# Patient Record
Sex: MALE | Race: WHITE | NOT HISPANIC OR LATINO | Employment: FULL TIME | URBAN - METROPOLITAN AREA
[De-identification: names, ages, dates, MRNs, and addresses within clinical notes are randomized per-mention and may not be internally consistent; named-entity substitution may affect disease eponyms.]

---

## 2017-01-11 ENCOUNTER — CLINICAL SUPPORT (OUTPATIENT)
Dept: INTERNAL MEDICINE | Facility: CLINIC | Age: 52
End: 2017-01-11
Payer: COMMERCIAL

## 2017-01-11 DIAGNOSIS — E29.1 OTHER TESTICULAR HYPOFUNCTION: Primary | ICD-10-CM

## 2017-01-11 DIAGNOSIS — R79.89 LOW TESTOSTERONE: ICD-10-CM

## 2017-01-11 PROCEDURE — 99999 PR PBB SHADOW E&M-EST. PATIENT-LVL II: CPT | Mod: PBBFAC,,,

## 2017-01-11 PROCEDURE — 96372 THER/PROPH/DIAG INJ SC/IM: CPT | Mod: S$GLB,,, | Performed by: FAMILY MEDICINE

## 2017-01-11 RX ORDER — TESTOSTERONE CYPIONATE 200 MG/ML
200 INJECTION, SOLUTION INTRAMUSCULAR
Status: ACTIVE | OUTPATIENT
Start: 2017-01-12 | End: 2017-06-29

## 2017-01-11 RX ADMIN — TESTOSTERONE CYPIONATE 200 MG: 200 INJECTION, SOLUTION INTRAMUSCULAR at 04:01

## 2017-01-25 ENCOUNTER — CLINICAL SUPPORT (OUTPATIENT)
Dept: INTERNAL MEDICINE | Facility: CLINIC | Age: 52
End: 2017-01-25
Payer: COMMERCIAL

## 2017-01-25 DIAGNOSIS — R79.89 LOW TESTOSTERONE: Primary | ICD-10-CM

## 2017-01-25 PROCEDURE — 99999 PR PBB SHADOW E&M-EST. PATIENT-LVL II: CPT | Mod: PBBFAC,,,

## 2017-01-25 PROCEDURE — 96372 THER/PROPH/DIAG INJ SC/IM: CPT | Mod: S$GLB,,, | Performed by: FAMILY MEDICINE

## 2017-01-25 RX ADMIN — TESTOSTERONE CYPIONATE 200 MG: 200 INJECTION, SOLUTION INTRAMUSCULAR at 04:01

## 2017-01-25 NOTE — PROGRESS NOTES
Administered Depotestosterone 200mg/ml IM left Vastus Lateralius per Dr. Schwartz. Advised to remain in lobby 15 min to monitor for adverse reaction. Tolerated well/TGD

## 2017-02-08 ENCOUNTER — CLINICAL SUPPORT (OUTPATIENT)
Dept: INTERNAL MEDICINE | Facility: CLINIC | Age: 52
End: 2017-02-08
Payer: COMMERCIAL

## 2017-02-08 DIAGNOSIS — E34.9 TESTOSTERONE DEFICIENCY: Primary | ICD-10-CM

## 2017-02-08 PROCEDURE — 96372 THER/PROPH/DIAG INJ SC/IM: CPT | Mod: S$GLB,,, | Performed by: FAMILY MEDICINE

## 2017-02-08 PROCEDURE — 99999 PR PBB SHADOW E&M-EST. PATIENT-LVL II: CPT | Mod: PBBFAC,,,

## 2017-02-08 RX ADMIN — TESTOSTERONE CYPIONATE 200 MG: 200 INJECTION, SOLUTION INTRAMUSCULAR at 04:02

## 2017-02-08 NOTE — PROGRESS NOTES
Testosterone 200 mg given IM right vastus lateralis , pt tolerated well. Do to his schedule change he is requesting to do his own injections

## 2017-02-08 NOTE — TELEPHONE ENCOUNTER
Pt's schedule has changed and he is back to working at an hourly rate. He has to take off a couple of hours early and that is not good. He is requesting to give himself the injections . Okay to send to the pharmacy ?

## 2017-02-09 RX ORDER — TESTOSTERONE CYPIONATE 200 MG/ML
200 INJECTION, SOLUTION INTRAMUSCULAR
Qty: 10 ML | Refills: 0 | Status: SHIPPED | OUTPATIENT
Start: 2017-02-09 | End: 2017-09-06 | Stop reason: SDUPTHER

## 2017-04-07 RX ORDER — POTASSIUM CHLORIDE 750 MG/1
TABLET, EXTENDED RELEASE ORAL
Qty: 90 TABLET | Refills: 3 | Status: SHIPPED | OUTPATIENT
Start: 2017-04-07 | End: 2017-04-24 | Stop reason: SDUPTHER

## 2017-04-17 ENCOUNTER — LAB VISIT (OUTPATIENT)
Dept: LAB | Facility: HOSPITAL | Age: 52
End: 2017-04-17
Attending: FAMILY MEDICINE
Payer: COMMERCIAL

## 2017-04-17 DIAGNOSIS — M1A.0790 IDIOPATHIC CHRONIC GOUT OF FOOT WITHOUT TOPHUS, UNSPECIFIED LATERALITY: ICD-10-CM

## 2017-04-17 DIAGNOSIS — E78.2 MIXED HYPERLIPIDEMIA: ICD-10-CM

## 2017-04-17 DIAGNOSIS — R79.89 LOW TESTOSTERONE: ICD-10-CM

## 2017-04-17 PROCEDURE — 84550 ASSAY OF BLOOD/URIC ACID: CPT

## 2017-04-17 PROCEDURE — 80053 COMPREHEN METABOLIC PANEL: CPT

## 2017-04-17 PROCEDURE — 84403 ASSAY OF TOTAL TESTOSTERONE: CPT

## 2017-04-17 PROCEDURE — 36415 COLL VENOUS BLD VENIPUNCTURE: CPT | Mod: PO

## 2017-04-17 PROCEDURE — 80061 LIPID PANEL: CPT

## 2017-04-17 PROCEDURE — 84153 ASSAY OF PSA TOTAL: CPT

## 2017-04-18 LAB
ALBUMIN SERPL BCP-MCNC: 3.9 G/DL
ALP SERPL-CCNC: 63 U/L
ALT SERPL W/O P-5'-P-CCNC: 24 U/L
ANION GAP SERPL CALC-SCNC: 9 MMOL/L
AST SERPL-CCNC: 19 U/L
BILIRUB SERPL-MCNC: 0.4 MG/DL
BUN SERPL-MCNC: 17 MG/DL
CALCIUM SERPL-MCNC: 8.9 MG/DL
CHLORIDE SERPL-SCNC: 101 MMOL/L
CHOLEST/HDLC SERPL: 3.6 {RATIO}
CO2 SERPL-SCNC: 29 MMOL/L
COMPLEXED PSA SERPL-MCNC: 0.47 NG/ML
CREAT SERPL-MCNC: 1 MG/DL
EST. GFR  (AFRICAN AMERICAN): >60 ML/MIN/1.73 M^2
EST. GFR  (NON AFRICAN AMERICAN): >60 ML/MIN/1.73 M^2
GLUCOSE SERPL-MCNC: 104 MG/DL
HDL/CHOLESTEROL RATIO: 27.4 %
HDLC SERPL-MCNC: 164 MG/DL
HDLC SERPL-MCNC: 45 MG/DL
LDLC SERPL CALC-MCNC: 58.8 MG/DL
NONHDLC SERPL-MCNC: 119 MG/DL
POTASSIUM SERPL-SCNC: 4.1 MMOL/L
PROT SERPL-MCNC: 7.3 G/DL
SODIUM SERPL-SCNC: 139 MMOL/L
TESTOST SERPL-MCNC: 314 NG/DL
TRIGL SERPL-MCNC: 301 MG/DL
URATE SERPL-MCNC: 6.8 MG/DL

## 2017-04-24 ENCOUNTER — OFFICE VISIT (OUTPATIENT)
Dept: INTERNAL MEDICINE | Facility: CLINIC | Age: 52
End: 2017-04-24
Payer: COMMERCIAL

## 2017-04-24 VITALS
BODY MASS INDEX: 32 KG/M2 | OXYGEN SATURATION: 95 % | SYSTOLIC BLOOD PRESSURE: 124 MMHG | WEIGHT: 216.06 LBS | TEMPERATURE: 98 F | HEART RATE: 72 BPM | HEIGHT: 69 IN | DIASTOLIC BLOOD PRESSURE: 76 MMHG

## 2017-04-24 DIAGNOSIS — Z78.9: ICD-10-CM

## 2017-04-24 DIAGNOSIS — G57.11 MERALGIA PARAESTHETICA, RIGHT: ICD-10-CM

## 2017-04-24 DIAGNOSIS — R79.89 LOW TESTOSTERONE: Primary | ICD-10-CM

## 2017-04-24 DIAGNOSIS — E78.2 MIXED HYPERLIPIDEMIA: ICD-10-CM

## 2017-04-24 DIAGNOSIS — Z12.11 COLON CANCER SCREENING: ICD-10-CM

## 2017-04-24 DIAGNOSIS — M1A.0710 IDIOPATHIC CHRONIC GOUT OF RIGHT FOOT WITHOUT TOPHUS: ICD-10-CM

## 2017-04-24 PROCEDURE — 1160F RVW MEDS BY RX/DR IN RCRD: CPT | Mod: S$GLB,,, | Performed by: FAMILY MEDICINE

## 2017-04-24 PROCEDURE — 3078F DIAST BP <80 MM HG: CPT | Mod: S$GLB,,, | Performed by: FAMILY MEDICINE

## 2017-04-24 PROCEDURE — 99214 OFFICE O/P EST MOD 30 MIN: CPT | Mod: S$GLB,,, | Performed by: FAMILY MEDICINE

## 2017-04-24 PROCEDURE — 3074F SYST BP LT 130 MM HG: CPT | Mod: S$GLB,,, | Performed by: FAMILY MEDICINE

## 2017-04-24 PROCEDURE — 99999 PR PBB SHADOW E&M-EST. PATIENT-LVL III: CPT | Mod: PBBFAC,,, | Performed by: FAMILY MEDICINE

## 2017-04-24 RX ORDER — ALLOPURINOL 100 MG/1
100 TABLET ORAL DAILY
Qty: 90 TABLET | Refills: 3 | Status: SHIPPED | OUTPATIENT
Start: 2017-04-24 | End: 2017-10-31 | Stop reason: SDUPTHER

## 2017-04-24 RX ORDER — TADALAFIL 20 MG/1
20 TABLET ORAL
Qty: 15 TABLET | Refills: 3 | Status: SHIPPED | OUTPATIENT
Start: 2017-04-24 | End: 2019-12-02

## 2017-04-24 RX ORDER — POTASSIUM CHLORIDE 750 MG/1
TABLET, EXTENDED RELEASE ORAL
Qty: 90 TABLET | Refills: 3 | Status: SHIPPED | OUTPATIENT
Start: 2017-04-24 | End: 2017-10-31 | Stop reason: SDUPTHER

## 2017-04-24 RX ORDER — HYDRALAZINE HYDROCHLORIDE 50 MG/1
50 TABLET, FILM COATED ORAL 3 TIMES DAILY
Qty: 270 TABLET | Refills: 3 | Status: SHIPPED | OUTPATIENT
Start: 2017-04-24 | End: 2017-10-31 | Stop reason: SDUPTHER

## 2017-04-24 RX ORDER — AMLODIPINE AND BENAZEPRIL HYDROCHLORIDE 10; 40 MG/1; MG/1
1 CAPSULE ORAL DAILY
Qty: 90 CAPSULE | Refills: 3 | Status: SHIPPED | OUTPATIENT
Start: 2017-04-24 | End: 2017-10-31 | Stop reason: SDUPTHER

## 2017-04-24 RX ORDER — FLUTICASONE PROPIONATE 50 MCG
1 SPRAY, SUSPENSION (ML) NASAL DAILY
Qty: 3 BOTTLE | Refills: 3 | Status: SHIPPED | OUTPATIENT
Start: 2017-04-24 | End: 2017-10-31 | Stop reason: SDUPTHER

## 2017-04-24 RX ORDER — SERTRALINE HYDROCHLORIDE 50 MG/1
50 TABLET, FILM COATED ORAL DAILY
Qty: 90 TABLET | Refills: 3 | Status: SHIPPED | OUTPATIENT
Start: 2017-04-24 | End: 2017-10-31 | Stop reason: SDUPTHER

## 2017-04-24 RX ORDER — TRAZODONE HYDROCHLORIDE 50 MG/1
50 TABLET ORAL DAILY
Qty: 90 TABLET | Refills: 3 | Status: SHIPPED | OUTPATIENT
Start: 2017-04-24 | End: 2017-10-31 | Stop reason: SDUPTHER

## 2017-04-24 RX ORDER — HYDROCHLOROTHIAZIDE 25 MG/1
25 TABLET ORAL DAILY
Qty: 90 TABLET | Refills: 3 | Status: SHIPPED | OUTPATIENT
Start: 2017-04-24 | End: 2017-10-31 | Stop reason: SDUPTHER

## 2017-04-24 RX ORDER — PRAVASTATIN SODIUM 40 MG/1
40 TABLET ORAL DAILY
Qty: 90 TABLET | Refills: 3 | Status: SHIPPED | OUTPATIENT
Start: 2017-04-24 | End: 2017-10-31 | Stop reason: SDUPTHER

## 2017-04-24 NOTE — MR AVS SNAPSHOT
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       These Medications        Disp Refills Start End    trazodone (DESYREL) 50 MG tablet 90 tablet 3 2017     Take 1 tablet (50 mg total) by mouth once daily. - Oral    Pharmacy: [facility]/pharmacy #[address] [place], [place] [address] - [address] [facility] Ph #: [phone]       tadalafil (CIALIS) 20 MG Tab 15 tablet 3 2017    Take 1 tablet (20 mg total) by mouth every 36 (thirty-six) hours. - Oral    Pharmacy: [facility]/pharmacy #[facility] [place] - [address] [facility] Ph #: [phone]       sertraline (ZOLOFT) 50 MG tablet 90 tablet 3 2017     Take 1 tablet (50 mg total) by mouth once daily. - Oral    Pharmacy: [facility]/pharmacy #[facility] [place] - [address] [facility] AT [facility] Ph #: [phone]       pravastatin (PRAVACHOL) 40 MG tablet 90  tablet 3 4/24/2017     Take 1 tablet (40 mg total) by mouth once daily. - Oral    Pharmacy: [facility]/pharmacy #[facility] JONATHON Rivera [address] [place] [address] AT [facility] Ph #: [phone]       potassium chloride SA (K-DUR,KLOR-CON) 10 MEQ tablet 90 tablet 3 4/24/2017     TAKE 2 TABLETS (20 MEQ TOTAL) BY MOUTH ONCE DAILY.    Pharmacy: [facility]/pharmacy #[facility] JONATHON Rivera [address] [place] [address] AT [facility] Ph #: [phone]       hydrochlorothiazide (HYDRODIURIL) 25 MG tablet 90 tablet 3 4/24/2017     Take 1 tablet (25 mg total) by mouth once daily. - Oral    Pharmacy: [facility]/pharmacy #[facility] JONATHON Albert [address] [place] [address] AT [facility] Ph #: [phone]       hydrALAZINE (APRESOLINE) 50 MG tablet 270 tablet 3 4/24/2017 4/24/2018    Take 1 tablet (50 mg total) by mouth 3 (three) times daily. - Oral    Pharmacy: [facility]/pharmacy #[facility] JONATHON Rivera [address] [place] [address] AT [facility] Ph #: [phone]       fluticasone (FLONASE) 50 mcg/actuation nasal spray 3 Bottle 3 4/24/2017     1 spray by Each Nare route once daily. - Each Nare    Pharmacy: [facility]/pharmacy #[facility] JONATHON Rivera [address] [place] [address] AT [facility] Ph #: [phone]       amlodipine-benazepril (LOTREL) 10-40 mg per capsule 90 capsule 3 4/24/2017     Take 1 capsule by mouth once daily. - Oral    Pharmacy: [facility]/pharmacy #[place] JONATHON Albert [address] [place] [address] AT [facility] Ph #: [phone]       allopurinol (ZYLOPRIM) 100 MG tablet 90 tablet 3 4/24/2017     Take 1 tablet (100 mg total) by mouth once daily. - Oral    Pharmacy: [facility]/pharmacy #[facility] JONATHON Albert [address] [place] AT [facility] Ph #: [phone]         [facility]     [facility] Nurse Care Line - 24/7 Assistance  Unless otherwise directed by your provider, please contact [facility], our nurse care line  that is available for 24/7 assistance.     Registered nurses in the [facility] provide: appointment scheduling, clinical advisement, health education, and other advisory services.  Call: [phone] (toll free)               Medications           Message regarding Medications     Verify the changes and/or additions to your medication regime listed below are the same as discussed with your clinician today.  If any of these changes or additions are incorrect, please notify your healthcare provider.             Verify that the below list of medications is an accurate representation of the medications you are currently taking.  If none reported, the list may be blank. If incorrect, please contact your healthcare provider. Carry this list with you in case of emergency.           Current Medications     allopurinol (ZYLOPRIM) 100 MG tablet Take 1 tablet (100 mg total) by mouth once daily.    amlodipine-benazepril (LOTREL) 10-40 mg per capsule Take 1 capsule by mouth once daily.    fluticasone (FLONASE) 50 mcg/actuation nasal spray 1 spray by Each Nare route once daily.    hydrALAZINE (APRESOLINE) 50 MG tablet Take 1 tablet (50 mg total) by mouth 3 (three) times daily.    hydrochlorothiazide (HYDRODIURIL) 25 MG tablet Take 1 tablet (25 mg total) by mouth once daily.    loratadine (CLARITIN) 10 mg tablet Take 1 tablet (10 mg total) by mouth once daily.    potassium chloride SA (K-DUR,KLOR-CON) 10 MEQ tablet TAKE 2 TABLETS (20 MEQ TOTAL) BY MOUTH ONCE DAILY.    pravastatin (PRAVACHOL) 40 MG tablet Take 1 tablet (40 mg total) by mouth once daily.    sertraline (ZOLOFT) 50 MG tablet Take 1 tablet (50 mg total) by mouth once daily.    tadalafil (CIALIS) 20 MG Tab Take 1 tablet (20 mg total) by mouth every 36 (thirty-six) hours.    testosterone cypionate (DEPOTESTOTERONE CYPIONATE) 200 mg/mL injection Inject 1 mL (200 mg total) into the muscle every 14 (fourteen) days.    trazodone (DESYREL) 50 MG tablet Take 1  "tablet (50 mg total) by mouth once daily.           Clinical Reference Information           Your Vitals Were     BP Pulse Temp Height    124/76 (BP Location: Right arm, Patient Position: Sitting, BP Method: Manual) 72 98.1 °F (36.7 °C) (Tympanic) 5' 8.5" (1.74 m)    Weight SpO2 BMI    98 kg (216 lb 0.8 oz) 95% 32.37 kg/m2      Blood Pressure          Most Recent Value    BP  124/76      Allergies as of 4/24/2017     Iodine And Iodide Containing Products      Immunizations Administered on Date of Encounter - 4/24/2017     None      Orders Placed During Today's Visit      Normal Orders This Visit    Ambulatory Referral to Pain Clinic     Case request GI: COLONOSCOPY     Future Labs/Procedures Expected by Expires    Testosterone  4/24/2017 6/23/2018    CBC auto differential  10/22/2017 (Approximate) 7/23/2018    PSA, Screening  10/23/2017 (Approximate) 7/23/2018    Comprehensive metabolic panel  10/24/2017 (Approximate) 7/23/2018    Lipid panel  10/24/2017 (Approximate) 7/23/2018      [facility] Sign-Up     Activating your [facility] account is as easy as 1-2-3!     1) Visit [email or web address], select Sign Up Now, enter this activation code and your date of birth, then select Next.  [email or web address]  Expires: 6/8/2017  5:33 PM      2) Create a username and password to use when you visit [facility] in the future and select a security question in case you lose your password and select Next.    3) Enter your e-mail address and click Sign Up!    Additional Information  If you have questions, please e-mail [email or web address] or call [phone] to talk to our [facility] staff. Remember, [facility] is NOT to be used for urgent needs. For medical emergencies, dial 911.         Language Assistance Services     ATTENTION: Language assistance services are available, free of charge. Please call [phone].      ATENCIÓN: Si habla español, tiene a wylie disposición servicios gratuitos de asistencia lingüística. Llame al " [phone].     [facility] Ý: N?u b?n nói Ti?ng Vi?t, có các d?ch v? h? tr? ngôn ng? mi?n phí manoharh cho b?n. G?i s? [phone].         [facility] complies with applicable Federal civil rights laws and does not discriminate on the basis of race, color, national origin, age, disability, or sex.

## 2017-04-24 NOTE — PROGRESS NOTES
Chief Complaint:    Chief Complaint   Patient presents with    Follow-up       History of Present Illness:    Patient presents today for follow-up of chronic medical problems,  He is on testosterone placement therapy doing well no side effects.  His blood pressure is normal today, patient lipids chemistries are at goal.  Uric acid also has come down depression is stable.  He still drinks about 2-3 beers per day.  He does complain of some pain in the right anterior lateral thigh.    ROS:  Review of Systems   Constitutional: Negative for activity change, chills, fatigue, fever and unexpected weight change.   HENT: Negative for congestion, ear discharge, ear pain, hearing loss, postnasal drip and rhinorrhea.    Eyes: Negative for pain and visual disturbance.   Respiratory: Negative for cough, chest tightness and shortness of breath.    Cardiovascular: Negative for chest pain and palpitations.   Gastrointestinal: Negative for abdominal pain, diarrhea and vomiting.   Endocrine: Negative for heat intolerance.   Genitourinary: Negative for dysuria, flank pain, frequency and hematuria.   Musculoskeletal: Negative for back pain, gait problem and neck pain.   Skin: Negative for color change and rash.   Neurological: Negative for dizziness, tremors, seizures, numbness and headaches.   Psychiatric/Behavioral: Negative for agitation, hallucinations, self-injury, sleep disturbance and suicidal ideas. The patient is not nervous/anxious.        Past Medical History:   Diagnosis Date    Allergy     Anxiety     Depression     Hyperlipidemia     Hypertension        Social History:  Social History     Social History    Marital status:      Spouse name: N/A    Number of children: N/A    Years of education: N/A     Occupational History     [employer]     Social History Main Topics    Smoking status: Never Smoker    Smokeless tobacco: Current User      Comment: dips     Alcohol use Yes    Drug use: No     "Sexual activity: Yes     Partners: Female     Birth control/ protection: None     Other Topics Concern    None     Social History Narrative    None       Family History:   family history includes Hypertension in his mother.    Health Maintenance   Topic Date Due    TETANUS VACCINE  07/21/1984    Colonoscopy  07/21/2016    Influenza Vaccine  08/01/2016    Lipid Panel  04/17/2018       Physical Exam:    Vital Signs  Temp: 98.1 °F (36.7 °C)  Temp src: Tympanic  Pulse: 72  SpO2: 95 %  BP: 124/76  BP Location: Right arm  BP Method: Manual  Patient Position: Sitting  Pain Score:   2  Pain Loc: Leg  Height and Weight  Height: 5' 8.5" (174 cm)  Weight: 98 kg (216 lb 0.8 oz)  BSA (Calculated - sq m): 2.18 sq meters  BMI (Calculated): 32.4  Weight in (lb) to have BMI = 25: 166.5]    Body mass index is 32.37 kg/(m^2).    Physical Exam   Constitutional: He is oriented to person, place, and time. He appears well-developed.   HENT:   Mouth/Throat: Oropharynx is clear and moist.   Eyes: Conjunctivae are normal. Pupils are equal, round, and reactive to light.   Neck: Normal range of motion. Neck supple.   Cardiovascular: Normal rate, regular rhythm and normal heart sounds.    No murmur heard.  Pulmonary/Chest: Effort normal and breath sounds normal. No respiratory distress. He has no wheezes. He has no rales. He exhibits no tenderness.   Abdominal: Soft. He exhibits no distension and no mass. There is no tenderness. There is no guarding.   Genitourinary: Prostate normal.   Musculoskeletal: He exhibits no edema or tenderness.        Legs:  Lymphadenopathy:     He has no cervical adenopathy.   Neurological: He is alert and oriented to person, place, and time. He has normal reflexes.   Skin: Skin is warm and dry.   Psychiatric: He has a normal mood and affect. His behavior is normal. Judgment and thought content normal.       Lab Results   Component Value Date    CHOL 164 04/17/2017    CHOL 137 10/12/2016    CHOL 132 03/23/2016 "    TRIG 301 (H) 04/17/2017    TRIG 214 (H) 10/12/2016    TRIG 131 03/23/2016    HDL 45 04/17/2017    HDL 42 10/12/2016    HDL 51 03/23/2016    TOTALCHOLEST 3.6 04/17/2017    TOTALCHOLEST 3.3 10/12/2016    TOTALCHOLEST 2.6 03/23/2016    NONHDLCHOL 119 04/17/2017    NONHDLCHOL 95 10/12/2016    NONHDLCHOL 81 03/23/2016       Lab Results   Component Value Date    HGBA1C 5.3 09/02/2015       Assessment:      ICD-10-CM ICD-9-CM   1. Low testosterone E29.1 257.2   2. Mixed hyperlipidemia E78.2 272.2   3. Alcohol ingestion, more than 4 drinks/day Z78.9 V69.8   4. 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 Colon cancer screening Z12.11 V76.51   6. Meralgia paraesthetica, right G57.11 355.1         Plan:  Continue current treatment plan.  Please cut back on alcohol intake completely  Weight loss is recommended.  Continue monitor blood pressure  He will be referred to Dr. Schumacher for thigh pain.    Orders Placed This Encounter   Procedures    Lipid panel    Comprehensive metabolic panel    CBC auto differential    PSA, Screening    Testosterone    Ambulatory Referral to Pain Clinic       Current Outpatient Prescriptions   Medication Sig Dispense Refill    allopurinol (ZYLOPRIM) 100 MG tablet Take 1 tablet (100 mg total) by mouth once daily. 90 tablet 3    amlodipine-benazepril (LOTREL) 10-40 mg per capsule Take 1 capsule by mouth once daily. 90 capsule 3    fluticasone (FLONASE) 50 mcg/actuation nasal spray 1 spray by Each Nare route once daily. 3 Bottle 3    hydrALAZINE (APRESOLINE) 50 MG tablet Take 1 tablet (50 mg total) by mouth 3 (three) times daily. 270 tablet 3    hydrochlorothiazide (HYDRODIURIL) 25 MG tablet Take 1 tablet (25 mg total) by mouth once daily. 90 tablet 3    loratadine (CLARITIN) 10 mg tablet Take 1 tablet (10 mg total) by mouth once daily. 90 tablet 0    potassium chloride SA (K-DUR,KLOR-CON) 10 MEQ tablet TAKE 2 TABLETS (20 MEQ TOTAL) BY MOUTH ONCE DAILY. 90  tablet 3    pravastatin (PRAVACHOL) 40 MG tablet Take 1 tablet (40 mg total) by mouth once daily. 90 tablet 3    sertraline (ZOLOFT) 50 MG tablet Take 1 tablet (50 mg total) by mouth once daily. 90 tablet 3    tadalafil (CIALIS) 20 MG Tab Take 1 tablet (20 mg total) by mouth every 36 (thirty-six) hours. 15 tablet 3    testosterone cypionate (DEPOTESTOTERONE CYPIONATE) 200 mg/mL injection Inject 1 mL (200 mg total) into the muscle every 14 (fourteen) days. 10 mL 0    trazodone (DESYREL) 50 MG tablet Take 1 tablet (50 mg total) by mouth once daily. 90 tablet 3     Current Facility-Administered Medications   Medication Dose Route Frequency Provider Last Rate Last Dose    testosterone cypionate injection 200 mg  200 mg Intramuscular Q14 Days Manuel Villalobos, FNP   200 mg at 02/08/17 1611       Medications Discontinued During This Encounter   Medication Reason    trazodone (DESYREL) 50 MG tablet Reorder    tadalafil (CIALIS) 20 MG Tab Reorder    sertraline (ZOLOFT) 50 MG tablet Reorder    pravastatin (PRAVACHOL) 40 MG tablet Reorder    potassium chloride SA (K-DUR,KLOR-CON) 10 MEQ tablet Reorder    hydrochlorothiazide (HYDRODIURIL) 25 MG tablet Reorder    hydrALAZINE (APRESOLINE) 50 MG tablet Reorder    fluticasone (FLONASE) 50 mcg/actuation nasal spray Reorder    amlodipine-benazepril (LOTREL) 10-40 mg per capsule Reorder    allopurinol (ZYLOPRIM) 100 MG tablet Reorder       Return in about 6 months (around 10/24/2017).      Dr Mohan Schwartz MD    Disclaimer: This note is prepared using voice recognition system and as such is likely to have errors and is not proof read.

## 2017-05-09 ENCOUNTER — OFFICE VISIT (OUTPATIENT)
Dept: PAIN MEDICINE | Facility: CLINIC | Age: 52
End: 2017-05-09
Payer: COMMERCIAL

## 2017-05-09 VITALS
WEIGHT: 216 LBS | BODY MASS INDEX: 32.74 KG/M2 | HEART RATE: 75 BPM | RESPIRATION RATE: 18 BRPM | SYSTOLIC BLOOD PRESSURE: 118 MMHG | HEIGHT: 68 IN | DIASTOLIC BLOOD PRESSURE: 73 MMHG

## 2017-05-09 DIAGNOSIS — G57.11 MERALGIA PARAESTHETICA, RIGHT: Primary | ICD-10-CM

## 2017-05-09 PROCEDURE — 99999 PR PBB SHADOW E&M-EST. PATIENT-LVL III: CPT | Mod: PBBFAC,,, | Performed by: ANESTHESIOLOGY

## 2017-05-09 PROCEDURE — 3078F DIAST BP <80 MM HG: CPT | Mod: S$GLB,,, | Performed by: ANESTHESIOLOGY

## 2017-05-09 PROCEDURE — 3074F SYST BP LT 130 MM HG: CPT | Mod: S$GLB,,, | Performed by: ANESTHESIOLOGY

## 2017-05-09 PROCEDURE — 99203 OFFICE O/P NEW LOW 30 MIN: CPT | Mod: S$GLB,,, | Performed by: ANESTHESIOLOGY

## 2017-05-09 PROCEDURE — 1160F RVW MEDS BY RX/DR IN RCRD: CPT | Mod: S$GLB,,, | Performed by: ANESTHESIOLOGY

## 2017-05-09 RX ORDER — GABAPENTIN 300 MG/1
300 CAPSULE ORAL NIGHTLY
Qty: 30 CAPSULE | Refills: 0 | Status: SHIPPED | OUTPATIENT
Start: 2017-05-09 | End: 2017-10-31 | Stop reason: SDUPTHER

## 2017-05-09 NOTE — LETTER
May 9, 2017      Mohan Schwartz MD  [address] [address] [address]           [facility]  [address] [facility] [address]  Phone: [phone]  Fax: [phone]          Patient: Dre Curiel   MR Number: [id number]   YOB: 1965   Date of Visit: 5/9/2017       Dear Dr. Mohan Schwartz:    Thank you for referring Dre Curiel to me for evaluation. Attached you will find relevant portions of my assessment and plan of care.    If you have questions, please do not hesitate to call me. I look forward to following Dre Curiel along with you.    Sincerely,    Gokul Schumacher MD    Enclosure  CC:  No Recipients    If you would like to receive this communication electronically, please contact [email or web address] or [phone] to request more information on [employer] Link access.    For providers and/or their staff who would like to refer a patient to [facility], please contact us through our one-stop-shop provider referral line, [facility], at [phone].    If you feel you have received this communication in error or would no longer like to receive these types of communications, please e-mail [email or web address]

## 2017-05-09 NOTE — PROGRESS NOTES
"Chief Pain Complaint:  Right hip pain, right leg pain    History of Present Illness:   This patient is a 51 y.o. male who presents today complaining of the above noted pain/s. The patient describes the pain as follows.    - duration of pain: 1 year   - timing: intermittent   - character: aching, sharp  - radiating, dermatomal: extends into right leg / thigh  - antecedent trauma, prior spinal surgery: no prior trauma, no prior spinal surgery   - pertinent negatives: No fever, No chills, No weight loss, No bladder dysfunction, No bowel dysfunction, No extremity weakness, No saddle anesthesia  - pertinent positives: none    - medications, other therapies tried (physical therapy, injections):     >> Tylenol, compounded cream    >> Has NOT previously undergone Physical Therapy    >> Has NOT previously undergone spinal injection/s      Imaging / Labs / Studies (reviewed on 5/9/2017): none for review      Review of Systems:  CONSTITUTIONAL: patient denies any fever, chills, or weight loss  SKIN: patient denies any rash or itching  RESPIRATORY: patient denies having any shortness of breath  GASTROINTESTINAL: patient denies having any diarrhea, constipation, or bowel incontinence  GENITOURINARY: patient denies having any abnormal bladder function    MUSCULOSKELETAL:  - patient complains of the above noted pain/s (see chief pain complaint)    NEUROLOGICAL:   - pain as above  - strength in Lower extremities is intact, BILATERALLY  - sensation in Lower extremities is abnormal, on the RIGHT  - patient denies any loss of bowel or bladder control      PSYCHIATRIC: patient denies any change in mood    Other:  All other systems reviewed and are negative      Physical Exam:  /73 (BP Location: Right arm, Patient Position: Sitting, BP Method: Automatic)  Pulse 75  Resp 18  Ht 5' 8" (1.727 m)  Wt 98 kg (216 lb)  BMI 32.84 kg/m2 (reviewed on 5/9/2017)  General: alert and oriented, in no apparent distress  Gait: normal " gait  Skin: No rashes, No discoloration, No obvious lesions  HEENT: EOMI  Cardiovascular: no significant peripheral edema present  Respiratory: respirations nonlabored    Musculoskeletal:  - Any pain on flexion, extension, rotation:    >> pain on extension and rotation  - Straight Leg Raise:     >> LEFT :: negative    >> RIGHT :: negative    - Any tenderness to palpation across paraspinal muscles, joints, bursae:     >> none across lumbar paraspinals    Neuro:  - Extremity Strength:     >> LEFT :: 5/5    >> RIGHT :: 5/5     Psych:  Mood and affect is appropriate      Assessment:  Meralgia paresthetica, right    Plan:  Patient has right thigh pain, perhaps lateral femoral cutaneous compression.  I recommend weight loss, loosen the pants, trial gabapentin, apply lidocaine cream near ASIS (inferior and medial to).  RTC in 4 weeks, if no better then injection in clinic and check lumbar xr.  Imaging / studies reviewed, detailed above.  I discussed in detail the risks, benefits, and alternatives to any and all potential treatment options.  All questions and concerns were fully addressed today in clinic.      Disclaimer:  This note may have been prepared using voice recognition software, it may have not been extensively proofed, as such there could be errors within the text such as sound alike errors.

## 2017-09-06 DIAGNOSIS — E34.9 TESTOSTERONE DEFICIENCY: ICD-10-CM

## 2017-09-06 RX ORDER — TESTOSTERONE CYPIONATE 200 MG/ML
INJECTION, SOLUTION INTRAMUSCULAR
Qty: 10 ML | Refills: 0 | Status: SHIPPED | OUTPATIENT
Start: 2017-09-06 | End: 2019-04-16 | Stop reason: SDUPTHER

## 2017-10-24 ENCOUNTER — LAB VISIT (OUTPATIENT)
Dept: LAB | Facility: HOSPITAL | Age: 52
End: 2017-10-24
Attending: FAMILY MEDICINE
Payer: COMMERCIAL

## 2017-10-24 DIAGNOSIS — Z78.9: ICD-10-CM

## 2017-10-24 DIAGNOSIS — M1A.0710 IDIOPATHIC CHRONIC GOUT OF RIGHT FOOT WITHOUT TOPHUS: ICD-10-CM

## 2017-10-24 DIAGNOSIS — R79.89 LOW TESTOSTERONE: ICD-10-CM

## 2017-10-24 DIAGNOSIS — E78.2 MIXED HYPERLIPIDEMIA: ICD-10-CM

## 2017-10-24 DIAGNOSIS — Z12.11 COLON CANCER SCREENING: ICD-10-CM

## 2017-10-24 LAB
ALBUMIN SERPL BCP-MCNC: 3.7 G/DL
ALP SERPL-CCNC: 91 U/L
ALT SERPL W/O P-5'-P-CCNC: 22 U/L
ANION GAP SERPL CALC-SCNC: 8 MMOL/L
AST SERPL-CCNC: 20 U/L
BASOPHILS # BLD AUTO: 0.07 K/UL
BASOPHILS NFR BLD: 0.8 %
BILIRUB SERPL-MCNC: 0.3 MG/DL
BUN SERPL-MCNC: 21 MG/DL
CALCIUM SERPL-MCNC: 9.4 MG/DL
CHLORIDE SERPL-SCNC: 103 MMOL/L
CHOLEST SERPL-MCNC: 162 MG/DL
CHOLEST/HDLC SERPL: 3.9 {RATIO}
CO2 SERPL-SCNC: 29 MMOL/L
COMPLEXED PSA SERPL-MCNC: 0.29 NG/ML
CREAT SERPL-MCNC: 1.2 MG/DL
DIFFERENTIAL METHOD: ABNORMAL
EOSINOPHIL # BLD AUTO: 0.2 K/UL
EOSINOPHIL NFR BLD: 1.7 %
ERYTHROCYTE [DISTWIDTH] IN BLOOD BY AUTOMATED COUNT: 12.6 %
EST. GFR  (AFRICAN AMERICAN): >60 ML/MIN/1.73 M^2
EST. GFR  (NON AFRICAN AMERICAN): >60 ML/MIN/1.73 M^2
GLUCOSE SERPL-MCNC: 121 MG/DL
HCT VFR BLD AUTO: 44.6 %
HDLC SERPL-MCNC: 42 MG/DL
HDLC SERPL: 25.9 %
HGB BLD-MCNC: 14.6 G/DL
IMM GRANULOCYTES # BLD AUTO: 0.05 K/UL
IMM GRANULOCYTES NFR BLD AUTO: 0.6 %
LDLC SERPL CALC-MCNC: 73 MG/DL
LYMPHOCYTES # BLD AUTO: 1.9 K/UL
LYMPHOCYTES NFR BLD: 21.5 %
MCH RBC QN AUTO: 29.1 PG
MCHC RBC AUTO-ENTMCNC: 32.7 G/DL
MCV RBC AUTO: 89 FL
MONOCYTES # BLD AUTO: 0.6 K/UL
MONOCYTES NFR BLD: 6.9 %
NEUTROPHILS # BLD AUTO: 6.1 K/UL
NEUTROPHILS NFR BLD: 68.5 %
NONHDLC SERPL-MCNC: 120 MG/DL
NRBC BLD-RTO: 0 /100 WBC
PLATELET # BLD AUTO: 269 K/UL
PMV BLD AUTO: 11.5 FL
POTASSIUM SERPL-SCNC: 4 MMOL/L
PROT SERPL-MCNC: 7.7 G/DL
RBC # BLD AUTO: 5.02 M/UL
SODIUM SERPL-SCNC: 140 MMOL/L
TESTOST SERPL-MCNC: 167 NG/DL
TRIGL SERPL-MCNC: 235 MG/DL
WBC # BLD AUTO: 8.84 K/UL

## 2017-10-24 PROCEDURE — 80053 COMPREHEN METABOLIC PANEL: CPT

## 2017-10-24 PROCEDURE — 85025 COMPLETE CBC W/AUTO DIFF WBC: CPT

## 2017-10-24 PROCEDURE — 84403 ASSAY OF TOTAL TESTOSTERONE: CPT

## 2017-10-24 PROCEDURE — 80061 LIPID PANEL: CPT

## 2017-10-24 PROCEDURE — 36415 COLL VENOUS BLD VENIPUNCTURE: CPT | Mod: PO

## 2017-10-24 PROCEDURE — 84153 ASSAY OF PSA TOTAL: CPT

## 2017-10-25 DIAGNOSIS — D72.829 LEUKOCYTOSIS, UNSPECIFIED TYPE: Primary | ICD-10-CM

## 2017-10-30 ENCOUNTER — TELEPHONE (OUTPATIENT)
Dept: FAMILY MEDICINE | Facility: CLINIC | Age: 52
End: 2017-10-30

## 2017-10-30 NOTE — TELEPHONE ENCOUNTER
----- Message from Mohan Schwartz MD sent at 10/25/2017  8:49 AM CDT -----  CBC showing some immature granulocytes and it needs to be repeated in 1-2 weeks.    Orders are in the system

## 2017-10-30 NOTE — TELEPHONE ENCOUNTER
Spoke with patient. Gave results. Patient verbalized understanding. Appt for follow up labs and MD tomorrow.

## 2017-10-31 ENCOUNTER — OFFICE VISIT (OUTPATIENT)
Dept: FAMILY MEDICINE | Facility: CLINIC | Age: 52
End: 2017-10-31
Payer: COMMERCIAL

## 2017-10-31 ENCOUNTER — LAB VISIT (OUTPATIENT)
Dept: LAB | Facility: HOSPITAL | Age: 52
End: 2017-10-31
Attending: FAMILY MEDICINE
Payer: COMMERCIAL

## 2017-10-31 VITALS
DIASTOLIC BLOOD PRESSURE: 72 MMHG | TEMPERATURE: 98 F | WEIGHT: 218.38 LBS | SYSTOLIC BLOOD PRESSURE: 135 MMHG | HEIGHT: 68 IN | OXYGEN SATURATION: 96 % | BODY MASS INDEX: 33.1 KG/M2 | HEART RATE: 81 BPM

## 2017-10-31 DIAGNOSIS — E78.2 MIXED HYPERLIPIDEMIA: ICD-10-CM

## 2017-10-31 DIAGNOSIS — D72.829 LEUKOCYTOSIS, UNSPECIFIED TYPE: ICD-10-CM

## 2017-10-31 DIAGNOSIS — Z51.81 ENCOUNTER FOR MONITORING TESTOSTERONE REPLACEMENT THERAPY: ICD-10-CM

## 2017-10-31 DIAGNOSIS — Z12.11 COLON CANCER SCREENING: ICD-10-CM

## 2017-10-31 DIAGNOSIS — G47.09 OTHER INSOMNIA: ICD-10-CM

## 2017-10-31 DIAGNOSIS — I10 HYPERTENSION, UNSPECIFIED TYPE: ICD-10-CM

## 2017-10-31 DIAGNOSIS — R79.89 LOW TESTOSTERONE: Primary | ICD-10-CM

## 2017-10-31 DIAGNOSIS — Z79.890 ENCOUNTER FOR MONITORING TESTOSTERONE REPLACEMENT THERAPY: ICD-10-CM

## 2017-10-31 LAB
BASOPHILS # BLD AUTO: 0.09 K/UL
BASOPHILS NFR BLD: 0.7 %
DIFFERENTIAL METHOD: ABNORMAL
EOSINOPHIL # BLD AUTO: 0.1 K/UL
EOSINOPHIL NFR BLD: 1.1 %
ERYTHROCYTE [DISTWIDTH] IN BLOOD BY AUTOMATED COUNT: 12.6 %
HCT VFR BLD AUTO: 42.2 %
HGB BLD-MCNC: 14.2 G/DL
IMM GRANULOCYTES # BLD AUTO: 0.07 K/UL
IMM GRANULOCYTES NFR BLD AUTO: 0.5 %
LYMPHOCYTES # BLD AUTO: 2.4 K/UL
LYMPHOCYTES NFR BLD: 18 %
MCH RBC QN AUTO: 29.2 PG
MCHC RBC AUTO-ENTMCNC: 33.6 G/DL
MCV RBC AUTO: 87 FL
MONOCYTES # BLD AUTO: 1 K/UL
MONOCYTES NFR BLD: 7.3 %
NEUTROPHILS # BLD AUTO: 9.5 K/UL
NEUTROPHILS NFR BLD: 72.4 %
NRBC BLD-RTO: 0 /100 WBC
PLATELET # BLD AUTO: 272 K/UL
PMV BLD AUTO: 11.1 FL
RBC # BLD AUTO: 4.87 M/UL
WBC # BLD AUTO: 13.11 K/UL

## 2017-10-31 PROCEDURE — 90471 IMMUNIZATION ADMIN: CPT | Mod: S$GLB,,, | Performed by: FAMILY MEDICINE

## 2017-10-31 PROCEDURE — 90472 IMMUNIZATION ADMIN EACH ADD: CPT | Mod: S$GLB,,, | Performed by: FAMILY MEDICINE

## 2017-10-31 PROCEDURE — 90686 IIV4 VACC NO PRSV 0.5 ML IM: CPT | Mod: S$GLB,,, | Performed by: FAMILY MEDICINE

## 2017-10-31 PROCEDURE — 85025 COMPLETE CBC W/AUTO DIFF WBC: CPT

## 2017-10-31 PROCEDURE — 99214 OFFICE O/P EST MOD 30 MIN: CPT | Mod: 25,S$GLB,, | Performed by: FAMILY MEDICINE

## 2017-10-31 PROCEDURE — 99999 PR PBB SHADOW E&M-EST. PATIENT-LVL III: CPT | Mod: PBBFAC,,, | Performed by: FAMILY MEDICINE

## 2017-10-31 PROCEDURE — 90715 TDAP VACCINE 7 YRS/> IM: CPT | Mod: S$GLB,,, | Performed by: FAMILY MEDICINE

## 2017-10-31 PROCEDURE — 36415 COLL VENOUS BLD VENIPUNCTURE: CPT | Mod: PO

## 2017-10-31 RX ORDER — POTASSIUM CHLORIDE 750 MG/1
TABLET, EXTENDED RELEASE ORAL
Qty: 90 TABLET | Refills: 3 | Status: SHIPPED | OUTPATIENT
Start: 2017-10-31 | End: 2018-11-21 | Stop reason: SDUPTHER

## 2017-10-31 RX ORDER — HYDRALAZINE HYDROCHLORIDE 50 MG/1
50 TABLET, FILM COATED ORAL 3 TIMES DAILY
Qty: 270 TABLET | Refills: 3 | Status: SHIPPED | OUTPATIENT
Start: 2017-10-31 | End: 2018-11-21 | Stop reason: SDUPTHER

## 2017-10-31 RX ORDER — SERTRALINE HYDROCHLORIDE 50 MG/1
50 TABLET, FILM COATED ORAL DAILY
Qty: 90 TABLET | Refills: 3 | Status: SHIPPED | OUTPATIENT
Start: 2017-10-31 | End: 2018-11-21 | Stop reason: SDUPTHER

## 2017-10-31 RX ORDER — HYDROCHLOROTHIAZIDE 25 MG/1
25 TABLET ORAL DAILY
Qty: 90 TABLET | Refills: 3 | Status: SHIPPED | OUTPATIENT
Start: 2017-10-31 | End: 2018-11-21 | Stop reason: SDUPTHER

## 2017-10-31 RX ORDER — FLUTICASONE PROPIONATE 50 MCG
1 SPRAY, SUSPENSION (ML) NASAL DAILY
Qty: 3 BOTTLE | Refills: 3 | Status: SHIPPED | OUTPATIENT
Start: 2017-10-31 | End: 2020-06-09 | Stop reason: SDUPTHER

## 2017-10-31 RX ORDER — AMLODIPINE AND BENAZEPRIL HYDROCHLORIDE 10; 40 MG/1; MG/1
1 CAPSULE ORAL DAILY
Qty: 90 CAPSULE | Refills: 3 | Status: SHIPPED | OUTPATIENT
Start: 2017-10-31 | End: 2018-11-10 | Stop reason: SDUPTHER

## 2017-10-31 RX ORDER — PRAVASTATIN SODIUM 40 MG/1
40 TABLET ORAL DAILY
Qty: 90 TABLET | Refills: 3 | Status: SHIPPED | OUTPATIENT
Start: 2017-10-31 | End: 2018-11-21 | Stop reason: SDUPTHER

## 2017-10-31 RX ORDER — LORATADINE 10 MG/1
10 TABLET ORAL DAILY
Qty: 90 TABLET | Refills: 0 | Status: SHIPPED | OUTPATIENT
Start: 2017-10-31 | End: 2018-11-21 | Stop reason: SDUPTHER

## 2017-10-31 RX ORDER — ALLOPURINOL 100 MG/1
100 TABLET ORAL DAILY
Qty: 90 TABLET | Refills: 3 | Status: SHIPPED | OUTPATIENT
Start: 2017-10-31 | End: 2018-11-21 | Stop reason: SDUPTHER

## 2017-10-31 RX ORDER — TRAZODONE HYDROCHLORIDE 50 MG/1
50 TABLET ORAL DAILY
Qty: 90 TABLET | Refills: 3 | Status: SHIPPED | OUTPATIENT
Start: 2017-10-31 | End: 2018-11-21 | Stop reason: SDUPTHER

## 2017-10-31 RX ORDER — GABAPENTIN 300 MG/1
300 CAPSULE ORAL NIGHTLY
Qty: 30 CAPSULE | Refills: 0 | Status: SHIPPED | OUTPATIENT
Start: 2017-10-31 | End: 2017-12-05 | Stop reason: SDUPTHER

## 2017-10-31 NOTE — PROGRESS NOTES
Chief Complaint:    Chief Complaint   Patient presents with    Follow-up       History of Present Illness:    Is here for follow-up of chronic medical problems,  He has low testosterone is on replacement therapy doing well no side effects.  Also drinking a lot of alcohol which he says he's cut back.  His recent CBC revealed that he has some immature cells and repeat CBC is pending.  He does take trazodone to help with insomnia.    ROS:  Review of Systems   Constitutional: Negative for activity change, chills, fatigue, fever and unexpected weight change.   HENT: Negative for congestion, ear discharge, ear pain, hearing loss, postnasal drip and rhinorrhea.    Eyes: Negative for pain and visual disturbance.   Respiratory: Negative for cough, chest tightness and shortness of breath.    Cardiovascular: Negative for chest pain and palpitations.   Gastrointestinal: Negative for abdominal pain, diarrhea and vomiting.   Endocrine: Negative for heat intolerance.   Genitourinary: Negative for dysuria, flank pain, frequency and hematuria.   Musculoskeletal: Negative for back pain, gait problem and neck pain.   Skin: Negative for color change and rash.   Neurological: Negative for dizziness, tremors, seizures, numbness and headaches.   Psychiatric/Behavioral: Negative for agitation, hallucinations, self-injury, sleep disturbance and suicidal ideas. The patient is not nervous/anxious.        Past Medical History:   Diagnosis Date    Allergy     Anxiety     Depression     Hyperlipidemia     Hypertension        Social History:  Social History     Social History    Marital status:      Spouse name: N/A    Number of children: N/A    Years of education: N/A     Occupational History     [employer]     Social History Main Topics    Smoking status: Never Smoker    Smokeless tobacco: Current User      Comment: dips     Alcohol use Yes    Drug use: No    Sexual activity: Yes     Partners: Female     Birth  "control/ protection: None     Other Topics Concern    None     Social History Narrative    None       Family History:   family history includes Hypertension in his mother.    Health Maintenance   Topic Date Due    TETANUS VACCINE  07/21/1984    Colonoscopy  07/21/2016    Influenza Vaccine  08/01/2017    Lipid Panel  10/24/2018       Physical Exam:    Vital Signs  Temp: 97.8 °F (36.6 °C)  Temp src: Tympanic  Pulse: 81  SpO2: 96 %  BP: 135/72  BP Location: Left arm  Patient Position: Sitting  Pain Score: 0-No pain  Height and Weight  Height: 5' 8" (172.7 cm)  Weight: 99 kg (218 lb 5.9 oz)  BSA (Calculated - sq m): 2.18 sq meters  BMI (Calculated): 33.3  Weight in (lb) to have BMI = 25: 164.1]    Body mass index is 33.2 kg/m².    Physical Exam   Constitutional: He is oriented to person, place, and time. He appears well-developed.   HENT:   Mouth/Throat: Oropharynx is clear and moist.   Eyes: Conjunctivae are normal. Pupils are equal, round, and reactive to light.   Neck: Normal range of motion. Neck supple.   Cardiovascular: Normal rate, regular rhythm and normal heart sounds.    No murmur heard.  Pulmonary/Chest: Effort normal and breath sounds normal. No respiratory distress. He has no wheezes. He has no rales. He exhibits no tenderness.   Abdominal: Soft. He exhibits no distension and no mass. There is no tenderness. There is no guarding.   Genitourinary: Prostate normal.   Musculoskeletal: He exhibits no edema or tenderness.   Lymphadenopathy:     He has no cervical adenopathy.   Neurological: He is alert and oriented to person, place, and time. He has normal reflexes.   Skin: Skin is warm and dry.   Psychiatric: He has a normal mood and affect. His behavior is normal. Judgment and thought content normal.       Lab Results   Component Value Date    CHOL 162 10/24/2017    CHOL 164 04/17/2017    CHOL 137 10/12/2016    TRIG 235 (H) 10/24/2017    TRIG 301 (H) 04/17/2017    TRIG 214 (H) 10/12/2016    HDL 42 " 10/24/2017    HDL 45 04/17/2017    HDL 42 10/12/2016    TOTALCHOLEST 3.9 10/24/2017    TOTALCHOLEST 3.6 04/17/2017    TOTALCHOLEST 3.3 10/12/2016    NONHDLCHOL 120 10/24/2017    NONHDLCHOL 119 04/17/2017    NONHDLCHOL 95 10/12/2016       Lab Results   Component Value Date    HGBA1C 5.3 09/02/2015       Assessment:      ICD-10-CM ICD-9-CM   1. Low testosterone E34.9 259.9   2. Other insomnia G47.09 780.52   3. Encounter for monitoring testosterone replacement therapy Z51.81 V58.83    Z79.899 V58.69   4. Mixed hyperlipidemia E78.2 272.2   5. Hypertension, unspecified type I10 401.9   6. Colon cancer screening Z12.11 V76.51         Plan:  Continue current meds and plan.  Elevate CBC to see if he still has the same pattern of immature cells.  PSA will need hematology consultation.  Continue to minimize the use of alcohol.  Weight loss is recommended.  We have entered colonoscopy orders as requested since he could not keep up his last appointment.  Orders Placed This Encounter   Procedures    (In Office Administered) Tdap Vaccine    Influenza - Quadrivalent (3 years & older) (PF)    Comprehensive metabolic panel    Lipid panel    CBC auto differential    PSA, Screening    Testosterone       Current Outpatient Prescriptions   Medication Sig Dispense Refill    allopurinol (ZYLOPRIM) 100 MG tablet Take 1 tablet (100 mg total) by mouth once daily. 90 tablet 3    amlodipine-benazepril (LOTREL) 10-40 mg per capsule Take 1 capsule by mouth once daily. 90 capsule 3    fluticasone (FLONASE) 50 mcg/actuation nasal spray 1 spray by Each Nare route once daily. 3 Bottle 3    hydrALAZINE (APRESOLINE) 50 MG tablet Take 1 tablet (50 mg total) by mouth 3 (three) times daily. 270 tablet 3    hydroCHLOROthiazide (HYDRODIURIL) 25 MG tablet Take 1 tablet (25 mg total) by mouth once daily. 90 tablet 3    potassium chloride SA (K-DUR,KLOR-CON) 10 MEQ tablet TAKE 2 TABLETS (20 MEQ TOTAL) BY MOUTH ONCE DAILY. 90 tablet 3     pravastatin (PRAVACHOL) 40 MG tablet Take 1 tablet (40 mg total) by mouth once daily. 90 tablet 3    sertraline (ZOLOFT) 50 MG tablet Take 1 tablet (50 mg total) by mouth once daily. 90 tablet 3    tadalafil (CIALIS) 20 MG Tab Take 1 tablet (20 mg total) by mouth every 36 (thirty-six) hours. 15 tablet 3    testosterone cypionate (DEPOTESTOTERONE CYPIONATE) 200 mg/mL injection INJECT 1 ML INTO MUSCLE EVERY 14 DAYS AS DIRECTED 10 mL 0    traZODone (DESYREL) 50 MG tablet Take 1 tablet (50 mg total) by mouth once daily. 90 tablet 3    gabapentin (NEURONTIN) 300 MG capsule Take 1 capsule (300 mg total) by mouth every evening. Take 45 min to 1 hour before bed as may cause drowsiness 30 capsule 0    loratadine (CLARITIN) 10 mg tablet Take 1 tablet (10 mg total) by mouth once daily. 90 tablet 0     No current facility-administered medications for this visit.        Medications Discontinued During This Encounter   Medication Reason    allopurinol (ZYLOPRIM) 100 MG tablet Reorder    amlodipine-benazepril (LOTREL) 10-40 mg per capsule Reorder    fluticasone (FLONASE) 50 mcg/actuation nasal spray Reorder    gabapentin (NEURONTIN) 300 MG capsule Reorder    hydrALAZINE (APRESOLINE) 50 MG tablet Reorder    hydrochlorothiazide (HYDRODIURIL) 25 MG tablet Reorder    loratadine (CLARITIN) 10 mg tablet Reorder    potassium chloride SA (K-DUR,KLOR-CON) 10 MEQ tablet Reorder    pravastatin (PRAVACHOL) 40 MG tablet Reorder    sertraline (ZOLOFT) 50 MG tablet Reorder    trazodone (DESYREL) 50 MG tablet Reorder       Return in about 6 months (around 4/30/2018).      Mohan Schwartz MD          Disclaimer: This note is prepared using voice recognition system and as such is likely to have errors and is not proof read.

## 2017-11-01 DIAGNOSIS — D72.829 LEUKOCYTOSIS, UNSPECIFIED TYPE: Primary | ICD-10-CM

## 2017-11-01 NOTE — PROGRESS NOTES
Pt given influenza vaccine IM right deltoid. Pt also given Tdap vaccine left deltoid. Pt advised to wait 15 minutes to observe for adverse reaction. Pt tolerated well.

## 2017-11-03 ENCOUNTER — TELEPHONE (OUTPATIENT)
Dept: FAMILY MEDICINE | Facility: CLINIC | Age: 52
End: 2017-11-03

## 2017-11-03 DIAGNOSIS — D72.9 ABNORMAL WBC COUNT: Primary | ICD-10-CM

## 2017-11-24 ENCOUNTER — OFFICE VISIT (OUTPATIENT)
Dept: HEMATOLOGY/ONCOLOGY | Facility: CLINIC | Age: 52
End: 2017-11-24
Payer: COMMERCIAL

## 2017-11-24 ENCOUNTER — LAB VISIT (OUTPATIENT)
Dept: LAB | Facility: HOSPITAL | Age: 52
End: 2017-11-24
Attending: INTERNAL MEDICINE
Payer: COMMERCIAL

## 2017-11-24 VITALS
OXYGEN SATURATION: 99 % | HEART RATE: 64 BPM | BODY MASS INDEX: 33.08 KG/M2 | DIASTOLIC BLOOD PRESSURE: 97 MMHG | SYSTOLIC BLOOD PRESSURE: 163 MMHG | HEIGHT: 68 IN | WEIGHT: 218.25 LBS | TEMPERATURE: 98 F

## 2017-11-24 DIAGNOSIS — D72.825 BANDEMIA: ICD-10-CM

## 2017-11-24 DIAGNOSIS — D72.820 LYMPHOCYTOSIS: ICD-10-CM

## 2017-11-24 LAB
BASOPHILS # BLD AUTO: 0.03 K/UL
BASOPHILS NFR BLD: 0.3 %
DIFFERENTIAL METHOD: ABNORMAL
EOSINOPHIL # BLD AUTO: 0.1 K/UL
EOSINOPHIL NFR BLD: 1.1 %
ERYTHROCYTE [DISTWIDTH] IN BLOOD BY AUTOMATED COUNT: 13.3 %
HCT VFR BLD AUTO: 43.5 %
HGB BLD-MCNC: 14.7 G/DL
LYMPHOCYTES # BLD AUTO: 1.7 K/UL
LYMPHOCYTES NFR BLD: 18.4 %
MCH RBC QN AUTO: 30.2 PG
MCHC RBC AUTO-ENTMCNC: 33.8 G/DL
MCV RBC AUTO: 89 FL
MONOCYTES # BLD AUTO: 0.5 K/UL
MONOCYTES NFR BLD: 5.6 %
NEUTROPHILS # BLD AUTO: 6.9 K/UL
NEUTROPHILS NFR BLD: 74.6 %
PLATELET # BLD AUTO: 235 K/UL
PMV BLD AUTO: 9.9 FL
RBC # BLD AUTO: 4.87 M/UL
WBC # BLD AUTO: 9.27 K/UL

## 2017-11-24 PROCEDURE — 99244 OFF/OP CNSLTJ NEW/EST MOD 40: CPT | Mod: S$GLB,,, | Performed by: INTERNAL MEDICINE

## 2017-11-24 PROCEDURE — 36415 COLL VENOUS BLD VENIPUNCTURE: CPT

## 2017-11-24 PROCEDURE — 85025 COMPLETE CBC W/AUTO DIFF WBC: CPT

## 2017-11-24 PROCEDURE — 99999 PR PBB SHADOW E&M-EST. PATIENT-LVL III: CPT | Mod: PBBFAC,,, | Performed by: INTERNAL MEDICINE

## 2017-11-24 NOTE — PROGRESS NOTES
Subjective:       Patient ID: Dre Curiel is a 52 y.o. male.    Chief Complaint: Results (Leukocytosis)    HPI 52-year-old male referred by primary physician for evaluation of abnormal CBC with elevated white count and atypical granulocytes present    Past Medical History:   Diagnosis Date    Allergy     Anxiety     Depression     Hyperlipidemia     Hypertension      Family History   Problem Relation Age of Onset    Hypertension Mother     No Known Problems Sister     No Known Problems Brother     No Known Problems Maternal Aunt     Cancer Maternal Uncle     No Known Problems Paternal Aunt     No Known Problems Paternal Uncle     No Known Problems Maternal Grandmother     No Known Problems Maternal Grandfather     No Known Problems Paternal Grandmother     No Known Problems Paternal Grandfather      Social History     Social History    Marital status:      Spouse name: N/A    Number of children: N/A    Years of education: N/A     Occupational History     [employer]     Social History Main Topics    Smoking status: Never Smoker    Smokeless tobacco: Current User      Comment: dips     Alcohol use Yes    Drug use: No    Sexual activity: Yes     Partners: Female     Birth control/ protection: None     Other Topics Concern    Not on file     Social History Narrative    No narrative on file     Past Surgical History:   Procedure Laterality Date    COSMETIC SURGERY      kidney removal         Labs:  Lab Results   Component Value Date    WBC 9.27 11/24/2017    HGB 14.7 11/24/2017    HCT 43.5 11/24/2017    MCV 89 11/24/2017     11/24/2017     BMP  Lab Results   Component Value Date     10/24/2017    K 4.0 10/24/2017     10/24/2017    CO2 29 10/24/2017    BUN 21 (H) 10/24/2017    CREATININE 1.2 10/24/2017    CALCIUM 9.4 10/24/2017    ANIONGAP 8 10/24/2017    ESTGFRAFRICA >60.0 10/24/2017    EGFRNONAA >60.0 10/24/2017     Lab Results   Component Value Date    ALT  22 10/24/2017    AST 20 10/24/2017    ALKPHOS 91 10/24/2017    BILITOT 0.3 10/24/2017       No results found for: IRON, TIBC, FERRITIN, SATURATEDIRO  No results found for: [id number]  No results found for: FOLATE  Lab Results   Component Value Date    TSH 1.27 01/25/2011         Review of Systems   Constitutional: Negative for activity change, appetite change, chills, diaphoresis, fatigue, fever and unexpected weight change.   HENT: Negative for congestion, dental problem, drooling, ear discharge, ear pain, facial swelling, hearing loss, mouth sores, nosebleeds, postnasal drip, rhinorrhea, sinus pressure, sneezing, sore throat, tinnitus, trouble swallowing and voice change.    Eyes: Negative for photophobia, pain, discharge, redness, itching and visual disturbance.   Respiratory: Negative for apnea, cough, choking, chest tightness, shortness of breath, wheezing and stridor.    Cardiovascular: Negative for chest pain, palpitations and leg swelling.   Gastrointestinal: Negative for abdominal distention, abdominal pain, anal bleeding, blood in stool, constipation, diarrhea, nausea, rectal pain and vomiting.   Endocrine: Negative for cold intolerance, heat intolerance, polydipsia, polyphagia and polyuria.   Genitourinary: Negative for decreased urine volume, difficulty urinating, discharge, dysuria, enuresis, flank pain, frequency, genital sores, hematuria, penile pain, penile swelling, scrotal swelling, testicular pain and urgency.   Musculoskeletal: Negative for arthralgias, back pain, gait problem, joint swelling, myalgias, neck pain and neck stiffness.   Skin: Negative for color change, pallor, rash and wound.   Allergic/Immunologic: Negative for environmental allergies, food allergies and immunocompromised state.   Neurological: Negative for dizziness, tremors, seizures, syncope, facial asymmetry, speech difficulty, weakness, light-headedness, numbness and headaches.   Hematological: Negative for adenopathy. Does  not bruise/bleed easily.   Psychiatric/Behavioral: Negative for agitation, behavioral problems, confusion, decreased concentration, dysphoric mood, hallucinations, self-injury, sleep disturbance and suicidal ideas. The patient is not nervous/anxious and is not hyperactive.        Objective:      Physical Exam   Constitutional: He is oriented to person, place, and time. He appears well-developed and well-nourished. No distress.   HENT:   Head: Normocephalic.   Right Ear: Tympanic membrane and external ear normal.   Left Ear: Tympanic membrane and external ear normal.   Nose: Nose normal. Right sinus exhibits no maxillary sinus tenderness and no frontal sinus tenderness. Left sinus exhibits no maxillary sinus tenderness and no frontal sinus tenderness.   Mouth/Throat: Oropharynx is clear and moist. No oropharyngeal exudate.   Eyes: EOM and lids are normal. Pupils are equal, round, and reactive to light. Right eye exhibits no discharge. Left eye exhibits no discharge. Right conjunctiva is not injected. Right conjunctiva has no hemorrhage. Left conjunctiva is not injected. Left conjunctiva has no hemorrhage. No scleral icterus. Right eye exhibits normal extraocular motion. Left eye exhibits normal extraocular motion.   Neck: Normal range of motion. Neck supple. No JVD present. No tracheal deviation present. No thyromegaly present.   Cardiovascular: Normal rate, regular rhythm and normal heart sounds.    Pulmonary/Chest: Effort normal and breath sounds normal. No stridor. No respiratory distress.   Abdominal: Soft. Bowel sounds are normal. He exhibits no mass. There is no hepatosplenomegaly, splenomegaly or hepatomegaly. There is no tenderness.   Musculoskeletal: Normal range of motion. He exhibits no edema or tenderness.   Lymphadenopathy:        Head (right side): No posterior auricular and no occipital adenopathy present.        Head (left side): No posterior auricular and no occipital adenopathy present.     He has  no cervical adenopathy.        Right cervical: No superficial cervical, no deep cervical and no posterior cervical adenopathy present.       Left cervical: No superficial cervical, no deep cervical and no posterior cervical adenopathy present.     He has no axillary adenopathy.        Right: No supraclavicular adenopathy present.        Left: No supraclavicular adenopathy present.   Neurological: He is alert and oriented to person, place, and time. He has normal strength. No cranial nerve deficit. Coordination normal.   Skin: Skin is dry. No rash noted. He is not diaphoretic. No cyanosis or erythema. Nails show no clubbing.   Psychiatric: He has a normal mood and affect. His behavior is normal. Judgment and thought content normal. Cognition and memory are normal.   Vitals reviewed.          Assessment:      1. Bandemia           Plan:   Repeat CBC totally normal today at this point high likelihood normal variant patient does report a previous friend was called an infection when his CBC was done complete resolution at this point would make no further follow-up discussed implications.  This is a new type of a CBC that is marking the cells will check with pathology for review

## 2017-12-05 RX ORDER — GABAPENTIN 300 MG/1
CAPSULE ORAL
Qty: 30 CAPSULE | Refills: 0 | Status: SHIPPED | OUTPATIENT
Start: 2017-12-05 | End: 2018-01-03 | Stop reason: SDUPTHER

## 2018-01-03 RX ORDER — GABAPENTIN 300 MG/1
CAPSULE ORAL
Qty: 30 CAPSULE | Refills: 0 | Status: SHIPPED | OUTPATIENT
Start: 2018-01-03 | End: 2018-02-19 | Stop reason: SDUPTHER

## 2018-02-19 RX ORDER — GABAPENTIN 300 MG/1
CAPSULE ORAL
Qty: 30 CAPSULE | Refills: 0 | Status: SHIPPED | OUTPATIENT
Start: 2018-02-19 | End: 2018-03-19 | Stop reason: SDUPTHER

## 2018-03-19 RX ORDER — GABAPENTIN 300 MG/1
CAPSULE ORAL
Qty: 30 CAPSULE | Refills: 0 | Status: SHIPPED | OUTPATIENT
Start: 2018-03-19 | End: 2018-05-18

## 2018-04-30 ENCOUNTER — LAB VISIT (OUTPATIENT)
Dept: LAB | Facility: HOSPITAL | Age: 53
End: 2018-04-30
Attending: FAMILY MEDICINE
Payer: COMMERCIAL

## 2018-04-30 DIAGNOSIS — E78.2 MIXED HYPERLIPIDEMIA: ICD-10-CM

## 2018-04-30 DIAGNOSIS — I10 HYPERTENSION, UNSPECIFIED TYPE: ICD-10-CM

## 2018-04-30 DIAGNOSIS — R79.89 LOW TESTOSTERONE: ICD-10-CM

## 2018-04-30 LAB
ALBUMIN SERPL BCP-MCNC: 3.7 G/DL
ALP SERPL-CCNC: 80 U/L
ALT SERPL W/O P-5'-P-CCNC: 17 U/L
ANION GAP SERPL CALC-SCNC: 9 MMOL/L
AST SERPL-CCNC: 16 U/L
BASOPHILS # BLD AUTO: 0.08 K/UL
BASOPHILS NFR BLD: 0.8 %
BILIRUB SERPL-MCNC: 0.5 MG/DL
BUN SERPL-MCNC: 10 MG/DL
CALCIUM SERPL-MCNC: 9.1 MG/DL
CHLORIDE SERPL-SCNC: 102 MMOL/L
CHOLEST SERPL-MCNC: 139 MG/DL
CHOLEST/HDLC SERPL: 3.4 {RATIO}
CO2 SERPL-SCNC: 29 MMOL/L
COMPLEXED PSA SERPL-MCNC: 0.78 NG/ML
CREAT SERPL-MCNC: 1 MG/DL
DIFFERENTIAL METHOD: ABNORMAL
EOSINOPHIL # BLD AUTO: 0.1 K/UL
EOSINOPHIL NFR BLD: 1.3 %
ERYTHROCYTE [DISTWIDTH] IN BLOOD BY AUTOMATED COUNT: 13.3 %
EST. GFR  (AFRICAN AMERICAN): >60 ML/MIN/1.73 M^2
EST. GFR  (NON AFRICAN AMERICAN): >60 ML/MIN/1.73 M^2
GLUCOSE SERPL-MCNC: 139 MG/DL
HCT VFR BLD AUTO: 48.9 %
HDLC SERPL-MCNC: 41 MG/DL
HDLC SERPL: 29.5 %
HGB BLD-MCNC: 15.5 G/DL
IMM GRANULOCYTES # BLD AUTO: 0.03 K/UL
IMM GRANULOCYTES NFR BLD AUTO: 0.3 %
LDLC SERPL CALC-MCNC: 66.6 MG/DL
LYMPHOCYTES # BLD AUTO: 1.7 K/UL
LYMPHOCYTES NFR BLD: 17.6 %
MCH RBC QN AUTO: 29.5 PG
MCHC RBC AUTO-ENTMCNC: 31.7 G/DL
MCV RBC AUTO: 93 FL
MONOCYTES # BLD AUTO: 0.5 K/UL
MONOCYTES NFR BLD: 5.4 %
NEUTROPHILS # BLD AUTO: 7.1 K/UL
NEUTROPHILS NFR BLD: 74.6 %
NONHDLC SERPL-MCNC: 98 MG/DL
NRBC BLD-RTO: 0 /100 WBC
PLATELET # BLD AUTO: 288 K/UL
PMV BLD AUTO: 10.6 FL
POTASSIUM SERPL-SCNC: 3.6 MMOL/L
PROT SERPL-MCNC: 7.3 G/DL
RBC # BLD AUTO: 5.26 M/UL
SODIUM SERPL-SCNC: 140 MMOL/L
TESTOST SERPL-MCNC: 801 NG/DL
TRIGL SERPL-MCNC: 157 MG/DL
WBC # BLD AUTO: 9.48 K/UL

## 2018-04-30 PROCEDURE — 36415 COLL VENOUS BLD VENIPUNCTURE: CPT | Mod: PO

## 2018-04-30 PROCEDURE — 80061 LIPID PANEL: CPT

## 2018-04-30 PROCEDURE — 85025 COMPLETE CBC W/AUTO DIFF WBC: CPT

## 2018-04-30 PROCEDURE — 80053 COMPREHEN METABOLIC PANEL: CPT

## 2018-04-30 PROCEDURE — 84153 ASSAY OF PSA TOTAL: CPT

## 2018-04-30 PROCEDURE — 84403 ASSAY OF TOTAL TESTOSTERONE: CPT

## 2018-05-07 ENCOUNTER — OFFICE VISIT (OUTPATIENT)
Dept: FAMILY MEDICINE | Facility: CLINIC | Age: 53
End: 2018-05-07
Payer: COMMERCIAL

## 2018-05-07 VITALS
DIASTOLIC BLOOD PRESSURE: 76 MMHG | HEART RATE: 88 BPM | WEIGHT: 225 LBS | OXYGEN SATURATION: 98 % | TEMPERATURE: 96 F | SYSTOLIC BLOOD PRESSURE: 137 MMHG | HEIGHT: 68 IN | BODY MASS INDEX: 34.1 KG/M2

## 2018-05-07 DIAGNOSIS — M79.672 PAIN IN BOTH FEET: ICD-10-CM

## 2018-05-07 DIAGNOSIS — Z12.11 COLON CANCER SCREENING: ICD-10-CM

## 2018-05-07 DIAGNOSIS — G89.29 CHRONIC RADICULAR LOW BACK PAIN: ICD-10-CM

## 2018-05-07 DIAGNOSIS — R14.0 ABDOMINAL DISTENTION, NON-GASEOUS: ICD-10-CM

## 2018-05-07 DIAGNOSIS — E78.2 MIXED HYPERLIPIDEMIA: ICD-10-CM

## 2018-05-07 DIAGNOSIS — R73.9 HYPERGLYCEMIA: ICD-10-CM

## 2018-05-07 DIAGNOSIS — Z78.9: ICD-10-CM

## 2018-05-07 DIAGNOSIS — Z51.81 ENCOUNTER FOR MONITORING TESTOSTERONE REPLACEMENT THERAPY: Primary | ICD-10-CM

## 2018-05-07 DIAGNOSIS — Z79.890 ENCOUNTER FOR MONITORING TESTOSTERONE REPLACEMENT THERAPY: Primary | ICD-10-CM

## 2018-05-07 DIAGNOSIS — M54.16 CHRONIC RADICULAR LOW BACK PAIN: ICD-10-CM

## 2018-05-07 DIAGNOSIS — M79.671 PAIN IN BOTH FEET: ICD-10-CM

## 2018-05-07 PROCEDURE — 99999 PR PBB SHADOW E&M-EST. PATIENT-LVL IV: CPT | Mod: PBBFAC,,, | Performed by: FAMILY MEDICINE

## 2018-05-07 PROCEDURE — 99214 OFFICE O/P EST MOD 30 MIN: CPT | Mod: S$GLB,,, | Performed by: FAMILY MEDICINE

## 2018-05-07 PROCEDURE — 3075F SYST BP GE 130 - 139MM HG: CPT | Mod: CPTII,S$GLB,, | Performed by: FAMILY MEDICINE

## 2018-05-07 PROCEDURE — 3078F DIAST BP <80 MM HG: CPT | Mod: CPTII,S$GLB,, | Performed by: FAMILY MEDICINE

## 2018-05-07 PROCEDURE — 3008F BODY MASS INDEX DOCD: CPT | Mod: CPTII,S$GLB,, | Performed by: FAMILY MEDICINE

## 2018-05-07 RX ORDER — SILDENAFIL 100 MG/1
100 TABLET, FILM COATED ORAL DAILY PRN
Qty: 5 TABLET | Refills: 2 | Status: SHIPPED | OUTPATIENT
Start: 2018-05-07 | End: 2018-11-21 | Stop reason: SDUPTHER

## 2018-05-07 NOTE — PROGRESS NOTES
Chief Complaint:    Chief Complaint   Patient presents with    Annual Exam       History of Present Illness:    He presents today for six-month follow-up:  He is complaining of abdominal distention and does drink alcohol on a regular basis.  Also complains of pain in the low back sometimes feels tingling numbness sensation in the right lateral thigh no weakness.  Also has bilateral ft pain he stays up on his feet all day.  His blood pressure is normal  He is complaining of trouble with in getting erections Cialis does not work anymore.    ROS:  Review of Systems   Constitutional: Negative for activity change, chills, fatigue, fever and unexpected weight change.   HENT: Negative for congestion, ear discharge, ear pain, hearing loss, postnasal drip and rhinorrhea.    Eyes: Negative for pain and visual disturbance.   Respiratory: Negative for cough, chest tightness and shortness of breath.    Cardiovascular: Negative for chest pain and palpitations.   Gastrointestinal: Negative for abdominal pain, diarrhea and vomiting.   Endocrine: Negative for heat intolerance.   Genitourinary: Negative for dysuria, flank pain, frequency and hematuria.   Musculoskeletal: Positive for back pain. Negative for gait problem and neck pain.   Skin: Negative for color change and rash.   Neurological: Negative for dizziness, tremors, seizures, numbness and headaches.   Psychiatric/Behavioral: Negative for agitation, hallucinations, self-injury, sleep disturbance and suicidal ideas. The patient is not nervous/anxious.        Past Medical History:   Diagnosis Date    Allergy     Anxiety     Depression     Hyperlipidemia     Hypertension        Social History:  Social History     Social History    Marital status:      Spouse name: N/A    Number of children: N/A    Years of education: N/A     Occupational History     [employer]     Social History Main Topics    Smoking status: Never Smoker    Smokeless tobacco: Current  "User      Comment: dips     Alcohol use Yes    Drug use: No    Sexual activity: Yes     Partners: Female     Birth control/ protection: None     Other Topics Concern    None     Social History Narrative    None       Family History:   family history includes Cancer in his maternal uncle; Hypertension in his mother; No Known Problems in his brother, maternal aunt, maternal grandfather, maternal grandmother, paternal aunt, paternal grandfather, paternal grandmother, paternal uncle, and sister.    Health Maintenance   Topic Date Due    Colonoscopy  07/21/2016    Influenza Vaccine  08/01/2018    Lipid Panel  04/30/2019    TETANUS VACCINE  10/31/2027       Physical Exam:    Vital Signs  Temp: 96.1 °F (35.6 °C)  Temp src: Tympanic  Pulse: 88  SpO2: 98 %  BP: 137/76  BP Location: Left arm  Patient Position: Sitting  Height and Weight  Height: 5' 8" (172.7 cm)  Weight: 102 kg (224 lb 15.7 oz)  BSA (Calculated - sq m): 2.21 sq meters  BMI (Calculated): 34.3  Weight in (lb) to have BMI = 25: 164.1]    Body mass index is 34.21 kg/m².    Physical Exam   Constitutional: He is oriented to person, place, and time. He appears well-developed.   HENT:   Mouth/Throat: Oropharynx is clear and moist.   Eyes: Conjunctivae are normal. Pupils are equal, round, and reactive to light.   Neck: Normal range of motion. Neck supple.   Cardiovascular: Normal rate, regular rhythm and normal heart sounds.    No murmur heard.  Pulmonary/Chest: Effort normal and breath sounds normal. No respiratory distress. He has no wheezes. He has no rales. He exhibits no tenderness.   Abdominal: Soft. He exhibits no distension and no mass. There is no tenderness. There is no guarding.   Musculoskeletal: He exhibits no edema or tenderness.        Feet:    Straight leg raising test is positive bilaterally.  There is decrease in sensation to touch on the right lateral leg   Lymphadenopathy:     He has no cervical adenopathy.   Neurological: He is alert and " oriented to person, place, and time. He has normal reflexes.   Skin: Skin is warm and dry.   Psychiatric: He has a normal mood and affect. His behavior is normal. Judgment and thought content normal.       Results for orders placed or performed in visit on 04/30/18   Comprehensive metabolic panel   Result Value Ref Range    Sodium 140 136 - 145 mmol/L    Potassium 3.6 3.5 - 5.1 mmol/L    Chloride 102 95 - 110 mmol/L    CO2 29 23 - 29 mmol/L    Glucose 139 (H) 70 - 110 mg/dL    BUN, Bld 10 6 - 20 mg/dL    Creatinine 1.0 0.5 - 1.4 mg/dL    Calcium 9.1 8.7 - 10.5 mg/dL    Total Protein 7.3 6.0 - 8.4 g/dL    Albumin 3.7 3.5 - 5.2 g/dL    Total Bilirubin 0.5 0.1 - 1.0 mg/dL    Alkaline Phosphatase 80 55 - 135 U/L    AST 16 10 - 40 U/L    ALT 17 10 - 44 U/L    Anion Gap 9 8 - 16 mmol/L    eGFR if African American >60.0 >60 mL/min/1.73 m^2    eGFR if non African American >60.0 >60 mL/min/1.73 m^2   Lipid panel   Result Value Ref Range    Cholesterol 139 120 - 199 mg/dL    Triglycerides 157 (H) 30 - 150 mg/dL    HDL 41 40 - 75 mg/dL    LDL Cholesterol 66.6 63.0 - 159.0 mg/dL    HDL/Chol Ratio 29.5 20.0 - 50.0 %    Total Cholesterol/HDL Ratio 3.4 2.0 - 5.0    Non-HDL Cholesterol 98 mg/dL   CBC auto differential   Result Value Ref Range    WBC 9.48 3.90 - 12.70 K/uL    RBC 5.26 4.60 - 6.20 M/uL    Hemoglobin 15.5 14.0 - 18.0 g/dL    Hematocrit 48.9 40.0 - 54.0 %    MCV 93 82 - 98 fL    MCH 29.5 27.0 - 31.0 pg    MCHC 31.7 (L) 32.0 - 36.0 g/dL    RDW 13.3 11.5 - 14.5 %    Platelets 288 150 - 350 K/uL    MPV 10.6 9.2 - 12.9 fL    Immature Granulocytes 0.3 0.0 - 0.5 %    Gran # (ANC) 7.1 1.8 - 7.7 K/uL    Immature Grans (Abs) 0.03 0.00 - 0.04 K/uL    Lymph # 1.7 1.0 - 4.8 K/uL    Mono # 0.5 0.3 - 1.0 K/uL    Eos # 0.1 0.0 - 0.5 K/uL    Baso # 0.08 0.00 - 0.20 K/uL    nRBC 0 0 /100 WBC    Gran% 74.6 (H) 38.0 - 73.0 %    Lymph% 17.6 (L) 18.0 - 48.0 %    Mono% 5.4 4.0 - 15.0 %    Eosinophil% 1.3 0.0 - 8.0 %    Basophil% 0.8 0.0 -  1.9 %    Differential Method Automated    PSA, Screening   Result Value Ref Range    PSA, SCREEN 0.78 0.00 - 4.00 ng/mL   Testosterone   Result Value Ref Range    Testosterone, Total 801 195.0 - 1138.0 ng/dL         Lab Results   Component Value Date    HGBA1C 5.3 09/02/2015       Assessment:      ICD-10-CM ICD-9-CM   1. Encounter for monitoring testosterone replacement therapy Z51.81 V58.83    Z79.890 V58.69   2. Mixed hyperlipidemia E78.2 272.2   3. Colon cancer screening Z12.11 V76.51   4. Abdominal distention, non-gaseous R14.0 787.3   5. Alcohol ingestion, more than 4 drinks/day Z78.9 V69.8   6. Chronic radicular low back pain M54.16 724.4    G89.29 338.29   7. Pain in both feet M79.671 729.5    M79.672    8. Hyperglycemia R73.9 790.29         Plan:    It appears that he may have radicular pain, he does not want to try therapy recommend consultation with Pain Management for UVALDO  He will be referred to Podiatry for bilateral forefeet pain  Will get ultrasound of abdomen because of his abdominal distention and continued alcohol abuse.  Check A1c due to his hyperglycemia  He will continue testosterone replacement therapy  We will try Viagra for ED  Recommended screening colonoscopy      Orders Placed This Encounter   Procedures    US Abdomen Limited    Comprehensive metabolic panel    CBC auto differential    Lipid panel    PSA, Screening    Testosterone    Hemoglobin A1c    Ambulatory referral to Pain Clinic    Ambulatory referral to Podiatry       Current Outpatient Prescriptions   Medication Sig Dispense Refill    allopurinol (ZYLOPRIM) 100 MG tablet Take 1 tablet (100 mg total) by mouth once daily. 90 tablet 3    amlodipine-benazepril (LOTREL) 10-40 mg per capsule Take 1 capsule by mouth once daily. 90 capsule 3    fluticasone (FLONASE) 50 mcg/actuation nasal spray 1 spray by Each Nare route once daily. 3 Bottle 3    gabapentin (NEURONTIN) 300 MG capsule TAKE ONE CAPSULE BY MOUTH EVERY EVENING**  TAKE 45MIN TO 1HR BEFORE BED AS MAY CAUSE DROWSINESS 30 capsule 0    hydrALAZINE (APRESOLINE) 50 MG tablet Take 1 tablet (50 mg total) by mouth 3 (three) times daily. 270 tablet 3    hydroCHLOROthiazide (HYDRODIURIL) 25 MG tablet Take 1 tablet (25 mg total) by mouth once daily. 90 tablet 3    loratadine (CLARITIN) 10 mg tablet Take 1 tablet (10 mg total) by mouth once daily. 90 tablet 0    potassium chloride SA (K-DUR,KLOR-CON) 10 MEQ tablet TAKE 2 TABLETS (20 MEQ TOTAL) BY MOUTH ONCE DAILY. 90 tablet 3    pravastatin (PRAVACHOL) 40 MG tablet Take 1 tablet (40 mg total) by mouth once daily. 90 tablet 3    sertraline (ZOLOFT) 50 MG tablet Take 1 tablet (50 mg total) by mouth once daily. 90 tablet 3    testosterone cypionate (DEPOTESTOTERONE CYPIONATE) 200 mg/mL injection INJECT 1 ML INTO MUSCLE EVERY 14 DAYS AS DIRECTED 10 mL 0    traZODone (DESYREL) 50 MG tablet Take 1 tablet (50 mg total) by mouth once daily. 90 tablet 3    sildenafil (VIAGRA) 100 MG tablet Take 1 tablet (100 mg total) by mouth daily as needed for Erectile Dysfunction. 5 tablet 2    tadalafil (CIALIS) 20 MG Tab Take 1 tablet (20 mg total) by mouth every 36 (thirty-six) hours. 15 tablet 3     No current facility-administered medications for this visit.        There are no discontinued medications.    Follow-up in about 6 months (around 11/7/2018).      Mohan Schwartz MD

## 2018-05-08 ENCOUNTER — LAB VISIT (OUTPATIENT)
Dept: LAB | Facility: HOSPITAL | Age: 53
End: 2018-05-08
Attending: FAMILY MEDICINE
Payer: COMMERCIAL

## 2018-05-08 DIAGNOSIS — R73.9 HYPERGLYCEMIA: ICD-10-CM

## 2018-05-08 LAB
ESTIMATED AVG GLUCOSE: 97 MG/DL
HBA1C MFR BLD HPLC: 5 %

## 2018-05-08 PROCEDURE — 83036 HEMOGLOBIN GLYCOSYLATED A1C: CPT

## 2018-05-08 PROCEDURE — 36415 COLL VENOUS BLD VENIPUNCTURE: CPT | Mod: PO

## 2018-05-09 ENCOUNTER — DOCUMENTATION ONLY (OUTPATIENT)
Dept: ENDOSCOPY | Facility: HOSPITAL | Age: 53
End: 2018-05-09

## 2018-05-09 RX ORDER — SODIUM, POTASSIUM,MAG SULFATES 17.5-3.13G
SOLUTION, RECONSTITUTED, ORAL ORAL
Qty: 354 ML | Refills: 0 | Status: ON HOLD | OUTPATIENT
Start: 2018-05-09 | End: 2018-06-08 | Stop reason: HOSPADM

## 2018-05-09 NOTE — PROGRESS NOTES
05/09/18 Per [employer], Person pressed touch tone key to speak with an endoscopy .  Pt. has been scheduled for colonoscopy on 06/08/18. Oral instructions given and mailed. Suprep ordered.

## 2018-05-17 ENCOUNTER — TELEPHONE (OUTPATIENT)
Dept: RADIOLOGY | Facility: HOSPITAL | Age: 53
End: 2018-05-17

## 2018-05-18 ENCOUNTER — HOSPITAL ENCOUNTER (OUTPATIENT)
Dept: RADIOLOGY | Facility: HOSPITAL | Age: 53
Discharge: HOME | End: 2018-05-18
Attending: FAMILY MEDICINE
Payer: COMMERCIAL

## 2018-05-18 ENCOUNTER — OFFICE VISIT (OUTPATIENT)
Dept: PAIN MEDICINE | Facility: CLINIC | Age: 53
End: 2018-05-18
Payer: COMMERCIAL

## 2018-05-18 VITALS
RESPIRATION RATE: 18 BRPM | HEART RATE: 72 BPM | WEIGHT: 222 LBS | BODY MASS INDEX: 33.65 KG/M2 | DIASTOLIC BLOOD PRESSURE: 90 MMHG | SYSTOLIC BLOOD PRESSURE: 156 MMHG | HEIGHT: 68 IN

## 2018-05-18 DIAGNOSIS — M79.651 RIGHT THIGH PAIN: ICD-10-CM

## 2018-05-18 DIAGNOSIS — M79.18 MYOFASCIAL PAIN: ICD-10-CM

## 2018-05-18 DIAGNOSIS — M54.16 LUMBAR RADICULOPATHY: ICD-10-CM

## 2018-05-18 DIAGNOSIS — G57.11 MERALGIA PARAESTHETICA, RIGHT: Primary | ICD-10-CM

## 2018-05-18 DIAGNOSIS — R14.0 ABDOMINAL DISTENTION, NON-GASEOUS: ICD-10-CM

## 2018-05-18 PROCEDURE — 3080F DIAST BP >= 90 MM HG: CPT | Mod: CPTII,S$GLB,, | Performed by: PHYSICIAN ASSISTANT

## 2018-05-18 PROCEDURE — 3077F SYST BP >= 140 MM HG: CPT | Mod: CPTII,S$GLB,, | Performed by: PHYSICIAN ASSISTANT

## 2018-05-18 PROCEDURE — 3008F BODY MASS INDEX DOCD: CPT | Mod: CPTII,S$GLB,, | Performed by: PHYSICIAN ASSISTANT

## 2018-05-18 PROCEDURE — 76705 ECHO EXAM OF ABDOMEN: CPT | Mod: TC

## 2018-05-18 PROCEDURE — 76705 ECHO EXAM OF ABDOMEN: CPT | Mod: 26,,, | Performed by: RADIOLOGY

## 2018-05-18 PROCEDURE — 99214 OFFICE O/P EST MOD 30 MIN: CPT | Mod: S$GLB,,, | Performed by: PHYSICIAN ASSISTANT

## 2018-05-18 PROCEDURE — 99999 PR PBB SHADOW E&M-EST. PATIENT-LVL IV: CPT | Mod: PBBFAC,,, | Performed by: PHYSICIAN ASSISTANT

## 2018-05-18 RX ORDER — GABAPENTIN 300 MG/1
CAPSULE ORAL
Qty: 60 CAPSULE | Refills: 1 | Status: SHIPPED | OUTPATIENT
Start: 2018-05-18 | End: 2018-06-15 | Stop reason: SDUPTHER

## 2018-05-18 NOTE — LETTER
May 18, 2018      Mohan Schwartz MD  [address] [address] [address]           [facility]  [address] [facility] [address]  Phone: [phone]  Fax: [phone]          Patient: Dre Curiel   MR Number: [id number]   YOB: 1965   Date of Visit: 5/18/2018       Dear Dr. Mohan Schwartz:    Thank you for referring Dre Curiel to me for evaluation. Attached you will find relevant portions of my assessment and plan of care.    If you have questions, please do not hesitate to call me. I look forward to following Dre Curiel along with you.    Sincerely,    Jeanne Luna PA-C    Enclosure  CC:  No Recipients    If you would like to receive this communication electronically, please contact [email or web address] or [phone] to request more information on [employer] access.    For providers and/or their staff who would like to refer a patient to [facility], please contact us through our one-stop-shop provider referral line, [facility] , at [phone].    If you feel you have received this communication in error or would no longer like to receive these types of communications, please e-mail [email or web address]

## 2018-05-18 NOTE — PROGRESS NOTES
Chief Pain Complaint:  Right hip pain, right leg pain      Interval History:  Patient was last seen on 5/9/17 with Dr. Schumacher. He was prescribed pain cream then, which helped some. His pain comes and goes.       History of Present Illness:   This patient is a 52 y.o. male who presents today complaining of the above noted pain/s. The patient describes the pain as follows.    - duration of pain: 1 year   - timing: intermittent   - character: aching, sharp  - radiating, dermatomal: extends into right leg / thigh sometimes down entire leg  - antecedent trauma, prior spinal surgery: no prior trauma, no prior spinal surgery   - pertinent negatives: No fever, No chills, No weight loss, No bladder dysfunction, No bowel dysfunction, No extremity weakness, No saddle anesthesia  - pertinent positives: none    - medications, other therapies tried (physical therapy, injections):     >> Tylenol, compounded cream    >> Has NOT previously undergone Physical Therapy    >> Has NOT previously undergone spinal injection/s      Imaging / Labs / Studies (reviewed on 5/18/2018):     4/30/18 Comprehensive metabolic panel     Ref Range & Units 4/30/18 6mo ago 1yr ago    Sodium 136 - 145 mmol/L 140  140  139     Potassium 3.5 - 5.1 mmol/L 3.6  4.0  4.1     Chloride 95 - 110 mmol/L 102  103  101     CO2 23 - 29 mmol/L 29  29  29     Glucose 70 - 110 mg/dL 139   121   104     BUN, Bld 6 - 20 mg/dL 10  21   17     Creatinine 0.5 - 1.4 mg/dL 1.0  1.2  1.0     Calcium 8.7 - 10.5 mg/dL 9.1  9.4  8.9     Total Protein 6.0 - 8.4 g/dL 7.3  7.7  7.3     Albumin 3.5 - 5.2 g/dL 3.7  3.7  3.9     Total Bilirubin 0.1 - 1.0 mg/dL 0.5  0.3CM  0.4CM        Alkaline Phosphatase 55 - 135 U/L 80  91  63     AST 10 - 40 U/L 16  20  19     ALT 10 - 44 U/L 17  22  24     Anion Gap 8 - 16 mmol/L 9  8  9     eGFR if African American >60 mL/min/1.73 m^2 >60.0  >60.0  >60.0     eGFR if non African American >60 mL/min/1.73 m^2 >60.0  >60.0CM  >60.0CM         "          Review of Systems:  CONSTITUTIONAL: patient denies any fever, chills, or weight loss  SKIN: patient denies any rash or itching  RESPIRATORY: patient denies having any shortness of breath  GASTROINTESTINAL: patient denies having any diarrhea, constipation, or bowel incontinence  GENITOURINARY: patient denies having any abnormal bladder function    MUSCULOSKELETAL:  - patient complains of the above noted pain/s (see chief pain complaint)    NEUROLOGICAL:   - pain as above  - strength in Lower extremities is intact, BILATERALLY  - sensation in Lower extremities is abnormal, on the RIGHT  - patient denies any loss of bowel or bladder control      PSYCHIATRIC: patient denies any change in mood    Other:  All other systems reviewed and are negative        Physical Exam:  Vitals:  BP (!) 156/90 (BP Location: Right arm, Patient Position: Sitting, BP Method: Medium (Automatic))   Pulse 72   Resp 18   Ht 5' 8" (1.727 m)   Wt 100.7 kg (222 lb)   BMI 33.75 kg/m²   (reviewed on 5/18/2018)    General: alert and oriented, in no apparent distress.  Gait: normal gait.  Skin: no rashes, no discoloration, no obvious lesions  HEENT: normocephalic, atraumatic. Pupils equal and round.  Cardiovascular: no significant peripheral edema present.  Respiratory: without use of accessory muscles of respiration.    Musculoskeletal - Lumbar Spine:  - Pain on flexion of lumbar spine: Absent   - Pain on extension of lumbar spine: Present  - Lumbar facet loading: Absent   - TTP over the lumbar facet joints: Absent  - TTP over the SI joints:  Absent   - TTP over GT bursa: Absent  - Straight Leg Raise: Negative  - DO: Negative    Neuro - Lower Extremities:  - RLE Strength:     >> 5/5 strength in right ankle with plantar and dorsiflexion    >> 5/5 strength with right knee flexion extension  - LLE Strength:     >> 5/5 strength in left ankle with plantar and dorsiflexion    >> 5/5 strength with knee flexion extension on the left   - " Sensory: Sensation to light touch intact bilaterally      Psych:  Mood and affect is appropriate          Assessment:  Dre Curiel is a 52 y.o. male who presents with    ICD-10-CM ICD-9-CM   1. Meralgia paraesthetica, right G57.11 355.1   2. Lumbar radiculopathy M54.16 724.4   3. Myofascial pain M79.1 729.1   4. Right thigh pain M79.651 729.5     Patient has right thigh pain, perhaps lateral femoral cutaneous compression.  It may also be due to lumbar radiculopathy.       Plan:  1. Interventional: None for now.     2. Pharmacologic:   - Will start gabapentin 600mg QHS (with titration instructions, take 1 tablet QHS x 1 week, then increase to 2 capsules at night thereafter, increasing as tolerated).    3. Rehabilitative: Encouraged regular exercise.    4. Diagnostic: None for now. Consider ordering lumbar MRI.    5. Follow up: 4 weeks for f/u    - I discussed the risks, benefits, and alternatives to potential treatment options. All questions and concerns were fully addressed today in clinic. Dr. Manuel was consulted regarding the patient plan and agrees.

## 2018-05-24 ENCOUNTER — TELEPHONE (OUTPATIENT)
Dept: FAMILY MEDICINE | Facility: CLINIC | Age: 53
End: 2018-05-24

## 2018-05-24 NOTE — TELEPHONE ENCOUNTER
----- Message from Jing Manzo sent at 5/24/2018 10:39 AM CDT -----  Contact: pt  Calling about a missed call and please advise [phone] (mpcc)

## 2018-05-25 ENCOUNTER — HOSPITAL ENCOUNTER (OUTPATIENT)
Dept: RADIOLOGY | Facility: HOSPITAL | Age: 53
Discharge: HOME | End: 2018-05-25
Attending: PODIATRIST
Payer: COMMERCIAL

## 2018-05-25 ENCOUNTER — OFFICE VISIT (OUTPATIENT)
Dept: PODIATRY | Facility: CLINIC | Age: 53
End: 2018-05-25
Payer: COMMERCIAL

## 2018-05-25 VITALS
DIASTOLIC BLOOD PRESSURE: 95 MMHG | RESPIRATION RATE: 20 BRPM | SYSTOLIC BLOOD PRESSURE: 149 MMHG | HEART RATE: 66 BPM | BODY MASS INDEX: 33.45 KG/M2 | HEIGHT: 68 IN | WEIGHT: 220.69 LBS

## 2018-05-25 DIAGNOSIS — M79.672 PAIN IN BOTH FEET: ICD-10-CM

## 2018-05-25 DIAGNOSIS — M76.821 POSTERIOR TIBIAL TENDON DYSFUNCTION (PTTD) OF BOTH LOWER EXTREMITIES: Primary | ICD-10-CM

## 2018-05-25 DIAGNOSIS — M21.42 PES PLANUS OF BOTH FEET: ICD-10-CM

## 2018-05-25 DIAGNOSIS — M79.671 PAIN IN BOTH FEET: ICD-10-CM

## 2018-05-25 DIAGNOSIS — M76.822 POSTERIOR TIBIAL TENDON DYSFUNCTION (PTTD) OF BOTH LOWER EXTREMITIES: Primary | ICD-10-CM

## 2018-05-25 DIAGNOSIS — M21.41 PES PLANUS OF BOTH FEET: ICD-10-CM

## 2018-05-25 PROCEDURE — 3008F BODY MASS INDEX DOCD: CPT | Mod: CPTII,S$GLB,, | Performed by: PODIATRIST

## 2018-05-25 PROCEDURE — 99204 OFFICE O/P NEW MOD 45 MIN: CPT | Mod: S$GLB,,, | Performed by: PODIATRIST

## 2018-05-25 PROCEDURE — 73630 X-RAY EXAM OF FOOT: CPT | Mod: 26,50,, | Performed by: RADIOLOGY

## 2018-05-25 PROCEDURE — 73630 X-RAY EXAM OF FOOT: CPT | Mod: 50,TC,FY,PO

## 2018-05-25 PROCEDURE — 3080F DIAST BP >= 90 MM HG: CPT | Mod: CPTII,S$GLB,, | Performed by: PODIATRIST

## 2018-05-25 PROCEDURE — 3077F SYST BP >= 140 MM HG: CPT | Mod: CPTII,S$GLB,, | Performed by: PODIATRIST

## 2018-05-25 PROCEDURE — 99999 PR PBB SHADOW E&M-EST. PATIENT-LVL III: CPT | Mod: PBBFAC,,, | Performed by: PODIATRIST

## 2018-05-25 NOTE — LETTER
May 30, 2018      Mohan Schwartz MD  [address] [address] [address]           [facility]  [address] [facility]  [place] [address]  Phone: [phone]  Fax: [phone]          Patient: Dre Curiel   MR Number: [id number]   YOB: 1965   Date of Visit: 5/25/2018       Dear Dr. Mohan Schwartz:    Thank you for referring Dre Curiel to me for evaluation. Attached you will find relevant portions of my assessment and plan of care.    If you have questions, please do not hesitate to call me. I look forward to following Dre Curiel along with you.    Sincerely,    Ramin Cardona  CC:  No Recipients    If you would like to receive this communication electronically, please contact [email or web address] or [phone] to request more information on [employer] Link access.    For providers and/or their staff who would like to refer a patient to [facility], please contact us through our one-stop-shop provider referral line, [facility], at [phone].    If you feel you have received this communication in error or would no longer like to receive these types of communications, please e-mail [email or web address]

## 2018-06-01 ENCOUNTER — TELEPHONE (OUTPATIENT)
Dept: FAMILY MEDICINE | Facility: CLINIC | Age: 53
End: 2018-06-01

## 2018-06-01 DIAGNOSIS — K82.4 GALLBLADDER POLYP: Primary | ICD-10-CM

## 2018-06-04 NOTE — PROGRESS NOTES
Subjective:     Patient ID: Dre Curiel is a 52 y.o. male.    Chief Complaint: Foot Pain (bilateral pain on bottom of feet mainly right, pt has gout)    Dre is a 52 y.o. male who presents to the podiatry clinic  with complaint of  bilateral foot pain. Onset of the symptoms was several months ago. Precipitating event: none known. Current symptoms include: ability to bear weight, but with some pain and stiffness. Aggravating factors: any weight bearing. Symptoms have gradually worsened. Patient has had no prior foot problems. Evaluation to date: none. Treatment to date: none. Patients rates pain 5/10 on pain scale.            Patient Active Problem List   Diagnosis    Idiopathic chronic gout of right foot    Insomnia    Low testosterone    Impotence due to erectile dysfunction    Premature ejaculation    Hyperlipidemia    Alcohol ingestion, more than 4 drinks/day    Encounter for monitoring testosterone replacement therapy    Lymphocytosis       Medication List with Changes/Refills   Current Medications    ALLOPURINOL (ZYLOPRIM) 100 MG TABLET    Take 1 tablet (100 mg total) by mouth once daily.    AMLODIPINE-BENAZEPRIL (LOTREL) 10-40 MG PER CAPSULE    Take 1 capsule by mouth once daily.    FLUTICASONE (FLONASE) 50 MCG/ACTUATION NASAL SPRAY    1 spray by Each Nare route once daily.    GABAPENTIN (NEURONTIN) 300 MG CAPSULE    Take 1 capsule (300mg) by mouth every night X 1 week then increase to 2 capsules (600mg) QHS thereafter. Increase as tolerated.    HYDRALAZINE (APRESOLINE) 50 MG TABLET    Take 1 tablet (50 mg total) by mouth 3 (three) times daily.    HYDROCHLOROTHIAZIDE (HYDRODIURIL) 25 MG TABLET    Take 1 tablet (25 mg total) by mouth once daily.    LORATADINE (CLARITIN) 10 MG TABLET    Take 1 tablet (10 mg total) by mouth once daily.    POTASSIUM CHLORIDE SA (K-DUR,KLOR-CON) 10 MEQ TABLET    TAKE 2 TABLETS (20 MEQ TOTAL) BY MOUTH ONCE DAILY.    PRAVASTATIN (PRAVACHOL) 40 MG TABLET    Take 1 tablet  (40 mg total) by mouth once daily.    SERTRALINE (ZOLOFT) 50 MG TABLET    Take 1 tablet (50 mg total) by mouth once daily.    SILDENAFIL (VIAGRA) 100 MG TABLET    Take 1 tablet (100 mg total) by mouth daily as needed for Erectile Dysfunction.    SODIUM,POTASSIUM,MAG SULFATES (SUPREP BOWEL PREP KIT) 17.5-3.13-1.6 GRAM SOLR    As directed    TADALAFIL (CIALIS) 20 MG TAB    Take 1 tablet (20 mg total) by mouth every 36 (thirty-six) hours.    TESTOSTERONE CYPIONATE (DEPOTESTOTERONE CYPIONATE) 200 MG/ML INJECTION    INJECT 1 ML INTO MUSCLE EVERY 14 DAYS AS DIRECTED    TRAZODONE (DESYREL) 50 MG TABLET    Take 1 tablet (50 mg total) by mouth once daily.       Review of patient's allergies indicates:   Allergen Reactions    Iodine and iodide containing products      Other reaction(s): Swelling  Other reaction(s): Sneezing  Other reaction(s): Shortness of breath       Past Surgical History:   Procedure Laterality Date    COSMETIC SURGERY      kidney removal         Family History   Problem Relation Age of Onset    Hypertension Mother     No Known Problems Sister     No Known Problems Brother     No Known Problems Maternal Aunt     Cancer Maternal Uncle     No Known Problems Paternal Aunt     No Known Problems Paternal Uncle     No Known Problems Maternal Grandmother     No Known Problems Maternal Grandfather     No Known Problems Paternal Grandmother     No Known Problems Paternal Grandfather        Social History     Social History    Marital status:      Spouse name: N/A    Number of children: N/A    Years of education: N/A     Occupational History     [employer]     Social History Main Topics    Smoking status: Never Smoker    Smokeless tobacco: Current User      Comment: dips     Alcohol use Yes    Drug use: No    Sexual activity: Yes     Partners: Female     Birth control/ protection: None     Other Topics Concern    Not on file     Social History Narrative    No narrative on file  "      Vitals:    05/25/18 0821   BP: (!) 149/95   Pulse: 66   Resp: 20   Weight: 100.1 kg (220 lb 10.9 oz)   Height: 5' 8" (1.727 m)   PainSc: 0-No pain       Hemoglobin A1C   Date Value Ref Range Status   05/08/2018 5.0 4.0 - 5.6 % Final     Comment:     According to ADA guidelines, hemoglobin A1c <7.0% represents  optimal control in non-pregnant diabetic patients. Different  metrics may apply to specific patient populations.   Standards of Medical Care in Diabetes-2016.  For the purpose of screening for the presence of diabetes:  <5.7%     Consistent with the absence of diabetes  5.7-6.4%  Consistent with increasing risk for diabetes   (prediabetes)  >or=6.5%  Consistent with diabetes  Currently, no consensus exists for use of hemoglobin A1c  for diagnosis of diabetes for children.  This Hemoglobin A1c assay has significant interference with fetal   hemoglobin   (HbF). The results are invalid for patients with abnormal amounts of   HbF,   including those with known Hereditary Persistence   of Fetal Hemoglobin. Heterozygous hemoglobin variants (HbAS, HbAC,   HbAD, HbAE, HbA2) do not significantly interfere with this assay;   however, presence of multiple variants in a sample may impact the %   interference.     09/02/2015 5.3 4.5 - 6.2 % Final   03/13/2015 5.7 4.5 - 6.2 % Final       Review of Systems   Constitutional: Negative for chills and fever.   Respiratory: Negative for shortness of breath.    Cardiovascular: Negative for chest pain, palpitations, orthopnea, claudication and leg swelling.   Gastrointestinal: Negative for diarrhea, nausea and vomiting.   Musculoskeletal: Negative for joint pain.   Skin: Negative for rash.   Neurological: Negative for dizziness, tingling, sensory change, focal weakness and weakness.   Psychiatric/Behavioral: Negative.              Objective:      PHYSICAL EXAM: Apperance: Alert and orient in no distress,well developed, and with good attention to grooming and body " habits  Patient presents ambulating in tennis shoes.   Lower Extremity Physical Exam:  VASCULAR: Dorsalis pedis pulses 2/4 bilateral and Posterior Tibial pulses 2/4 bilateral. Capillary fill time <4 seconds bilateral. No edema observed bilateral . Varicosities absent bilateral. Skin temperature of the lower extremities is warm to warm, proximal to distal. Hair growth WNLbilateral.  DERMATOLOGICAL: No skin rashes, subcutaneous nodules, lesions, or ulcers observed bilateral.   NEUROLOGICAL: Light touch, sharp-dull, proprioception all present and equal bilaterally.   MUSCULOSKELETAL:Muscle strength is 5/5 for foot inverters, everters, plantarflexors, and dorsiflexors. Muscle tone is normal.  Ankle joint ROM diminished  with no pain or crepitus bilateral ankle joints. Ankle joints bilateral demonstrate no evidence of subluxation, dislocation or laxity.  STJ bilateral demonstrates WNL ROM with no pain or crepitus. STJ bilateral demonstrates no evidence of subluxation, dislocation or laxity.  MTJ ROM bilateral is WNL, pain free and without crepitus.  MTJ bilateral demonstrates no evidence of subluxation, dislocation or laxity. Pain to palpation bilateral medial foot at arch. Medial aspect of bilateral foot shows tenderness to palpation. No pain on along posterior tibial tendon. The general morphology of the foot is decreased arch height off weight bearing bilateral. + Hubscher maneuver bilateral.      TESTS RESULTS: Radiographs of bilateral feet reveals there is multi articular degenerative change bilaterally.  Dorsal and plantar calcaneal spurs on both sides.  No periarticular erosions.  No acute fracture or dislocation.  Subtle deformity and benign appearing intraosseous lucency posteriorly within the distal fibular shaft on the right.  Small 3 mm curvilinear metallic foreign object in the plantar soft tissues between the 3rd and 4th proximal phalanges of the left foot.          Assessment:       Encounter Diagnoses    Name Primary?    Posterior tibial tendon dysfunction (PTTD) of both lower extremities Yes    Pes planus of both feet     Pain in both feet          Plan:   Posterior tibial tendon dysfunction (PTTD) of both lower extremities    Pes planus of both feet    Pain in both feet  -     X-Ray Foot Complete Bilateral; Future; Expected date: 05/25/2018      I counseled the patient on his conditions, regarding findings of my examination, my impressions, and usual treatment plan.   Ordered and reviewed bilateral foot x-rays in exam room with patient.   The patient and I reviewed the types of shoes he should be wearing, my recommendation includes generally the best time of the day for a shoe fitting is the afternoon, shoes with a wide toe box, very good cushion, and tennis shoes with removable inner soles.The patient and I reviewed my recommendations for over-the-counter orthotic inserts.    Patient shown proper execution of stretching /strengthening exercise and instructed on correct number and amounts of time each stretch.    Patient to return in 2 month(s) or sooner if needed.                  Nusrat Rodrigues DPM  [facility]

## 2018-06-08 ENCOUNTER — ANESTHESIA EVENT (OUTPATIENT)
Dept: ENDOSCOPY | Facility: HOSPITAL | Age: 53
End: 2018-06-08
Payer: COMMERCIAL

## 2018-06-08 ENCOUNTER — ANESTHESIA (OUTPATIENT)
Dept: ENDOSCOPY | Facility: HOSPITAL | Age: 53
End: 2018-06-08
Payer: COMMERCIAL

## 2018-06-08 ENCOUNTER — SURGERY (OUTPATIENT)
Age: 53
End: 2018-06-08

## 2018-06-08 ENCOUNTER — HOSPITAL ENCOUNTER (OUTPATIENT)
Facility: HOSPITAL | Age: 53
Discharge: HOME | End: 2018-06-08
Attending: INTERNAL MEDICINE | Admitting: INTERNAL MEDICINE
Payer: COMMERCIAL

## 2018-06-08 VITALS
WEIGHT: 218 LBS | BODY MASS INDEX: 33.04 KG/M2 | SYSTOLIC BLOOD PRESSURE: 138 MMHG | HEIGHT: 68 IN | TEMPERATURE: 98 F | RESPIRATION RATE: 19 BRPM | DIASTOLIC BLOOD PRESSURE: 73 MMHG | HEART RATE: 70 BPM | OXYGEN SATURATION: 99 %

## 2018-06-08 DIAGNOSIS — K57.30 DIVERTICULOSIS OF LARGE INTESTINE WITHOUT HEMORRHAGE: ICD-10-CM

## 2018-06-08 DIAGNOSIS — D12.6 ADENOMATOUS POLYP OF COLON, UNSPECIFIED PART OF COLON: Primary | ICD-10-CM

## 2018-06-08 PROCEDURE — 88305 TISSUE EXAM BY PATHOLOGIST: CPT | Mod: 26,,, | Performed by: PATHOLOGY

## 2018-06-08 PROCEDURE — 45385 COLONOSCOPY W/LESION REMOVAL: CPT | Mod: 33,,, | Performed by: INTERNAL MEDICINE

## 2018-06-08 PROCEDURE — 27201089 HC SNARE, DISP (ANY): Performed by: INTERNAL MEDICINE

## 2018-06-08 PROCEDURE — 63600175 PHARM REV CODE 636 W HCPCS: Performed by: NURSE ANESTHETIST, CERTIFIED REGISTERED

## 2018-06-08 PROCEDURE — 88305 TISSUE EXAM BY PATHOLOGIST: CPT | Performed by: PATHOLOGY

## 2018-06-08 PROCEDURE — 25000003 PHARM REV CODE 250: Performed by: INTERNAL MEDICINE

## 2018-06-08 PROCEDURE — 45385 COLONOSCOPY W/LESION REMOVAL: CPT | Performed by: INTERNAL MEDICINE

## 2018-06-08 PROCEDURE — 37000008 HC ANESTHESIA 1ST 15 MINUTES: Performed by: INTERNAL MEDICINE

## 2018-06-08 PROCEDURE — 37000009 HC ANESTHESIA EA ADD 15 MINS: Performed by: INTERNAL MEDICINE

## 2018-06-08 PROCEDURE — 25000003 PHARM REV CODE 250: Performed by: NURSE ANESTHETIST, CERTIFIED REGISTERED

## 2018-06-08 RX ORDER — PROPOFOL 10 MG/ML
VIAL (ML) INTRAVENOUS
Status: DISCONTINUED | OUTPATIENT
Start: 2018-06-08 | End: 2018-06-08

## 2018-06-08 RX ORDER — LIDOCAINE HYDROCHLORIDE 10 MG/ML
INJECTION INFILTRATION; PERINEURAL
Status: DISCONTINUED | OUTPATIENT
Start: 2018-06-08 | End: 2018-06-08

## 2018-06-08 RX ORDER — SODIUM CHLORIDE, SODIUM LACTATE, POTASSIUM CHLORIDE, CALCIUM CHLORIDE 600; 310; 30; 20 MG/100ML; MG/100ML; MG/100ML; MG/100ML
INJECTION, SOLUTION INTRAVENOUS CONTINUOUS
Status: DISCONTINUED | OUTPATIENT
Start: 2018-06-08 | End: 2018-06-08 | Stop reason: HOSPADM

## 2018-06-08 RX ORDER — LIDOCAINE HYDROCHLORIDE 10 MG/ML
1 INJECTION, SOLUTION EPIDURAL; INFILTRATION; INTRACAUDAL; PERINEURAL ONCE
Status: DISCONTINUED | OUTPATIENT
Start: 2018-06-08 | End: 2018-06-08 | Stop reason: HOSPADM

## 2018-06-08 RX ADMIN — PROPOFOL 30 MG: 10 INJECTION, EMULSION INTRAVENOUS at 02:06

## 2018-06-08 RX ADMIN — PROPOFOL 20 MG: 10 INJECTION, EMULSION INTRAVENOUS at 01:06

## 2018-06-08 RX ADMIN — LIDOCAINE HYDROCHLORIDE 50 MG: 10 INJECTION, SOLUTION INFILTRATION; PERINEURAL at 01:06

## 2018-06-08 RX ADMIN — SODIUM CHLORIDE, SODIUM LACTATE, POTASSIUM CHLORIDE, AND CALCIUM CHLORIDE: 600; 310; 30; 20 INJECTION, SOLUTION INTRAVENOUS at 02:06

## 2018-06-08 RX ADMIN — PROPOFOL 90 MG: 10 INJECTION, EMULSION INTRAVENOUS at 01:06

## 2018-06-08 RX ADMIN — PROPOFOL 20 MG: 10 INJECTION, EMULSION INTRAVENOUS at 02:06

## 2018-06-08 RX ADMIN — SODIUM CHLORIDE, SODIUM LACTATE, POTASSIUM CHLORIDE, AND CALCIUM CHLORIDE: 600; 310; 30; 20 INJECTION, SOLUTION INTRAVENOUS at 01:06

## 2018-06-08 NOTE — [employer]OVATION PATIENT INSTRUCTIONS
Discharge Summary/Instructions after an Endoscopic Procedure  Patient Name: Dre Curiel  Patient MRN: [id number]  Patient YOB: 1965 Friday, June 08, 2018 Simeon Acharya III, MD  RESTRICTIONS:  During your procedure today, you received medications for sedation.  These   medications may affect your judgment, balance and coordination.  Therefore,   for 24 hours, you have the following restrictions:   - DO NOT drive a car, operate machinery, make legal/financial decisions,   sign important papers or drink alcohol.    ACTIVITY:  Today: no heavy lifting, straining or running due to procedural   sedation/anesthesia.  The following day: return to full activity including work.  DIET:  Eat and drink normally unless instructed otherwise.     TREATMENT FOR COMMON SIDE EFFECTS:  - Mild abdominal pain, nausea, belching, bloating or excessive gas:  rest,   eat lightly and use a heating pad.  - Sore Throat: treat with throat lozenges and/or gargle with warm salt   water.  - Because air was used during the procedure, expelling large amounts of air   from your rectum or belching is normal.  - If a bowel prep was taken, you may not have a bowel movement for 1-3 days.    This is normal.  SYMPTOMS TO WATCH FOR AND REPORT TO YOUR PHYSICIAN:  1. Abdominal pain or bloating, other than gas cramps.  2. Chest pain.  3. Back pain.  4. Signs of infection such as: chills or fever occurring within 24 hours   after the procedure.  5. Rectal bleeding, which would show as bright red, maroon, or black stools.   (A tablespoon of blood from the rectum is not serious, especially if   hemorrhoids are present.)  6. Vomiting.  7. Weakness or dizziness.  GO DIRECTLY TO THE NEAREST EMERGENCY ROOM IF YOU HAVE ANY OF THE FOLLOWING:      Difficulty breathing              Chills and/or fever over 101 F   Persistent vomiting and/or vomiting blood   Severe abdominal pain   Severe chest pain   Black, tarry stools   Bleeding- more than one  tablespoon   Any other symptom or condition that you feel may need urgent attention  Your doctor recommends these additional instructions:  If any biopsies were taken, your doctors clinic will contact you in 1 to 2   weeks with any results.  - Discharge patient to home (via wheelchair).   - High fiber diet.   - Continue present medications.   - Await pathology results.   - Repeat colonoscopy in 3 years for surveillance based on pathology results.     - Return to primary care physician as previously scheduled.   - Discharge patient to home (via wheelchair).   - High fiber diet.   - Continue present medications.   - Await pathology results.   - Repeat colonoscopy in 3 years for surveillance based on pathology results.     - Return to primary care physician as previously scheduled.  For questions, problems or results please call your physician Simeon Acharya III, MD at Work:  [phone]  If you have any questions about the above instructions, call the GI   department at [phone] or call the endoscopy unit at [phone]   from 7am until 3 pm.  [facility] [place], EMERGENCY ROOM PHONE NUMBER:   [phone]  IF A COMPLICATION OR EMERGENCY SITUATION ARISES AND YOU ARE UNABLE TO REACH   YOUR PHYSICIAN - GO DIRECTLY TO THE EMERGENCY ROOM.  I have read or have had read to me these discharge instructions for my   procedure and have received a written copy.  I understand these   instructions and will follow-up with my physician if I have any questions.     __________________________________       _____________________________________  Nurse Signature                                          Patient/Designated   Responsible Party Signature  Simeon Acharya III, MD  6/8/2018 2:49:41 PM  This report has been verified and signed electronically.  [employer]

## 2018-06-08 NOTE — DISCHARGE INSTRUCTIONS
Understanding Colon and Rectal Polyps    The colon (also called the large intestine) is a muscular tube that forms the last part of the digestive tract. It absorbs water and stores food waste. The colon is about 4 to 6 feet long. The rectum is the last 6 inches of the colon. The colon and rectum have a smooth lining composed of millions of cells. Changes in these cells can lead to growths in the colon that can become cancerous and should be removed. Multiple tests are available to screen for colon cancer, but the colonoscopy is the most recommended test. During colonoscopy, these polyps can be removed. How often you need this test depends on many things including your condition, your family history, symptoms, and what the findings were at the previous colonoscopy.   When the colon lining changes  Changes that happen in the cells that line the colon or rectum can lead to growths called polyps. Over a period of years, polyps can turn cancerous. Removing polyps early may prevent cancer from ever forming.  Polyps  Polyps are fleshy clumps of tissue that form on the lining of the colon or rectum. Small polyps are usually benign (not cancerous). However, over time, cells in a polyp can change and become cancerous. Certain types of polyps known as adenomatous polyps are premalignant. The risk for invasive cancer increases with the size of the polyp and certain cell and gene features. This means that they can become cancerous if they're not removed. Hyperplastic polyps are benign. They can grow quite large and not turn cancerous.   Cancer  Almost all colorectal cancers start when polyp cells begin growing abnormally. As a cancerous tumor grows, it may involve more and more of the colon or rectum. In time, cancer can also grow beyond the colon or rectum and spread to nearby organs or to glands called lymph nodes. The cells can also travel to other parts of the body. This is known as metastasis. The earlier a cancerous  tumor is removed, the better the chance of preventing its spread.    Date Last Reviewed: 8/1/2016  © 5178-4331 [employer]. [address] [place] [address], [place] [address]. All rights reserved. This information is not intended as a substitute for professional medical care. Always follow your healthcare professional's instructions.        Hemorrhoids    Hemorrhoids are swollen and inflamed veins inside the rectum and near the anus. The rectum is the last several inches of the colon. The anus is the passage between the rectum and the outside of the body.  Causes  The veins can become swollen due to increased pressure in them. This is most often caused by:  · Chronic constipation or diarrhea  · Straining when having a bowel movement  · Sitting too long on the toilet  · A low-fiber diet  · Pregnancy  Symptoms  · Bleeding from the rectum (this may be noticeable after bowel movements)  · Lump near the anus  · Itching around the anus  · Pain around the anus  There are different types of hemorrhoids. Depending on the type you have and the severity, you may be able to treat yourself at home. In some cases, a procedure may be the best treatment option. Your healthcare provider can tell you more about this, if needed.  Home care  General care  · To get relief from pain or itching, try:  ¨ Topical products. Your healthcare provider may prescribe or recommend creams, ointments, or pads that can be applied to the hemorrhoid. Use these exactly as directed.  ¨ Medicines. Your healthcare provider may recommend stool softeners, suppositories, or laxatives to help manage constipation. Use these exactly as directed.  ¨ Sitz baths. A sitz bath involves sitting in a few inches of warm bath water. Be careful not to make the water so hot that you burn yourself--test it before sitting in it. Soak for about 10 to 15 minutes a few times a day. This may help relieve pain.  Tips to help prevent hemorrhoids  · Eat more fiber. Fiber adds  bulk to stool and absorbs water as it moves through your colon. This makes stool softer and easier to pass.  ¨ Increase the fiber in your diet with more fiber-rich foods. These include fresh fruit, vegetables, and whole grains.  ¨ Take a fiber supplement or bulking agent, if advised to by your provider. These include products such as psyllium or methylcellulose.  · Drink plenty of water, if directed to by your provider. This can help keep stool soft.  · Be more active. Frequent exercise aids digestion and helps prevent constipation. It may also help make bowel movements more regular.  · Dont strain during bowel movements. This can make hemorrhoids more likely. Also, dont sit on the toilet for long periods of time.  Follow-up care  Follow up with your healthcare provider, or as advised. If a culture or imaging tests were done, you will be notified of the results when they are ready. This may take a few days or longer.  When to seek medical advice  Call your healthcare provider right away if any of these occur:  · Increased bleeding from the rectum  · Increased pain around the rectum or anus  · Weakness or dizziness  Call 911  Call 911 or return to the emergency department right away if any of these occur:  · Trouble breathing or swallowing  · Fainting or loss of consciousness  · Unusually fast heart rate  · Vomiting blood  · Large amounts of blood in stool  Date Last Reviewed: 6/22/2015  © 7833-9947 [employer]. [address] [place] [address]. All rights reserved. This information is not intended as a substitute for professional medical care. Always follow your healthcare professional's instructions.        Diverticulosis    Diverticulosis means that small pouches have formed in the wall of your large intestine (colon). Most often, this problem causes no symptoms and is common as people age. But the pouches in the colon are at risk of becoming infected. When this happens, the condition is  called diverticulitis. Although most people with diverticulosis never develop diverticulitis, it is still not uncommon. Rectal bleeding can also occur and in less common situations, a type of colon inflammation called colitis.  While most people do not have symptoms, some people with diverticulosis may have:  · Abdominal cramps and pain  · Bloating  · Constipation  · Change in bowel habits  Causes  The exact cause of diverticulosis (and diverticulitis) has not been proved, but a few things are associated with the condition:  · Low-fiber diet  · Constipation  · Lack of exercise  Your healthcare provider will talk with you about how to manage your condition. Diet changes may be all that are needed to help control diverticulosis and prevent progression to diverticulitis. If you develop diverticulitis, you will likely need other treatments.  Home care  You may be told to take fiber supplements daily. Fiber adds bulk to the stool so that it passes through the colon more easily. Stool softeners may be recommended. You may also be given medications for pain relief. Be sure to take all medications as directed.  In the past, people were told to avoid corn, nuts, and seeds. This is no longer necessary.  Follow these guidelines when caring for yourself at home:  · Eat unprocessed foods that are high in fiber. Whole grains, fruits, and vegetables are good choices.  · Drink 6 to 8 glasses of water every day unless your healthcare provider has you limit how much fluid you should have.  · Watch for changes in your bowel movements. Tell your provider if you notice any changes.  · Begin an exercise program. Ask your provider how to get started. Generally, walking is the best.  · Get plenty of rest and sleep.  Follow-up care  Follow up with your healthcare provider, or as advised. Regular visits may be needed to check on your health. Sometimes special procedures such as colonoscopy, are needed after an episode of diverticulitis or  blooding. Be sure to keep all your appointments.  If a stool sample was taken, or cultures were done, you should be told if they are positive, or if your treatment needs to be changed. You can call as directed for the results.  If X-rays were done, a radiologist will look at them. You will be told if there is a change in your treatment.  If antibiotics were prescribed, be sure to finish them all.  When to seek medical advice  Call your healthcare provider right away if any of these occur:  · Fever of 100.4°F (38°C) or higher, or as directed by your healthcare provider  · Severe cramps in the lower left side of the abdomen or pain that is getting worse  · Tenderness in the lower left side of the abdomen or worsening pain throughout the abdomen  · Diarrhea or constipation that doesn't get better within 24 hours  · Nausea and vomiting  · Bleeding from the rectum  Call 911  Call emergency services if any of the following occur:  · Trouble breathing  · Confusion  · Very drowsy or trouble awakening  · Fainting or loss of consciousness  · Rapid heart rate  · Chest pain  Date Last Reviewed: 12/30/2015 © 2000-2017 [employer]. [address] [place] [address]. All rights reserved. This information is not intended as a substitute for professional medical care. Always follow your healthcare professional's instructions.

## 2018-06-08 NOTE — DISCHARGE SUMMARY
[facility]  Brief Operative Note     SUMMARY     Surgery Date: 6/8/2018     Surgeon(s) and Role:     * Simeon Acharya III, MD - Primary    Assisting Surgeon: None    Pre-op Diagnosis:  Colon cancer screening [Z12.11]    Post-op Diagnosis:  Post-Op Diagnosis Codes:     * Colon cancer screening [Z12.11]     - Colon Polyps     - Diverticulosis  Procedure(s) (LRB):  COLONOSCOPY (N/A)    Anesthesia: Choice    Description of the findings of the procedure: Procedures completed. See Procedure note for full details.    Findings/Key Components: Procedures completed. See Procedure note for full details.    Prosthetics/Devices: None    Estimated Blood Loss: * No values recorded between 6/8/2018 12:00 AM and 6/8/2018  2:54 PM *         Specimens:   Specimen (12h ago through future)    Start     Ordered    06/08/18 1410  Specimen to Pathology - Surgery  Once     Comments:  1. Cecum polyp2. Ascending colon polyps      06/08/18 1430          Discharge Note    SUMMARY     Admit Date: 6/8/2018    Discharge Date and Time: 6/8/2018    Hospital Course (synopsis of major diagnoses, care, treatment, and services provided during the course of the hospital stay):  Procedures completed. See Procedure note for full details. Discharge patient when discharge criteria met.    Final Diagnosis: Post-Op Diagnosis Codes:     * Colon cancer screening [Z12.11]     - Colon Polyps     - Diverticulosis  Disposition: Discharge patient when discharge criteria met.    Follow Up/Patient Instructions:       Medications:  Reconciled Home Medications: Current Discharge Medication List      CONTINUE these medications which have NOT CHANGED    Details   allopurinol (ZYLOPRIM) 100 MG tablet Take 1 tablet (100 mg total) by mouth once daily.  Qty: 90 tablet, Refills: 3      amlodipine-benazepril (LOTREL) 10-40 mg per capsule Take 1 capsule by mouth once daily.  Qty: 90 capsule, Refills: 3      fluticasone (FLONASE) 50 mcg/actuation nasal spray 1  spray by Each Nare route once daily.  Qty: 3 Bottle, Refills: 3      gabapentin (NEURONTIN) 300 MG capsule Take 1 capsule (300mg) by mouth every night X 1 week then increase to 2 capsules (600mg) QHS thereafter. Increase as tolerated.  Qty: 60 capsule, Refills: 1      hydrALAZINE (APRESOLINE) 50 MG tablet Take 1 tablet (50 mg total) by mouth 3 (three) times daily.  Qty: 270 tablet, Refills: 3      hydroCHLOROthiazide (HYDRODIURIL) 25 MG tablet Take 1 tablet (25 mg total) by mouth once daily.  Qty: 90 tablet, Refills: 3      loratadine (CLARITIN) 10 mg tablet Take 1 tablet (10 mg total) by mouth once daily.  Qty: 90 tablet, Refills: 0      potassium chloride SA (K-DUR,KLOR-CON) 10 MEQ tablet TAKE 2 TABLETS (20 MEQ TOTAL) BY MOUTH ONCE DAILY.  Qty: 90 tablet, Refills: 3      pravastatin (PRAVACHOL) 40 MG tablet Take 1 tablet (40 mg total) by mouth once daily.  Qty: 90 tablet, Refills: 3      sertraline (ZOLOFT) 50 MG tablet Take 1 tablet (50 mg total) by mouth once daily.  Qty: 90 tablet, Refills: 3      sildenafil (VIAGRA) 100 MG tablet Take 1 tablet (100 mg total) by mouth daily as needed for Erectile Dysfunction.  Qty: 5 tablet, Refills: 2      testosterone cypionate (DEPOTESTOTERONE CYPIONATE) 200 mg/mL injection INJECT 1 ML INTO MUSCLE EVERY 14 DAYS AS DIRECTED  Qty: 10 mL, Refills: 0    Comments: This request is for a new prescription for a controlled substance as required by Federal/State law.  Associated Diagnoses: Testosterone deficiency      traZODone (DESYREL) 50 MG tablet Take 1 tablet (50 mg total) by mouth once daily.  Qty: 90 tablet, Refills: 3      tadalafil (CIALIS) 20 MG Tab Take 1 tablet (20 mg total) by mouth every 36 (thirty-six) hours.  Qty: 15 tablet, Refills: 3         STOP taking these medications       sodium,potassium,mag sulfates (SUPREP BOWEL PREP KIT) 17.5-3.13-1.6 gram SolR Comments:   Reason for Stopping:                Discharge Procedure Orders  Diet general     Activity as tolerated

## 2018-06-08 NOTE — H&P
Short Stay Endoscopy History and Physical    PCP - Mohan Schwartz MD    Procedure - Colon Cancer Screen  ASA - 2  Mallampati - per anesthesia  History of Anesthesia problems - no  Family history Anesthesia problems -  no     HPI:  This is a 52 y.o.male here for evaluation of :Colon Cancer Screen     Reflux - no  Dysphagia - no  Abdominal pain - no  Diarrhea - no  Anemia - no  GI bleeding - no  Nausea and vomiting-no  Early satiety-no  aversion to sight or smell of food-no    ROS:  Constitutional: No fevers, chills, No weight loss  ENT: No allergies  CV: No chest pain  Pulm: No cough, No shortness of breath  Ophtho: No vision changes  GI: see HPI  Derm: No rash  Heme: No lymphadenopathy, No bruising  MSK: No arthritis  : No dysuria, No hematuria  Endo: No hot or cold intolerance  Neuro: No syncope, No seizure  Psych: No anxiety, No depression    Medical History:  Past Medical History:   Diagnosis Date    Allergy     Anxiety     Depression     Hyperlipidemia     Hypertension        Surgical History:  Past Surgical History:   Procedure Laterality Date    COSMETIC SURGERY      kidney removal         Family History:  Family History   Problem Relation Age of Onset    Hypertension Mother     No Known Problems Sister     No Known Problems Brother     No Known Problems Maternal Aunt     Cancer Maternal Uncle     No Known Problems Paternal Aunt     No Known Problems Paternal Uncle     No Known Problems Maternal Grandmother     No Known Problems Maternal Grandfather     No Known Problems Paternal Grandmother     No Known Problems Paternal Grandfather        Social History:  Social History     Social History    Marital status:      Spouse name: N/A    Number of children: N/A    Years of education: N/A     Occupational History     [employer]     Social History Main Topics    Smoking status: Never Smoker    Smokeless tobacco: Current User      Comment: dips     Alcohol use Yes    Drug  use: No    Sexual activity: Yes     Partners: Female     Birth control/ protection: None     Other Topics Concern    Not on file     Social History Narrative    No narrative on file       Allergies:   Review of patient's allergies indicates:   Allergen Reactions    Iodine and iodide containing products      Other reaction(s): Swelling  Other reaction(s): Sneezing  Other reaction(s): Shortness of breath       Medications:   No current facility-administered medications on file prior to encounter.      Current Outpatient Prescriptions on File Prior to Encounter   Medication Sig Dispense Refill    allopurinol (ZYLOPRIM) 100 MG tablet Take 1 tablet (100 mg total) by mouth once daily. 90 tablet 3    amlodipine-benazepril (LOTREL) 10-40 mg per capsule Take 1 capsule by mouth once daily. 90 capsule 3    fluticasone (FLONASE) 50 mcg/actuation nasal spray 1 spray by Each Nare route once daily. 3 Bottle 3    hydrALAZINE (APRESOLINE) 50 MG tablet Take 1 tablet (50 mg total) by mouth 3 (three) times daily. 270 tablet 3    hydroCHLOROthiazide (HYDRODIURIL) 25 MG tablet Take 1 tablet (25 mg total) by mouth once daily. 90 tablet 3    loratadine (CLARITIN) 10 mg tablet Take 1 tablet (10 mg total) by mouth once daily. 90 tablet 0    potassium chloride SA (K-DUR,KLOR-CON) 10 MEQ tablet TAKE 2 TABLETS (20 MEQ TOTAL) BY MOUTH ONCE DAILY. 90 tablet 3    pravastatin (PRAVACHOL) 40 MG tablet Take 1 tablet (40 mg total) by mouth once daily. 90 tablet 3    sertraline (ZOLOFT) 50 MG tablet Take 1 tablet (50 mg total) by mouth once daily. 90 tablet 3    sildenafil (VIAGRA) 100 MG tablet Take 1 tablet (100 mg total) by mouth daily as needed for Erectile Dysfunction. 5 tablet 2    testosterone cypionate (DEPOTESTOTERONE CYPIONATE) 200 mg/mL injection INJECT 1 ML INTO MUSCLE EVERY 14 DAYS AS DIRECTED 10 mL 0    traZODone (DESYREL) 50 MG tablet Take 1 tablet (50 mg total) by mouth once daily. 90 tablet 3    tadalafil (CIALIS) 20  MG Tab Take 1 tablet (20 mg total) by mouth every 36 (thirty-six) hours. 15 tablet 3       Objective Findings:    Vital Signs:  Vitals:    06/08/18 1253   BP: (!) 151/85   Pulse: 66   Resp: 20   Temp: 98.2 °F (36.8 °C)           Physical Exam:  General Appearance: Well appearing in no acute distress  Eyes:    No scleral icterus  ENT: Neck supple, Lips, mucosa, and tongue normal; teeth and gums normal  Lungs: CTA bilaterally in anterior and posterior fields, no wheezes, no crackles.  Heart:  Regular rate, S1, S2 normal, no murmurs heard.  Abdomen: Soft, non tender, non distended with normal bowel sounds. No hepatosplenomegaly, ascites, or mass.  Extremities: No clubbing, cyanosis or edema  Skin: No rash    Labs:  Reviewed    Plan:Colonoscopy  I have explained the risks and benefits of endoscopy procedures to the patient including but not limited to bleeding, perforation, infection, and death. The patient wishes to proceed.

## 2018-06-08 NOTE — ANESTHESIA RELEASE NOTE
"Anesthesia Release from PACU Note    Patient: Dre Curiel    Procedure(s) Performed: Procedure(s) (LRB):  COLONOSCOPY (N/A)    Anesthesia type: MAC    Post pain: Adequate analgesia    Post assessment: no apparent anesthetic complications, tolerated procedure well and no evidence of recall    Last Vitals:   Visit Vitals  BP (!) 151/85   Pulse 66   Temp 36.8 °C (98.2 °F) (Oral)   Resp 20   Ht 5' 8" (1.727 m)   Wt 98.9 kg (218 lb)   SpO2 97%   BMI 33.15 kg/m²       Post vital signs: stable    Level of consciousness: awake    Nausea/Vomiting: no nausea/no vomiting    Complications: none    Airway Patency: patent    Respiratory: unassisted, spontaneous ventilation, room air    Cardiovascular: stable and blood pressure at baseline    Hydration: euvolemic  "

## 2018-06-08 NOTE — ANESTHESIA PREPROCEDURE EVALUATION
06/08/2018  Dre Curiel is a 52 y.o., male.    Anesthesia Evaluation    I have reviewed the Patient Summary Reports.    I have reviewed the Nursing Notes.   I have reviewed the Medications.     Review of Systems  Anesthesia Hx:  No problems with previous Anesthesia  Denies Family Hx of Anesthesia complications.   Denies Personal Hx of Anesthesia complications.   Social:  Alcohol Use, Non-Smoker 6-7 beers per day   Hematology/Oncology:     Oncology Normal   Hematology Comments: Lymphocytosis   Cardiovascular:   Hypertension Denies MI.   Denies CABG/stent.      hyperlipidemia    Pulmonary:   Denies COPD.  Denies Asthma.  Denies Sleep Apnea.    Renal/:  Renal/ Normal     Hepatic/GI:   Bowel Prep. Denies GERD. Denies Liver Disease.  Denies Hepatitis.    Musculoskeletal:   gout   Neurological:   Denies CVA. Denies Seizures.    Endocrine:  Endocrine Normal    Psych:   anxiety depression Insomnia         Physical Exam  General:  Obesity    Airway/Jaw/Neck:  Airway Findings: Mouth Opening: Normal Tongue: Normal  General Airway Assessment: Adult  Mallampati: II      Dental:  Dental Findings: In tact, upper partial dentures   Chest/Lungs:  Chest/Lungs Findings: Clear to auscultation, Normal Respiratory Rate     Heart/Vascular:  Heart Findings: Rate: Normal  Rhythm: Regular Rhythm  Sounds: Normal             Anesthesia Plan  Type of Anesthesia, risks & benefits discussed:  Anesthesia Type:  MAC  Patient's Preference:   Intra-op Monitoring Plan: standard ASA monitors  Intra-op Monitoring Plan Comments:   Post Op Pain Control Plan:   Post Op Pain Control Plan Comments:   Induction:   IV  Beta Blocker:  Patient is not currently on a Beta-Blocker (No further documentation required).       Informed Consent: Patient understands risks and agrees with Anesthesia plan.  Questions answered. Anesthesia consent signed with  patient.  ASA Score: 2     Day of Surgery Review of History & Physical: I have interviewed and examined the patient. I have reviewed the patient's H&P dated:  There are no significant changes.  H&P update referred to the surgeon.         Ready For Surgery From Anesthesia Perspective.

## 2018-06-08 NOTE — PLAN OF CARE
MD at , speaking with pt and fmly, all questions answered, pt denies c/o pain, VSS, dc instructions reviewed, verbalized understanding, pt ok to dc to home

## 2018-06-11 ENCOUNTER — ANESTHESIA (OUTPATIENT)
Dept: ENDOSCOPY | Facility: HOSPITAL | Age: 53
End: 2018-06-11
Payer: COMMERCIAL

## 2018-06-11 ENCOUNTER — HOSPITAL ENCOUNTER (OUTPATIENT)
Facility: HOSPITAL | Age: 53
Discharge: HOME | End: 2018-06-12
Attending: EMERGENCY MEDICINE | Admitting: HOSPITALIST
Payer: COMMERCIAL

## 2018-06-11 ENCOUNTER — ANESTHESIA EVENT (OUTPATIENT)
Dept: ENDOSCOPY | Facility: HOSPITAL | Age: 53
End: 2018-06-11
Payer: COMMERCIAL

## 2018-06-11 DIAGNOSIS — K92.2 ACUTE LOWER GI BLEEDING: Primary | ICD-10-CM

## 2018-06-11 PROBLEM — I10 ESSENTIAL HYPERTENSION: Status: ACTIVE | Noted: 2018-06-11

## 2018-06-11 PROBLEM — D72.829 LEUKOCYTOSIS: Status: ACTIVE | Noted: 2017-11-24

## 2018-06-11 LAB
ABO + RH BLD: NORMAL
ALBUMIN SERPL BCP-MCNC: 3.6 G/DL
ALP SERPL-CCNC: 69 U/L
ALT SERPL W/O P-5'-P-CCNC: 20 U/L
ANION GAP SERPL CALC-SCNC: 12 MMOL/L
APTT BLDCRRT: 26.8 SEC
AST SERPL-CCNC: 14 U/L
BASOPHILS # BLD AUTO: 0.03 K/UL
BASOPHILS NFR BLD: 0.2 %
BILIRUB SERPL-MCNC: 0.6 MG/DL
BILIRUB UR QL STRIP: NEGATIVE
BLD GP AB SCN CELLS X3 SERPL QL: NORMAL
BUN SERPL-MCNC: 16 MG/DL
CALCIUM SERPL-MCNC: 9 MG/DL
CHLORIDE SERPL-SCNC: 101 MMOL/L
CLARITY UR: CLEAR
CO2 SERPL-SCNC: 24 MMOL/L
COLOR UR: YELLOW
CREAT SERPL-MCNC: 1.1 MG/DL
DIFFERENTIAL METHOD: ABNORMAL
EOSINOPHIL # BLD AUTO: 0.2 K/UL
EOSINOPHIL NFR BLD: 1.2 %
ERYTHROCYTE [DISTWIDTH] IN BLOOD BY AUTOMATED COUNT: 13.6 %
EST. GFR  (AFRICAN AMERICAN): >60 ML/MIN/1.73 M^2
EST. GFR  (NON AFRICAN AMERICAN): >60 ML/MIN/1.73 M^2
GLUCOSE SERPL-MCNC: 192 MG/DL
GLUCOSE UR QL STRIP: NEGATIVE
HCT VFR BLD AUTO: 40.2 %
HCT VFR BLD AUTO: 41.6 %
HCT VFR BLD AUTO: 45 %
HGB BLD-MCNC: 13.3 G/DL
HGB BLD-MCNC: 13.6 G/DL
HGB BLD-MCNC: 14.8 G/DL
HGB UR QL STRIP: NEGATIVE
INR PPP: 1
KETONES UR QL STRIP: NEGATIVE
LEUKOCYTE ESTERASE UR QL STRIP: NEGATIVE
LYMPHOCYTES # BLD AUTO: 2.7 K/UL
LYMPHOCYTES NFR BLD: 19.8 %
MCH RBC QN AUTO: 30.1 PG
MCHC RBC AUTO-ENTMCNC: 32.9 G/DL
MCV RBC AUTO: 92 FL
MONOCYTES # BLD AUTO: 0.5 K/UL
MONOCYTES NFR BLD: 3.8 %
NEUTROPHILS # BLD AUTO: 10.4 K/UL
NEUTROPHILS NFR BLD: 75 %
NITRITE UR QL STRIP: NEGATIVE
PH UR STRIP: 6 [PH] (ref 5–8)
PLATELET # BLD AUTO: 228 K/UL
PMV BLD AUTO: 10 FL
POCT GLUCOSE: 85 MG/DL (ref 70–110)
POTASSIUM SERPL-SCNC: 3.6 MMOL/L
PROT SERPL-MCNC: 7 G/DL
PROT UR QL STRIP: NEGATIVE
PROTHROMBIN TIME: 10 SEC
RBC # BLD AUTO: 4.91 M/UL
SODIUM SERPL-SCNC: 137 MMOL/L
SP GR UR STRIP: 1.02 (ref 1–1.03)
URN SPEC COLLECT METH UR: NORMAL
UROBILINOGEN UR STRIP-ACNC: NEGATIVE EU/DL
WBC # BLD AUTO: 13.85 K/UL

## 2018-06-11 PROCEDURE — 85018 HEMOGLOBIN: CPT | Mod: 91

## 2018-06-11 PROCEDURE — 94761 N-INVAS EAR/PLS OXIMETRY MLT: CPT

## 2018-06-11 PROCEDURE — 25000003 PHARM REV CODE 250: Performed by: PHYSICIAN ASSISTANT

## 2018-06-11 PROCEDURE — C9113 INJ PANTOPRAZOLE SODIUM, VIA: HCPCS | Performed by: NURSE PRACTITIONER

## 2018-06-11 PROCEDURE — 37000008 HC ANESTHESIA 1ST 15 MINUTES: Performed by: INTERNAL MEDICINE

## 2018-06-11 PROCEDURE — 96374 THER/PROPH/DIAG INJ IV PUSH: CPT

## 2018-06-11 PROCEDURE — 63600175 PHARM REV CODE 636 W HCPCS: Performed by: NURSE PRACTITIONER

## 2018-06-11 PROCEDURE — 80053 COMPREHEN METABOLIC PANEL: CPT

## 2018-06-11 PROCEDURE — 45381 COLONOSCOPY SUBMUCOUS NJX: CPT | Performed by: INTERNAL MEDICINE

## 2018-06-11 PROCEDURE — 85014 HEMATOCRIT: CPT | Mod: 91

## 2018-06-11 PROCEDURE — G0378 HOSPITAL OBSERVATION PER HR: HCPCS

## 2018-06-11 PROCEDURE — 85730 THROMBOPLASTIN TIME PARTIAL: CPT

## 2018-06-11 PROCEDURE — 96361 HYDRATE IV INFUSION ADD-ON: CPT

## 2018-06-11 PROCEDURE — 37000009 HC ANESTHESIA EA ADD 15 MINS: Performed by: INTERNAL MEDICINE

## 2018-06-11 PROCEDURE — 85610 PROTHROMBIN TIME: CPT

## 2018-06-11 PROCEDURE — 25000003 PHARM REV CODE 250: Performed by: NURSE PRACTITIONER

## 2018-06-11 PROCEDURE — 86901 BLOOD TYPING SEROLOGIC RH(D): CPT

## 2018-06-11 PROCEDURE — 81003 URINALYSIS AUTO W/O SCOPE: CPT

## 2018-06-11 PROCEDURE — 25000003 PHARM REV CODE 250: Performed by: INTERNAL MEDICINE

## 2018-06-11 PROCEDURE — 25000003 PHARM REV CODE 250: Performed by: NURSE ANESTHETIST, CERTIFIED REGISTERED

## 2018-06-11 PROCEDURE — 27200997: Performed by: INTERNAL MEDICINE

## 2018-06-11 PROCEDURE — 63600175 PHARM REV CODE 636 W HCPCS: Performed by: NURSE ANESTHETIST, CERTIFIED REGISTERED

## 2018-06-11 PROCEDURE — 45381 COLONOSCOPY SUBMUCOUS NJX: CPT | Mod: ,,, | Performed by: INTERNAL MEDICINE

## 2018-06-11 PROCEDURE — 45382 COLONOSCOPY W/CONTROL BLEED: CPT | Performed by: INTERNAL MEDICINE

## 2018-06-11 PROCEDURE — 85025 COMPLETE CBC W/AUTO DIFF WBC: CPT

## 2018-06-11 PROCEDURE — 99284 EMERGENCY DEPT VISIT MOD MDM: CPT

## 2018-06-11 PROCEDURE — 36415 COLL VENOUS BLD VENIPUNCTURE: CPT

## 2018-06-11 RX ORDER — TRAZODONE HYDROCHLORIDE 50 MG/1
50 TABLET ORAL DAILY
Status: DISCONTINUED | OUTPATIENT
Start: 2018-06-11 | End: 2018-06-11

## 2018-06-11 RX ORDER — PROPOFOL 10 MG/ML
VIAL (ML) INTRAVENOUS
Status: DISCONTINUED | OUTPATIENT
Start: 2018-06-11 | End: 2018-06-11

## 2018-06-11 RX ORDER — SODIUM CHLORIDE 9 MG/ML
INJECTION, SOLUTION INTRAVENOUS CONTINUOUS
Status: DISCONTINUED | OUTPATIENT
Start: 2018-06-11 | End: 2018-06-12 | Stop reason: HOSPADM

## 2018-06-11 RX ORDER — SODIUM CHLORIDE, SODIUM LACTATE, POTASSIUM CHLORIDE, CALCIUM CHLORIDE 600; 310; 30; 20 MG/100ML; MG/100ML; MG/100ML; MG/100ML
INJECTION, SOLUTION INTRAVENOUS CONTINUOUS
Status: DISCONTINUED | OUTPATIENT
Start: 2018-06-11 | End: 2018-06-11

## 2018-06-11 RX ORDER — PRAVASTATIN SODIUM 20 MG/1
40 TABLET ORAL DAILY
Status: DISCONTINUED | OUTPATIENT
Start: 2018-06-11 | End: 2018-06-12 | Stop reason: HOSPADM

## 2018-06-11 RX ORDER — AMLODIPINE BESYLATE 10 MG/1
10 TABLET ORAL DAILY
Status: DISCONTINUED | OUTPATIENT
Start: 2018-06-11 | End: 2018-06-12 | Stop reason: HOSPADM

## 2018-06-11 RX ORDER — ALLOPURINOL 100 MG/1
100 TABLET ORAL DAILY
Status: DISCONTINUED | OUTPATIENT
Start: 2018-06-11 | End: 2018-06-12 | Stop reason: HOSPADM

## 2018-06-11 RX ORDER — DEXTROSE MONOHYDRATE AND SODIUM CHLORIDE 5; .45 G/100ML; G/100ML
1000 INJECTION, SOLUTION INTRAVENOUS CONTINUOUS
Status: DISCONTINUED | OUTPATIENT
Start: 2018-06-11 | End: 2018-06-11

## 2018-06-11 RX ORDER — GABAPENTIN 300 MG/1
600 CAPSULE ORAL NIGHTLY
Status: DISCONTINUED | OUTPATIENT
Start: 2018-06-11 | End: 2018-06-12 | Stop reason: HOSPADM

## 2018-06-11 RX ORDER — LIDOCAINE HYDROCHLORIDE 10 MG/ML
INJECTION INFILTRATION; PERINEURAL
Status: DISCONTINUED | OUTPATIENT
Start: 2018-06-11 | End: 2018-06-11

## 2018-06-11 RX ORDER — POLYETHYLENE GLYCOL 3350, SODIUM SULFATE ANHYDROUS, SODIUM BICARBONATE, SODIUM CHLORIDE, POTASSIUM CHLORIDE 236; 22.74; 6.74; 5.86; 2.97 G/4L; G/4L; G/4L; G/4L; G/4L
4000 POWDER, FOR SOLUTION ORAL ONCE
Status: COMPLETED | OUTPATIENT
Start: 2018-06-11 | End: 2018-06-11

## 2018-06-11 RX ORDER — TRAZODONE HYDROCHLORIDE 50 MG/1
50 TABLET ORAL NIGHTLY
Status: DISCONTINUED | OUTPATIENT
Start: 2018-06-11 | End: 2018-06-12 | Stop reason: HOSPADM

## 2018-06-11 RX ORDER — HYDRALAZINE HYDROCHLORIDE 50 MG/1
50 TABLET, FILM COATED ORAL 3 TIMES DAILY
Status: DISCONTINUED | OUTPATIENT
Start: 2018-06-11 | End: 2018-06-12 | Stop reason: HOSPADM

## 2018-06-11 RX ORDER — BENAZEPRIL HYDROCHLORIDE 20 MG/1
40 TABLET ORAL DAILY
Status: DISCONTINUED | OUTPATIENT
Start: 2018-06-11 | End: 2018-06-12 | Stop reason: HOSPADM

## 2018-06-11 RX ORDER — SODIUM CHLORIDE, SODIUM LACTATE, POTASSIUM CHLORIDE, CALCIUM CHLORIDE 600; 310; 30; 20 MG/100ML; MG/100ML; MG/100ML; MG/100ML
INJECTION, SOLUTION INTRAVENOUS CONTINUOUS PRN
Status: DISCONTINUED | OUTPATIENT
Start: 2018-06-11 | End: 2018-06-11

## 2018-06-11 RX ORDER — CETIRIZINE HYDROCHLORIDE 10 MG/1
10 TABLET ORAL DAILY
Status: DISCONTINUED | OUTPATIENT
Start: 2018-06-11 | End: 2018-06-12 | Stop reason: HOSPADM

## 2018-06-11 RX ADMIN — PROPOFOL 50 MG: 10 INJECTION, EMULSION INTRAVENOUS at 05:06

## 2018-06-11 RX ADMIN — PROPOFOL 100 MG: 10 INJECTION, EMULSION INTRAVENOUS at 05:06

## 2018-06-11 RX ADMIN — SODIUM CHLORIDE, SODIUM LACTATE, POTASSIUM CHLORIDE, AND CALCIUM CHLORIDE: .6; .31; .03; .02 INJECTION, SOLUTION INTRAVENOUS at 05:06

## 2018-06-11 RX ADMIN — SODIUM CHLORIDE: 0.9 INJECTION, SOLUTION INTRAVENOUS at 06:06

## 2018-06-11 RX ADMIN — SODIUM CHLORIDE, SODIUM LACTATE, POTASSIUM CHLORIDE, AND CALCIUM CHLORIDE: 600; 310; 30; 20 INJECTION, SOLUTION INTRAVENOUS at 05:06

## 2018-06-11 RX ADMIN — SODIUM CHLORIDE: 0.9 INJECTION, SOLUTION INTRAVENOUS at 05:06

## 2018-06-11 RX ADMIN — DEXTROSE 40 MG: 50 INJECTION, SOLUTION INTRAVENOUS at 08:06

## 2018-06-11 RX ADMIN — TRAZODONE HYDROCHLORIDE 50 MG: 50 TABLET ORAL at 08:06

## 2018-06-11 RX ADMIN — HYDRALAZINE HYDROCHLORIDE 50 MG: 50 TABLET, FILM COATED ORAL at 03:06

## 2018-06-11 RX ADMIN — POLYETHYLENE GLYCOL 3350, SODIUM SULFATE ANHYDROUS, SODIUM BICARBONATE, SODIUM CHLORIDE, POTASSIUM CHLORIDE 4000 ML: 236; 22.74; 6.74; 5.86; 2.97 POWDER, FOR SOLUTION ORAL at 10:06

## 2018-06-11 RX ADMIN — LIDOCAINE HYDROCHLORIDE 50 MG: 10 INJECTION, SOLUTION INFILTRATION; PERINEURAL at 05:06

## 2018-06-11 RX ADMIN — GABAPENTIN 600 MG: 300 CAPSULE ORAL at 08:06

## 2018-06-11 RX ADMIN — HYDRALAZINE HYDROCHLORIDE 50 MG: 50 TABLET, FILM COATED ORAL at 08:06

## 2018-06-11 RX ADMIN — SODIUM CHLORIDE: 0.9 INJECTION, SOLUTION INTRAVENOUS at 02:06

## 2018-06-11 NOTE — SUBJECTIVE & OBJECTIVE
Past Medical History:   Diagnosis Date    Allergy     Anxiety     Depression     Hyperlipidemia     Hypertension        Past Surgical History:   Procedure Laterality Date    COSMETIC SURGERY      kidney removal         Review of patient's allergies indicates:   Allergen Reactions    Iodine and iodide containing products      Other reaction(s): Swelling  Other reaction(s): Sneezing  Other reaction(s): Shortness of breath       No current facility-administered medications on file prior to encounter.      Current Outpatient Prescriptions on File Prior to Encounter   Medication Sig    allopurinol (ZYLOPRIM) 100 MG tablet Take 1 tablet (100 mg total) by mouth once daily.    amlodipine-benazepril (LOTREL) 10-40 mg per capsule Take 1 capsule by mouth once daily.    fluticasone (FLONASE) 50 mcg/actuation nasal spray 1 spray by Each Nare route once daily.    gabapentin (NEURONTIN) 300 MG capsule Take 1 capsule (300mg) by mouth every night X 1 week then increase to 2 capsules (600mg) QHS thereafter. Increase as tolerated.    hydrALAZINE (APRESOLINE) 50 MG tablet Take 1 tablet (50 mg total) by mouth 3 (three) times daily.    hydroCHLOROthiazide (HYDRODIURIL) 25 MG tablet Take 1 tablet (25 mg total) by mouth once daily.    loratadine (CLARITIN) 10 mg tablet Take 1 tablet (10 mg total) by mouth once daily.    potassium chloride SA (K-DUR,KLOR-CON) 10 MEQ tablet TAKE 2 TABLETS (20 MEQ TOTAL) BY MOUTH ONCE DAILY.    pravastatin (PRAVACHOL) 40 MG tablet Take 1 tablet (40 mg total) by mouth once daily.    sertraline (ZOLOFT) 50 MG tablet Take 1 tablet (50 mg total) by mouth once daily.    sildenafil (VIAGRA) 100 MG tablet Take 1 tablet (100 mg total) by mouth daily as needed for Erectile Dysfunction.    tadalafil (CIALIS) 20 MG Tab Take 1 tablet (20 mg total) by mouth every 36 (thirty-six) hours.    testosterone cypionate (DEPOTESTOTERONE CYPIONATE) 200 mg/mL injection INJECT 1 ML INTO MUSCLE EVERY 14 DAYS AS  DIRECTED    traZODone (DESYREL) 50 MG tablet Take 1 tablet (50 mg total) by mouth once daily.     Family History     Problem Relation (Age of Onset)    Cancer Maternal Uncle    Hypertension Mother    No Known Problems Sister, Brother, Maternal Aunt, Paternal Aunt, Paternal Uncle, Maternal Grandmother, Maternal Grandfather, Paternal Grandmother, Paternal Grandfather        Social History Main Topics    Smoking status: Never Smoker    Smokeless tobacco: Current User      Comment: dips     Alcohol use Yes    Drug use: No    Sexual activity: Yes     Partners: Female     Birth control/ protection: None     Review of Systems   Constitutional: Positive for activity change.   HENT: Negative.    Eyes: Negative.    Respiratory: Negative for apnea, cough, choking, chest tightness, shortness of breath, wheezing and stridor.    Cardiovascular: Negative for chest pain, palpitations and leg swelling.   Gastrointestinal: Positive for anal bleeding. Negative for abdominal pain, blood in stool, constipation, diarrhea, nausea and vomiting.   Endocrine: Negative.    Genitourinary: Negative.    Musculoskeletal: Negative.    Skin: Negative.    Allergic/Immunologic: Negative.    Neurological: Negative for dizziness, tremors, seizures, syncope, facial asymmetry, speech difficulty, weakness, light-headedness, numbness and headaches.     Objective:     Vital Signs (Most Recent):  Temp: 98 °F (36.7 °C) (06/11/18 0308)  Pulse: 86 (06/11/18 0330)  Resp: 16 (06/11/18 0308)  BP: (!) 189/110 (06/11/18 0308) Vital Signs (24h Range):  Temp:  [98 °F (36.7 °C)] 98 °F (36.7 °C)  Pulse:  [86-87] 86  Resp:  [16] 16  BP: (189)/(110) 189/110     Weight: 99.8 kg (220 lb 0.3 oz)  Body mass index is 33.45 kg/m².    Physical Exam   Constitutional: He is oriented to person, place, and time. He appears well-developed. No distress.   HENT:   Head: Normocephalic and atraumatic.   Eyes: EOM are normal.   Neck: Normal range of motion. Neck supple.    Cardiovascular: Normal rate, regular rhythm, normal heart sounds and intact distal pulses.    No murmur heard.  Pulmonary/Chest: Effort normal and breath sounds normal. No respiratory distress. He has no wheezes. He has no rales. He exhibits no tenderness.   Abdominal: Soft. Bowel sounds are normal. He exhibits no distension. There is no tenderness. There is no rebound and no guarding.   Musculoskeletal: Normal range of motion.   Neurological: He is alert and oriented to person, place, and time. No sensory deficit.   Skin: Skin is warm and dry. Capillary refill takes less than 2 seconds.   Psychiatric: He has a normal mood and affect. His behavior is normal. Judgment and thought content normal.   Nursing note and vitals reviewed.        CRANIAL NERVES     CN III, IV, VI   Extraocular motions are normal.        Significant Labs:   CBC:   Recent Labs  Lab 06/11/18  0330   WBC 13.85*   HGB 14.8   HCT 45.0        CMP:   Recent Labs  Lab 06/11/18  0330      K 3.6      CO2 24   *   BUN 16   CREATININE 1.1   CALCIUM 9.0   PROT 7.0   ALBUMIN 3.6   BILITOT 0.6   ALKPHOS 69   AST 14   ALT 20   ANIONGAP 12   EGFRNONAA >60     Coagulation:   Recent Labs  Lab 06/11/18  0330   INR 1.0   APTT 26.8       Significant Imaging:   Imaging Results    None

## 2018-06-11 NOTE — PROGRESS NOTES
Medication administration attempted this AM however pt states he took all of his own daily medications this AM. Expressed to pt for the duration of his stay that the nurses will be administering his medications. Pt verbalized understanding

## 2018-06-11 NOTE — ANESTHESIA RELEASE NOTE
"Anesthesia Release from PACU Note    Patient: Dre Curiel    Procedure(s) Performed: Procedure(s) (LRB):  COLONOSCOPY (N/A)    Anesthesia type: MAC    Post pain: Adequate analgesia    Post assessment: no apparent anesthetic complications    Last Vitals:   Visit Vitals  /65   Pulse 82   Temp 36.8 °C (98.3 °F)   Resp 18   Ht 5' 8" (1.727 m)   Wt 98 kg (216 lb)   SpO2 96%   BMI 32.84 kg/m²       Post vital signs: stable    Level of consciousness: awake    Nausea/Vomiting: no nausea/no vomiting    Complications: none    Airway Patency: patent    Respiratory: unassisted    Cardiovascular: stable and blood pressure at baseline    Hydration: euvolemic  "

## 2018-06-11 NOTE — ANESTHESIA PREPROCEDURE EVALUATION
06/11/2018  Dre Curiel is a 52 y.o., male.    Anesthesia Evaluation    I have reviewed the Patient Summary Reports.    I have reviewed the Nursing Notes.   I have reviewed the Medications.     Review of Systems  Anesthesia Hx:  No problems with previous Anesthesia  History of prior surgery of interest to airway management or planning: Denies Family Hx of Anesthesia complications.   Denies Personal Hx of Anesthesia complications.   Social:  Non-Smoker, Alcohol Use    Hematology/Oncology:         -- Anemia:   Cardiovascular:   Hypertension hyperlipidemia    Pulmonary:  Pulmonary Normal    Renal/:  Renal/ Normal     Musculoskeletal:   gout   Psych:   Psychiatric History depression          Physical Exam  General:  Obesity    Airway/Jaw/Neck:  Airway Findings: Mouth Opening: Normal Tongue: Normal  General Airway Assessment: Adult  Mallampati: II  TM Distance: Normal, at least 6 cm      Dental:  Poor dentition   Chest/Lungs:  Chest/Lungs Findings: Normal Respiratory Rate     Heart/Vascular:  Heart Findings: Rate: Normal             Anesthesia Plan  Type of Anesthesia, risks & benefits discussed:  Anesthesia Type:  MAC  Patient's Preference:   Intra-op Monitoring Plan: standard ASA monitors  Intra-op Monitoring Plan Comments:   Post Op Pain Control Plan:   Post Op Pain Control Plan Comments:   Induction:   IV  Beta Blocker:  Patient is not currently on a Beta-Blocker (No further documentation required).       Informed Consent: Patient understands risks and agrees with Anesthesia plan.  Questions answered. Anesthesia consent signed with patient.  ASA Score: 3     Day of Surgery Review of History & Physical:    H&P update referred to the surgeon.         Ready For Surgery From Anesthesia Perspective.

## 2018-06-11 NOTE — H&P (VIEW-ONLY)
[facility] -   Gastroenterology  Consult Note    Patient Name: Dre Curiel  MRN: [id number]  Admission Date: 6/11/2018  Hospital Length of Stay: 0 days  Code Status: No Order   Attending Provider: Js Hernandez MD   Consulting Provider: Klaus Gentile PA-C  Primary Care Physician: Mohan Schwartz MD  Principal Problem:GI bleed    Inpatient consult to Gastroenterology  Consult performed by: KLAUS GENTILE  Consult ordered by: DEVONTE GIL  Reason for consult: GI bleed        Subjective:     HPI:  The patient presented to the ER for hematochezia. He had a colonoscopy on the 8th. Multiple polyps were removed specifically from the ascending colon. The bleeding started yesterday and progressively got worse and more bright red. His last BM was 5 minutes ago, very dark and very small. He reports some lower abdominal cramping. He denies nausea or vomiting. His Hgb on admit was 14.8. The last one was 13.3. Vitals stable. BUN and creatinine normal. He takes Aspirin 81 mg daily which he resumed. He denies taking other blood thinners.     Past Medical History:   Diagnosis Date    Allergy     Anxiety     Depression     Hyperlipidemia     Hypertension        Past Surgical History:   Procedure Laterality Date    COSMETIC SURGERY      kidney removal         Review of patient's allergies indicates:   Allergen Reactions    Iodine and iodide containing products      Other reaction(s): Swelling  Other reaction(s): Sneezing  Other reaction(s): Shortness of breath     Family History     Problem Relation (Age of Onset)    Cancer Maternal Uncle    Hypertension Mother    No Known Problems Sister, Brother, Maternal Aunt, Paternal Aunt, Paternal Uncle, Maternal Grandmother, Maternal Grandfather, Paternal Grandmother, Paternal Grandfather        Social History Main Topics    Smoking status: Never Smoker    Smokeless tobacco: Current User      Comment: dips     Alcohol use Yes    Drug use: No    Sexual activity:  Yes     Partners: Female     Birth control/ protection: None     Review of Systems   Constitutional: Negative for fatigue and fever.   HENT: Negative for hearing loss.    Eyes: Negative for visual disturbance.   Respiratory: Negative for cough and shortness of breath.    Cardiovascular: Negative for chest pain and palpitations.   Gastrointestinal:        As per HPI.   Genitourinary: Negative for difficulty urinating, dysuria, frequency and hematuria.   Musculoskeletal: Negative for arthralgias and back pain.   Skin: Negative for color change and rash.   Neurological: Negative for dizziness, seizures, syncope, weakness, numbness and headaches.   Hematological: Does not bruise/bleed easily.   Psychiatric/Behavioral: The patient is not nervous/anxious.      Objective:     Vital Signs (Most Recent):  Temp: 98.1 °F (36.7 °C) (06/11/18 0754)  Pulse: 72 (06/11/18 0754)  Resp: 18 (06/11/18 0754)  BP: 132/71 (06/11/18 0754)  SpO2: 96 % (06/11/18 0754) Vital Signs (24h Range):  Temp:  [98 °F (36.7 °C)-98.4 °F (36.9 °C)] 98.1 °F (36.7 °C)  Pulse:  [72-89] 72  Resp:  [16-18] 18  SpO2:  [96 %-97 %] 96 %  BP: (131-189)/() 132/71     Weight: 98 kg (216 lb) (06/11/18 0529)  Body mass index is 32.84 kg/m².      Intake/Output Summary (Last 24 hours) at 06/11/18 0758  Last data filed at 06/11/18 0700   Gross per 24 hour   Intake              200 ml   Output                0 ml   Net              200 ml       Lines/Drains/Airways     Peripheral Intravenous Line                 Peripheral IV - Single Lumen 06/11/18 0327 Right Antecubital less than 1 day                Physical Exam   Constitutional: He is oriented to person, place, and time. He appears well-developed and well-nourished.   HENT:   Head: Normocephalic and atraumatic.   Eyes: EOM are normal.   Neck: Normal range of motion. Neck supple.   Cardiovascular: Normal rate, regular rhythm and normal heart sounds.    No murmur heard.  Pulmonary/Chest: Effort normal and breath  sounds normal. No respiratory distress. He has no wheezes.   Abdominal: Soft. Bowel sounds are normal. He exhibits no distension and no mass. There is no hepatomegaly. There is tenderness in the right upper quadrant.   Musculoskeletal: He exhibits no edema.   Neurological: He is alert and oriented to person, place, and time. No cranial nerve deficit. Gait normal.   Skin: Skin is warm and dry. No rash noted.   Psychiatric: He has a normal mood and affect.       Significant Labs:  CBC:   Recent Labs  Lab 06/11/18 0330 06/11/18 0651   WBC 13.85*  --    HGB 14.8 13.3*   HCT 45.0 40.2     --      CMP:   Recent Labs  Lab 06/11/18 0330   *   CALCIUM 9.0   ALBUMIN 3.6   PROT 7.0      K 3.6   CO2 24      BUN 16   CREATININE 1.1   ALKPHOS 69   ALT 20   AST 14   BILITOT 0.6     Coagulation:   Recent Labs  Lab 06/11/18 0330   INR 1.0   APTT 26.8       Significant Imaging:  Imaging results within the past 24 hours have been reviewed.    Assessment/Plan:     * GI bleed    53 yo male with hematochezia, suspect post-polypectomy bleed.   These are usually self-limiting.   Currently, stool getting darker and smaller. Hgb and vitals are ok.   Will likely monitor for now.   Agree with regular monitoring of H/H.             Thank you for your consult. I will follow-up with patient. Please contact us if you have any additional questions.    Jesse Gentile PA-C  Gastroenterology  [facility]

## 2018-06-11 NOTE — [employer] PATIENT INSTRUCTIONS
Discharge Summary/Instructions after an Endoscopic Procedure  Patient Name: Dre Curiel  Patient MRN: [id number]  Patient YOB: 1965 Monday, June 11, 2018 Simeon Acharya III, MD  RESTRICTIONS:  During your procedure today, you received medications for sedation.  These   medications may affect your judgment, balance and coordination.  Therefore,   for 24 hours, you have the following restrictions:   - DO NOT drive a car, operate machinery, make legal/financial decisions,   sign important papers or drink alcohol.    ACTIVITY:  Today: no heavy lifting, straining or running due to procedural   sedation/anesthesia.  The following day: return to full activity including work.  DIET:  Eat and drink normally unless instructed otherwise.     TREATMENT FOR COMMON SIDE EFFECTS:  - Mild abdominal pain, nausea, belching, bloating or excessive gas:  rest,   eat lightly and use a heating pad.  - Sore Throat: treat with throat lozenges and/or gargle with warm salt   water.  - Because air was used during the procedure, expelling large amounts of air   from your rectum or belching is normal.  - If a bowel prep was taken, you may not have a bowel movement for 1-3 days.    This is normal.  SYMPTOMS TO WATCH FOR AND REPORT TO YOUR PHYSICIAN:  1. Abdominal pain or bloating, other than gas cramps.  2. Chest pain.  3. Back pain.  4. Signs of infection such as: chills or fever occurring within 24 hours   after the procedure.  5. Rectal bleeding, which would show as bright red, maroon, or black stools.   (A tablespoon of blood from the rectum is not serious, especially if   hemorrhoids are present.)  6. Vomiting.  7. Weakness or dizziness.  GO DIRECTLY TO THE NEAREST EMERGENCY ROOM IF YOU HAVE ANY OF THE FOLLOWING:      Difficulty breathing              Chills and/or fever over 101 F   Persistent vomiting and/or vomiting blood   Severe abdominal pain   Severe chest pain   Black, tarry stools   Bleeding- more than one  tablespoon   Any other symptom or condition that you feel may need urgent attention  Your doctor recommends these additional instructions:  If any biopsies were taken, your doctors clinic will contact you in 1 to 2   weeks with any results.  - Return patient to [facility] for [facility] care.   - Advance diet as tolerated.   - Continue present medications.   - Repeat colonoscopy in 3 years for surveillance.  For questions, problems or results please call your physician Simeon Acharya III, MD at Work:  [phone]  If you have any questions about the above instructions, call the GI   department at [phone] or call the endoscopy unit at [phone]   from 7am until 3 pm.  [facility] [place], EMERGENCY ROOM PHONE NUMBER:   [phone]  IF A COMPLICATION OR EMERGENCY SITUATION ARISES AND YOU ARE UNABLE TO REACH   YOUR PHYSICIAN - GO DIRECTLY TO THE EMERGENCY ROOM.  I have read or have had read to me these discharge instructions for my   procedure and have received a written copy.  I understand these   instructions and will follow-up with my physician if I have any questions.     __________________________________       _____________________________________  Nurse Signature                                          Patient/Designated   Responsible Party Signature  Simeon Acharya III, MD  6/11/2018 5:58:25 PM  This report has been verified and signed electronically.  [employer]

## 2018-06-11 NOTE — ASSESSMENT & PLAN NOTE
- Initially hypertensive upon arrival to ED, BP now improved. Pt reports taking all BP medications today prior to arrival  - Resume home medications  - Monitor BP, risk for hypotension with rectal bleeding

## 2018-06-11 NOTE — PROGRESS NOTES
Pt back to floor from ENDO procedure. Pt tolerated CLD and advanced to regular at this time. SR on tele monitor. Fall precautions in place

## 2018-06-11 NOTE — SUBJECTIVE & OBJECTIVE
Past Medical History:   Diagnosis Date    Allergy     Anxiety     Depression     Hyperlipidemia     Hypertension        Past Surgical History:   Procedure Laterality Date    COSMETIC SURGERY      kidney removal         Review of patient's allergies indicates:   Allergen Reactions    Iodine and iodide containing products      Other reaction(s): Swelling  Other reaction(s): Sneezing  Other reaction(s): Shortness of breath     Family History     Problem Relation (Age of Onset)    Cancer Maternal Uncle    Hypertension Mother    No Known Problems Sister, Brother, Maternal Aunt, Paternal Aunt, Paternal Uncle, Maternal Grandmother, Maternal Grandfather, Paternal Grandmother, Paternal Grandfather        Social History Main Topics    Smoking status: Never Smoker    Smokeless tobacco: Current User      Comment: dips     Alcohol use Yes    Drug use: No    Sexual activity: Yes     Partners: Female     Birth control/ protection: None     Review of Systems   Constitutional: Negative for fatigue and fever.   HENT: Negative for hearing loss.    Eyes: Negative for visual disturbance.   Respiratory: Negative for cough and shortness of breath.    Cardiovascular: Negative for chest pain and palpitations.   Gastrointestinal:        As per HPI.   Genitourinary: Negative for difficulty urinating, dysuria, frequency and hematuria.   Musculoskeletal: Negative for arthralgias and back pain.   Skin: Negative for color change and rash.   Neurological: Negative for dizziness, seizures, syncope, weakness, numbness and headaches.   Hematological: Does not bruise/bleed easily.   Psychiatric/Behavioral: The patient is not nervous/anxious.      Objective:     Vital Signs (Most Recent):  Temp: 98.1 °F (36.7 °C) (06/11/18 0754)  Pulse: 72 (06/11/18 0754)  Resp: 18 (06/11/18 0754)  BP: 132/71 (06/11/18 0754)  SpO2: 96 % (06/11/18 0754) Vital Signs (24h Range):  Temp:  [98 °F (36.7 °C)-98.4 °F (36.9 °C)] 98.1 °F (36.7 °C)  Pulse:  [72-89]  72  Resp:  [16-18] 18  SpO2:  [96 %-97 %] 96 %  BP: (131-189)/() 132/71     Weight: 98 kg (216 lb) (06/11/18 0529)  Body mass index is 32.84 kg/m².      Intake/Output Summary (Last 24 hours) at 06/11/18 0758  Last data filed at 06/11/18 0700   Gross per 24 hour   Intake              200 ml   Output                0 ml   Net              200 ml       Lines/Drains/Airways     Peripheral Intravenous Line                 Peripheral IV - Single Lumen 06/11/18 0327 Right Antecubital less than 1 day                Physical Exam   Constitutional: He is oriented to person, place, and time. He appears well-developed and well-nourished.   HENT:   Head: Normocephalic and atraumatic.   Eyes: EOM are normal.   Neck: Normal range of motion. Neck supple.   Cardiovascular: Normal rate, regular rhythm and normal heart sounds.    No murmur heard.  Pulmonary/Chest: Effort normal and breath sounds normal. No respiratory distress. He has no wheezes.   Abdominal: Soft. Bowel sounds are normal. He exhibits no distension and no mass. There is no hepatomegaly. There is tenderness in the right upper quadrant.   Musculoskeletal: He exhibits no edema.   Neurological: He is alert and oriented to person, place, and time. No cranial nerve deficit. Gait normal.   Skin: Skin is warm and dry. No rash noted.   Psychiatric: He has a normal mood and affect.       Significant Labs:  CBC:   Recent Labs  Lab 06/11/18  0330 06/11/18  0651   WBC 13.85*  --    HGB 14.8 13.3*   HCT 45.0 40.2     --      CMP:   Recent Labs  Lab 06/11/18  0330   *   CALCIUM 9.0   ALBUMIN 3.6   PROT 7.0      K 3.6   CO2 24      BUN 16   CREATININE 1.1   ALKPHOS 69   ALT 20   AST 14   BILITOT 0.6     Coagulation:   Recent Labs  Lab 06/11/18  0330   INR 1.0   APTT 26.8       Significant Imaging:  Imaging results within the past 24 hours have been reviewed.

## 2018-06-11 NOTE — PLAN OF CARE
Pt transferred back to room via stretcher. VSS. NADN. Glasses on pt. Tele monitor in place. Procedure handout given to pt. Bedside report given to JAXON Cristobal.

## 2018-06-11 NOTE — PLAN OF CARE
Problem: Patient Care Overview  Goal: Plan of Care Review  Outcome: Ongoing (interventions implemented as appropriate)  Plan of care discussed with patient AAOX4 Vital signs stable afebrile on RA on tele SR 1 bm some blood darker not as much as in ER urine collect cxr pending fluids at 100 patient tolerating well NPO until GI see patient will cont to monitor.

## 2018-06-11 NOTE — TRANSFER OF CARE
"Anesthesia Transfer of Care Note    Patient: Dre Curiel    Procedure(s) Performed: Procedure(s) (LRB):  COLONOSCOPY (N/A)    Patient location: PACU    Anesthesia Type: MAC    Transport from OR: Transported from OR on room air with adequate spontaneous ventilation    Post pain: adequate analgesia    Post assessment: no apparent anesthetic complications    Post vital signs: stable    Level of consciousness: awake    Nausea/Vomiting: no nausea/vomiting    Complications: none    Transfer of care protocol was followed      Last vitals:   Visit Vitals  /65   Pulse 82   Temp 36.8 °C (98.3 °F)   Resp 18   Ht 5' 8" (1.727 m)   Wt 98 kg (216 lb)   SpO2 96%   BMI 32.84 kg/m²     "

## 2018-06-11 NOTE — ASSESSMENT & PLAN NOTE
53 yo male with hematochezia, suspect post-polypectomy bleed.   These are usually self-limiting.   Currently, stool getting darker and smaller. Hgb and vitals are ok.   Will likely monitor for now.   Agree with regular monitoring of H/H.

## 2018-06-11 NOTE — HPI
Dre Curiel is a 52 year old male with a PMHx of HTN, HLD, Depression, and Anxiety who presented to the Emergency Department with c/o Rectal bleeding which onset gradually at 10 PM yesterday. Symptoms are episodic and moderate in severity. Pt states he has had 5 to 6 episodes PTA. Pt reports his blood started as dark and now is bright red. No mitigating or exacerbating factors reported. Associated sxs include mild abdominal pain. Pt has had 2 episodes of bright red rectal bleeding in the ED and 1 episode on observation unit. Patient denies any fever, chills, chest pain, SOB, n/v/d, hematuria, dysuria, back pain, flank pain, dizziness. Pt had a routine colonoscopy on 06/08/18 showed non bleeding internal hemorrhoids, diverticulosis, and polyps -- performed by Dr. Acharya. Pt denies NSAID abuse. Pt reports he drinks about 6 beers/day. ED workup revealed:  WBC 13.85, Hgb/Hct 14.8/45.0, glucose 192. Pt placed on Observation for GI Bleed.

## 2018-06-11 NOTE — CONSULTS
[facility] -   Gastroenterology  Consult Note    Patient Name: Dre Curiel  MRN: [id number]  Admission Date: 6/11/2018  Hospital Length of Stay: 0 days  Code Status: No Order   Attending Provider: Js Hernandez MD   Consulting Provider: Klaus Gentile PA-C  Primary Care Physician: Mohan Schwartz MD  Principal Problem:GI bleed    Inpatient consult to Gastroenterology  Consult performed by: KLAUS GENTILE  Consult ordered by: DEVONTE GIL  Reason for consult: GI bleed        Subjective:     HPI:  The patient presented to the ER for hematochezia. He had a colonoscopy on the 8th. Multiple polyps were removed specifically from the ascending colon. The bleeding started yesterday and progressively got worse and more bright red. His last BM was 5 minutes ago, very dark and very small. He reports some lower abdominal cramping. He denies nausea or vomiting. His Hgb on admit was 14.8. The last one was 13.3. Vitals stable. BUN and creatinine normal. He takes Aspirin 81 mg daily which he resumed. He denies taking other blood thinners.     Past Medical History:   Diagnosis Date    Allergy     Anxiety     Depression     Hyperlipidemia     Hypertension        Past Surgical History:   Procedure Laterality Date    COSMETIC SURGERY      kidney removal         Review of patient's allergies indicates:   Allergen Reactions    Iodine and iodide containing products      Other reaction(s): Swelling  Other reaction(s): Sneezing  Other reaction(s): Shortness of breath     Family History     Problem Relation (Age of Onset)    Cancer Maternal Uncle    Hypertension Mother    No Known Problems Sister, Brother, Maternal Aunt, Paternal Aunt, Paternal Uncle, Maternal Grandmother, Maternal Grandfather, Paternal Grandmother, Paternal Grandfather        Social History Main Topics    Smoking status: Never Smoker    Smokeless tobacco: Current User      Comment: dips     Alcohol use Yes    Drug use: No    Sexual activity:  Yes     Partners: Female     Birth control/ protection: None     Review of Systems   Constitutional: Negative for fatigue and fever.   HENT: Negative for hearing loss.    Eyes: Negative for visual disturbance.   Respiratory: Negative for cough and shortness of breath.    Cardiovascular: Negative for chest pain and palpitations.   Gastrointestinal:        As per HPI.   Genitourinary: Negative for difficulty urinating, dysuria, frequency and hematuria.   Musculoskeletal: Negative for arthralgias and back pain.   Skin: Negative for color change and rash.   Neurological: Negative for dizziness, seizures, syncope, weakness, numbness and headaches.   Hematological: Does not bruise/bleed easily.   Psychiatric/Behavioral: The patient is not nervous/anxious.      Objective:     Vital Signs (Most Recent):  Temp: 98.1 °F (36.7 °C) (06/11/18 0754)  Pulse: 72 (06/11/18 0754)  Resp: 18 (06/11/18 0754)  BP: 132/71 (06/11/18 0754)  SpO2: 96 % (06/11/18 0754) Vital Signs (24h Range):  Temp:  [98 °F (36.7 °C)-98.4 °F (36.9 °C)] 98.1 °F (36.7 °C)  Pulse:  [72-89] 72  Resp:  [16-18] 18  SpO2:  [96 %-97 %] 96 %  BP: (131-189)/() 132/71     Weight: 98 kg (216 lb) (06/11/18 0529)  Body mass index is 32.84 kg/m².      Intake/Output Summary (Last 24 hours) at 06/11/18 0758  Last data filed at 06/11/18 0700   Gross per 24 hour   Intake              200 ml   Output                0 ml   Net              200 ml       Lines/Drains/Airways     Peripheral Intravenous Line                 Peripheral IV - Single Lumen 06/11/18 0327 Right Antecubital less than 1 day                Physical Exam   Constitutional: He is oriented to person, place, and time. He appears well-developed and well-nourished.   HENT:   Head: Normocephalic and atraumatic.   Eyes: EOM are normal.   Neck: Normal range of motion. Neck supple.   Cardiovascular: Normal rate, regular rhythm and normal heart sounds.    No murmur heard.  Pulmonary/Chest: Effort normal and breath  sounds normal. No respiratory distress. He has no wheezes.   Abdominal: Soft. Bowel sounds are normal. He exhibits no distension and no mass. There is no hepatomegaly. There is tenderness in the right upper quadrant.   Musculoskeletal: He exhibits no edema.   Neurological: He is alert and oriented to person, place, and time. No cranial nerve deficit. Gait normal.   Skin: Skin is warm and dry. No rash noted.   Psychiatric: He has a normal mood and affect.       Significant Labs:  CBC:   Recent Labs  Lab 06/11/18 0330 06/11/18 0651   WBC 13.85*  --    HGB 14.8 13.3*   HCT 45.0 40.2     --      CMP:   Recent Labs  Lab 06/11/18 0330   *   CALCIUM 9.0   ALBUMIN 3.6   PROT 7.0      K 3.6   CO2 24      BUN 16   CREATININE 1.1   ALKPHOS 69   ALT 20   AST 14   BILITOT 0.6     Coagulation:   Recent Labs  Lab 06/11/18 0330   INR 1.0   APTT 26.8       Significant Imaging:  Imaging results within the past 24 hours have been reviewed.    Assessment/Plan:     * GI bleed    53 yo male with hematochezia, suspect post-polypectomy bleed.   These are usually self-limiting.   Currently, stool getting darker and smaller. Hgb and vitals are ok.   Will likely monitor for now.   Agree with regular monitoring of H/H.             Thank you for your consult. I will follow-up with patient. Please contact us if you have any additional questions.    Jesse Gentile PA-C  Gastroenterology  [facility]

## 2018-06-11 NOTE — ASSESSMENT & PLAN NOTE
- WBC count 13.85K  - May be stress response  - Currently afebrile. No increase in HR or RR. Low threshold to start antibiotics at this time  - Obtain CXR and UA to r/o infectious process

## 2018-06-11 NOTE — ED PROVIDER NOTES
SCRIBE #1 NOTE: I, Chante Crawford, am scribing for, and in the presence of, Robert Crawford MD. I have scribed the entire note.      History      Chief Complaint   Patient presents with    Rectal Bleeding     Pt reports colonscopy 6/8/18, and noticing bright red stools tonight       Review of patient's allergies indicates:   Allergen Reactions    Iodine and iodide containing products      Other reaction(s): Swelling  Other reaction(s): Sneezing  Other reaction(s): Shortness of breath        HPI   HPI    6/11/2018, 3:17 AM   History obtained from the patient      History of Present Illness: Dre Curiel is a 52 y.o. male patient who presents to the Emergency Department for rectal bleeding which onset gradually at 10 PM yesterday. Symptoms are episodic and moderate in severity. Pt states he has had 5 to 6 episodes. Pt reports his blood started as dark and now is bright red. No mitigating or exacerbating factors reported. Associated sxs include abd pain and hematochezia. Patient denies any fever chills, n/v/d, hematuria, dysuria, back pain, flank pain, dizziness, and all other sxs at this time. No prior Tx. Pt had polyps removed during a colonoscopy by Dr. Acharya on 6/8/18. No further complaints or concerns at this time.         Arrival mode: Personal vehicle      PCP: Mohan Schwartz MD       Past Medical History:  Past Medical History:   Diagnosis Date    Allergy     Anxiety     Depression     Hyperlipidemia     Hypertension        Past Surgical History:  Past Surgical History:   Procedure Laterality Date    COSMETIC SURGERY      kidney removal           Family History:  Family History   Problem Relation Age of Onset    Hypertension Mother     No Known Problems Sister     No Known Problems Brother     No Known Problems Maternal Aunt     Cancer Maternal Uncle     No Known Problems Paternal Aunt     No Known Problems Paternal Uncle     No Known Problems Maternal Grandmother     No Known Problems Maternal  Grandfather     No Known Problems Paternal Grandmother     No Known Problems Paternal Grandfather        Social History:  Social History     Social History Main Topics    Smoking status: Never Smoker    Smokeless tobacco: Current User      Comment: dips     Alcohol use Yes    Drug use: No    Sexual activity: Yes     Partners: Female     Birth control/ protection: None       ROS   Review of Systems   Constitutional: Negative for fever.   HENT: Negative for sore throat.    Respiratory: Negative for shortness of breath.    Cardiovascular: Negative for chest pain.   Gastrointestinal: Positive for abdominal pain, anal bleeding and blood in stool. Negative for diarrhea, nausea, rectal pain and vomiting.   Genitourinary: Negative for dysuria.   Musculoskeletal: Negative for back pain.   Skin: Negative for rash.   Neurological: Negative for dizziness and weakness.   Hematological: Does not bruise/bleed easily.   All other systems reviewed and are negative.      Physical Exam      Initial Vitals   BP Pulse Resp Temp SpO2   06/11/18 0308 06/11/18 0308 06/11/18 0308 06/11/18 0308 06/11/18 0502   (!) 189/110 87 16 98 °F (36.7 °C) 96 %      MAP       06/11/18 0308       136.33          Physical Exam  Nursing Notes and Vital Signs Reviewed.  Constitutional: Patient is in no apparent distress. Well-developed and well-nourished.  Head: Atraumatic. Normocephalic.  Eyes: PERRL. EOM intact. Conjunctivae are not pale. No scleral icterus.  ENT: Mucous membranes are moist. Oropharynx is clear and symmetric.    Neck: Supple. Full ROM. No lymphadenopathy.  Cardiovascular: Regular rate. Regular rhythm. No murmurs, rubs, or gallops. Distal pulses are 2+ and symmetric.  Pulmonary/Chest: No respiratory distress. Clear to auscultation bilaterally. No wheezing or rales.  Abdominal: Soft and non-distended.  There is no tenderness.  No rebound, guarding, or rigidity. Good bowel sounds.  Genitourinary: No CVA tenderness.  Musculoskeletal:  "Moves all extremities. No obvious deformities. No edema. No calf tenderness.  Skin: Warm and dry.  Neurological:  Alert, awake, and appropriate.  Normal speech.  No acute focal neurological deficits are appreciated.  Psychiatric: Normal affect. Good eye contact. Appropriate in content.    ED Course    Procedures  ED Vital Signs:  Vitals:    06/11/18 0308 06/11/18 0330 06/11/18 0502   BP: (!) 189/110  131/66   Pulse: 87 86 89   Resp: 16     Temp: 98 °F (36.7 °C)     TempSrc: Oral     SpO2:   96%   Weight: 99.8 kg (220 lb 0.3 oz)     Height: 5' 8" (1.727 m)         Abnormal Lab Results:  Labs Reviewed   CBC W/ AUTO DIFFERENTIAL - Abnormal; Notable for the following:        Result Value    WBC 13.85 (*)     Gran # (ANC) 10.4 (*)     Gran% 75.0 (*)     Mono% 3.8 (*)     All other components within normal limits   COMPREHENSIVE METABOLIC PANEL - Abnormal; Notable for the following:     Glucose 192 (*)     All other components within normal limits   PROTIME-INR   APTT   HEMOGLOBIN   HEMATOCRIT   TYPE & SCREEN        All Lab Results:  Results for orders placed or performed during the hospital encounter of 06/11/18   CBC auto differential   Result Value Ref Range    WBC 13.85 (H) 3.90 - 12.70 K/uL    RBC 4.91 4.60 - 6.20 M/uL    Hemoglobin 14.8 14.0 - 18.0 g/dL    Hematocrit 45.0 40.0 - 54.0 %    MCV 92 82 - 98 fL    MCH 30.1 27.0 - 31.0 pg    MCHC 32.9 32.0 - 36.0 g/dL    RDW 13.6 11.5 - 14.5 %    Platelets 228 150 - 350 K/uL    MPV 10.0 9.2 - 12.9 fL    Gran # (ANC) 10.4 (H) 1.8 - 7.7 K/uL    Lymph # 2.7 1.0 - 4.8 K/uL    Mono # 0.5 0.3 - 1.0 K/uL    Eos # 0.2 0.0 - 0.5 K/uL    Baso # 0.03 0.00 - 0.20 K/uL    Gran% 75.0 (H) 38.0 - 73.0 %    Lymph% 19.8 18.0 - 48.0 %    Mono% 3.8 (L) 4.0 - 15.0 %    Eosinophil% 1.2 0.0 - 8.0 %    Basophil% 0.2 0.0 - 1.9 %    Differential Method Automated    Comprehensive metabolic panel   Result Value Ref Range    Sodium 137 136 - 145 mmol/L    Potassium 3.6 3.5 - 5.1 mmol/L    Chloride " 101 95 - 110 mmol/L    CO2 24 23 - 29 mmol/L    Glucose 192 (H) 70 - 110 mg/dL    BUN, Bld 16 6 - 20 mg/dL    Creatinine 1.1 0.5 - 1.4 mg/dL    Calcium 9.0 8.7 - 10.5 mg/dL    Total Protein 7.0 6.0 - 8.4 g/dL    Albumin 3.6 3.5 - 5.2 g/dL    Total Bilirubin 0.6 0.1 - 1.0 mg/dL    Alkaline Phosphatase 69 55 - 135 U/L    AST 14 10 - 40 U/L    ALT 20 10 - 44 U/L    Anion Gap 12 8 - 16 mmol/L    eGFR if African American >60 >60 mL/min/1.73 m^2    eGFR if non African American >60 >60 mL/min/1.73 m^2   Protime-INR   Result Value Ref Range    Prothrombin Time 10.0 9.0 - 12.5 sec    INR 1.0 0.8 - 1.2   APTT   Result Value Ref Range    aPTT 26.8 21.0 - 32.0 sec   Type & Screen   Result Value Ref Range    Group & Rh O POS     Indirect Emily NEG          Imaging Results:  Imaging Results    None                 The Emergency Provider reviewed the vital signs and test results, which are outlined above.    ED Discussion     4:29 AM: Re-evaluated pt. Pt is resting comfortably and is in no acute distress.  Pt states his last meal was around 3 or 4 PM yesterday.  D/w pt all pertinent results. D/w pt any concerns expressed at this time. Answered all questions. Pt expresses understanding at this time.    4:47 AM: Pt had 2 episodes of bright red blood.    4:52 AM: Discussed case with Nickie Rodrigues NP ([facility]). Dr. Brown agrees with current care and management of pt and accepts admission.   Admitting Service: Hospital medicine   Admitting Physician: Kevin  Admit to: tele obs    5:00 AM: Re-evaluated pt. I have discussed test results, shared treatment plan, and the need for admission with patient at bedside. Pt express understanding at this time and agree with all information. All questions answered. Pt have no further questions or concerns at this time. Pt is ready for admit.        ED Medication(s):  Medications   pantoprazole 40 mg in dextrose 5 % 100 mL IVPB (over 15 minutes) (ready to mix system) (not administered)   0.9%   NaCl infusion (not administered)       New Prescriptions    No medications on file             Medical Decision Making    Medical Decision Making:   Clinical Tests:   Lab Tests: Reviewed and Ordered           Scribe Attestation:   Scribe #1: I performed the above scribed service and the documentation accurately describes the services I performed. I attest to the accuracy of the note.    Attending:   Physician Attestation Statement for Scribe #1: I, Robert Crawfodr MD, personally performed the services described in this documentation, as scribed by Chante Crawford, in my presence, and it is both accurate and complete.          Clinical Impression       ICD-10-CM ICD-9-CM   1. Acute lower GI bleeding K92.2 578.9       Disposition:   Disposition: Placed in Observation  Condition: Fair         Robert Crawford MD  06/11/18 0515

## 2018-06-11 NOTE — H&P
[facility] Hospital Medicine  History & Physical    Patient Name: Dre Curiel  MRN: [id number]  Admission Date: 6/11/2018  Attending Physician: Edison Brown MD   Primary Care Provider: Mohan Schwartz MD         Patient information was obtained from patient and ER records.     Subjective:     Principal Problem:GI bleed    Chief Complaint:   Chief Complaint   Patient presents with    Rectal Bleeding     Pt reports colonscopy 6/8/18, and noticing bright red stools tonight        HPI: Dre Curiel is a 52 year old male with a PMHx of HTN, HLD, Depression, and Anxiety who presented to the Emergency Department with c/o Rectal bleeding which onset gradually at 10 PM yesterday. Symptoms are episodic and moderate in severity. Pt states he has had 5 to 6 episodes PTA. Pt reports his blood started as dark and now is bright red. No mitigating or exacerbating factors reported. Associated sxs include mild abdominal pain. Pt has had 2 episodes of bright red rectal bleeding in the ED and 1 episode on observation unit. Patient denies any fever, chills, chest pain, SOB, n/v/d, hematuria, dysuria, back pain, flank pain, dizziness. Pt had a routine colonoscopy on 06/08/18 showed non bleeding internal hemorrhoids, diverticulosis, and polyps -- performed by Dr. Acharya. Pt denies NSAID abuse. Pt reports he drinks about 6 beers/day. ED workup revealed:  WBC 13.85, Hgb/Hct 14.8/45.0, glucose 192. Pt placed on Observation for GI Bleed.     Past Medical History:   Diagnosis Date    Allergy     Anxiety     Depression     Hyperlipidemia     Hypertension        Past Surgical History:   Procedure Laterality Date    COSMETIC SURGERY      kidney removal         Review of patient's allergies indicates:   Allergen Reactions    Iodine and iodide containing products      Other reaction(s): Swelling  Other reaction(s): Sneezing  Other reaction(s): Shortness of breath       No current facility-administered medications on  file prior to encounter.      Current Outpatient Prescriptions on File Prior to Encounter   Medication Sig    allopurinol (ZYLOPRIM) 100 MG tablet Take 1 tablet (100 mg total) by mouth once daily.    amlodipine-benazepril (LOTREL) 10-40 mg per capsule Take 1 capsule by mouth once daily.    fluticasone (FLONASE) 50 mcg/actuation nasal spray 1 spray by Each Nare route once daily.    gabapentin (NEURONTIN) 300 MG capsule Take 1 capsule (300mg) by mouth every night X 1 week then increase to 2 capsules (600mg) QHS thereafter. Increase as tolerated.    hydrALAZINE (APRESOLINE) 50 MG tablet Take 1 tablet (50 mg total) by mouth 3 (three) times daily.    hydroCHLOROthiazide (HYDRODIURIL) 25 MG tablet Take 1 tablet (25 mg total) by mouth once daily.    loratadine (CLARITIN) 10 mg tablet Take 1 tablet (10 mg total) by mouth once daily.    potassium chloride SA (K-DUR,KLOR-CON) 10 MEQ tablet TAKE 2 TABLETS (20 MEQ TOTAL) BY MOUTH ONCE DAILY.    pravastatin (PRAVACHOL) 40 MG tablet Take 1 tablet (40 mg total) by mouth once daily.    sertraline (ZOLOFT) 50 MG tablet Take 1 tablet (50 mg total) by mouth once daily.    sildenafil (VIAGRA) 100 MG tablet Take 1 tablet (100 mg total) by mouth daily as needed for Erectile Dysfunction.    tadalafil (CIALIS) 20 MG Tab Take 1 tablet (20 mg total) by mouth every 36 (thirty-six) hours.    testosterone cypionate (DEPOTESTOTERONE CYPIONATE) 200 mg/mL injection INJECT 1 ML INTO MUSCLE EVERY 14 DAYS AS DIRECTED    traZODone (DESYREL) 50 MG tablet Take 1 tablet (50 mg total) by mouth once daily.     Family History     Problem Relation (Age of Onset)    Cancer Maternal Uncle    Hypertension Mother    No Known Problems Sister, Brother, Maternal Aunt, Paternal Aunt, Paternal Uncle, Maternal Grandmother, Maternal Grandfather, Paternal Grandmother, Paternal Grandfather        Social History Main Topics    Smoking status: Never Smoker    Smokeless tobacco: Current User      Comment:  dips     Alcohol use Yes    Drug use: No    Sexual activity: Yes     Partners: Female     Birth control/ protection: None     Review of Systems   Constitutional: Positive for activity change.   HENT: Negative.    Eyes: Negative.    Respiratory: Negative for apnea, cough, choking, chest tightness, shortness of breath, wheezing and stridor.    Cardiovascular: Negative for chest pain, palpitations and leg swelling.   Gastrointestinal: Positive for anal bleeding. Negative for abdominal pain, blood in stool, constipation, diarrhea, nausea and vomiting.   Endocrine: Negative.    Genitourinary: Negative.    Musculoskeletal: Negative.    Skin: Negative.    Allergic/Immunologic: Negative.    Neurological: Negative for dizziness, tremors, seizures, syncope, facial asymmetry, speech difficulty, weakness, light-headedness, numbness and headaches.     Objective:     Vital Signs (Most Recent):  Temp: 98 °F (36.7 °C) (06/11/18 0308)  Pulse: 86 (06/11/18 0330)  Resp: 16 (06/11/18 0308)  BP: (!) 189/110 (06/11/18 0308) Vital Signs (24h Range):  Temp:  [98 °F (36.7 °C)] 98 °F (36.7 °C)  Pulse:  [86-87] 86  Resp:  [16] 16  BP: (189)/(110) 189/110     Weight: 99.8 kg (220 lb 0.3 oz)  Body mass index is 33.45 kg/m².    Physical Exam   Constitutional: He is oriented to person, place, and time. He appears well-developed. No distress.   HENT:   Head: Normocephalic and atraumatic.   Eyes: EOM are normal.   Neck: Normal range of motion. Neck supple.   Cardiovascular: Normal rate, regular rhythm, normal heart sounds and intact distal pulses.    No murmur heard.  Pulmonary/Chest: Effort normal and breath sounds normal. No respiratory distress. He has no wheezes. He has no rales. He exhibits no tenderness.   Abdominal: Soft. Bowel sounds are normal. He exhibits no distension. There is no tenderness. There is no rebound and no guarding.   Musculoskeletal: Normal range of motion.   Neurological: He is alert and oriented to person, place, and  time. No sensory deficit.   Skin: Skin is warm and dry. Capillary refill takes less than 2 seconds.   Psychiatric: He has a normal mood and affect. His behavior is normal. Judgment and thought content normal.   Nursing note and vitals reviewed.        CRANIAL NERVES     CN III, IV, VI   Extraocular motions are normal.        Significant Labs:   CBC:   Recent Labs  Lab 06/11/18  0330   WBC 13.85*   HGB 14.8   HCT 45.0        CMP:   Recent Labs  Lab 06/11/18  0330      K 3.6      CO2 24   *   BUN 16   CREATININE 1.1   CALCIUM 9.0   PROT 7.0   ALBUMIN 3.6   BILITOT 0.6   ALKPHOS 69   AST 14   ALT 20   ANIONGAP 12   EGFRNONAA >60     Coagulation:   Recent Labs  Lab 06/11/18  0330   INR 1.0   APTT 26.8       Significant Imaging:   Imaging Results    None       Assessment/Plan:     * GI bleed    - Observation  - Colonoscopy on 06/08/18 -- non bleeding internal hemorrhoids, diverticulosis, and polyps (performed by Dr. Acharya)  - Inpatient consult to Gastroenterology in AM  - Hgb/Hct upon admit stable at 14.8/45.0  - Has had 2 episodes of bright red rectal bleeding in the ED then one episode on observation unit  - Serial Hgb/Hct q6hrs x 3  - PPI  - NPO  - IVF  - Supplemental oxygen prn, keep O2 sats > 92%          Leukocytosis    - WBC count 13.85K  - May be stress response  - Currently afebrile. No increase in HR or RR. Low threshold to start antibiotics at this time  - Obtain CXR and UA to r/o infectious process          Essential hypertension    - Initially hypertensive upon arrival to ED, BP now improved. Pt reports taking all BP medications today prior to arrival  - Resume home medications  - Monitor BP, risk for hypotension with rectal bleeding            VTE Risk Mitigation         Ordered     Place sequential compression device  Until discontinued      06/11/18 0573             LOKESH Ann  Department of Hospital Medicine   [facility]

## 2018-06-11 NOTE — INTERVAL H&P NOTE
The patient has been examined and the H&P has been reviewed:I have reviewed this note and I agree with this assessment. The patient remains stable for endoscopy at the time of this present evaluation.         Anesthesia/Surgery risks, benefits and alternative options discussed and understood by patient/family.          Active Hospital Problems    Diagnosis  POA    *GI bleed [K92.2]  Yes    Essential hypertension [I10]  Yes    Leukocytosis [D72.829]  Yes      Resolved Hospital Problems    Diagnosis Date Resolved POA   No resolved problems to display.

## 2018-06-11 NOTE — HPI
The patient presented to the ER for hematochezia. He had a colonoscopy on the 8th. Multiple polyps were removed specifically from the ascending colon. The bleeding started yesterday and progressively got worse and more bright red. His last BM was 5 minutes ago, very dark and very small. He reports some lower abdominal cramping. He denies nausea or vomiting. His Hgb on admit was 14.8. The last one was 13.3. Vitals stable. BUN and creatinine normal. He takes Aspirin 81 mg daily which he resumed. He denies taking other blood thinners.

## 2018-06-11 NOTE — ASSESSMENT & PLAN NOTE
- Observation  - Colonoscopy on 06/08/18 -- non bleeding internal hemorrhoids, diverticulosis, and polyps (performed by Dr. Acharya)  - Inpatient consult to Gastroenterology in AM  - Hgb/Hct upon admit stable at 14.8/45.0  - Has had 2 episodes of bright red rectal bleeding in the ED then one episode on observation unit  - Serial Hgb/Hct q6hrs x 3  - PPI  - NPO  - IVF  - Supplemental oxygen prn, keep O2 sats > 92%

## 2018-06-11 NOTE — PLAN OF CARE
Problem: Patient Care Overview  Goal: Individualization & Mutuality  Outcome: Ongoing (interventions implemented as appropriate)  Pt AAO  SR on tele monitor  Colonoscopy today  Advance diet as tolerated   IVF at 100 ml/hr   If no events over night with bleeding and pt tolerates diet DC tomorrow per report from ENDO RN   Fall precautions in place

## 2018-06-11 NOTE — ANESTHESIA POSTPROCEDURE EVALUATION
"Anesthesia Post Evaluation    Patient: Dre Curiel    Procedure(s) Performed: Procedure(s) (LRB):  COLONOSCOPY (N/A)    Final Anesthesia Type: MAC  Patient location during evaluation: PACU  Patient participation: Yes- Able to Participate  Level of consciousness: awake and alert  Post-procedure vital signs: reviewed and stable  Pain management: adequate  Airway patency: patent  PONV status at discharge: No PONV  Anesthetic complications: no      Cardiovascular status: blood pressure returned to baseline  Respiratory status: unassisted  Hydration status: euvolemic  Follow-up not needed.        Visit Vitals  /65   Pulse 82   Temp 36.8 °C (98.3 °F)   Resp 18   Ht 5' 8" (1.727 m)   Wt 98 kg (216 lb)   SpO2 96%   BMI 32.84 kg/m²       Pain/Marisa Score: Pain Assessment Performed: Yes (6/11/2018  4:43 PM)  Presence of Pain: denies (6/11/2018  5:40 PM)  Marisa Score: 9 (6/11/2018  5:40 PM)      "

## 2018-06-11 NOTE — PROGRESS NOTES
Patient seen and examined. He is still having dark, tarry stool. He is prepping for colonscopy - drinking Golythly. H/H down slightly - checking every 6 hours, will transfuse if indicated. All was discussed with patient, who verbalized understanding.

## 2018-06-12 VITALS
HEART RATE: 83 BPM | BODY MASS INDEX: 32.74 KG/M2 | SYSTOLIC BLOOD PRESSURE: 125 MMHG | RESPIRATION RATE: 16 BRPM | TEMPERATURE: 98 F | HEIGHT: 68 IN | WEIGHT: 216 LBS | DIASTOLIC BLOOD PRESSURE: 68 MMHG | OXYGEN SATURATION: 98 %

## 2018-06-12 LAB
HCT VFR BLD AUTO: 38.2 %
HGB BLD-MCNC: 12.3 G/DL

## 2018-06-12 PROCEDURE — 85014 HEMATOCRIT: CPT

## 2018-06-12 PROCEDURE — 85018 HEMOGLOBIN: CPT

## 2018-06-12 PROCEDURE — G0378 HOSPITAL OBSERVATION PER HR: HCPCS

## 2018-06-12 PROCEDURE — 94761 N-INVAS EAR/PLS OXIMETRY MLT: CPT

## 2018-06-12 PROCEDURE — 99213 OFFICE O/P EST LOW 20 MIN: CPT | Mod: ,,, | Performed by: PHYSICIAN ASSISTANT

## 2018-06-12 PROCEDURE — 25000003 PHARM REV CODE 250: Performed by: NURSE PRACTITIONER

## 2018-06-12 PROCEDURE — 36415 COLL VENOUS BLD VENIPUNCTURE: CPT

## 2018-06-12 RX ADMIN — PRAVASTATIN SODIUM 40 MG: 20 TABLET ORAL at 10:06

## 2018-06-12 RX ADMIN — HYDRALAZINE HYDROCHLORIDE 50 MG: 50 TABLET, FILM COATED ORAL at 10:06

## 2018-06-12 RX ADMIN — ALLOPURINOL 100 MG: 100 TABLET ORAL at 10:06

## 2018-06-12 RX ADMIN — BENAZEPRIL HYDROCHLORIDE 40 MG: 20 TABLET, FILM COATED ORAL at 10:06

## 2018-06-12 RX ADMIN — AMLODIPINE BESYLATE 10 MG: 10 TABLET ORAL at 10:06

## 2018-06-12 RX ADMIN — CETIRIZINE HYDROCHLORIDE 10 MG: 10 TABLET, FILM COATED ORAL at 10:06

## 2018-06-12 NOTE — PROGRESS NOTES
[facility]  Gastroenterology  Progress Note    Patient Name: Dre Curiel  MRN: [id number]  Admission Date: 6/11/2018  Hospital Length of Stay: 0 days  Code Status: No Order   Attending Provider: Js Hernandez MD  Consulting Provider: Jesse Gentile PA-C  Primary Care Physician: Mohan Schwartz MD  Principal Problem: GI bleed      Subjective:     Interval History:   The patient is feeling well and tolerating his diet. No complaints or bleeding today. Hgb relatively stable. S/p colonoscopy yesterday which showed bleeding at the appendiceal orifice at polypectomy site. It was injected and clipped. There were also a few ulcers in the ascending colon.     Review of Systems   Constitutional:        See Interval History for daily ROS.      Objective:     Vital Signs (Most Recent):  Temp: 97.8 °F (36.6 °C) (06/12/18 0827)  Pulse: 79 (06/12/18 0827)  Resp: 18 (06/12/18 0827)  BP: 133/70 (06/12/18 0827)  SpO2: 95 % (06/12/18 0827) Vital Signs (24h Range):  Temp:  [97.8 °F (36.6 °C)-98.8 °F (37.1 °C)] 97.8 °F (36.6 °C)  Pulse:  [58-89] 79  Resp:  [16-18] 18  SpO2:  [93 %-98 %] 95 %  BP: (105-133)/(50-83) 133/70     Weight: 98 kg (216 lb) (06/11/18 0529)  Body mass index is 32.84 kg/m².      Intake/Output Summary (Last 24 hours) at 06/12/18 1055  Last data filed at 06/12/18 0600   Gross per 24 hour   Intake             4974 ml   Output               16 ml   Net             4958 ml       Lines/Drains/Airways     Peripheral Intravenous Line                 Peripheral IV - Single Lumen 06/11/18 0327 Right Antecubital 1 day                Physical Exam   Constitutional: He is oriented to person, place, and time. He appears well-developed and well-nourished. No distress.   HENT:   Head: Normocephalic and atraumatic.   Eyes: EOM are normal.   Cardiovascular: Normal rate and regular rhythm.    Pulmonary/Chest: Effort normal and breath sounds normal. No respiratory distress. He has no wheezes.   Abdominal: Soft. Bowel  sounds are normal. He exhibits no distension. There is no tenderness.   Neurological: He is alert and oriented to person, place, and time. No cranial nerve deficit.   Skin: He is not diaphoretic.   Psychiatric: His behavior is normal.       Significant Labs:  CBC:   Recent Labs  Lab 06/11/18  0330 06/11/18  0651 06/11/18  1208 06/12/18  0914   WBC 13.85*  --   --   --    HGB 14.8 13.3* 13.6* 12.3*   HCT 45.0 40.2 41.6 38.2*     --   --   --          Significant Imaging:  Imaging results within the past 24 hours have been reviewed.    Assessment/Plan:     * GI bleed    51 yo male with post-polypectomy bleed.   Hgb and vitals are relatively stable.   Plan for discharge today.    Recommend holding Aspirin for 5 days.   Call clinic in five days for pathology results.             Thank you for your consult. I will sign off. Please contact us if you have any additional questions.    Jesse Gentile PA-C  Gastroenterology  [facility]

## 2018-06-12 NOTE — PROGRESS NOTES
Discharge instructions given to pt. Verbalized understanding. Iv removed. Cath tip intact. Tele removed. Pt can not drive for 24hr per sedation yest evening. Pt called for ride. Awaiting arrival. No distress noted.

## 2018-06-12 NOTE — ASSESSMENT & PLAN NOTE
51 yo male with post-polypectomy bleed.   Hgb and vitals are relatively stable.   Plan for discharge today.    Recommend holding Aspirin for 5 days.   Call clinic in five days for pathology results.

## 2018-06-12 NOTE — PROGRESS NOTES
Pt walked down stairs to private vehicle. Home with friend. No distress noted. Denies any other needs.

## 2018-06-12 NOTE — PLAN OF CARE
Problem: Patient Care Overview  Goal: Plan of Care Review  Outcome: Ongoing (interventions implemented as appropriate)  Outcome: Ongoing (interventions implemented as appropriate)  Plan of care discussed with patient AAOX4 Vital signs stable afebrile on RA on tele SR  GI seen patient colonoscopy done old bleeder fix 1 bm clear per patient assessment  fluids at 100 patient tolerating if no blood in stool and h/h stable will d/c in the am see patient will cont to monitor.

## 2018-06-12 NOTE — SUBJECTIVE & OBJECTIVE
Subjective:     Interval History:   The patient is feeling well and tolerating his diet. No complaints or bleeding today. Hgb relatively stable. S/p colonoscopy yesterday which showed bleeding at the appendiceal orifice at polypectomy site. It was injected and clipped. There were also a few ulcers in the ascending colon.     Review of Systems   Constitutional:        See Interval History for daily ROS.      Objective:     Vital Signs (Most Recent):  Temp: 97.8 °F (36.6 °C) (06/12/18 0827)  Pulse: 79 (06/12/18 0827)  Resp: 18 (06/12/18 0827)  BP: 133/70 (06/12/18 0827)  SpO2: 95 % (06/12/18 0827) Vital Signs (24h Range):  Temp:  [97.8 °F (36.6 °C)-98.8 °F (37.1 °C)] 97.8 °F (36.6 °C)  Pulse:  [58-89] 79  Resp:  [16-18] 18  SpO2:  [93 %-98 %] 95 %  BP: (105-133)/(50-83) 133/70     Weight: 98 kg (216 lb) (06/11/18 0529)  Body mass index is 32.84 kg/m².      Intake/Output Summary (Last 24 hours) at 06/12/18 1055  Last data filed at 06/12/18 0600   Gross per 24 hour   Intake             4974 ml   Output               16 ml   Net             4958 ml       Lines/Drains/Airways     Peripheral Intravenous Line                 Peripheral IV - Single Lumen 06/11/18 0327 Right Antecubital 1 day                Physical Exam   Constitutional: He is oriented to person, place, and time. He appears well-developed and well-nourished. No distress.   HENT:   Head: Normocephalic and atraumatic.   Eyes: EOM are normal.   Cardiovascular: Normal rate and regular rhythm.    Pulmonary/Chest: Effort normal and breath sounds normal. No respiratory distress. He has no wheezes.   Abdominal: Soft. Bowel sounds are normal. He exhibits no distension. There is no tenderness.   Neurological: He is alert and oriented to person, place, and time. No cranial nerve deficit.   Skin: He is not diaphoretic.   Psychiatric: His behavior is normal.       Significant Labs:  CBC:   Recent Labs  Lab 06/11/18  0330 06/11/18  0651 06/11/18  1208 06/12/18  0914    WBC 13.85*  --   --   --    HGB 14.8 13.3* 13.6* 12.3*   HCT 45.0 40.2 41.6 38.2*     --   --   --          Significant Imaging:  Imaging results within the past 24 hours have been reviewed.

## 2018-06-13 NOTE — DISCHARGE SUMMARY
[facility] Hospital Medicine  Discharge Summary      Patient Name: Dre Curiel  MRN: [id number]  Admission Date: 6/11/2018  Hospital Length of Stay: 0 days  Discharge Date 6/12/18  Attending Physician:Dr. Vick   Discharging Provider: Naomi Bazan NP  Primary Care Provider: Mohan Schwartz MD      HPI:   Dre Curiel is a 52 year old male with a PMHx of HTN, HLD, Depression, and Anxiety who presented to the Emergency Department with c/o Rectal bleeding which onset gradually at 10 PM yesterday. Symptoms are episodic and moderate in severity. Pt states he has had 5 to 6 episodes PTA. Pt reports his blood started as dark and now is bright red. No mitigating or exacerbating factors reported. Associated sxs include mild abdominal pain. Pt has had 2 episodes of bright red rectal bleeding in the ED and 1 episode on observation unit. Patient denies any fever, chills, chest pain, SOB, n/v/d, hematuria, dysuria, back pain, flank pain, dizziness. Pt had a routine colonoscopy on 06/08/18 showed non bleeding internal hemorrhoids, diverticulosis, and polyps -- performed by Dr. Acharya. Pt denies NSAID abuse. Pt reports he drinks about 6 beers/day. ED workup revealed:  WBC 13.85, Hgb/Hct 14.8/45.0, glucose 192. Pt placed on Observation for GI Bleed.     Procedure(s) (LRB):  COLONOSCOPY (N/A)      Hospital Course:   The pt 51 yo male placed in observation for post-polypectomy bleed. 14.8>13 and continued to have bleeding.CAre discussed with GI who recommended colonoscopy which showed Diverticulosis, Bleeding at the appendiceal orifice secondary to previous polypectomy. Injected. Clip was placed. A few ulcers in the ascending colon.   Hgb and vitals remained stable. No further rectal bleeding. Pt instructed to hold ASA x 5 days- f/u with GI.      Consults:   Consults         Status Ordering Provider     Inpatient consult to Gastroenterology  Once     Provider:  (Not yet assigned)    Completed DEVONTE GIL             Final Active Diagnoses:    Diagnosis Date Noted POA    Essential hypertension [I10] 06/11/2018 Yes    Acute lower GI bleeding [K92.2] 06/11/2018 Yes    Leukocytosis [D72.829] 11/24/2017 Yes      Problems Resolved During this Admission:    Diagnosis Date Noted Date Resolved POA       Discharged Condition: stable    Disposition: Home or Self Care    Follow Up:  Follow-up Information     Mohan Schwartz MD In 3 days.    Specialty:  Family Medicine  Contact information:  [address] [address] [address] [phone]                 Patient Instructions:     Diet Adult Regular     Activity as tolerated         Significant Diagnostic Studies:  Imaging Results          X-Ray Chest PA And Lateral (Final result)  Result time 06/11/18 08:06:53    Final result by Pedro Wiseman MD (06/11/18 08:06:53)                 Impression:      No acute findings.      Electronically signed by: Pedro Wiseman MD  Date:    06/11/2018  Time:    08:06             Narrative:    EXAMINATION:  XR CHEST PA AND LATERAL    CLINICAL HISTORY  Fever.  Elevated white blood cell count., Leukocytosis;    COMPARISON:  03/30/2016.    FINDINGS:  The heart size is normal.  The lung fields are clear.  No acute cardiopulmonary infiltrative.                                Pending Diagnostic Studies:     None         Medications:  Reconciled Home Medications:      Medication List      CONTINUE taking these medications    allopurinol 100 MG tablet  Commonly known as:  ZYLOPRIM  Take 1 tablet (100 mg total) by mouth once daily.     amlodipine-benazepril 10-40 mg per capsule  Commonly known as:  LOTREL  Take 1 capsule by mouth once daily.     fluticasone 50 mcg/actuation nasal spray  Commonly known as:  FLONASE  1 spray by Each Nare route once daily.     gabapentin 300 MG capsule  Commonly known as:  NEURONTIN  Take 1 capsule (300mg) by mouth every night X 1 week then increase to 2 capsules (600mg) QHS thereafter. Increase as  tolerated.     hydrALAZINE 50 MG tablet  Commonly known as:  APRESOLINE  Take 1 tablet (50 mg total) by mouth 3 (three) times daily.     hydroCHLOROthiazide 25 MG tablet  Commonly known as:  HYDRODIURIL  Take 1 tablet (25 mg total) by mouth once daily.     loratadine 10 mg tablet  Commonly known as:  CLARITIN  Take 1 tablet (10 mg total) by mouth once daily.     potassium chloride SA 10 MEQ tablet  Commonly known as:  K-DUR,KLOR-CON  TAKE 2 TABLETS (20 MEQ TOTAL) BY MOUTH ONCE DAILY.     pravastatin 40 MG tablet  Commonly known as:  PRAVACHOL  Take 1 tablet (40 mg total) by mouth once daily.     sertraline 50 MG tablet  Commonly known as:  ZOLOFT  Take 1 tablet (50 mg total) by mouth once daily.     sildenafil 100 MG tablet  Commonly known as:  VIAGRA  Take 1 tablet (100 mg total) by mouth daily as needed for Erectile Dysfunction.     tadalafil 20 MG Tab  Commonly known as:  CIALIS  Take 1 tablet (20 mg total) by mouth every 36 (thirty-six) hours.     testosterone cypionate 200 mg/mL injection  Commonly known as:  DEPOTESTOTERONE CYPIONATE  INJECT 1 ML INTO MUSCLE EVERY 14 DAYS AS DIRECTED     traZODone 50 MG tablet  Commonly known as:  DESYREL  Take 1 tablet (50 mg total) by mouth once daily.            Indwelling Lines/Drains at time of discharge:   Lines/Drains/Airways          No matching active lines, drains, or airways          Time spent on the discharge of patient: 42 minutes  Patient was seen and examined on the date of discharge and determined to be suitable for discharge.         Naomi Bazan NP  Department of Hospital Medicine  [facility]

## 2018-06-13 NOTE — HOSPITAL COURSE
The pt 51 yo male placed in observation for post-polypectomy bleed. 14.8>13 and continued to have bleeding.CAre discussed with GI who recommended colonoscopy which showed Diverticulosis, Bleeding at the appendiceal orifice secondary to previous polypectomy. Injected. Clip was placed. A few ulcers in the ascending colon.   Hgb and vitals remained stable. No further rectal bleeding. Pt instructed to hold ASA x 5 days- f/u with GI.

## 2018-06-15 ENCOUNTER — OFFICE VISIT (OUTPATIENT)
Dept: PAIN MEDICINE | Facility: CLINIC | Age: 53
End: 2018-06-15
Payer: COMMERCIAL

## 2018-06-15 VITALS
RESPIRATION RATE: 18 BRPM | BODY MASS INDEX: 32.74 KG/M2 | HEART RATE: 74 BPM | WEIGHT: 216 LBS | DIASTOLIC BLOOD PRESSURE: 82 MMHG | HEIGHT: 68 IN | SYSTOLIC BLOOD PRESSURE: 123 MMHG

## 2018-06-15 DIAGNOSIS — M79.651 RIGHT THIGH PAIN: ICD-10-CM

## 2018-06-15 DIAGNOSIS — M79.18 MYOFASCIAL PAIN: ICD-10-CM

## 2018-06-15 DIAGNOSIS — M54.16 LUMBAR RADICULOPATHY: ICD-10-CM

## 2018-06-15 DIAGNOSIS — G57.11 MERALGIA PARAESTHETICA, RIGHT: Primary | ICD-10-CM

## 2018-06-15 PROCEDURE — 99214 OFFICE O/P EST MOD 30 MIN: CPT | Mod: S$GLB,,, | Performed by: PHYSICIAN ASSISTANT

## 2018-06-15 PROCEDURE — 99999 PR PBB SHADOW E&M-EST. PATIENT-LVL III: CPT | Mod: PBBFAC,,, | Performed by: PHYSICIAN ASSISTANT

## 2018-06-15 PROCEDURE — 3079F DIAST BP 80-89 MM HG: CPT | Mod: CPTII,S$GLB,, | Performed by: PHYSICIAN ASSISTANT

## 2018-06-15 PROCEDURE — 3074F SYST BP LT 130 MM HG: CPT | Mod: CPTII,S$GLB,, | Performed by: PHYSICIAN ASSISTANT

## 2018-06-15 PROCEDURE — 3008F BODY MASS INDEX DOCD: CPT | Mod: CPTII,S$GLB,, | Performed by: PHYSICIAN ASSISTANT

## 2018-06-15 RX ORDER — GABAPENTIN 300 MG/1
CAPSULE ORAL
Qty: 270 CAPSULE | Refills: 1 | Status: SHIPPED | OUTPATIENT
Start: 2018-06-15 | End: 2018-06-15 | Stop reason: SDUPTHER

## 2018-06-15 RX ORDER — GABAPENTIN 300 MG/1
CAPSULE ORAL
Qty: 270 CAPSULE | Refills: 1 | Status: SHIPPED | OUTPATIENT
Start: 2018-06-15 | End: 2018-11-21 | Stop reason: SDUPTHER

## 2018-06-15 NOTE — PROGRESS NOTES
Chief Pain Complaint:  Right hip pain, right leg pain      Interval History:   Since his last visit on 5-18-18, he reports he had a colonoscopy and had a complication with increased bleeding after.  He had to be admitted, but he is doing better now. He had another colonoscopy after that to check, and he has been cleared.      History of Present Illness:   This patient is a 52 y.o. male who presents today complaining of the above noted pain/s. The patient describes the pain as follows.    - duration of pain: ~ 1 year   - timing: intermittent   - character: aching, sharp  - radiating, dermatomal: extends into right leg / thigh sometimes down entire leg  - antecedent trauma, prior spinal surgery: no prior trauma, no prior spinal surgery   - pertinent negatives: No fever, No chills, No weight loss, No bladder dysfunction, No bowel dysfunction, No extremity weakness, No saddle anesthesia  - pertinent positives: none    - medications, other therapies tried (physical therapy, injections):     >> Tylenol, compounded cream    >> Has NOT previously undergone Physical Therapy    >> Has NOT previously undergone spinal injection/s        Imaging / Labs / Studies (reviewed on 6/15/2018):     4/30/18 Comprehensive metabolic panel     Ref Range & Units 4/30/18 6mo ago 1yr ago    Sodium 136 - 145 mmol/L 140  140  139     Potassium 3.5 - 5.1 mmol/L 3.6  4.0  4.1     Chloride 95 - 110 mmol/L 102  103  101     CO2 23 - 29 mmol/L 29  29  29     Glucose 70 - 110 mg/dL 139   121   104     BUN, Bld 6 - 20 mg/dL 10  21   17     Creatinine 0.5 - 1.4 mg/dL 1.0  1.2  1.0     Calcium 8.7 - 10.5 mg/dL 9.1  9.4  8.9     Total Protein 6.0 - 8.4 g/dL 7.3  7.7  7.3     Albumin 3.5 - 5.2 g/dL 3.7  3.7  3.9     Total Bilirubin 0.1 - 1.0 mg/dL 0.5  0.3CM  0.4CM        Alkaline Phosphatase 55 - 135 U/L 80  91  63     AST 10 - 40 U/L 16  20  19     ALT 10 - 44 U/L 17  22  24     Anion Gap 8 - 16 mmol/L 9  8  9     eGFR if African American >60 mL/min/1.73  "m^2 >60.0  >60.0  >60.0     eGFR if non African American >60 mL/min/1.73 m^2 >60.0  >60.0CM  >60.0CM                  Review of Systems:  CONSTITUTIONAL: patient denies any fever, chills, or weight loss  SKIN: patient denies any rash or itching  RESPIRATORY: patient denies having any shortness of breath  GASTROINTESTINAL: patient denies having any diarrhea, constipation, or bowel incontinence  GENITOURINARY: patient denies having any abnormal bladder function    MUSCULOSKELETAL:  - patient complains of the above noted pain/s (see chief pain complaint)    NEUROLOGICAL:   - pain as above  - strength in Lower extremities is intact, BILATERALLY  - sensation in Lower extremities is abnormal, on the RIGHT  - patient denies any loss of bowel or bladder control      PSYCHIATRIC: patient denies any change in mood    Other:  All other systems reviewed and are negative        Physical Exam:  Vitals:  /82 (BP Location: Right arm, Patient Position: Sitting, BP Method: Medium (Automatic))   Pulse 74   Resp 18   Ht 5' 8" (1.727 m)   Wt 98 kg (216 lb)   BMI 32.84 kg/m²   (reviewed on 6/15/2018)    General: alert and oriented, in no apparent distress.  Gait: normal gait.  Skin: no rashes, no discoloration, no obvious lesions  HEENT: normocephalic, atraumatic. Pupils equal and round.  Cardiovascular: no significant peripheral edema present.  Respiratory: without use of accessory muscles of respiration.    Musculoskeletal - Lumbar Spine:  - Pain on flexion of lumbar spine: Absent   - Pain on extension of lumbar spine: Present  - Lumbar facet loading: Absent   - TTP over the lumbar facet joints: Absent  - TTP over the SI joints:  Absent   - TTP over GT bursa: Absent  - Straight Leg Raise: Negative  - DO: Negative    Neuro - Lower Extremities:  - RLE Strength:     >> 5/5 strength in right ankle with plantar and dorsiflexion    >> 5/5 strength with right knee flexion extension  - LLE Strength:     >> 5/5 strength in left " ankle with plantar and dorsiflexion    >> 5/5 strength with knee flexion extension on the left   - Sensory: Sensation to light touch intact bilaterally      Psych:  Mood and affect is appropriate          Assessment:  Dre Curiel is a 52 y.o. male who presents with    ICD-10-CM ICD-9-CM   1. Meralgia paraesthetica, right G57.11 355.1   2. Lumbar radiculopathy M54.16 724.4   3. Myofascial pain M79.1 729.1   4. Right thigh pain M79.651 729.5     Patient has right thigh pain, perhaps lateral femoral cutaneous compression.  It may also be due to lumbar radiculopathy.       Plan:  1. Interventional: None for now.     2. Pharmacologic: Increase gabapentin 600mg QHS to 900mg per day (300mg QD, 600mg QHS). 90 day supply with refill sent to pharmacy.  He feels this has been helping some.    3. Rehabilitative: Encouraged regular exercise.    4. Diagnostic: None for now. Consider ordering lumbar MRI.    5. Follow up: PRN    - I discussed the risks, benefits, and alternatives to potential treatment options. All questions and concerns were fully addressed today in clinic. Dr. Manuel was consulted regarding the patient plan and agrees.

## 2018-06-18 ENCOUNTER — LAB VISIT (OUTPATIENT)
Dept: LAB | Facility: HOSPITAL | Age: 53
End: 2018-06-18
Attending: FAMILY MEDICINE
Payer: COMMERCIAL

## 2018-06-18 ENCOUNTER — OFFICE VISIT (OUTPATIENT)
Dept: FAMILY MEDICINE | Facility: CLINIC | Age: 53
End: 2018-06-18
Payer: COMMERCIAL

## 2018-06-18 VITALS
TEMPERATURE: 97 F | BODY MASS INDEX: 34.55 KG/M2 | HEART RATE: 86 BPM | SYSTOLIC BLOOD PRESSURE: 110 MMHG | WEIGHT: 220.13 LBS | OXYGEN SATURATION: 96 % | DIASTOLIC BLOOD PRESSURE: 56 MMHG | HEIGHT: 67 IN

## 2018-06-18 DIAGNOSIS — K62.5 RECTAL BLEED: Primary | ICD-10-CM

## 2018-06-18 DIAGNOSIS — K62.5 RECTAL BLEED: ICD-10-CM

## 2018-06-18 LAB
BASOPHILS # BLD AUTO: 0.1 K/UL
BASOPHILS NFR BLD: 0.7 %
DIFFERENTIAL METHOD: ABNORMAL
EOSINOPHIL # BLD AUTO: 0.1 K/UL
EOSINOPHIL NFR BLD: 0.6 %
ERYTHROCYTE [DISTWIDTH] IN BLOOD BY AUTOMATED COUNT: 13.2 %
HCT VFR BLD AUTO: 38.9 %
HGB BLD-MCNC: 12.9 G/DL
IMM GRANULOCYTES # BLD AUTO: 0.13 K/UL
IMM GRANULOCYTES NFR BLD AUTO: 0.9 %
LYMPHOCYTES # BLD AUTO: 2.3 K/UL
LYMPHOCYTES NFR BLD: 15.1 %
MCH RBC QN AUTO: 30.1 PG
MCHC RBC AUTO-ENTMCNC: 33.2 G/DL
MCV RBC AUTO: 91 FL
MONOCYTES # BLD AUTO: 1.2 K/UL
MONOCYTES NFR BLD: 8.1 %
NEUTROPHILS # BLD AUTO: 11.3 K/UL
NEUTROPHILS NFR BLD: 74.6 %
NRBC BLD-RTO: 0 /100 WBC
PLATELET # BLD AUTO: 359 K/UL
PMV BLD AUTO: 11 FL
RBC # BLD AUTO: 4.29 M/UL
WBC # BLD AUTO: 15.09 K/UL

## 2018-06-18 PROCEDURE — 3008F BODY MASS INDEX DOCD: CPT | Mod: CPTII,S$GLB,, | Performed by: FAMILY MEDICINE

## 2018-06-18 PROCEDURE — 85025 COMPLETE CBC W/AUTO DIFF WBC: CPT

## 2018-06-18 PROCEDURE — 99213 OFFICE O/P EST LOW 20 MIN: CPT | Mod: S$GLB,,, | Performed by: FAMILY MEDICINE

## 2018-06-18 PROCEDURE — 36415 COLL VENOUS BLD VENIPUNCTURE: CPT | Mod: PO

## 2018-06-18 PROCEDURE — 99999 PR PBB SHADOW E&M-EST. PATIENT-LVL III: CPT | Mod: PBBFAC,,, | Performed by: FAMILY MEDICINE

## 2018-06-18 PROCEDURE — 3074F SYST BP LT 130 MM HG: CPT | Mod: CPTII,S$GLB,, | Performed by: FAMILY MEDICINE

## 2018-06-18 PROCEDURE — 3078F DIAST BP <80 MM HG: CPT | Mod: CPTII,S$GLB,, | Performed by: FAMILY MEDICINE

## 2018-06-18 NOTE — PROGRESS NOTES
Chief Complaint:    Chief Complaint   Patient presents with    Follow-up     hospital       History of Present Illness:    Patient is here for follow-up he was hospitalized after GI bleed following colonoscopy he bled from the polypectomy site.  His bleeding was spot stopped spontaneously.  He has not had any more bleeding since then is symptoms have been stable.      ROS:  Review of Systems   Constitutional: Negative for activity change, chills, fatigue, fever and unexpected weight change.   HENT: Negative for congestion, ear discharge, ear pain, hearing loss, postnasal drip and rhinorrhea.    Eyes: Negative for pain and visual disturbance.   Respiratory: Negative for cough, chest tightness and shortness of breath.    Cardiovascular: Negative for chest pain and palpitations.   Gastrointestinal: Negative for abdominal pain, diarrhea and vomiting.   Endocrine: Negative for heat intolerance.   Genitourinary: Negative for dysuria, flank pain, frequency and hematuria.   Musculoskeletal: Negative for back pain, gait problem and neck pain.   Skin: Negative for color change and rash.   Neurological: Negative for dizziness, tremors, seizures, numbness and headaches.   Psychiatric/Behavioral: Negative for agitation, hallucinations, self-injury, sleep disturbance and suicidal ideas. The patient is not nervous/anxious.        Past Medical History:   Diagnosis Date    Allergy     Anxiety     Depression     Hyperlipidemia     Hypertension        Social History:  Social History     Social History    Marital status:      Spouse name: N/A    Number of children: N/A    Years of education: N/A     Occupational History     [employer]     Social History Main Topics    Smoking status: Never Smoker    Smokeless tobacco: Current User      Comment: dips     Alcohol use Yes    Drug use: No    Sexual activity: Yes     Partners: Female     Birth control/ protection: None     Other Topics Concern    None  "    Social History Narrative    None       Family History:   family history includes Cancer in his maternal uncle; Hypertension in his mother; No Known Problems in his brother, maternal aunt, maternal grandfather, maternal grandmother, paternal aunt, paternal grandfather, paternal grandmother, paternal uncle, and sister.    Health Maintenance   Topic Date Due    Influenza Vaccine  08/01/2018    Lipid Panel  04/30/2019    TETANUS VACCINE  10/31/2027    Colonoscopy  06/11/2028       Physical Exam:    Vital Signs  Temp: 96.7 °F (35.9 °C)  Temp src: Tympanic  Pulse: 86  SpO2: 96 %  BP: (!) 110/56  BP Location: Left arm  Patient Position: Sitting  Pain Score: 0-No pain  Height and Weight  Height: 5' 7" (170.2 cm)  Weight: 99.9 kg (220 lb 2.1 oz)  BSA (Calculated - sq m): 2.17 sq meters  BMI (Calculated): 34.5  Weight in (lb) to have BMI = 25: 159.3]    Body mass index is 34.48 kg/m².    Physical Exam   Constitutional: He is oriented to person, place, and time. He appears well-developed.   HENT:   Mouth/Throat: Oropharynx is clear and moist.   Eyes: Conjunctivae are normal. Pupils are equal, round, and reactive to light.   Neck: Normal range of motion. Neck supple.   Cardiovascular: Normal rate, regular rhythm and normal heart sounds.    No murmur heard.  Pulmonary/Chest: Effort normal and breath sounds normal. No respiratory distress. He has no wheezes. He has no rales. He exhibits no tenderness.   Abdominal: Soft. He exhibits no distension and no mass. There is no tenderness. There is no guarding.   Musculoskeletal: He exhibits no edema or tenderness.   Lymphadenopathy:     He has no cervical adenopathy.   Neurological: He is alert and oriented to person, place, and time. He has normal reflexes.   Skin: Skin is warm and dry.   Psychiatric: He has a normal mood and affect. His behavior is normal. Judgment and thought content normal.       Results for orders placed or performed during the hospital encounter of " 06/11/18   CBC auto differential   Result Value Ref Range    WBC 13.85 (H) 3.90 - 12.70 K/uL    RBC 4.91 4.60 - 6.20 M/uL    Hemoglobin 14.8 14.0 - 18.0 g/dL    Hematocrit 45.0 40.0 - 54.0 %    MCV 92 82 - 98 fL    MCH 30.1 27.0 - 31.0 pg    MCHC 32.9 32.0 - 36.0 g/dL    RDW 13.6 11.5 - 14.5 %    Platelets 228 150 - 350 K/uL    MPV 10.0 9.2 - 12.9 fL    Gran # (ANC) 10.4 (H) 1.8 - 7.7 K/uL    Lymph # 2.7 1.0 - 4.8 K/uL    Mono # 0.5 0.3 - 1.0 K/uL    Eos # 0.2 0.0 - 0.5 K/uL    Baso # 0.03 0.00 - 0.20 K/uL    Gran% 75.0 (H) 38.0 - 73.0 %    Lymph% 19.8 18.0 - 48.0 %    Mono% 3.8 (L) 4.0 - 15.0 %    Eosinophil% 1.2 0.0 - 8.0 %    Basophil% 0.2 0.0 - 1.9 %    Differential Method Automated    Comprehensive metabolic panel   Result Value Ref Range    Sodium 137 136 - 145 mmol/L    Potassium 3.6 3.5 - 5.1 mmol/L    Chloride 101 95 - 110 mmol/L    CO2 24 23 - 29 mmol/L    Glucose 192 (H) 70 - 110 mg/dL    BUN, Bld 16 6 - 20 mg/dL    Creatinine 1.1 0.5 - 1.4 mg/dL    Calcium 9.0 8.7 - 10.5 mg/dL    Total Protein 7.0 6.0 - 8.4 g/dL    Albumin 3.6 3.5 - 5.2 g/dL    Total Bilirubin 0.6 0.1 - 1.0 mg/dL    Alkaline Phosphatase 69 55 - 135 U/L    AST 14 10 - 40 U/L    ALT 20 10 - 44 U/L    Anion Gap 12 8 - 16 mmol/L    eGFR if African American >60 >60 mL/min/1.73 m^2    eGFR if non African American >60 >60 mL/min/1.73 m^2   Protime-INR   Result Value Ref Range    Prothrombin Time 10.0 9.0 - 12.5 sec    INR 1.0 0.8 - 1.2   APTT   Result Value Ref Range    aPTT 26.8 21.0 - 32.0 sec   Urinalysis   Result Value Ref Range    Specimen UA Urine, Clean Catch     Color, UA Yellow Yellow, Straw, Yoko    Appearance, UA Clear Clear    pH, UA 6.0 5.0 - 8.0    Specific Gravity, UA 1.020 1.005 - 1.030    Protein, UA Negative Negative    Glucose, UA Negative Negative    Ketones, UA Negative Negative    Bilirubin (UA) Negative Negative    Occult Blood UA Negative Negative    Nitrite, UA Negative Negative    Urobilinogen, UA Negative <2.0 EU/dL     Leukocytes, UA Negative Negative   Hematocrit   Result Value Ref Range    Hematocrit 40.2 40.0 - 54.0 %   Hemoglobin   Result Value Ref Range    Hemoglobin 13.3 (L) 14.0 - 18.0 g/dL   Hematocrit   Result Value Ref Range    Hematocrit 41.6 40.0 - 54.0 %   Hemoglobin   Result Value Ref Range    Hemoglobin 13.6 (L) 14.0 - 18.0 g/dL   Hemoglobin   Result Value Ref Range    Hemoglobin 12.3 (L) 14.0 - 18.0 g/dL   Hematocrit   Result Value Ref Range    Hematocrit 38.2 (L) 40.0 - 54.0 %   Type & Screen   Result Value Ref Range    Group & Rh O POS     Indirect Emily NEG    POCT glucose   Result Value Ref Range    POCT Glucose 85 70 - 110 mg/dL         Lab Results   Component Value Date    HGBA1C 5.0 05/08/2018       Assessment:      ICD-10-CM ICD-9-CM   1. Rectal bleed K62.5 569.3         Plan:      Check a CBC today  GI bleed is resolved         Orders Placed This Encounter   Procedures    CBC auto differential       Current Outpatient Prescriptions   Medication Sig Dispense Refill    allopurinol (ZYLOPRIM) 100 MG tablet Take 1 tablet (100 mg total) by mouth once daily. 90 tablet 3    amlodipine-benazepril (LOTREL) 10-40 mg per capsule Take 1 capsule by mouth once daily. 90 capsule 3    fluticasone (FLONASE) 50 mcg/actuation nasal spray 1 spray by Each Nare route once daily. 3 Bottle 3    gabapentin (NEURONTIN) 300 MG capsule Take 1 capsule (300mg) during the day and 2 capsules (600mg) QHS. Total 900mg per day. 90 day supply. 270 capsule 1    hydrALAZINE (APRESOLINE) 50 MG tablet Take 1 tablet (50 mg total) by mouth 3 (three) times daily. 270 tablet 3    hydroCHLOROthiazide (HYDRODIURIL) 25 MG tablet Take 1 tablet (25 mg total) by mouth once daily. 90 tablet 3    loratadine (CLARITIN) 10 mg tablet Take 1 tablet (10 mg total) by mouth once daily. 90 tablet 0    potassium chloride SA (K-DUR,KLOR-CON) 10 MEQ tablet TAKE 2 TABLETS (20 MEQ TOTAL) BY MOUTH ONCE DAILY. 90 tablet 3    pravastatin (PRAVACHOL) 40 MG  tablet Take 1 tablet (40 mg total) by mouth once daily. 90 tablet 3    sertraline (ZOLOFT) 50 MG tablet Take 1 tablet (50 mg total) by mouth once daily. 90 tablet 3    sildenafil (VIAGRA) 100 MG tablet Take 1 tablet (100 mg total) by mouth daily as needed for Erectile Dysfunction. 5 tablet 2    testosterone cypionate (DEPOTESTOTERONE CYPIONATE) 200 mg/mL injection INJECT 1 ML INTO MUSCLE EVERY 14 DAYS AS DIRECTED 10 mL 0    traZODone (DESYREL) 50 MG tablet Take 1 tablet (50 mg total) by mouth once daily. 90 tablet 3    tadalafil (CIALIS) 20 MG Tab Take 1 tablet (20 mg total) by mouth every 36 (thirty-six) hours. 15 tablet 3     No current facility-administered medications for this visit.        There are no discontinued medications.    No Follow-up on file.      Mohan Schwartz MD

## 2018-06-20 ENCOUNTER — TELEPHONE (OUTPATIENT)
Dept: FAMILY MEDICINE | Facility: CLINIC | Age: 53
End: 2018-06-20

## 2018-06-20 DIAGNOSIS — R42 DIZZINESS: ICD-10-CM

## 2018-06-20 DIAGNOSIS — D72.829 LEUKOCYTOSIS, UNSPECIFIED TYPE: Primary | ICD-10-CM

## 2018-06-22 ENCOUNTER — LAB VISIT (OUTPATIENT)
Dept: LAB | Facility: HOSPITAL | Age: 53
End: 2018-06-22
Attending: FAMILY MEDICINE
Payer: COMMERCIAL

## 2018-06-22 DIAGNOSIS — D72.829 LEUKOCYTOSIS, UNSPECIFIED TYPE: ICD-10-CM

## 2018-06-22 LAB
BASOPHILS # BLD AUTO: 0.08 K/UL
BASOPHILS NFR BLD: 0.9 %
DIFFERENTIAL METHOD: ABNORMAL
EOSINOPHIL # BLD AUTO: 0.2 K/UL
EOSINOPHIL NFR BLD: 1.9 %
ERYTHROCYTE [DISTWIDTH] IN BLOOD BY AUTOMATED COUNT: 13.2 %
HCT VFR BLD AUTO: 38.8 %
HGB BLD-MCNC: 12.8 G/DL
IMM GRANULOCYTES # BLD AUTO: 0.07 K/UL
IMM GRANULOCYTES NFR BLD AUTO: 0.8 %
LYMPHOCYTES # BLD AUTO: 2 K/UL
LYMPHOCYTES NFR BLD: 21.5 %
MCH RBC QN AUTO: 30 PG
MCHC RBC AUTO-ENTMCNC: 33 G/DL
MCV RBC AUTO: 91 FL
MONOCYTES # BLD AUTO: 0.6 K/UL
MONOCYTES NFR BLD: 6.9 %
NEUTROPHILS # BLD AUTO: 6.3 K/UL
NEUTROPHILS NFR BLD: 68 %
NRBC BLD-RTO: 0 /100 WBC
PLATELET # BLD AUTO: 265 K/UL
PMV BLD AUTO: 11.6 FL
RBC # BLD AUTO: 4.26 M/UL
WBC # BLD AUTO: 9.31 K/UL

## 2018-06-22 PROCEDURE — 85025 COMPLETE CBC W/AUTO DIFF WBC: CPT

## 2018-06-22 PROCEDURE — 36415 COLL VENOUS BLD VENIPUNCTURE: CPT | Mod: PO

## 2018-07-26 ENCOUNTER — TELEPHONE (OUTPATIENT)
Dept: GASTROENTEROLOGY | Facility: CLINIC | Age: 53
End: 2018-07-26

## 2018-07-26 NOTE — TELEPHONE ENCOUNTER
----- Message from Kendra West sent at 7/26/2018  4:31 PM CDT -----  Contact: Pt.   Pt is calling regarding question about letter that he received in mail regarding labs. Pt stated that he is uncertain has to what it may be pertaining too. ..[phone]

## 2018-11-07 ENCOUNTER — LAB VISIT (OUTPATIENT)
Dept: LAB | Facility: HOSPITAL | Age: 53
End: 2018-11-07
Attending: FAMILY MEDICINE
Payer: COMMERCIAL

## 2018-11-07 DIAGNOSIS — Z79.890 ENCOUNTER FOR MONITORING TESTOSTERONE REPLACEMENT THERAPY: ICD-10-CM

## 2018-11-07 DIAGNOSIS — Z51.81 ENCOUNTER FOR MONITORING TESTOSTERONE REPLACEMENT THERAPY: ICD-10-CM

## 2018-11-07 LAB
ALBUMIN SERPL BCP-MCNC: 3.8 G/DL
ALP SERPL-CCNC: 83 U/L
ALT SERPL W/O P-5'-P-CCNC: 19 U/L
ANION GAP SERPL CALC-SCNC: 10 MMOL/L
AST SERPL-CCNC: 24 U/L
BASOPHILS # BLD AUTO: 0.06 K/UL
BASOPHILS NFR BLD: 0.7 %
BILIRUB SERPL-MCNC: 0.4 MG/DL
BUN SERPL-MCNC: 12 MG/DL
CALCIUM SERPL-MCNC: 9.3 MG/DL
CHLORIDE SERPL-SCNC: 99 MMOL/L
CHOLEST SERPL-MCNC: 142 MG/DL
CHOLEST/HDLC SERPL: 3.2 {RATIO}
CO2 SERPL-SCNC: 29 MMOL/L
COMPLEXED PSA SERPL-MCNC: 0.46 NG/ML
CREAT SERPL-MCNC: 1.2 MG/DL
DIFFERENTIAL METHOD: ABNORMAL
EOSINOPHIL # BLD AUTO: 0.1 K/UL
EOSINOPHIL NFR BLD: 1.1 %
ERYTHROCYTE [DISTWIDTH] IN BLOOD BY AUTOMATED COUNT: 13.6 %
EST. GFR  (AFRICAN AMERICAN): >60 ML/MIN/1.73 M^2
EST. GFR  (NON AFRICAN AMERICAN): >60 ML/MIN/1.73 M^2
GLUCOSE SERPL-MCNC: 120 MG/DL
HCT VFR BLD AUTO: 46.6 %
HDLC SERPL-MCNC: 45 MG/DL
HDLC SERPL: 31.7 %
HGB BLD-MCNC: 15.4 G/DL
IMM GRANULOCYTES # BLD AUTO: 0.08 K/UL
IMM GRANULOCYTES NFR BLD AUTO: 0.9 %
LDLC SERPL CALC-MCNC: 63.8 MG/DL
LYMPHOCYTES # BLD AUTO: 1.6 K/UL
LYMPHOCYTES NFR BLD: 17.5 %
MCH RBC QN AUTO: 28.7 PG
MCHC RBC AUTO-ENTMCNC: 33 G/DL
MCV RBC AUTO: 87 FL
MONOCYTES # BLD AUTO: 0.6 K/UL
MONOCYTES NFR BLD: 7 %
NEUTROPHILS # BLD AUTO: 6.4 K/UL
NEUTROPHILS NFR BLD: 72.8 %
NONHDLC SERPL-MCNC: 97 MG/DL
NRBC BLD-RTO: 0 /100 WBC
PLATELET # BLD AUTO: 261 K/UL
PMV BLD AUTO: 11.7 FL
POTASSIUM SERPL-SCNC: 3.5 MMOL/L
PROT SERPL-MCNC: 7.8 G/DL
RBC # BLD AUTO: 5.37 M/UL
SODIUM SERPL-SCNC: 138 MMOL/L
TESTOST SERPL-MCNC: 455 NG/DL
TRIGL SERPL-MCNC: 166 MG/DL
WBC # BLD AUTO: 8.85 K/UL

## 2018-11-07 PROCEDURE — 84403 ASSAY OF TOTAL TESTOSTERONE: CPT

## 2018-11-07 PROCEDURE — 80053 COMPREHEN METABOLIC PANEL: CPT

## 2018-11-07 PROCEDURE — 80061 LIPID PANEL: CPT

## 2018-11-07 PROCEDURE — 85025 COMPLETE CBC W/AUTO DIFF WBC: CPT

## 2018-11-07 PROCEDURE — 36415 COLL VENOUS BLD VENIPUNCTURE: CPT | Mod: PO

## 2018-11-07 PROCEDURE — 84153 ASSAY OF PSA TOTAL: CPT

## 2018-11-10 RX ORDER — AMLODIPINE AND BENAZEPRIL HYDROCHLORIDE 10; 40 MG/1; MG/1
1 CAPSULE ORAL DAILY
Qty: 90 CAPSULE | Refills: 3 | Status: SHIPPED | OUTPATIENT
Start: 2018-11-10 | End: 2018-11-21 | Stop reason: SDUPTHER

## 2018-11-21 ENCOUNTER — OFFICE VISIT (OUTPATIENT)
Dept: FAMILY MEDICINE | Facility: CLINIC | Age: 53
End: 2018-11-21
Payer: COMMERCIAL

## 2018-11-21 VITALS
BODY MASS INDEX: 34.73 KG/M2 | OXYGEN SATURATION: 98 % | DIASTOLIC BLOOD PRESSURE: 78 MMHG | WEIGHT: 221.25 LBS | TEMPERATURE: 97 F | HEART RATE: 82 BPM | HEIGHT: 67 IN | SYSTOLIC BLOOD PRESSURE: 120 MMHG

## 2018-11-21 DIAGNOSIS — G47.09 OTHER INSOMNIA: ICD-10-CM

## 2018-11-21 DIAGNOSIS — R79.89 LOW TESTOSTERONE: ICD-10-CM

## 2018-11-21 DIAGNOSIS — Z79.890 ENCOUNTER FOR MONITORING TESTOSTERONE REPLACEMENT THERAPY: Primary | ICD-10-CM

## 2018-11-21 DIAGNOSIS — Z51.81 ENCOUNTER FOR MONITORING TESTOSTERONE REPLACEMENT THERAPY: Primary | ICD-10-CM

## 2018-11-21 DIAGNOSIS — Z78.9: ICD-10-CM

## 2018-11-21 DIAGNOSIS — E78.2 MIXED HYPERLIPIDEMIA: ICD-10-CM

## 2018-11-21 DIAGNOSIS — I10 ESSENTIAL HYPERTENSION: ICD-10-CM

## 2018-11-21 PROBLEM — K92.2 ACUTE LOWER GI BLEEDING: Status: RESOLVED | Noted: 2018-06-11 | Resolved: 2018-11-21

## 2018-11-21 PROCEDURE — 3074F SYST BP LT 130 MM HG: CPT | Mod: CPTII,S$GLB,, | Performed by: FAMILY MEDICINE

## 2018-11-21 PROCEDURE — 99999 PR PBB SHADOW E&M-EST. PATIENT-LVL III: CPT | Mod: PBBFAC,,, | Performed by: FAMILY MEDICINE

## 2018-11-21 PROCEDURE — 99214 OFFICE O/P EST MOD 30 MIN: CPT | Mod: S$GLB,,, | Performed by: FAMILY MEDICINE

## 2018-11-21 PROCEDURE — 3008F BODY MASS INDEX DOCD: CPT | Mod: CPTII,S$GLB,, | Performed by: FAMILY MEDICINE

## 2018-11-21 PROCEDURE — 3078F DIAST BP <80 MM HG: CPT | Mod: CPTII,S$GLB,, | Performed by: FAMILY MEDICINE

## 2018-11-21 RX ORDER — POTASSIUM CHLORIDE 750 MG/1
TABLET, EXTENDED RELEASE ORAL
Qty: 90 TABLET | Refills: 3 | Status: SHIPPED | OUTPATIENT
Start: 2018-11-21 | End: 2019-06-26 | Stop reason: SDUPTHER

## 2018-11-21 RX ORDER — ALLOPURINOL 100 MG/1
100 TABLET ORAL DAILY
Qty: 90 TABLET | Refills: 3 | Status: SHIPPED | OUTPATIENT
Start: 2018-11-21 | End: 2019-12-02

## 2018-11-21 RX ORDER — SERTRALINE HYDROCHLORIDE 50 MG/1
50 TABLET, FILM COATED ORAL DAILY
Qty: 90 TABLET | Refills: 3 | Status: SHIPPED | OUTPATIENT
Start: 2018-11-21 | End: 2020-06-09 | Stop reason: SDUPTHER

## 2018-11-21 RX ORDER — TADALAFIL 20 MG/1
20 TABLET ORAL
Qty: 15 TABLET | Refills: 3 | Status: CANCELLED | OUTPATIENT
Start: 2018-11-21 | End: 2019-11-21

## 2018-11-21 RX ORDER — HYDROCHLOROTHIAZIDE 25 MG/1
25 TABLET ORAL DAILY
Qty: 90 TABLET | Refills: 3 | Status: SHIPPED | OUTPATIENT
Start: 2018-11-21 | End: 2019-11-12 | Stop reason: SDUPTHER

## 2018-11-21 RX ORDER — GABAPENTIN 300 MG/1
CAPSULE ORAL
Qty: 270 CAPSULE | Refills: 1 | Status: SHIPPED | OUTPATIENT
Start: 2018-11-21 | End: 2020-06-09 | Stop reason: SDUPTHER

## 2018-11-21 RX ORDER — HYDRALAZINE HYDROCHLORIDE 50 MG/1
50 TABLET, FILM COATED ORAL 3 TIMES DAILY
Qty: 270 TABLET | Refills: 3 | Status: SHIPPED | OUTPATIENT
Start: 2018-11-21 | End: 2020-06-09 | Stop reason: SDUPTHER

## 2018-11-21 RX ORDER — AMLODIPINE AND BENAZEPRIL HYDROCHLORIDE 10; 40 MG/1; MG/1
1 CAPSULE ORAL DAILY
Qty: 90 CAPSULE | Refills: 3 | Status: SHIPPED | OUTPATIENT
Start: 2018-11-21 | End: 2020-03-09 | Stop reason: SDUPTHER

## 2018-11-21 RX ORDER — PRAVASTATIN SODIUM 40 MG/1
40 TABLET ORAL DAILY
Qty: 90 TABLET | Refills: 3 | Status: SHIPPED | OUTPATIENT
Start: 2018-11-21 | End: 2020-06-09 | Stop reason: SDUPTHER

## 2018-11-21 RX ORDER — TRAZODONE HYDROCHLORIDE 50 MG/1
50 TABLET ORAL DAILY
Qty: 90 TABLET | Refills: 3 | Status: SHIPPED | OUTPATIENT
Start: 2018-11-21 | End: 2020-06-09 | Stop reason: SDUPTHER

## 2018-11-21 RX ORDER — SILDENAFIL 100 MG/1
100 TABLET, FILM COATED ORAL DAILY PRN
Qty: 5 TABLET | Refills: 2 | Status: SHIPPED | OUTPATIENT
Start: 2018-11-21 | End: 2019-12-02

## 2018-11-21 RX ORDER — LORATADINE 10 MG/1
10 TABLET ORAL DAILY
Qty: 90 TABLET | Refills: 0 | Status: SHIPPED | OUTPATIENT
Start: 2018-11-21 | End: 2020-06-09 | Stop reason: SDUPTHER

## 2018-11-21 NOTE — PROGRESS NOTES
Chief Complaint:    Chief Complaint   Patient presents with    Results       History of Present Illness:    Here for 6 month follow-up of chronic medical problems:  He is on testosterone replacement therapy doing okay no side effects  His labs are okay still continues to drink beer blood pressure is normal today  His chronic insomnia which is stable      ROS:  Review of Systems   Constitutional: Negative for activity change, chills, fatigue, fever and unexpected weight change.   HENT: Negative for congestion, ear discharge, ear pain, hearing loss, postnasal drip and rhinorrhea.    Eyes: Negative for pain and visual disturbance.   Respiratory: Negative for cough, chest tightness and shortness of breath.    Cardiovascular: Negative for chest pain and palpitations.   Gastrointestinal: Negative for abdominal pain, diarrhea and vomiting.   Endocrine: Negative for heat intolerance.   Genitourinary: Negative for dysuria, flank pain, frequency and hematuria.   Musculoskeletal: Negative for back pain, gait problem and neck pain.   Skin: Negative for color change and rash.   Neurological: Negative for dizziness, tremors, seizures, numbness and headaches.   Psychiatric/Behavioral: Negative for agitation, hallucinations, self-injury, sleep disturbance and suicidal ideas. The patient is not nervous/anxious.        Past Medical History:   Diagnosis Date    Allergy     Anxiety     Depression     Hyperlipidemia     Hypertension        Social History:  Social History     Socioeconomic History    Marital status:      Spouse name: None    Number of children: None    Years of education: None    Highest education level: None   Social Needs    Financial resource strain: None    Food insecurity - worry: None    Food insecurity - inability: None    Transportation needs - medical: None    Transportation needs - non-medical: None   Occupational History     Employer: [employer]   Tobacco Use    Smoking status:  "Never Smoker    Smokeless tobacco: Current User    Tobacco comment: dips    Substance and Sexual Activity    Alcohol use: Yes    Drug use: No    Sexual activity: Yes     Partners: Female     Birth control/protection: None   Other Topics Concern    None   Social History Narrative    None       Family History:   family history includes Cancer in his maternal uncle; Hypertension in his mother; No Known Problems in his brother, maternal aunt, maternal grandfather, maternal grandmother, paternal aunt, paternal grandfather, paternal grandmother, paternal uncle, and sister.    Health Maintenance   Topic Date Due    Influenza Vaccine  08/01/2018    Lipid Panel  11/07/2019    TETANUS VACCINE  10/31/2027    Colonoscopy  06/11/2028       Physical Exam:    Vital Signs  Temp: 97 °F (36.1 °C)  Temp src: Tympanic  Pulse: 82  SpO2: 98 %  BP: 120/78  BP Location: Left arm  Patient Position: Sitting  Pain Score: 0-No pain  Height and Weight  Height: 5' 7" (170.2 cm)  Weight: 100.4 kg (221 lb 3.7 oz)  BSA (Calculated - sq m): 2.18 sq meters  BMI (Calculated): 34.7  Weight in (lb) to have BMI = 25: 159.3]    Body mass index is 34.65 kg/m².    Physical Exam   Constitutional: He is oriented to person, place, and time. He appears well-developed.   HENT:   Mouth/Throat: Oropharynx is clear and moist.   Eyes: Conjunctivae are normal. Pupils are equal, round, and reactive to light.   Neck: Normal range of motion. Neck supple.   Cardiovascular: Normal rate, regular rhythm and normal heart sounds.   No murmur heard.  Pulmonary/Chest: Effort normal and breath sounds normal. No respiratory distress. He has no wheezes. He has no rales. He exhibits no tenderness.   Abdominal: Soft. He exhibits no distension and no mass. There is no tenderness. There is no guarding.   Genitourinary: Prostate normal.   Musculoskeletal: He exhibits no edema or tenderness.   Lymphadenopathy:     He has no cervical adenopathy.   Neurological: He is alert and " oriented to person, place, and time. He has normal reflexes.   Skin: Skin is warm and dry.   Psychiatric: He has a normal mood and affect. His behavior is normal. Judgment and thought content normal.         Assessment:      ICD-10-CM ICD-9-CM   1. Encounter for monitoring testosterone replacement therapy Z51.81 V58.83    Z79.890 V58.69   2. Alcohol ingestion, more than 4 drinks/day Z78.9 V69.8   3. Mixed hyperlipidemia E78.2 272.2   4. Other insomnia G47.09 780.52         Plan:    Discussed alcohol cessation.  Continue current plan  Please work on weight loss start an exercise program        Orders Placed This Encounter   Procedures    Lipid panel    Hemoglobin A1c    Comprehensive metabolic panel    CBC auto differential    PSA, Screening       Current Outpatient Medications   Medication Sig Dispense Refill    allopurinol (ZYLOPRIM) 100 MG tablet Take 1 tablet (100 mg total) by mouth once daily. 90 tablet 3    amlodipine-benazepril (LOTREL) 10-40 mg per capsule TAKE 1 CAPSULE BY MOUTH ONCE DAILY. 90 capsule 3    fluticasone (FLONASE) 50 mcg/actuation nasal spray 1 spray by Each Nare route once daily. 3 Bottle 3    hydroCHLOROthiazide (HYDRODIURIL) 25 MG tablet Take 1 tablet (25 mg total) by mouth once daily. 90 tablet 3    potassium chloride SA (K-DUR,KLOR-CON) 10 MEQ tablet TAKE 2 TABLETS (20 MEQ TOTAL) BY MOUTH ONCE DAILY. 90 tablet 3    pravastatin (PRAVACHOL) 40 MG tablet Take 1 tablet (40 mg total) by mouth once daily. 90 tablet 3    sertraline (ZOLOFT) 50 MG tablet Take 1 tablet (50 mg total) by mouth once daily. 90 tablet 3    sildenafil (VIAGRA) 100 MG tablet Take 1 tablet (100 mg total) by mouth daily as needed for Erectile Dysfunction. 5 tablet 2    testosterone cypionate (DEPOTESTOTERONE CYPIONATE) 200 mg/mL injection INJECT 1 ML INTO MUSCLE EVERY 14 DAYS AS DIRECTED 10 mL 0    traZODone (DESYREL) 50 MG tablet Take 1 tablet (50 mg total) by mouth once daily. 90 tablet 3    gabapentin  (NEURONTIN) 300 MG capsule Take 1 capsule (300mg) during the day and 2 capsules (600mg) QHS. Total 900mg per day. 90 day supply. 270 capsule 1    hydrALAZINE (APRESOLINE) 50 MG tablet Take 1 tablet (50 mg total) by mouth 3 (three) times daily. 270 tablet 3    loratadine (CLARITIN) 10 mg tablet Take 1 tablet (10 mg total) by mouth once daily. 90 tablet 0    tadalafil (CIALIS) 20 MG Tab Take 1 tablet (20 mg total) by mouth every 36 (thirty-six) hours. 15 tablet 3     No current facility-administered medications for this visit.        There are no discontinued medications.    Follow-up in about 6 months (around 5/21/2019).      Mohan Schwartz MD

## 2018-12-06 ENCOUNTER — APPOINTMENT (OUTPATIENT)
Dept: RADIOLOGY | Facility: HOSPITAL | Age: 53
End: 2018-12-06
Attending: FAMILY MEDICINE
Payer: COMMERCIAL

## 2018-12-06 DIAGNOSIS — K82.4 GALLBLADDER POLYP: ICD-10-CM

## 2018-12-06 PROCEDURE — 76705 ECHO EXAM OF ABDOMEN: CPT | Mod: 26,,, | Performed by: RADIOLOGY

## 2018-12-06 PROCEDURE — 76705 ECHO EXAM OF ABDOMEN: CPT | Mod: TC,PO

## 2018-12-11 ENCOUNTER — TELEPHONE (OUTPATIENT)
Dept: FAMILY MEDICINE | Facility: CLINIC | Age: 53
End: 2018-12-11

## 2018-12-11 DIAGNOSIS — K76.0 FATTY LIVER: ICD-10-CM

## 2018-12-11 DIAGNOSIS — K82.4 GALLBLADDER POLYP: Primary | ICD-10-CM

## 2018-12-14 NOTE — PROGRESS NOTES
Notified pt of abnormal results. Pt verbalized understanding and made an appointment with GI on 1/4/19 @ 715 am per patient's request.

## 2019-01-04 ENCOUNTER — OFFICE VISIT (OUTPATIENT)
Dept: GASTROENTEROLOGY | Facility: CLINIC | Age: 54
End: 2019-01-04
Payer: COMMERCIAL

## 2019-01-04 VITALS
BODY MASS INDEX: 32.68 KG/M2 | HEIGHT: 68 IN | WEIGHT: 215.63 LBS | SYSTOLIC BLOOD PRESSURE: 122 MMHG | DIASTOLIC BLOOD PRESSURE: 69 MMHG | HEART RATE: 79 BPM

## 2019-01-04 DIAGNOSIS — F10.10 ALCOHOL ABUSE, DAILY USE: ICD-10-CM

## 2019-01-04 DIAGNOSIS — K76.0 FATTY LIVER: Primary | ICD-10-CM

## 2019-01-04 DIAGNOSIS — K82.4 POLYP OF GALLBLADDER: ICD-10-CM

## 2019-01-04 PROCEDURE — 99214 OFFICE O/P EST MOD 30 MIN: CPT | Mod: S$GLB,,, | Performed by: PHYSICIAN ASSISTANT

## 2019-01-04 PROCEDURE — 99999 PR PBB SHADOW E&M-EST. PATIENT-LVL IV: CPT | Mod: PBBFAC,,, | Performed by: PHYSICIAN ASSISTANT

## 2019-01-04 PROCEDURE — 99999 PR PBB SHADOW E&M-EST. PATIENT-LVL IV: ICD-10-PCS | Mod: PBBFAC,,, | Performed by: PHYSICIAN ASSISTANT

## 2019-01-04 PROCEDURE — 99214 PR OFFICE/OUTPT VISIT, EST, LEVL IV, 30-39 MIN: ICD-10-PCS | Mod: S$GLB,,, | Performed by: PHYSICIAN ASSISTANT

## 2019-01-04 PROCEDURE — 3008F BODY MASS INDEX DOCD: CPT | Mod: CPTII,S$GLB,, | Performed by: PHYSICIAN ASSISTANT

## 2019-01-04 PROCEDURE — 3008F PR BODY MASS INDEX (BMI) DOCUMENTED: ICD-10-PCS | Mod: CPTII,S$GLB,, | Performed by: PHYSICIAN ASSISTANT

## 2019-01-04 PROCEDURE — 3074F PR MOST RECENT SYSTOLIC BLOOD PRESSURE < 130 MM HG: ICD-10-PCS | Mod: CPTII,S$GLB,, | Performed by: PHYSICIAN ASSISTANT

## 2019-01-04 PROCEDURE — 3078F PR MOST RECENT DIASTOLIC BLOOD PRESSURE < 80 MM HG: ICD-10-PCS | Mod: CPTII,S$GLB,, | Performed by: PHYSICIAN ASSISTANT

## 2019-01-04 PROCEDURE — 3078F DIAST BP <80 MM HG: CPT | Mod: CPTII,S$GLB,, | Performed by: PHYSICIAN ASSISTANT

## 2019-01-04 PROCEDURE — 3074F SYST BP LT 130 MM HG: CPT | Mod: CPTII,S$GLB,, | Performed by: PHYSICIAN ASSISTANT

## 2019-01-04 NOTE — LETTER
January 6, 2019      Mohan Schwartz MD  [address] [address] [address]           [facility]  [id number] [place] [id number]  Phone: [phone]  Fax: [phone]          Patient: Dre Curiel   MR Number: [id number]   YOB: 1965   Date of Visit: 1/4/2019       Dear Dr. Mohan Schwartz:    Thank you for referring Dre Curiel to me for evaluation. Attached you will find relevant portions of my assessment and plan of care.    If you have questions, please do not hesitate to call me. I look forward to following Dre Curiel along with you.    Sincerely,    Jesse Gentile PA-C    Enclosure  CC:  No Recipients    If you would like to receive this communication electronically, please contact [email or web address] or [phone] to request more information on [facility] access.    For providers and/or their staff who would like to refer a patient to [facility], please contact us through our one-stop-shop provider referral line, [facility] , at [phone].    If you feel you have received this communication in error or would no longer like to receive these types of communications, please e-mail [email or web address]

## 2019-01-04 NOTE — PROGRESS NOTES
Subjective:      Patient ID: Dre Curiel is a 53 y.o. male.    Chief Complaint: Other (gallbladder polyp)    HPI  The patient has a history of colon polyps. The patient has been referred for fatty liver and gallbladder polyp. This was noted on ultrasound in May and again in December. The polyp is 4 mm. He has mild hepatomegaly with probable diffuse fatty infiltration and subcentimeter cyst noted in the right lobe. The pateint drinks about four beers a day. The patient denies jaundice, ascites, hematochezia, melena, change in bowel habits, weight loss, change in appetite, abdominal pain, nausea, vomiting, heartburn or dysphagia.     Review of Systems  As per HPI.     Objective:     Physical Exam   Constitutional: He is oriented to person, place, and time. He appears well-developed and well-nourished. No distress.   HENT:   Head: Normocephalic and atraumatic.   Eyes: EOM are normal.   Cardiovascular: Normal rate and regular rhythm.   Pulmonary/Chest: Effort normal and breath sounds normal. No respiratory distress. He has no wheezes.   Abdominal: Soft. Bowel sounds are normal. He exhibits no distension. There is no tenderness.   Neurological: He is alert and oriented to person, place, and time. No cranial nerve deficit.   Skin: He is not diaphoretic.   Psychiatric: His behavior is normal.       Assessment:     1. Fatty liver    2. Alcohol abuse, daily use    3. Polyp of gallbladder        Plan:     We discussed the natural history of fatty liver and discussed the importance of alcohol cessation.   Gallbladder polyps <5 mm are usually benign and do not require surgical intervention. Recommendations include repeat ultrasound in 12 months for surveillance.  The patient is up to date on colon cancer screening.      Follow-up in about 1 year (around 1/4/2020).    Thank you for the opportunity to participate in the care of this patient.   Jesse Gentile PA-C.

## 2019-04-16 DIAGNOSIS — E34.9 TESTOSTERONE DEFICIENCY: ICD-10-CM

## 2019-04-16 RX ORDER — TESTOSTERONE CYPIONATE 200 MG/ML
INJECTION, SOLUTION INTRAMUSCULAR
Qty: 10 ML | Refills: 0 | Status: SHIPPED | OUTPATIENT
Start: 2019-04-16 | End: 2019-10-17 | Stop reason: SDUPTHER

## 2019-05-21 ENCOUNTER — LAB VISIT (OUTPATIENT)
Dept: LAB | Facility: HOSPITAL | Age: 54
End: 2019-05-21
Attending: FAMILY MEDICINE
Payer: COMMERCIAL

## 2019-05-21 DIAGNOSIS — Z51.81 ENCOUNTER FOR MONITORING TESTOSTERONE REPLACEMENT THERAPY: ICD-10-CM

## 2019-05-21 DIAGNOSIS — Z79.890 ENCOUNTER FOR MONITORING TESTOSTERONE REPLACEMENT THERAPY: ICD-10-CM

## 2019-05-21 LAB
ALBUMIN SERPL BCP-MCNC: 3.7 G/DL (ref 3.5–5.2)
ALP SERPL-CCNC: 76 U/L (ref 55–135)
ALT SERPL W/O P-5'-P-CCNC: 26 U/L (ref 10–44)
ANION GAP SERPL CALC-SCNC: 9 MMOL/L (ref 8–16)
AST SERPL-CCNC: 29 U/L (ref 10–40)
BASOPHILS # BLD AUTO: 0.07 K/UL (ref 0–0.2)
BASOPHILS NFR BLD: 0.9 % (ref 0–1.9)
BILIRUB SERPL-MCNC: 0.4 MG/DL (ref 0.1–1)
BUN SERPL-MCNC: 15 MG/DL (ref 6–20)
CALCIUM SERPL-MCNC: 9.5 MG/DL (ref 8.7–10.5)
CHLORIDE SERPL-SCNC: 105 MMOL/L (ref 95–110)
CHOLEST SERPL-MCNC: 129 MG/DL (ref 120–199)
CHOLEST/HDLC SERPL: 2.9 {RATIO} (ref 2–5)
CO2 SERPL-SCNC: 24 MMOL/L (ref 23–29)
COMPLEXED PSA SERPL-MCNC: 0.73 NG/ML (ref 0–4)
CREAT SERPL-MCNC: 1.1 MG/DL (ref 0.5–1.4)
DIFFERENTIAL METHOD: ABNORMAL
EOSINOPHIL # BLD AUTO: 0.1 K/UL (ref 0–0.5)
EOSINOPHIL NFR BLD: 1.1 % (ref 0–8)
ERYTHROCYTE [DISTWIDTH] IN BLOOD BY AUTOMATED COUNT: 12.7 % (ref 11.5–14.5)
EST. GFR  (AFRICAN AMERICAN): >60 ML/MIN/1.73 M^2
EST. GFR  (NON AFRICAN AMERICAN): >60 ML/MIN/1.73 M^2
ESTIMATED AVG GLUCOSE: 94 MG/DL (ref 68–131)
GLUCOSE SERPL-MCNC: 120 MG/DL (ref 70–110)
HBA1C MFR BLD HPLC: 4.9 % (ref 4–5.6)
HCT VFR BLD AUTO: 47 % (ref 40–54)
HDLC SERPL-MCNC: 44 MG/DL (ref 40–75)
HDLC SERPL: 34.1 % (ref 20–50)
HGB BLD-MCNC: 15.8 G/DL (ref 14–18)
IMM GRANULOCYTES # BLD AUTO: 0.06 K/UL (ref 0–0.04)
IMM GRANULOCYTES NFR BLD AUTO: 0.8 % (ref 0–0.5)
LDLC SERPL CALC-MCNC: 61 MG/DL (ref 63–159)
LYMPHOCYTES # BLD AUTO: 1.5 K/UL (ref 1–4.8)
LYMPHOCYTES NFR BLD: 20.5 % (ref 18–48)
MCH RBC QN AUTO: 30.4 PG (ref 27–31)
MCHC RBC AUTO-ENTMCNC: 33.6 G/DL (ref 32–36)
MCV RBC AUTO: 90 FL (ref 82–98)
MONOCYTES # BLD AUTO: 0.5 K/UL (ref 0.3–1)
MONOCYTES NFR BLD: 6.5 % (ref 4–15)
NEUTROPHILS # BLD AUTO: 5.3 K/UL (ref 1.8–7.7)
NEUTROPHILS NFR BLD: 70.2 % (ref 38–73)
NONHDLC SERPL-MCNC: 85 MG/DL
NRBC BLD-RTO: 0 /100 WBC
PLATELET # BLD AUTO: 244 K/UL (ref 150–350)
PMV BLD AUTO: 11.5 FL (ref 9.2–12.9)
POTASSIUM SERPL-SCNC: 3.9 MMOL/L (ref 3.5–5.1)
PROT SERPL-MCNC: 7.3 G/DL (ref 6–8.4)
RBC # BLD AUTO: 5.2 M/UL (ref 4.6–6.2)
SODIUM SERPL-SCNC: 138 MMOL/L (ref 136–145)
TRIGL SERPL-MCNC: 120 MG/DL (ref 30–150)
WBC # BLD AUTO: 7.53 K/UL (ref 3.9–12.7)

## 2019-05-21 PROCEDURE — 85025 COMPLETE CBC W/AUTO DIFF WBC: CPT

## 2019-05-21 PROCEDURE — 83036 HEMOGLOBIN GLYCOSYLATED A1C: CPT

## 2019-05-21 PROCEDURE — 84153 ASSAY OF PSA TOTAL: CPT

## 2019-05-21 PROCEDURE — 80053 COMPREHEN METABOLIC PANEL: CPT

## 2019-05-21 PROCEDURE — 36415 COLL VENOUS BLD VENIPUNCTURE: CPT | Mod: PO

## 2019-05-21 PROCEDURE — 80061 LIPID PANEL: CPT

## 2019-05-28 ENCOUNTER — OFFICE VISIT (OUTPATIENT)
Dept: FAMILY MEDICINE | Facility: CLINIC | Age: 54
End: 2019-05-28
Payer: COMMERCIAL

## 2019-05-28 VITALS
SYSTOLIC BLOOD PRESSURE: 128 MMHG | DIASTOLIC BLOOD PRESSURE: 70 MMHG | OXYGEN SATURATION: 98 % | TEMPERATURE: 98 F | BODY MASS INDEX: 33.59 KG/M2 | HEART RATE: 80 BPM | WEIGHT: 220.88 LBS

## 2019-05-28 DIAGNOSIS — Z51.81 ENCOUNTER FOR MONITORING TESTOSTERONE REPLACEMENT THERAPY: Primary | ICD-10-CM

## 2019-05-28 DIAGNOSIS — M1A.9XX0 CHRONIC GOUT WITHOUT TOPHUS, UNSPECIFIED CAUSE, UNSPECIFIED SITE: ICD-10-CM

## 2019-05-28 DIAGNOSIS — G47.09 OTHER INSOMNIA: ICD-10-CM

## 2019-05-28 DIAGNOSIS — I10 ESSENTIAL HYPERTENSION: ICD-10-CM

## 2019-05-28 DIAGNOSIS — Z79.890 ENCOUNTER FOR MONITORING TESTOSTERONE REPLACEMENT THERAPY: Primary | ICD-10-CM

## 2019-05-28 PROCEDURE — 99999 PR PBB SHADOW E&M-EST. PATIENT-LVL III: ICD-10-PCS | Mod: PBBFAC,,, | Performed by: FAMILY MEDICINE

## 2019-05-28 PROCEDURE — 3008F BODY MASS INDEX DOCD: CPT | Mod: CPTII,S$GLB,, | Performed by: FAMILY MEDICINE

## 2019-05-28 PROCEDURE — 3078F DIAST BP <80 MM HG: CPT | Mod: CPTII,S$GLB,, | Performed by: FAMILY MEDICINE

## 2019-05-28 PROCEDURE — 3074F PR MOST RECENT SYSTOLIC BLOOD PRESSURE < 130 MM HG: ICD-10-PCS | Mod: CPTII,S$GLB,, | Performed by: FAMILY MEDICINE

## 2019-05-28 PROCEDURE — 99214 OFFICE O/P EST MOD 30 MIN: CPT | Mod: S$GLB,,, | Performed by: FAMILY MEDICINE

## 2019-05-28 PROCEDURE — 99999 PR PBB SHADOW E&M-EST. PATIENT-LVL III: CPT | Mod: PBBFAC,,, | Performed by: FAMILY MEDICINE

## 2019-05-28 PROCEDURE — 99214 PR OFFICE/OUTPT VISIT, EST, LEVL IV, 30-39 MIN: ICD-10-PCS | Mod: S$GLB,,, | Performed by: FAMILY MEDICINE

## 2019-05-28 PROCEDURE — 3078F PR MOST RECENT DIASTOLIC BLOOD PRESSURE < 80 MM HG: ICD-10-PCS | Mod: CPTII,S$GLB,, | Performed by: FAMILY MEDICINE

## 2019-05-28 PROCEDURE — 3074F SYST BP LT 130 MM HG: CPT | Mod: CPTII,S$GLB,, | Performed by: FAMILY MEDICINE

## 2019-05-28 PROCEDURE — 3008F PR BODY MASS INDEX (BMI) DOCUMENTED: ICD-10-PCS | Mod: CPTII,S$GLB,, | Performed by: FAMILY MEDICINE

## 2019-05-28 NOTE — PROGRESS NOTES
Chief Complaint:    Chief Complaint   Patient presents with    Follow-up       History of Present Illness:    Here for 6 month follow-up of chronic medical problems:  He is on testosterone replacement therapy doing okay no side effects  His labs are okay still continues to drink beer blood pressure is normal today although he says he has cut back on his beer drinking is not drinking every day.  His chronic insomnia which is stable      ROS:  Review of Systems   Constitutional: Negative for activity change, chills, fatigue, fever and unexpected weight change.   HENT: Negative for congestion, ear discharge, ear pain, hearing loss, postnasal drip and rhinorrhea.    Eyes: Negative for pain and visual disturbance.   Respiratory: Negative for cough, chest tightness and shortness of breath.    Cardiovascular: Negative for chest pain and palpitations.   Gastrointestinal: Negative for abdominal pain, diarrhea and vomiting.   Endocrine: Negative for heat intolerance.   Genitourinary: Negative for dysuria, flank pain, frequency and hematuria.   Musculoskeletal: Negative for back pain, gait problem and neck pain.   Skin: Negative for color change and rash.   Neurological: Negative for dizziness, tremors, seizures, numbness and headaches.   Psychiatric/Behavioral: Negative for agitation, hallucinations, self-injury, sleep disturbance and suicidal ideas. The patient is not nervous/anxious.        Past Medical History:   Diagnosis Date    Allergy     Anxiety     Depression     Hyperlipidemia     Hypertension        Social History:  Social History     Socioeconomic History    Marital status:      Spouse name: Not on file    Number of children: Not on file    Years of education: Not on file    Highest education level: Not on file   Occupational History     Employer: [employer]   Social Needs    Financial resource strain: Not on file    Food insecurity:     Worry: Not on file     Inability: Not on file     Transportation needs:     Medical: Not on file     Non-medical: Not on file   Tobacco Use    Smoking status: Never Smoker    Smokeless tobacco: Current User    Tobacco comment: dips    Substance and Sexual Activity    Alcohol use: Yes    Drug use: No    Sexual activity: Yes     Partners: Female     Birth control/protection: None   Lifestyle    Physical activity:     Days per week: Not on file     Minutes per session: Not on file    Stress: Not on file   Relationships    Social connections:     Talks on phone: Not on file     Gets together: Not on file     Attends Alevism service: Not on file     Active member of club or organization: Not on file     Attends meetings of clubs or organizations: Not on file     Relationship status: Not on file   Other Topics Concern    Not on file   Social History Narrative    Not on file       Family History:   family history includes Cancer in his maternal uncle; Hypertension in his mother; No Known Problems in his brother, maternal aunt, maternal grandfather, maternal grandmother, paternal aunt, paternal grandfather, paternal grandmother, paternal uncle, and sister.    Health Maintenance   Topic Date Due    Pneumococcal Vaccine (Medium Risk) (1 of 1 - PPSV23) 07/21/1985    Influenza Vaccine  08/01/2019    Lipid Panel  05/21/2020    TETANUS VACCINE  10/31/2027    Colonoscopy  06/11/2028       Physical Exam:    Vital Signs  Temp: 98 °F (36.7 °C)  Temp src: Oral  Pulse: 80  SpO2: 98 %  BP: 128/70  BP Location: Left arm  Patient Position: Sitting  Height and Weight  Weight: 100.2 kg (220 lb 14.4 oz)]    Body mass index is 33.59 kg/m².    Physical Exam   Constitutional: He is oriented to person, place, and time. He appears well-developed.   HENT:   Mouth/Throat: Oropharynx is clear and moist.   Eyes: Pupils are equal, round, and reactive to light. Conjunctivae are normal.   Neck: Normal range of motion. Neck supple.   Cardiovascular: Normal rate, regular rhythm and  normal heart sounds.   No murmur heard.  Pulmonary/Chest: Effort normal and breath sounds normal. No respiratory distress. He has no wheezes. He has no rales. He exhibits no tenderness.   Abdominal: Soft. He exhibits no distension and no mass. There is no tenderness. There is no guarding.   Genitourinary: Prostate normal.   Musculoskeletal: He exhibits no edema or tenderness.   Lymphadenopathy:     He has no cervical adenopathy.   Neurological: He is alert and oriented to person, place, and time. He has normal reflexes.   Skin: Skin is warm and dry.   Psychiatric: He has a normal mood and affect. His behavior is normal. Judgment and thought content normal.         Assessment:      ICD-10-CM ICD-9-CM   1. Encounter for monitoring testosterone replacement therapy Z51.81 V58.83    Z79.890 V58.69   2. Essential hypertension I10 401.9   3. Other insomnia G47.09 780.52   4. Chronic gout without tophus, unspecified cause, unspecified site [id number] 274.02         Plan:    Continue to cut back on alcohol use  Continue current plan  Please work on weight loss start an exercise program        Orders Placed This Encounter   Procedures    Testosterone    PSA, Screening    Lipid panel    Comprehensive metabolic panel    CBC auto differential    Uric acid       Current Outpatient Medications   Medication Sig Dispense Refill    allopurinol (ZYLOPRIM) 100 MG tablet Take 1 tablet (100 mg total) by mouth once daily. 90 tablet 3    amlodipine-benazepril (LOTREL) 10-40 mg per capsule Take 1 capsule by mouth once daily. 90 capsule 3    fluticasone (FLONASE) 50 mcg/actuation nasal spray 1 spray by Each Nare route once daily. 3 Bottle 3    hydrALAZINE (APRESOLINE) 50 MG tablet Take 1 tablet (50 mg total) by mouth 3 (three) times daily. 270 tablet 3    hydroCHLOROthiazide (HYDRODIURIL) 25 MG tablet Take 1 tablet (25 mg total) by mouth once daily. 90 tablet 3    loratadine (CLARITIN) 10 mg tablet Take 1 tablet (10 mg total) by  mouth once daily. 90 tablet 0    potassium chloride SA (K-DUR,KLOR-CON) 10 MEQ tablet TAKE 2 TABLETS (20 MEQ TOTAL) BY MOUTH ONCE DAILY. 90 tablet 3    pravastatin (PRAVACHOL) 40 MG tablet Take 1 tablet (40 mg total) by mouth once daily. 90 tablet 3    sertraline (ZOLOFT) 50 MG tablet Take 1 tablet (50 mg total) by mouth once daily. 90 tablet 3    sildenafil (VIAGRA) 100 MG tablet Take 1 tablet (100 mg total) by mouth daily as needed for Erectile Dysfunction. 5 tablet 2    testosterone cypionate (DEPOTESTOTERONE CYPIONATE) 200 mg/mL injection INJECT 1 ML INTO MUSCLE EVERY 14 DAYS AS DIRECTED 10 mL 0    traZODone (DESYREL) 50 MG tablet Take 1 tablet (50 mg total) by mouth once daily. 90 tablet 3    gabapentin (NEURONTIN) 300 MG capsule Take 1 capsule (300mg) during the day and 2 capsules (600mg) QHS. Total 900mg per day. 90 day supply. 270 capsule 1    tadalafil (CIALIS) 20 MG Tab Take 1 tablet (20 mg total) by mouth every 36 (thirty-six) hours. 15 tablet 3     No current facility-administered medications for this visit.        There are no discontinued medications.    Follow up in about 6 months (around 11/28/2019).      Mohan Schwartz MD

## 2019-06-26 RX ORDER — POTASSIUM CHLORIDE 750 MG/1
TABLET, EXTENDED RELEASE ORAL
Qty: 90 TABLET | Refills: 3 | Status: SHIPPED | OUTPATIENT
Start: 2019-06-26 | End: 2020-06-09 | Stop reason: SDUPTHER

## 2019-10-17 DIAGNOSIS — E34.9 TESTOSTERONE DEFICIENCY: ICD-10-CM

## 2019-10-17 RX ORDER — TESTOSTERONE CYPIONATE 200 MG/ML
INJECTION, SOLUTION INTRAMUSCULAR
Qty: 2 ML | Refills: 5 | Status: SHIPPED | OUTPATIENT
Start: 2019-10-17 | End: 2020-06-09 | Stop reason: SDUPTHER

## 2019-11-12 ENCOUNTER — LAB VISIT (OUTPATIENT)
Dept: LAB | Facility: HOSPITAL | Age: 54
End: 2019-11-12
Attending: FAMILY MEDICINE
Payer: COMMERCIAL

## 2019-11-12 DIAGNOSIS — Z79.890 ENCOUNTER FOR MONITORING TESTOSTERONE REPLACEMENT THERAPY: ICD-10-CM

## 2019-11-12 DIAGNOSIS — Z51.81 ENCOUNTER FOR MONITORING TESTOSTERONE REPLACEMENT THERAPY: ICD-10-CM

## 2019-11-12 DIAGNOSIS — M1A.9XX0 CHRONIC GOUT WITHOUT TOPHUS, UNSPECIFIED CAUSE, UNSPECIFIED SITE: ICD-10-CM

## 2019-11-12 LAB
ALBUMIN SERPL BCP-MCNC: 3.7 G/DL (ref 3.5–5.2)
ALP SERPL-CCNC: 57 U/L (ref 55–135)
ALT SERPL W/O P-5'-P-CCNC: 31 U/L (ref 10–44)
ANION GAP SERPL CALC-SCNC: 6 MMOL/L (ref 8–16)
AST SERPL-CCNC: 25 U/L (ref 10–40)
BASOPHILS # BLD AUTO: 0.04 K/UL (ref 0–0.2)
BASOPHILS NFR BLD: 0.6 % (ref 0–1.9)
BILIRUB SERPL-MCNC: 0.9 MG/DL (ref 0.1–1)
BUN SERPL-MCNC: 15 MG/DL (ref 6–20)
CALCIUM SERPL-MCNC: 9 MG/DL (ref 8.7–10.5)
CHLORIDE SERPL-SCNC: 105 MMOL/L (ref 95–110)
CHOLEST SERPL-MCNC: 182 MG/DL (ref 120–199)
CHOLEST/HDLC SERPL: 3.7 {RATIO} (ref 2–5)
CO2 SERPL-SCNC: 28 MMOL/L (ref 23–29)
COMPLEXED PSA SERPL-MCNC: 0.44 NG/ML (ref 0–4)
CREAT SERPL-MCNC: 1.1 MG/DL (ref 0.5–1.4)
DIFFERENTIAL METHOD: ABNORMAL
EOSINOPHIL # BLD AUTO: 0.1 K/UL (ref 0–0.5)
EOSINOPHIL NFR BLD: 1.1 % (ref 0–8)
ERYTHROCYTE [DISTWIDTH] IN BLOOD BY AUTOMATED COUNT: 15.4 % (ref 11.5–14.5)
EST. GFR  (AFRICAN AMERICAN): >60 ML/MIN/1.73 M^2
EST. GFR  (NON AFRICAN AMERICAN): >60 ML/MIN/1.73 M^2
GLUCOSE SERPL-MCNC: 92 MG/DL (ref 70–110)
HCT VFR BLD AUTO: 48.8 % (ref 40–54)
HDLC SERPL-MCNC: 49 MG/DL (ref 40–75)
HDLC SERPL: 26.9 % (ref 20–50)
HGB BLD-MCNC: 15.4 G/DL (ref 14–18)
IMM GRANULOCYTES # BLD AUTO: 0.03 K/UL (ref 0–0.04)
IMM GRANULOCYTES NFR BLD AUTO: 0.5 % (ref 0–0.5)
LDLC SERPL CALC-MCNC: 95.6 MG/DL (ref 63–159)
LYMPHOCYTES # BLD AUTO: 1.2 K/UL (ref 1–4.8)
LYMPHOCYTES NFR BLD: 18.9 % (ref 18–48)
MCH RBC QN AUTO: 29.1 PG (ref 27–31)
MCHC RBC AUTO-ENTMCNC: 31.6 G/DL (ref 32–36)
MCV RBC AUTO: 92 FL (ref 82–98)
MONOCYTES # BLD AUTO: 0.5 K/UL (ref 0.3–1)
MONOCYTES NFR BLD: 7.3 % (ref 4–15)
NEUTROPHILS # BLD AUTO: 4.6 K/UL (ref 1.8–7.7)
NEUTROPHILS NFR BLD: 71.6 % (ref 38–73)
NONHDLC SERPL-MCNC: 133 MG/DL
NRBC BLD-RTO: 0 /100 WBC
PLATELET # BLD AUTO: 254 K/UL (ref 150–350)
PMV BLD AUTO: 10.5 FL (ref 9.2–12.9)
POTASSIUM SERPL-SCNC: 4.2 MMOL/L (ref 3.5–5.1)
PROT SERPL-MCNC: 7.2 G/DL (ref 6–8.4)
RBC # BLD AUTO: 5.3 M/UL (ref 4.6–6.2)
SODIUM SERPL-SCNC: 139 MMOL/L (ref 136–145)
TESTOST SERPL-MCNC: 169 NG/DL (ref 304–1227)
TRIGL SERPL-MCNC: 187 MG/DL (ref 30–150)
URATE SERPL-MCNC: 7.8 MG/DL (ref 3.4–7)
WBC # BLD AUTO: 6.44 K/UL (ref 3.9–12.7)

## 2019-11-12 PROCEDURE — 84403 ASSAY OF TOTAL TESTOSTERONE: CPT

## 2019-11-12 PROCEDURE — 85025 COMPLETE CBC W/AUTO DIFF WBC: CPT

## 2019-11-12 PROCEDURE — 84550 ASSAY OF BLOOD/URIC ACID: CPT

## 2019-11-12 PROCEDURE — 36415 COLL VENOUS BLD VENIPUNCTURE: CPT | Mod: PO

## 2019-11-12 PROCEDURE — 80053 COMPREHEN METABOLIC PANEL: CPT

## 2019-11-12 PROCEDURE — 80061 LIPID PANEL: CPT

## 2019-11-12 PROCEDURE — 84153 ASSAY OF PSA TOTAL: CPT

## 2019-11-12 RX ORDER — HYDROCHLOROTHIAZIDE 25 MG/1
TABLET ORAL
Qty: 90 TABLET | Refills: 3 | Status: SHIPPED | OUTPATIENT
Start: 2019-11-12 | End: 2020-06-09 | Stop reason: SDUPTHER

## 2019-12-02 ENCOUNTER — OFFICE VISIT (OUTPATIENT)
Dept: FAMILY MEDICINE | Facility: CLINIC | Age: 54
End: 2019-12-02
Payer: COMMERCIAL

## 2019-12-02 VITALS
TEMPERATURE: 98 F | OXYGEN SATURATION: 96 % | BODY MASS INDEX: 32.76 KG/M2 | HEIGHT: 68 IN | DIASTOLIC BLOOD PRESSURE: 74 MMHG | HEART RATE: 71 BPM | WEIGHT: 216.19 LBS | SYSTOLIC BLOOD PRESSURE: 132 MMHG

## 2019-12-02 DIAGNOSIS — Z79.890 ENCOUNTER FOR MONITORING TESTOSTERONE REPLACEMENT THERAPY: Primary | ICD-10-CM

## 2019-12-02 DIAGNOSIS — I10 ESSENTIAL HYPERTENSION: ICD-10-CM

## 2019-12-02 DIAGNOSIS — Z78.9: ICD-10-CM

## 2019-12-02 DIAGNOSIS — E79.0 ELEVATED URIC ACID IN BLOOD: ICD-10-CM

## 2019-12-02 DIAGNOSIS — Z51.81 ENCOUNTER FOR MONITORING TESTOSTERONE REPLACEMENT THERAPY: Primary | ICD-10-CM

## 2019-12-02 DIAGNOSIS — E78.2 MIXED HYPERLIPIDEMIA: ICD-10-CM

## 2019-12-02 PROCEDURE — 3078F PR MOST RECENT DIASTOLIC BLOOD PRESSURE < 80 MM HG: ICD-10-PCS | Mod: CPTII,S$GLB,, | Performed by: FAMILY MEDICINE

## 2019-12-02 PROCEDURE — 3008F PR BODY MASS INDEX (BMI) DOCUMENTED: ICD-10-PCS | Mod: CPTII,S$GLB,, | Performed by: FAMILY MEDICINE

## 2019-12-02 PROCEDURE — 99214 PR OFFICE/OUTPT VISIT, EST, LEVL IV, 30-39 MIN: ICD-10-PCS | Mod: S$GLB,,, | Performed by: FAMILY MEDICINE

## 2019-12-02 PROCEDURE — 3075F PR MOST RECENT SYSTOLIC BLOOD PRESS GE 130-139MM HG: ICD-10-PCS | Mod: CPTII,S$GLB,, | Performed by: FAMILY MEDICINE

## 2019-12-02 PROCEDURE — 3078F DIAST BP <80 MM HG: CPT | Mod: CPTII,S$GLB,, | Performed by: FAMILY MEDICINE

## 2019-12-02 PROCEDURE — 99999 PR PBB SHADOW E&M-EST. PATIENT-LVL III: CPT | Mod: PBBFAC,,, | Performed by: FAMILY MEDICINE

## 2019-12-02 PROCEDURE — 3075F SYST BP GE 130 - 139MM HG: CPT | Mod: CPTII,S$GLB,, | Performed by: FAMILY MEDICINE

## 2019-12-02 PROCEDURE — 3008F BODY MASS INDEX DOCD: CPT | Mod: CPTII,S$GLB,, | Performed by: FAMILY MEDICINE

## 2019-12-02 PROCEDURE — 99999 PR PBB SHADOW E&M-EST. PATIENT-LVL III: ICD-10-PCS | Mod: PBBFAC,,, | Performed by: FAMILY MEDICINE

## 2019-12-02 PROCEDURE — 99214 OFFICE O/P EST MOD 30 MIN: CPT | Mod: S$GLB,,, | Performed by: FAMILY MEDICINE

## 2019-12-02 RX ORDER — ALLOPURINOL 300 MG/1
300 TABLET ORAL DAILY
Qty: 30 TABLET | Refills: 3 | Status: SHIPPED | OUTPATIENT
Start: 2019-12-02 | End: 2020-03-04 | Stop reason: SDUPTHER

## 2019-12-02 RX ORDER — ISOSORBIDE MONONITRATE 30 MG/1
30 TABLET, EXTENDED RELEASE ORAL DAILY
COMMUNITY
End: 2020-03-09 | Stop reason: SDUPTHER

## 2019-12-02 NOTE — PROGRESS NOTES
Chief Complaint:    Chief Complaint   Patient presents with    Follow-up       History of Present Illness:    Here for 6 month follow-up of chronic medical problems:  He is on testosterone replacement therapy doing okay no side effects  His labs are okay still continues to drink beer blood pressure is normal today although he says he has cut back on his beer drinking is not drinking every day.  His chronic insomnia which is stable      ROS:  Review of Systems   Constitutional: Negative for activity change, chills, fatigue, fever and unexpected weight change.   HENT: Negative for congestion, ear discharge, ear pain, hearing loss, postnasal drip and rhinorrhea.    Eyes: Negative for pain and visual disturbance.   Respiratory: Negative for cough, chest tightness and shortness of breath.    Cardiovascular: Negative for chest pain and palpitations.   Gastrointestinal: Negative for abdominal pain, diarrhea and vomiting.   Endocrine: Negative for heat intolerance.   Genitourinary: Negative for dysuria, flank pain, frequency and hematuria.   Musculoskeletal: Negative for back pain, gait problem and neck pain.   Skin: Negative for color change and rash.   Neurological: Negative for dizziness, tremors, seizures, numbness and headaches.   Psychiatric/Behavioral: Negative for agitation, hallucinations, self-injury, sleep disturbance and suicidal ideas. The patient is not nervous/anxious.        Past Medical History:   Diagnosis Date    Allergy     Anxiety     Depression     Hyperlipidemia     Hypertension        Social History:  Social History     Socioeconomic History    Marital status:      Spouse name: Not on file    Number of children: Not on file    Years of education: Not on file    Highest education level: Not on file   Occupational History     Employer: [employer]   Social Needs    Financial resource strain: Not on file    Food insecurity:     Worry: Not on file     Inability: Not on file     "Transportation needs:     Medical: Not on file     Non-medical: Not on file   Tobacco Use    Smoking status: Never Smoker    Smokeless tobacco: Current User    Tobacco comment: dips    Substance and Sexual Activity    Alcohol use: Yes    Drug use: No    Sexual activity: Yes     Partners: Female     Birth control/protection: None   Lifestyle    Physical activity:     Days per week: Not on file     Minutes per session: Not on file    Stress: Not on file   Relationships    Social connections:     Talks on phone: Not on file     Gets together: Not on file     Attends Anabaptism service: Not on file     Active member of club or organization: Not on file     Attends meetings of clubs or organizations: Not on file     Relationship status: Not on file   Other Topics Concern    Not on file   Social History Narrative    Not on file       Family History:   family history includes Cancer in his maternal uncle; Hypertension in his mother; No Known Problems in his brother, maternal aunt, maternal grandfather, maternal grandmother, paternal aunt, paternal grandfather, paternal grandmother, paternal uncle, and sister.    Health Maintenance   Topic Date Due    Pneumococcal Vaccine (Medium Risk) (1 of 1 - PPSV23) 07/21/1985    Lipid Panel  11/12/2020    TETANUS VACCINE  10/31/2027    Colonoscopy  06/11/2028       Physical Exam:    Vital Signs  Temp: 97.9 °F (36.6 °C)  Temp src: Temporal  Pulse: 71  SpO2: 96 %  BP: 132/74  BP Location: Left arm  Patient Position: Sitting  Pain Score: 0-No pain  Height and Weight  Height: 5' 8" (172.7 cm)  Weight: 98.1 kg (216 lb 2.6 oz)  BSA (Calculated - sq m): 2.17 sq meters  BMI (Calculated): 32.9  Weight in (lb) to have BMI = 25: 164.1]    Body mass index is 32.87 kg/m².    Physical Exam   Constitutional: He is oriented to person, place, and time. He appears well-developed.   HENT:   Mouth/Throat: Oropharynx is clear and moist.   Eyes: Pupils are equal, round, and reactive to light. " Conjunctivae are normal.   Neck: Normal range of motion. Neck supple.   Cardiovascular: Normal rate, regular rhythm and normal heart sounds.   No murmur heard.  Pulmonary/Chest: Effort normal and breath sounds normal. No respiratory distress. He has no wheezes. He has no rales. He exhibits no tenderness.   Abdominal: Soft. He exhibits no distension and no mass. There is no tenderness. There is no guarding.   Genitourinary: Prostate normal.   Musculoskeletal: He exhibits no edema or tenderness.   Lymphadenopathy:     He has no cervical adenopathy.   Neurological: He is alert and oriented to person, place, and time. He has normal reflexes.   Skin: Skin is warm and dry.   Psychiatric: He has a normal mood and affect. His behavior is normal. Judgment and thought content normal.         Assessment:      ICD-10-CM ICD-9-CM   1. Encounter for monitoring testosterone replacement therapy Z51.81 V58.83    Z79.890 V58.69   2. Essential hypertension I10 401.9   3. Alcohol ingestion, more than 4 drinks/day Z78.9 V69.8   4. Mixed hyperlipidemia E78.2 272.2   5. Elevated uric acid in blood E79.0 790.6         Plan:    Continue to cut back on alcohol use  Continue current plan  Increased allopurinol to 300 mg per day  Please work on weight loss start an exercise program        Orders Placed This Encounter   Procedures    Testosterone    PSA, Screening    Lipid panel    Comprehensive metabolic panel    CBC auto differential    Uric acid       Current Outpatient Medications   Medication Sig Dispense Refill    allopurinol (ZYLOPRIM) 300 MG tablet Take 1 tablet (300 mg total) by mouth once daily. 30 tablet 3    amlodipine-benazepril (LOTREL) 10-40 mg per capsule Take 1 capsule by mouth once daily. 90 capsule 3    fluticasone (FLONASE) 50 mcg/actuation nasal spray 1 spray by Each Nare route once daily. 3 Bottle 3    hydroCHLOROthiazide (HYDRODIURIL) 25 MG tablet TAKE 1 TABLET BY MOUTH EVERY DAY 90 tablet 3    isosorbide  mononitrate (IMDUR) 30 MG 24 hr tablet Take 30 mg by mouth once daily.      potassium chloride SA (K-DUR,KLOR-CON) 10 MEQ tablet TAKE 2 TABLETS (20 MEQ TOTAL) BY MOUTH ONCE DAILY. 90 tablet 3    pravastatin (PRAVACHOL) 40 MG tablet Take 1 tablet (40 mg total) by mouth once daily. 90 tablet 3    sertraline (ZOLOFT) 50 MG tablet Take 1 tablet (50 mg total) by mouth once daily. 90 tablet 3    testosterone cypionate (DEPOTESTOTERONE CYPIONATE) 200 mg/mL injection INJECT 1 ML INTO MUSCLE EVERY 14 DAYS AS DIRECTED 2 mL 5    traZODone (DESYREL) 50 MG tablet Take 1 tablet (50 mg total) by mouth once daily. 90 tablet 3    gabapentin (NEURONTIN) 300 MG capsule Take 1 capsule (300mg) during the day and 2 capsules (600mg) QHS. Total 900mg per day. 90 day supply. 270 capsule 1    hydrALAZINE (APRESOLINE) 50 MG tablet Take 1 tablet (50 mg total) by mouth 3 (three) times daily. 270 tablet 3    loratadine (CLARITIN) 10 mg tablet Take 1 tablet (10 mg total) by mouth once daily. 90 tablet 0     No current facility-administered medications for this visit.        Medications Discontinued During This Encounter   Medication Reason    tadalafil (CIALIS) 20 MG Tab     sildenafil (VIAGRA) 100 MG tablet     allopurinol (ZYLOPRIM) 100 MG tablet        Follow up in about 6 months (around 6/2/2020).      Mohan Schwartz MD

## 2020-01-22 ENCOUNTER — TELEPHONE (OUTPATIENT)
Dept: FAMILY MEDICINE | Facility: CLINIC | Age: 55
End: 2020-01-22

## 2020-01-22 ENCOUNTER — HOSPITAL ENCOUNTER (OUTPATIENT)
Dept: RADIOLOGY | Facility: HOSPITAL | Age: 55
Discharge: HOME | End: 2020-01-22
Attending: FAMILY MEDICINE
Payer: COMMERCIAL

## 2020-01-22 ENCOUNTER — OFFICE VISIT (OUTPATIENT)
Dept: FAMILY MEDICINE | Facility: CLINIC | Age: 55
End: 2020-01-22
Payer: COMMERCIAL

## 2020-01-22 VITALS
DIASTOLIC BLOOD PRESSURE: 72 MMHG | BODY MASS INDEX: 32.44 KG/M2 | HEIGHT: 68 IN | OXYGEN SATURATION: 99 % | HEART RATE: 64 BPM | TEMPERATURE: 98 F | SYSTOLIC BLOOD PRESSURE: 156 MMHG | WEIGHT: 214.06 LBS | RESPIRATION RATE: 18 BRPM

## 2020-01-22 DIAGNOSIS — G89.29 CHRONIC HIP PAIN, RIGHT: ICD-10-CM

## 2020-01-22 DIAGNOSIS — M54.16 LUMBAR RADICULOPATHY: Primary | ICD-10-CM

## 2020-01-22 DIAGNOSIS — M25.551 CHRONIC HIP PAIN, RIGHT: ICD-10-CM

## 2020-01-22 DIAGNOSIS — I10 ESSENTIAL HYPERTENSION: ICD-10-CM

## 2020-01-22 DIAGNOSIS — M54.41 CHRONIC RIGHT-SIDED LOW BACK PAIN WITH RIGHT-SIDED SCIATICA: ICD-10-CM

## 2020-01-22 DIAGNOSIS — G89.29 CHRONIC RIGHT-SIDED LOW BACK PAIN WITH RIGHT-SIDED SCIATICA: ICD-10-CM

## 2020-01-22 PROCEDURE — 99999 PR PBB SHADOW E&M-EST. PATIENT-LVL IV: ICD-10-PCS | Mod: PBBFAC,,, | Performed by: FAMILY MEDICINE

## 2020-01-22 PROCEDURE — 99215 PR OFFICE/OUTPT VISIT, EST, LEVL V, 40-54 MIN: ICD-10-PCS | Mod: 25,S$GLB,, | Performed by: FAMILY MEDICINE

## 2020-01-22 PROCEDURE — 96372 THER/PROPH/DIAG INJ SC/IM: CPT | Mod: S$GLB,,, | Performed by: FAMILY MEDICINE

## 2020-01-22 PROCEDURE — 3077F SYST BP >= 140 MM HG: CPT | Mod: CPTII,S$GLB,, | Performed by: FAMILY MEDICINE

## 2020-01-22 PROCEDURE — 3077F PR MOST RECENT SYSTOLIC BLOOD PRESSURE >= 140 MM HG: ICD-10-PCS | Mod: CPTII,S$GLB,, | Performed by: FAMILY MEDICINE

## 2020-01-22 PROCEDURE — 3008F BODY MASS INDEX DOCD: CPT | Mod: CPTII,S$GLB,, | Performed by: FAMILY MEDICINE

## 2020-01-22 PROCEDURE — 72100 XR LUMBAR SPINE AP AND LATERAL: ICD-10-PCS | Mod: 26,,, | Performed by: RADIOLOGY

## 2020-01-22 PROCEDURE — 73502 X-RAY EXAM HIP UNI 2-3 VIEWS: CPT | Mod: 26,RT,, | Performed by: RADIOLOGY

## 2020-01-22 PROCEDURE — 99215 OFFICE O/P EST HI 40 MIN: CPT | Mod: 25,S$GLB,, | Performed by: FAMILY MEDICINE

## 2020-01-22 PROCEDURE — 96372 PR INJECTION,THERAP/PROPH/DIAG2ST, IM OR SUBCUT: ICD-10-PCS | Mod: S$GLB,,, | Performed by: FAMILY MEDICINE

## 2020-01-22 PROCEDURE — 73502 XR HIP 2 VIEW RIGHT: ICD-10-PCS | Mod: 26,RT,, | Performed by: RADIOLOGY

## 2020-01-22 PROCEDURE — 99999 PR PBB SHADOW E&M-EST. PATIENT-LVL IV: CPT | Mod: PBBFAC,,, | Performed by: FAMILY MEDICINE

## 2020-01-22 PROCEDURE — 73502 X-RAY EXAM HIP UNI 2-3 VIEWS: CPT | Mod: TC,FY,PO,RT

## 2020-01-22 PROCEDURE — 72100 X-RAY EXAM L-S SPINE 2/3 VWS: CPT | Mod: 26,,, | Performed by: RADIOLOGY

## 2020-01-22 PROCEDURE — 3078F DIAST BP <80 MM HG: CPT | Mod: CPTII,S$GLB,, | Performed by: FAMILY MEDICINE

## 2020-01-22 PROCEDURE — 3078F PR MOST RECENT DIASTOLIC BLOOD PRESSURE < 80 MM HG: ICD-10-PCS | Mod: CPTII,S$GLB,, | Performed by: FAMILY MEDICINE

## 2020-01-22 PROCEDURE — 72100 X-RAY EXAM L-S SPINE 2/3 VWS: CPT | Mod: TC,FY,PO

## 2020-01-22 PROCEDURE — 3008F PR BODY MASS INDEX (BMI) DOCUMENTED: ICD-10-PCS | Mod: CPTII,S$GLB,, | Performed by: FAMILY MEDICINE

## 2020-01-22 RX ORDER — KETOROLAC TROMETHAMINE 30 MG/ML
60 INJECTION, SOLUTION INTRAMUSCULAR; INTRAVENOUS
Status: COMPLETED | OUTPATIENT
Start: 2020-01-22 | End: 2020-01-22

## 2020-01-22 RX ORDER — MELOXICAM 15 MG/1
15 TABLET ORAL DAILY PRN
Qty: 30 TABLET | Refills: 0 | Status: SHIPPED | OUTPATIENT
Start: 2020-01-22 | End: 2020-02-28

## 2020-01-22 RX ORDER — HYDROCODONE BITARTRATE AND ACETAMINOPHEN 10; 325 MG/1; MG/1
1 TABLET ORAL EVERY 6 HOURS PRN
Qty: 12 TABLET | Refills: 0 | Status: SHIPPED | OUTPATIENT
Start: 2020-01-22 | End: 2020-01-27

## 2020-01-22 RX ORDER — METHOCARBAMOL 750 MG/1
750 TABLET, FILM COATED ORAL 3 TIMES DAILY PRN
Qty: 40 TABLET | Refills: 0 | Status: SHIPPED | OUTPATIENT
Start: 2020-01-22 | End: 2020-02-28

## 2020-01-22 RX ADMIN — KETOROLAC TROMETHAMINE 60 MG: 30 INJECTION, SOLUTION INTRAMUSCULAR; INTRAVENOUS at 03:01

## 2020-01-22 NOTE — PROGRESS NOTES
Subjective:       Patient ID: Dre Curiel is a 54 y.o. male.    Chief Complaint: Low back  pain      History of Present Illness:   Dre Curiel 54 y.o. male presents today with low back pain  Low back pain  Acute on chronic  Onset; 11/2    Quality: Sharp, waxing and waning and rate pain 7/10. asso with stiffness  Radiates down to the right anterior leg with tingling and right hip pain  Unilateral sciatica:y to the right   Saddle or perineal anaesthesia:no  Bowel or bladder incontinence:no  LE motor weakness : yes right leg  Reduced or absent lower extremity reflexes.  He has never had xray of the back or hip and no PT either.  He takes ibuprofen 800 mg and it is not helping    BP was found to be elevated and he reports that he did not take his lunchtime-Hydralazine.      Past Medical History:   Diagnosis Date    Allergy     Anxiety     Depression     Hyperlipidemia     Hypertension      Family History   Problem Relation Age of Onset    Hypertension Mother     No Known Problems Sister     No Known Problems Brother     No Known Problems Maternal Aunt     Cancer Maternal Uncle     No Known Problems Paternal Aunt     No Known Problems Paternal Uncle     No Known Problems Maternal Grandmother     No Known Problems Maternal Grandfather     No Known Problems Paternal Grandmother     No Known Problems Paternal Grandfather      Social History     Socioeconomic History    Marital status:      Spouse name: Not on file    Number of children: Not on file    Years of education: Not on file    Highest education level: Not on file   Occupational History     Employer: [employer]   Social Needs    Financial resource strain: Not on file    Food insecurity:     Worry: Not on file     Inability: Not on file    Transportation needs:     Medical: Not on file     Non-medical: Not on file   Tobacco Use    Smoking status: Never Smoker    Smokeless tobacco: Current User    Tobacco comment: [place]ton     Substance and Sexual Activity    Alcohol use: Yes    Drug use: No    Sexual activity: Yes     Partners: Female     Birth control/protection: None   Lifestyle    Physical activity:     Days per week: Not on file     Minutes per session: Not on file    Stress: Not on file   Relationships    Social connections:     Talks on phone: Not on file     Gets together: Not on file     Attends Buddhist service: Not on file     Active member of club or organization: Not on file     Attends meetings of clubs or organizations: Not on file     Relationship status: Not on file   Other Topics Concern    Not on file   Social History Narrative    Not on file     Outpatient Encounter Medications as of 1/22/2020   Medication Sig Dispense Refill    allopurinol (ZYLOPRIM) 300 MG tablet Take 1 tablet (300 mg total) by mouth once daily. 30 tablet 3    amlodipine-benazepril (LOTREL) 10-40 mg per capsule Take 1 capsule by mouth once daily. 90 capsule 3    fluticasone (FLONASE) 50 mcg/actuation nasal spray 1 spray by Each Nare route once daily. 3 Bottle 3    gabapentin (NEURONTIN) 300 MG capsule Take 1 capsule (300mg) during the day and 2 capsules (600mg) QHS. Total 900mg per day. 90 day supply. 270 capsule 1    hydrALAZINE (APRESOLINE) 50 MG tablet Take 1 tablet (50 mg total) by mouth 3 (three) times daily. 270 tablet 3    hydroCHLOROthiazide (HYDRODIURIL) 25 MG tablet TAKE 1 TABLET BY MOUTH EVERY DAY 90 tablet 3    isosorbide mononitrate (IMDUR) 30 MG 24 hr tablet Take 30 mg by mouth once daily.      potassium chloride SA (K-DUR,KLOR-CON) 10 MEQ tablet TAKE 2 TABLETS (20 MEQ TOTAL) BY MOUTH ONCE DAILY. 90 tablet 3    pravastatin (PRAVACHOL) 40 MG tablet Take 1 tablet (40 mg total) by mouth once daily. 90 tablet 3    sertraline (ZOLOFT) 50 MG tablet Take 1 tablet (50 mg total) by mouth once daily. 90 tablet 3    testosterone cypionate (DEPOTESTOTERONE CYPIONATE) 200 mg/mL injection INJECT 1 ML INTO MUSCLE EVERY 14 DAYS AS  "DIRECTED 2 mL 5    traZODone (DESYREL) 50 MG tablet Take 1 tablet (50 mg total) by mouth once daily. 90 tablet 3    HYDROcodone-acetaminophen (NORCO)  mg per tablet Take 1 tablet by mouth every 6 (six) hours as needed for Pain. 12 tablet 0    loratadine (CLARITIN) 10 mg tablet Take 1 tablet (10 mg total) by mouth once daily. 90 tablet 0    meloxicam (MOBIC) 15 MG tablet Take 1 tablet (15 mg total) by mouth daily as needed for Pain. 30 tablet 0    methocarbamol (ROBAXIN) 750 MG Tab Take 1 tablet (750 mg total) by mouth 3 (three) times daily as needed. 40 tablet 0     Facility-Administered Encounter Medications as of 1/22/2020   Medication Dose Route Frequency Provider Last Rate Last Dose    ketorolac injection 60 mg  60 mg Intramuscular 1 time in Clinic/HOD Tabby Enriquez MD           Review of Systems   Constitutional: Negative for appetite change and fever.   HENT: Negative for congestion, facial swelling and voice change.    Eyes: Negative for discharge and itching.   Respiratory: Negative for cough, chest tightness and wheezing.    Cardiovascular: Negative.  Negative for chest pain and leg swelling.   Gastrointestinal: Negative for abdominal pain, nausea and vomiting.   Endocrine: Negative for cold intolerance and heat intolerance.   Genitourinary: Negative for dysuria and flank pain.   Musculoskeletal: Negative for myalgias and neck stiffness.   Skin: Negative for pallor and rash.   Neurological: Negative for facial asymmetry and weakness.   Psychiatric/Behavioral: Negative for agitation and confusion.       Objective:      BP (!) 156/72 (BP Location: Right arm, Patient Position: Sitting, BP Method: X-Large (Manual))   Pulse 64   Temp 97.6 °F (36.4 °C) (Oral)   Resp 18   Ht 5' 8" (1.727 m)   Wt 97.1 kg (214 lb 1.1 oz)   SpO2 99%   BMI 32.55 kg/m²   Physical Exam   Constitutional: He is oriented to person, place, and time. He appears well-developed. No distress.   HENT:   Head: Normocephalic " and atraumatic.   Right Ear: External ear normal.   Left Ear: External ear normal.   Eyes: Conjunctivae and EOM are normal.   Neck: Neck supple. No thyromegaly present.   Cardiovascular: Normal rate and regular rhythm.   Pulmonary/Chest: Effort normal. No respiratory distress.   Abdominal: Soft. Normal appearance and bowel sounds are normal. He exhibits no distension. There is no hepatosplenomegaly. There is no tenderness.   Genitourinary:   Genitourinary Comments: deferred   Musculoskeletal: He exhibits no edema or deformity.        Right hip: He exhibits decreased range of motion and tenderness.        Lumbar back: He exhibits decreased range of motion and tenderness.        Back:    Straight leg raise is positive to the right and negative to the left.     Lymphadenopathy:        Head (right side): No submandibular adenopathy present.        Head (left side): No submandibular adenopathy present.     He has no cervical adenopathy.   Neurological: He is alert and oriented to person, place, and time.   Skin: Skin is warm and dry.   Psychiatric: He has a normal mood and affect. His behavior is normal.   Nursing note and vitals reviewed.      Results for orders placed or performed in visit on 11/12/19   Testosterone   Result Value Ref Range    Testosterone, Total 169 (L) 304 - 1227 ng/dL   PSA, Screening   Result Value Ref Range    PSA, SCREEN 0.44 0.00 - 4.00 ng/mL   Lipid panel   Result Value Ref Range    Cholesterol 182 120 - 199 mg/dL    Triglycerides 187 (H) 30 - 150 mg/dL    HDL 49 40 - 75 mg/dL    LDL Cholesterol 95.6 63.0 - 159.0 mg/dL    Hdl/Cholesterol Ratio 26.9 20.0 - 50.0 %    Total Cholesterol/HDL Ratio 3.7 2.0 - 5.0    Non-HDL Cholesterol 133 mg/dL   Comprehensive metabolic panel   Result Value Ref Range    Sodium 139 136 - 145 mmol/L    Potassium 4.2 3.5 - 5.1 mmol/L    Chloride 105 95 - 110 mmol/L    CO2 28 23 - 29 mmol/L    Glucose 92 70 - 110 mg/dL    BUN, Bld 15 6 - 20 mg/dL    Creatinine 1.1 0.5 -  1.4 mg/dL    Calcium 9.0 8.7 - 10.5 mg/dL    Total Protein 7.2 6.0 - 8.4 g/dL    Albumin 3.7 3.5 - 5.2 g/dL    Total Bilirubin 0.9 0.1 - 1.0 mg/dL    Alkaline Phosphatase 57 55 - 135 U/L    AST 25 10 - 40 U/L    ALT 31 10 - 44 U/L    Anion Gap 6 (L) 8 - 16 mmol/L    eGFR if African American >60.0 >60 mL/min/1.73 m^2    eGFR if non African American >60.0 >60 mL/min/1.73 m^2   CBC auto differential   Result Value Ref Range    WBC 6.44 3.90 - 12.70 K/uL    RBC 5.30 4.60 - 6.20 M/uL    Hemoglobin 15.4 14.0 - 18.0 g/dL    Hematocrit 48.8 40.0 - 54.0 %    Mean Corpuscular Volume 92 82 - 98 fL    Mean Corpuscular Hemoglobin 29.1 27.0 - 31.0 pg    Mean Corpuscular Hemoglobin Conc 31.6 (L) 32.0 - 36.0 g/dL    RDW 15.4 (H) 11.5 - 14.5 %    Platelets 254 150 - 350 K/uL    MPV 10.5 9.2 - 12.9 fL    Immature Granulocytes 0.5 0.0 - 0.5 %    Gran # (ANC) 4.6 1.8 - 7.7 K/uL    Immature Grans (Abs) 0.03 0.00 - 0.04 K/uL    Lymph # 1.2 1.0 - 4.8 K/uL    Mono # 0.5 0.3 - 1.0 K/uL    Eos # 0.1 0.0 - 0.5 K/uL    Baso # 0.04 0.00 - 0.20 K/uL    nRBC 0 0 /100 WBC    Gran% 71.6 38.0 - 73.0 %    Lymph% 18.9 18.0 - 48.0 %    Mono% 7.3 4.0 - 15.0 %    Eosinophil% 1.1 0.0 - 8.0 %    Basophil% 0.6 0.0 - 1.9 %    Differential Method Automated    Uric acid   Result Value Ref Range    Uric Acid 7.8 (H) 3.4 - 7.0 mg/dL     Assessment:       1. Lumbar radiculopathy    2. Chronic right-sided low back pain with right-sided sciatica    3. Chronic hip pain, right    4. Essential hypertension        Plan:   Lumbar radiculopathy  -     Ambulatory Referral to Physical/Occupational Therapy    Chronic right-sided low back pain with right-sided sciatica  -     ketorolac injection 60 mg  -     HYDROcodone-acetaminophen (NORCO)  mg per tablet; Take 1 tablet by mouth every 6 (six) hours as needed for Pain.  Dispense: 12 tablet; Refill: 0  -     methocarbamol (ROBAXIN) 750 MG Tab; Take 1 tablet (750 mg total) by mouth 3 (three) times daily as needed.   Dispense: 40 tablet; Refill: 0  -     meloxicam (MOBIC) 15 MG tablet; Take 1 tablet (15 mg total) by mouth daily as needed for Pain.  Dispense: 30 tablet; Refill: 0  -     X-Ray Lumbar Spine Ap And Lateral; Future; Expected date: 01/22/2020  -     Ambulatory Referral to Physical/Occupational Therapy    Chronic hip pain, right  -     ketorolac injection 60 mg  -     HYDROcodone-acetaminophen (NORCO)  mg per tablet; Take 1 tablet by mouth every 6 (six) hours as needed for Pain.  Dispense: 12 tablet; Refill: 0  -     methocarbamol (ROBAXIN) 750 MG Tab; Take 1 tablet (750 mg total) by mouth 3 (three) times daily as needed.  Dispense: 40 tablet; Refill: 0  -     meloxicam (MOBIC) 15 MG tablet; Take 1 tablet (15 mg total) by mouth daily as needed for Pain.  Dispense: 30 tablet; Refill: 0  -     X-Ray Hip 2 View Right; Future; Expected date: 01/22/2020  -     Ambulatory Referral to Physical/Occupational Therapy    Essential hypertension: allow to continue current med and follow up with PCP      [id number]  MEDICAL DECISION MAKING: Moderate to high complexity.  Overall, the multiple problems listed are of moderate to high severity that may impact quality of life and activities of daily living. Side effects of medications, treatment plan as well as options and alternatives reviewed and discussed with patient. There was counseling of patient concerning these issues.     Total time spent in face to face counseling and coordination of care - 40 minutes over 50% of time was used in discussion of prognosis, risks, benefits of treatment, instructions and compliance with regimen . Discussion with other physicians or health care providers.

## 2020-01-22 NOTE — TELEPHONE ENCOUNTER
----- Message from Tabby Enriquez MD sent at 1/22/2020  4:09 PM CST -----  Please call the patient regarding his abnormal result.  Xray of the low back shows degenerative joint duisease

## 2020-01-22 NOTE — PATIENT INSTRUCTIONS
Back Care Tips    Caring for your back  These are things you can do to prevent a recurrence of acute back pain and to reduce symptoms from chronic back pain:  · Maintain a healthy weight. If you are overweight, losing weight will help most types of back pain.  · Exercise is an important part of recovery from most types of back pain. The muscles behind and in front of the spine support the back. This means strengthening both the back muscles and the abdominal muscles will provide better support for your spine.   · Swimming and brisk walking are good overall exercises to improve your fitness level.  · Practice safe lifting methods (below).  · Practice good posture when sitting, standing and walking. Avoid prolonged sitting. This puts more stress on the lower back than standing or walking.  · Wear quality shoes with sufficient arch support. Foot and ankle alignment can affect back symptoms. Women should avoid wearing high heels.  · Therapeutic massage can help relax the back muscles without stretching them.  · During the first 24 to 72 hours after an acute injury or flare-up of chronic back pain, apply an ice pack to the painful area for 20 minutes and then remove it for 20 minutes, over a period of 60 to 90 minutes, or several times a day. As a safety precaution, do not use a heating pad at bedtime. Sleeping on a heating pad can lead to skin burns or tissue damage.  · You can alternate ice and heat therapies.  Medications  Talk to your healthcare provider before using medicines, especially if you have other medical problems or are taking other medicines.  · You may use acetaminophen or ibuprofen to control pain, unless your healthcare provider prescribed other pain medicine. If you have chronic conditions like diabetes, liver or kidney disease, stomach ulcers, or gastrointestinal bleeding, or are taking blood thinners, talk with your healthcare provider before taking any medicines.  · Be careful if you are given  prescription pain medicines, narcotics, or medicine for muscle spasm. They can cause drowsiness, affect your coordination, reflexes, and judgment. Do not drive or operate heavy machinery while taking these types of medicines. Take prescription pain medicine only as prescribed by your healthcare provider.  Lumbar stretch  Here is a simple stretching exercise that will help relax muscle spasm and keep your back more limber. If exercise makes your back pain worse, dont do it.  · Lie on your back with your knees bent and both feet on the ground.  · Slowly raise your left knee to your chest as you flatten your lower back against the floor. Hold for 5 seconds.  · Relax and repeat the exercise with your right knee.  · Do 10 of these exercises for each leg.  Safe lifting method  · Dont bend over at the waist to lift an object off the floor.  Instead, bend your knees and hips in a squat.   · Keep your back and head upright  · Hold the object close to your body, directly in front of you.  · Straighten your legs to lift the object.   · Lower the object to the floor in the reverse fashion.  · If you must slide something across the floor, push it.  Posture tips  Sitting  Sit in chairs with straight backs or low-back support. Keep your knees lower than your hips, with your feet flat on the floor.  When driving, sit up straight. Adjust the seat forward so you are not leaning toward the steering wheel.  A small pillow or rolled towel behind your lower back may help if you are driving long distances.   Standing  When standing for long periods, shift most of your weight to one leg at a time. Alternate legs every few minutes.   Sleeping  The best way to sleep is on your side with your knees bent. Put a low pillow under your head to support your neck in a neutral spine position. Avoid thick pillows that bend your neck to one side. Put a pillow between your legs to further relax your lower back. If you sleep on your back, put pillows  under your knees to support your legs in a slightly flexed position. Use a firm mattress. If your mattress sags, replace it, or use a 1/2-inch plywood board under the mattress to add support.  Follow-up care  Follow up with your healthcare provider, or as advised.  If X-rays, a CT scan or an MRI scan were taken, they will be reviewed by a radiologist. You will be notified of any new findings that may affect your care.  Call 911  Seek emergency medical care if any of the following occur:  · Trouble breathing  · Confusion  · Very drowsy  · Fainting or loss of consciousness  · Rapid or very slow heart rate  · Loss of  bowel or bladder control  When to seek medical care  Call your healthcare provider if any of the following occur:  · Pain becomes worse or spreads to your arms or legs  · Weakness or numbness in one or both arms or legs  · Numbness in the groin area  Date Last Reviewed: 6/1/2016  © 1870-5250 [employer], "[employer]. [address] [place] [address], [place] [address]. All rights reserved. This information is not intended as a substitute for professional medical care. Always follow your healthcare professional's instructions.

## 2020-01-22 NOTE — PROGRESS NOTES
Toradol 60 Mg given IM right  ventrogluteal , patient tolerated well, recommended 15 minute wait to watch for adverse reactions.

## 2020-01-24 ENCOUNTER — TELEPHONE (OUTPATIENT)
Dept: FAMILY MEDICINE | Facility: CLINIC | Age: 55
End: 2020-01-24

## 2020-01-24 NOTE — TELEPHONE ENCOUNTER
----- Message from Jennifer Mclean sent at 1/24/2020 11:22 AM CST -----  Contact: self  states that physical therapy referral should've been sent to [facility]...[phone] (ask for salud)

## 2020-02-28 DIAGNOSIS — M54.41 CHRONIC RIGHT-SIDED LOW BACK PAIN WITH RIGHT-SIDED SCIATICA: ICD-10-CM

## 2020-02-28 DIAGNOSIS — M25.551 CHRONIC HIP PAIN, RIGHT: ICD-10-CM

## 2020-02-28 DIAGNOSIS — G89.29 CHRONIC HIP PAIN, RIGHT: ICD-10-CM

## 2020-02-28 DIAGNOSIS — G89.29 CHRONIC RIGHT-SIDED LOW BACK PAIN WITH RIGHT-SIDED SCIATICA: ICD-10-CM

## 2020-02-28 RX ORDER — METHOCARBAMOL 750 MG/1
TABLET, FILM COATED ORAL
Qty: 40 TABLET | Refills: 0 | Status: SHIPPED | OUTPATIENT
Start: 2020-02-28 | End: 2020-06-09 | Stop reason: SDUPTHER

## 2020-02-28 RX ORDER — MELOXICAM 15 MG/1
TABLET ORAL
Qty: 30 TABLET | Refills: 0 | Status: SHIPPED | OUTPATIENT
Start: 2020-02-28 | End: 2020-03-31

## 2020-03-04 RX ORDER — ALLOPURINOL 300 MG/1
300 TABLET ORAL DAILY
Qty: 30 TABLET | Refills: 3 | Status: SHIPPED | OUTPATIENT
Start: 2020-03-04 | End: 2020-05-25 | Stop reason: SDUPTHER

## 2020-03-09 RX ORDER — AMLODIPINE AND BENAZEPRIL HYDROCHLORIDE 10; 40 MG/1; MG/1
CAPSULE ORAL
Qty: 90 CAPSULE | Refills: 3 | Status: SHIPPED | OUTPATIENT
Start: 2020-03-09 | End: 2020-12-16 | Stop reason: SDUPTHER

## 2020-03-09 RX ORDER — AMLODIPINE AND BENAZEPRIL HYDROCHLORIDE 10; 40 MG/1; MG/1
1 CAPSULE ORAL DAILY
Qty: 90 CAPSULE | Refills: 1 | Status: SHIPPED | OUTPATIENT
Start: 2020-03-09 | End: 2020-06-09 | Stop reason: SDUPTHER

## 2020-03-09 RX ORDER — ISOSORBIDE MONONITRATE 30 MG/1
30 TABLET, EXTENDED RELEASE ORAL DAILY
Qty: 90 TABLET | Refills: 1 | Status: SHIPPED | OUTPATIENT
Start: 2020-03-09 | End: 2020-06-09 | Stop reason: SDUPTHER

## 2020-03-09 NOTE — TELEPHONE ENCOUNTER
----- Message from Rachel Powers sent at 3/9/2020 10:51 AM CDT -----  Contact: pt  Pt is calling to get refills on the following medication amlodipine-benazepril (LOTREL) 10-40 mg per capsule and isosorbide mononitrate (IMDUR) 30 MG 24 hr tablet. Pt can be reached at [phone].      Thanks,\  Rachel Powers

## 2020-03-31 DIAGNOSIS — G89.29 CHRONIC HIP PAIN, RIGHT: ICD-10-CM

## 2020-03-31 DIAGNOSIS — M54.41 CHRONIC RIGHT-SIDED LOW BACK PAIN WITH RIGHT-SIDED SCIATICA: ICD-10-CM

## 2020-03-31 DIAGNOSIS — G89.29 CHRONIC RIGHT-SIDED LOW BACK PAIN WITH RIGHT-SIDED SCIATICA: ICD-10-CM

## 2020-03-31 DIAGNOSIS — M25.551 CHRONIC HIP PAIN, RIGHT: ICD-10-CM

## 2020-03-31 RX ORDER — MELOXICAM 15 MG/1
TABLET ORAL
Qty: 30 TABLET | Refills: 0 | Status: SHIPPED | OUTPATIENT
Start: 2020-03-31 | End: 2020-04-28

## 2020-04-28 DIAGNOSIS — M54.41 CHRONIC RIGHT-SIDED LOW BACK PAIN WITH RIGHT-SIDED SCIATICA: ICD-10-CM

## 2020-04-28 DIAGNOSIS — G89.29 CHRONIC RIGHT-SIDED LOW BACK PAIN WITH RIGHT-SIDED SCIATICA: ICD-10-CM

## 2020-04-28 DIAGNOSIS — M25.551 CHRONIC HIP PAIN, RIGHT: ICD-10-CM

## 2020-04-28 DIAGNOSIS — G89.29 CHRONIC HIP PAIN, RIGHT: ICD-10-CM

## 2020-04-28 RX ORDER — MELOXICAM 15 MG/1
TABLET ORAL
Qty: 30 TABLET | Refills: 0 | Status: SHIPPED | OUTPATIENT
Start: 2020-04-28 | End: 2020-06-07

## 2020-05-25 RX ORDER — ALLOPURINOL 300 MG/1
300 TABLET ORAL DAILY
Qty: 30 TABLET | Refills: 3 | Status: SHIPPED | OUTPATIENT
Start: 2020-05-25 | End: 2020-06-09 | Stop reason: SDUPTHER

## 2020-06-02 ENCOUNTER — LAB VISIT (OUTPATIENT)
Dept: LAB | Facility: HOSPITAL | Age: 55
End: 2020-06-02
Attending: FAMILY MEDICINE
Payer: COMMERCIAL

## 2020-06-02 DIAGNOSIS — Z51.81 ENCOUNTER FOR MONITORING TESTOSTERONE REPLACEMENT THERAPY: ICD-10-CM

## 2020-06-02 DIAGNOSIS — E79.0 ELEVATED URIC ACID IN BLOOD: ICD-10-CM

## 2020-06-02 DIAGNOSIS — Z79.890 ENCOUNTER FOR MONITORING TESTOSTERONE REPLACEMENT THERAPY: ICD-10-CM

## 2020-06-02 LAB
BASOPHILS # BLD AUTO: 0.05 K/UL (ref 0–0.2)
BASOPHILS NFR BLD: 0.7 % (ref 0–1.9)
DIFFERENTIAL METHOD: NORMAL
EOSINOPHIL # BLD AUTO: 0.1 K/UL (ref 0–0.5)
EOSINOPHIL NFR BLD: 1.2 % (ref 0–8)
ERYTHROCYTE [DISTWIDTH] IN BLOOD BY AUTOMATED COUNT: 12.9 % (ref 11.5–14.5)
HCT VFR BLD AUTO: 47.7 % (ref 40–54)
HGB BLD-MCNC: 15.4 G/DL (ref 14–18)
IMM GRANULOCYTES # BLD AUTO: 0.02 K/UL (ref 0–0.04)
IMM GRANULOCYTES NFR BLD AUTO: 0.3 % (ref 0–0.5)
LYMPHOCYTES # BLD AUTO: 1.6 K/UL (ref 1–4.8)
LYMPHOCYTES NFR BLD: 23.6 % (ref 18–48)
MCH RBC QN AUTO: 29.4 PG (ref 27–31)
MCHC RBC AUTO-ENTMCNC: 32.3 G/DL (ref 32–36)
MCV RBC AUTO: 91 FL (ref 82–98)
MONOCYTES # BLD AUTO: 0.6 K/UL (ref 0.3–1)
MONOCYTES NFR BLD: 9.1 % (ref 4–15)
NEUTROPHILS # BLD AUTO: 4.4 K/UL (ref 1.8–7.7)
NEUTROPHILS NFR BLD: 65.1 % (ref 38–73)
NRBC BLD-RTO: 0 /100 WBC
PLATELET # BLD AUTO: 224 K/UL (ref 150–350)
PMV BLD AUTO: 11.4 FL (ref 9.2–12.9)
RBC # BLD AUTO: 5.23 M/UL (ref 4.6–6.2)
WBC # BLD AUTO: 6.79 K/UL (ref 3.9–12.7)

## 2020-06-02 PROCEDURE — 36415 COLL VENOUS BLD VENIPUNCTURE: CPT | Mod: PO

## 2020-06-02 PROCEDURE — 85025 COMPLETE CBC W/AUTO DIFF WBC: CPT

## 2020-06-02 PROCEDURE — 80061 LIPID PANEL: CPT

## 2020-06-02 PROCEDURE — 84403 ASSAY OF TOTAL TESTOSTERONE: CPT

## 2020-06-02 PROCEDURE — 84550 ASSAY OF BLOOD/URIC ACID: CPT

## 2020-06-02 PROCEDURE — 84153 ASSAY OF PSA TOTAL: CPT

## 2020-06-02 PROCEDURE — 80053 COMPREHEN METABOLIC PANEL: CPT

## 2020-06-03 LAB
ALBUMIN SERPL BCP-MCNC: 4 G/DL (ref 3.5–5.2)
ALP SERPL-CCNC: 71 U/L (ref 55–135)
ALT SERPL W/O P-5'-P-CCNC: 28 U/L (ref 10–44)
ANION GAP SERPL CALC-SCNC: 9 MMOL/L (ref 8–16)
AST SERPL-CCNC: 25 U/L (ref 10–40)
BILIRUB SERPL-MCNC: 0.5 MG/DL (ref 0.1–1)
BUN SERPL-MCNC: 13 MG/DL (ref 6–20)
CALCIUM SERPL-MCNC: 9.2 MG/DL (ref 8.7–10.5)
CHLORIDE SERPL-SCNC: 104 MMOL/L (ref 95–110)
CHOLEST SERPL-MCNC: 161 MG/DL (ref 120–199)
CHOLEST/HDLC SERPL: 2.6 {RATIO} (ref 2–5)
CO2 SERPL-SCNC: 26 MMOL/L (ref 23–29)
COMPLEXED PSA SERPL-MCNC: 0.47 NG/ML (ref 0–4)
CREAT SERPL-MCNC: 1 MG/DL (ref 0.5–1.4)
EST. GFR  (AFRICAN AMERICAN): >60 ML/MIN/1.73 M^2
EST. GFR  (NON AFRICAN AMERICAN): >60 ML/MIN/1.73 M^2
GLUCOSE SERPL-MCNC: 89 MG/DL (ref 70–110)
HDLC SERPL-MCNC: 61 MG/DL (ref 40–75)
HDLC SERPL: 37.9 % (ref 20–50)
LDLC SERPL CALC-MCNC: 78.4 MG/DL (ref 63–159)
NONHDLC SERPL-MCNC: 100 MG/DL
POTASSIUM SERPL-SCNC: 4.3 MMOL/L (ref 3.5–5.1)
PROT SERPL-MCNC: 7.5 G/DL (ref 6–8.4)
SODIUM SERPL-SCNC: 139 MMOL/L (ref 136–145)
TESTOST SERPL-MCNC: 294 NG/DL (ref 304–1227)
TRIGL SERPL-MCNC: 108 MG/DL (ref 30–150)
URATE SERPL-MCNC: 5.7 MG/DL (ref 3.4–7)

## 2020-06-09 ENCOUNTER — OFFICE VISIT (OUTPATIENT)
Dept: FAMILY MEDICINE | Facility: CLINIC | Age: 55
End: 2020-06-09
Payer: COMMERCIAL

## 2020-06-09 VITALS
OXYGEN SATURATION: 96 % | HEART RATE: 85 BPM | RESPIRATION RATE: 18 BRPM | SYSTOLIC BLOOD PRESSURE: 130 MMHG | WEIGHT: 213.75 LBS | DIASTOLIC BLOOD PRESSURE: 60 MMHG | TEMPERATURE: 99 F | BODY MASS INDEX: 32.5 KG/M2

## 2020-06-09 DIAGNOSIS — I10 ESSENTIAL HYPERTENSION: ICD-10-CM

## 2020-06-09 DIAGNOSIS — M54.41 CHRONIC RIGHT-SIDED LOW BACK PAIN WITH RIGHT-SIDED SCIATICA: ICD-10-CM

## 2020-06-09 DIAGNOSIS — Z51.81 ENCOUNTER FOR MONITORING TESTOSTERONE REPLACEMENT THERAPY: Primary | ICD-10-CM

## 2020-06-09 DIAGNOSIS — G89.29 CHRONIC HIP PAIN, RIGHT: ICD-10-CM

## 2020-06-09 DIAGNOSIS — E78.2 MIXED HYPERLIPIDEMIA: ICD-10-CM

## 2020-06-09 DIAGNOSIS — Z79.890 ENCOUNTER FOR MONITORING TESTOSTERONE REPLACEMENT THERAPY: Primary | ICD-10-CM

## 2020-06-09 DIAGNOSIS — E34.9 TESTOSTERONE DEFICIENCY: ICD-10-CM

## 2020-06-09 DIAGNOSIS — M25.551 CHRONIC HIP PAIN, RIGHT: ICD-10-CM

## 2020-06-09 DIAGNOSIS — G89.29 CHRONIC RIGHT-SIDED LOW BACK PAIN WITH RIGHT-SIDED SCIATICA: ICD-10-CM

## 2020-06-09 PROCEDURE — 3075F SYST BP GE 130 - 139MM HG: CPT | Mod: CPTII,S$GLB,, | Performed by: FAMILY MEDICINE

## 2020-06-09 PROCEDURE — 99214 OFFICE O/P EST MOD 30 MIN: CPT | Mod: S$GLB,,, | Performed by: FAMILY MEDICINE

## 2020-06-09 PROCEDURE — 3078F PR MOST RECENT DIASTOLIC BLOOD PRESSURE < 80 MM HG: ICD-10-PCS | Mod: CPTII,S$GLB,, | Performed by: FAMILY MEDICINE

## 2020-06-09 PROCEDURE — 3075F PR MOST RECENT SYSTOLIC BLOOD PRESS GE 130-139MM HG: ICD-10-PCS | Mod: CPTII,S$GLB,, | Performed by: FAMILY MEDICINE

## 2020-06-09 PROCEDURE — 3078F DIAST BP <80 MM HG: CPT | Mod: CPTII,S$GLB,, | Performed by: FAMILY MEDICINE

## 2020-06-09 PROCEDURE — 3008F BODY MASS INDEX DOCD: CPT | Mod: CPTII,S$GLB,, | Performed by: FAMILY MEDICINE

## 2020-06-09 PROCEDURE — 99214 PR OFFICE/OUTPT VISIT, EST, LEVL IV, 30-39 MIN: ICD-10-PCS | Mod: S$GLB,,, | Performed by: FAMILY MEDICINE

## 2020-06-09 PROCEDURE — 3008F PR BODY MASS INDEX (BMI) DOCUMENTED: ICD-10-PCS | Mod: CPTII,S$GLB,, | Performed by: FAMILY MEDICINE

## 2020-06-09 PROCEDURE — 99999 PR PBB SHADOW E&M-EST. PATIENT-LVL III: ICD-10-PCS | Mod: PBBFAC,,, | Performed by: FAMILY MEDICINE

## 2020-06-09 PROCEDURE — 99999 PR PBB SHADOW E&M-EST. PATIENT-LVL III: CPT | Mod: PBBFAC,,, | Performed by: FAMILY MEDICINE

## 2020-06-09 RX ORDER — ISOSORBIDE MONONITRATE 30 MG/1
30 TABLET, EXTENDED RELEASE ORAL DAILY
Qty: 90 TABLET | Refills: 1 | Status: SHIPPED | OUTPATIENT
Start: 2020-06-09 | End: 2021-09-10 | Stop reason: ALTCHOICE

## 2020-06-09 RX ORDER — SERTRALINE HYDROCHLORIDE 50 MG/1
50 TABLET, FILM COATED ORAL DAILY
Qty: 90 TABLET | Refills: 1 | Status: SHIPPED | OUTPATIENT
Start: 2020-06-09 | End: 2022-01-03 | Stop reason: SDUPTHER

## 2020-06-09 RX ORDER — HYDRALAZINE HYDROCHLORIDE 50 MG/1
50 TABLET, FILM COATED ORAL 3 TIMES DAILY
Qty: 270 TABLET | Refills: 1 | Status: SHIPPED | OUTPATIENT
Start: 2020-06-09 | End: 2020-12-16

## 2020-06-09 RX ORDER — POTASSIUM CHLORIDE 750 MG/1
TABLET, EXTENDED RELEASE ORAL
Qty: 90 TABLET | Refills: 1 | Status: ON HOLD | OUTPATIENT
Start: 2020-06-09 | End: 2021-09-19 | Stop reason: HOSPADM

## 2020-06-09 RX ORDER — METHOCARBAMOL 750 MG/1
750 TABLET, FILM COATED ORAL 3 TIMES DAILY PRN
Qty: 90 TABLET | Refills: 1 | Status: SHIPPED | OUTPATIENT
Start: 2020-06-09 | End: 2021-10-13

## 2020-06-09 RX ORDER — TESTOSTERONE CYPIONATE 200 MG/ML
INJECTION, SOLUTION INTRAMUSCULAR
Qty: 2 ML | Refills: 5 | Status: SHIPPED | OUTPATIENT
Start: 2020-06-09 | End: 2021-11-29

## 2020-06-09 RX ORDER — TRAZODONE HYDROCHLORIDE 50 MG/1
50 TABLET ORAL DAILY
Qty: 90 TABLET | Refills: 1 | Status: SHIPPED | OUTPATIENT
Start: 2020-06-09 | End: 2021-09-14

## 2020-06-09 RX ORDER — MELOXICAM 15 MG/1
TABLET ORAL
Qty: 90 TABLET | Refills: 1 | Status: SHIPPED | OUTPATIENT
Start: 2020-06-09 | End: 2021-10-13

## 2020-06-09 RX ORDER — PRAVASTATIN SODIUM 40 MG/1
40 TABLET ORAL DAILY
Qty: 90 TABLET | Refills: 1 | Status: SHIPPED | OUTPATIENT
Start: 2020-06-09 | End: 2022-01-03 | Stop reason: SDUPTHER

## 2020-06-09 RX ORDER — AMLODIPINE AND BENAZEPRIL HYDROCHLORIDE 10; 40 MG/1; MG/1
1 CAPSULE ORAL DAILY
Qty: 90 CAPSULE | Refills: 1 | Status: SHIPPED | OUTPATIENT
Start: 2020-06-09 | End: 2021-01-26

## 2020-06-09 RX ORDER — ALLOPURINOL 300 MG/1
300 TABLET ORAL DAILY
Qty: 90 TABLET | Refills: 1 | Status: SHIPPED | OUTPATIENT
Start: 2020-06-09 | End: 2021-08-27

## 2020-06-09 RX ORDER — HYDROCHLOROTHIAZIDE 25 MG/1
25 TABLET ORAL DAILY
Qty: 90 TABLET | Refills: 1 | Status: SHIPPED | OUTPATIENT
Start: 2020-06-09 | End: 2022-01-03 | Stop reason: SDUPTHER

## 2020-06-09 RX ORDER — FLUTICASONE PROPIONATE 50 MCG
1 SPRAY, SUSPENSION (ML) NASAL DAILY
Qty: 3 G | Refills: 5 | Status: SHIPPED | OUTPATIENT
Start: 2020-06-09 | End: 2023-05-16

## 2020-06-09 RX ORDER — GABAPENTIN 300 MG/1
CAPSULE ORAL
Qty: 270 CAPSULE | Refills: 1 | Status: SHIPPED | OUTPATIENT
Start: 2020-06-09 | End: 2022-02-18 | Stop reason: SDUPTHER

## 2020-06-09 RX ORDER — LORATADINE 10 MG/1
10 TABLET ORAL DAILY
Qty: 90 TABLET | Refills: 1 | Status: ON HOLD | OUTPATIENT
Start: 2020-06-09 | End: 2021-09-16

## 2020-06-09 NOTE — PROGRESS NOTES
Chief Complaint:    Chief Complaint   Patient presents with    Follow-up     6 month        History of Present Illness:    Here for 6 month follow-up of chronic medical problems:  He is on testosterone replacement therapy doing okay no side effects  His labs are okay still continues to drink beer blood pressure is normal today although he says he has cut back on his beer drinking is not drinking every day.  His chronic insomnia which is stable      ROS:  Review of Systems   Constitutional: Negative for activity change, chills, fatigue, fever and unexpected weight change.   HENT: Negative for congestion, ear discharge, ear pain, hearing loss, postnasal drip and rhinorrhea.    Eyes: Negative for pain and visual disturbance.   Respiratory: Negative for cough, chest tightness and shortness of breath.    Cardiovascular: Negative for chest pain and palpitations.   Gastrointestinal: Negative for abdominal pain, diarrhea and vomiting.   Endocrine: Negative for heat intolerance.   Genitourinary: Negative for dysuria, flank pain, frequency and hematuria.   Musculoskeletal: Negative for back pain, gait problem and neck pain.   Skin: Negative for color change and rash.   Neurological: Negative for dizziness, tremors, seizures, numbness and headaches.   Psychiatric/Behavioral: Negative for agitation, hallucinations, self-injury, sleep disturbance and suicidal ideas. The patient is not nervous/anxious.        Past Medical History:   Diagnosis Date    Allergy     Anxiety     Depression     Hyperlipidemia     Hypertension        Social History:  Social History     Socioeconomic History    Marital status:      Spouse name: Not on file    Number of children: Not on file    Years of education: Not on file    Highest education level: Not on file   Occupational History     Employer: [employer]   Social Needs    Financial resource strain: Not on file    Food insecurity:     Worry: Not on file     Inability: Not  on file    Transportation needs:     Medical: Not on file     Non-medical: Not on file   Tobacco Use    Smoking status: Never Smoker    Smokeless tobacco: Current User    Tobacco comment: dips    Substance and Sexual Activity    Alcohol use: Yes    Drug use: No    Sexual activity: Yes     Partners: Female     Birth control/protection: None   Lifestyle    Physical activity:     Days per week: Not on file     Minutes per session: Not on file    Stress: Not on file   Relationships    Social connections:     Talks on phone: Not on file     Gets together: Not on file     Attends [employer] service: Not on file     Active member of club or organization: Not on file     Attends meetings of clubs or organizations: Not on file     Relationship status: Not on file   Other Topics Concern    Not on file   Social History Narrative    Not on file       Family History:   family history includes Cancer in his maternal uncle; Hypertension in his mother; No Known Problems in his brother, maternal aunt, maternal grandfather, maternal grandmother, paternal aunt, paternal grandfather, paternal grandmother, paternal uncle, and sister.    Health Maintenance   Topic Date Due    Pneumococcal Vaccine (Medium Risk) (1 of 1 - PPSV23) 07/21/1985    Lipid Panel  06/02/2021    Colonoscopy  06/11/2021    TETANUS VACCINE  10/31/2027       Physical Exam:    Vital Signs  Temp: 98.8 °F (37.1 °C)  Temp src: Oral  Pulse: 85  Resp: 18  SpO2: 96 %  BP: 130/60  BP Location: Left arm  Patient Position: Sitting  Pain Score: 0-No pain  Height and Weight  Weight: 97 kg (213 lb 11.8 oz)]    Body mass index is 32.5 kg/m².    Physical Exam   Constitutional: He is oriented to person, place, and time. He appears well-developed.   HENT:   Mouth/Throat: Oropharynx is clear and moist.   Eyes: Pupils are equal, round, and reactive to light. Conjunctivae are normal.   Neck: Normal range of motion. Neck supple.   Cardiovascular: Normal rate, regular rhythm  and normal heart sounds.   No murmur heard.  Pulmonary/Chest: Effort normal and breath sounds normal. No respiratory distress. He has no wheezes. He has no rales. He exhibits no tenderness.   Abdominal: Soft. He exhibits no distension and no mass. There is no tenderness. There is no guarding.   Musculoskeletal: He exhibits no edema or tenderness.   Lymphadenopathy:     He has no cervical adenopathy.   Neurological: He is alert and oriented to person, place, and time. He has normal reflexes.   Skin: Skin is warm and dry.   Psychiatric: He has a normal mood and affect. His behavior is normal. Judgment and thought content normal.         Assessment:      ICD-10-CM ICD-9-CM   1. Encounter for monitoring testosterone replacement therapy Z51.81 V58.83    Z79.890 V58.69   2. Chronic right-sided low back pain with right-sided sciatica M54.41 724.2    G89.29 724.3     338.29   3. Chronic hip pain, right M25.551 719.45    G89.29 338.29   4. Testosterone deficiency E34.9 259.9   5. Essential hypertension I10 401.9   6. Mixed hyperlipidemia E78.2 272.2         Plan:    Continue to cut back on alcohol use  Continue current plan  Please work on weight loss start an exercise program        Orders Placed This Encounter   Procedures    Comprehensive metabolic panel    CBC auto differential    Lipid Panel    PSA, Screening    Testosterone       Current Outpatient Medications   Medication Sig Dispense Refill    allopurinoL (ZYLOPRIM) 300 MG tablet Take 1 tablet (300 mg total) by mouth once daily. 90 tablet 1    amLODIPine-benazepriL (LOTREL) 10-40 mg per capsule TAKE 1 CAPSULE BY MOUTH EVERY DAY 90 capsule 3    amLODIPine-benazepriL (LOTREL) 10-40 mg per capsule Take 1 capsule by mouth once daily. 90 capsule 1    fluticasone propionate (FLONASE) 50 mcg/actuation nasal spray 1 spray (50 mcg total) by Each Nostril route once daily. 3 g 5    gabapentin (NEURONTIN) 300 MG capsule Take 1 capsule (300mg) during the day and 2  capsules (600mg) QHS. Total 900mg per day. 90 day supply. 270 capsule 1    hydrALAZINE (APRESOLINE) 50 MG tablet Take 1 tablet (50 mg total) by mouth 3 (three) times daily. 270 tablet 1    hydroCHLOROthiazide (HYDRODIURIL) 25 MG tablet Take 1 tablet (25 mg total) by mouth once daily. 90 tablet 1    isosorbide mononitrate (IMDUR) 30 MG 24 hr tablet Take 1 tablet (30 mg total) by mouth once daily. 90 tablet 1    loratadine (CLARITIN) 10 mg tablet Take 1 tablet (10 mg total) by mouth once daily. 90 tablet 1    meloxicam (MOBIC) 15 MG tablet TAKE 1 TABLET BY MOUTH DAILY AS NEEDED FOR PAIN 90 tablet 1    methocarbamoL (ROBAXIN) 750 MG Tab Take 1 tablet (750 mg total) by mouth 3 (three) times daily as needed. 90 tablet 1    potassium chloride SA (K-DUR,KLOR-CON) 10 MEQ tablet Take one tablet by mouth daily 90 tablet 1    pravastatin (PRAVACHOL) 40 MG tablet Take 1 tablet (40 mg total) by mouth once daily. 90 tablet 1    sertraline (ZOLOFT) 50 MG tablet Take 1 tablet (50 mg total) by mouth once daily. 90 tablet 1    testosterone cypionate (DEPOTESTOTERONE CYPIONATE) 200 mg/mL injection INJECT 1 ML INTO MUSCLE EVERY 14 DAYS AS DIRECTED 2 mL 5    traZODone (DESYREL) 50 MG tablet Take 1 tablet (50 mg total) by mouth once daily. 90 tablet 1     No current facility-administered medications for this visit.        Medications Discontinued During This Encounter   Medication Reason    allopurinoL (ZYLOPRIM) 300 MG tablet Reorder    amLODIPine-benazepriL (LOTREL) 10-40 mg per capsule Reorder    fluticasone (FLONASE) 50 mcg/actuation nasal spray Reorder    gabapentin (NEURONTIN) 300 MG capsule Reorder    hydrALAZINE (APRESOLINE) 50 MG tablet Reorder    hydroCHLOROthiazide (HYDRODIURIL) 25 MG tablet Reorder    isosorbide mononitrate (IMDUR) 30 MG 24 hr tablet Reorder    loratadine (CLARITIN) 10 mg tablet Reorder    meloxicam (MOBIC) 15 MG tablet Reorder    methocarbamol (ROBAXIN) 750 MG Tab Reorder    potassium  chloride SA (K-DUR,KLOR-CON) 10 MEQ tablet Reorder    pravastatin (PRAVACHOL) 40 MG tablet Reorder    sertraline (ZOLOFT) 50 MG tablet Reorder    testosterone cypionate (DEPOTESTOTERONE CYPIONATE) 200 mg/mL injection Reorder    traZODone (DESYREL) 50 MG tablet Reorder       Follow up in about 6 months (around 12/9/2020).      Mohan Schwartz MD

## 2020-12-09 ENCOUNTER — LAB VISIT (OUTPATIENT)
Dept: LAB | Facility: HOSPITAL | Age: 55
End: 2020-12-09
Attending: FAMILY MEDICINE
Payer: COMMERCIAL

## 2020-12-09 DIAGNOSIS — Z51.81 ENCOUNTER FOR MONITORING TESTOSTERONE REPLACEMENT THERAPY: ICD-10-CM

## 2020-12-09 DIAGNOSIS — Z79.890 ENCOUNTER FOR MONITORING TESTOSTERONE REPLACEMENT THERAPY: ICD-10-CM

## 2020-12-09 LAB
BASOPHILS # BLD AUTO: 0.07 K/UL (ref 0–0.2)
BASOPHILS NFR BLD: 1.3 % (ref 0–1.9)
DIFFERENTIAL METHOD: ABNORMAL
EOSINOPHIL # BLD AUTO: 0.1 K/UL (ref 0–0.5)
EOSINOPHIL NFR BLD: 1.7 % (ref 0–8)
ERYTHROCYTE [DISTWIDTH] IN BLOOD BY AUTOMATED COUNT: 13.6 % (ref 11.5–14.5)
HCT VFR BLD AUTO: 53.5 % (ref 40–54)
HGB BLD-MCNC: 16.7 G/DL (ref 14–18)
IMM GRANULOCYTES # BLD AUTO: 0.04 K/UL (ref 0–0.04)
IMM GRANULOCYTES NFR BLD AUTO: 0.7 % (ref 0–0.5)
LYMPHOCYTES # BLD AUTO: 1.4 K/UL (ref 1–4.8)
LYMPHOCYTES NFR BLD: 25.2 % (ref 18–48)
MCH RBC QN AUTO: 29.5 PG (ref 27–31)
MCHC RBC AUTO-ENTMCNC: 31.2 G/DL (ref 32–36)
MCV RBC AUTO: 94 FL (ref 82–98)
MONOCYTES # BLD AUTO: 0.5 K/UL (ref 0.3–1)
MONOCYTES NFR BLD: 8.9 % (ref 4–15)
NEUTROPHILS # BLD AUTO: 3.4 K/UL (ref 1.8–7.7)
NEUTROPHILS NFR BLD: 62.2 % (ref 38–73)
NRBC BLD-RTO: 0 /100 WBC
PLATELET # BLD AUTO: 225 K/UL (ref 150–350)
PMV BLD AUTO: 10.6 FL (ref 9.2–12.9)
RBC # BLD AUTO: 5.67 M/UL (ref 4.6–6.2)
WBC # BLD AUTO: 5.4 K/UL (ref 3.9–12.7)

## 2020-12-09 PROCEDURE — 84153 ASSAY OF PSA TOTAL: CPT

## 2020-12-09 PROCEDURE — 80053 COMPREHEN METABOLIC PANEL: CPT

## 2020-12-09 PROCEDURE — 85025 COMPLETE CBC W/AUTO DIFF WBC: CPT

## 2020-12-09 PROCEDURE — 84403 ASSAY OF TOTAL TESTOSTERONE: CPT

## 2020-12-09 PROCEDURE — 80061 LIPID PANEL: CPT

## 2020-12-09 PROCEDURE — 36415 COLL VENOUS BLD VENIPUNCTURE: CPT | Mod: PO

## 2020-12-10 LAB
ALBUMIN SERPL BCP-MCNC: 3.7 G/DL (ref 3.5–5.2)
ALP SERPL-CCNC: 74 U/L (ref 55–135)
ALT SERPL W/O P-5'-P-CCNC: 23 U/L (ref 10–44)
ANION GAP SERPL CALC-SCNC: 13 MMOL/L (ref 8–16)
AST SERPL-CCNC: 16 U/L (ref 10–40)
BILIRUB SERPL-MCNC: 0.4 MG/DL (ref 0.1–1)
BUN SERPL-MCNC: 8 MG/DL (ref 6–20)
CALCIUM SERPL-MCNC: 9.1 MG/DL (ref 8.7–10.5)
CHLORIDE SERPL-SCNC: 101 MMOL/L (ref 95–110)
CHOLEST SERPL-MCNC: 145 MG/DL (ref 120–199)
CHOLEST/HDLC SERPL: 3.1 {RATIO} (ref 2–5)
CO2 SERPL-SCNC: 26 MMOL/L (ref 23–29)
COMPLEXED PSA SERPL-MCNC: 0.51 NG/ML (ref 0–4)
CREAT SERPL-MCNC: 1 MG/DL (ref 0.5–1.4)
EST. GFR  (AFRICAN AMERICAN): >60 ML/MIN/1.73 M^2
EST. GFR  (NON AFRICAN AMERICAN): >60 ML/MIN/1.73 M^2
GLUCOSE SERPL-MCNC: 98 MG/DL (ref 70–110)
HDLC SERPL-MCNC: 47 MG/DL (ref 40–75)
HDLC SERPL: 32.4 % (ref 20–50)
LDLC SERPL CALC-MCNC: 76.8 MG/DL (ref 63–159)
NONHDLC SERPL-MCNC: 98 MG/DL
POTASSIUM SERPL-SCNC: 4 MMOL/L (ref 3.5–5.1)
PROT SERPL-MCNC: 7.4 G/DL (ref 6–8.4)
SODIUM SERPL-SCNC: 140 MMOL/L (ref 136–145)
TESTOST SERPL-MCNC: 1144 NG/DL (ref 304–1227)
TRIGL SERPL-MCNC: 106 MG/DL (ref 30–150)

## 2020-12-16 ENCOUNTER — OFFICE VISIT (OUTPATIENT)
Dept: FAMILY MEDICINE | Facility: CLINIC | Age: 55
End: 2020-12-16
Payer: COMMERCIAL

## 2020-12-16 VITALS
BODY MASS INDEX: 32.99 KG/M2 | WEIGHT: 217.69 LBS | HEART RATE: 90 BPM | DIASTOLIC BLOOD PRESSURE: 70 MMHG | OXYGEN SATURATION: 99 % | TEMPERATURE: 98 F | SYSTOLIC BLOOD PRESSURE: 132 MMHG | HEIGHT: 68 IN

## 2020-12-16 DIAGNOSIS — Z51.81 ENCOUNTER FOR MONITORING TESTOSTERONE REPLACEMENT THERAPY: Primary | ICD-10-CM

## 2020-12-16 DIAGNOSIS — E78.2 MIXED HYPERLIPIDEMIA: ICD-10-CM

## 2020-12-16 DIAGNOSIS — Z79.890 ENCOUNTER FOR MONITORING TESTOSTERONE REPLACEMENT THERAPY: Primary | ICD-10-CM

## 2020-12-16 DIAGNOSIS — Z78.9: ICD-10-CM

## 2020-12-16 DIAGNOSIS — I10 ESSENTIAL HYPERTENSION: ICD-10-CM

## 2020-12-16 PROCEDURE — 99214 OFFICE O/P EST MOD 30 MIN: CPT | Mod: 25,S$GLB,, | Performed by: FAMILY MEDICINE

## 2020-12-16 PROCEDURE — 90472 IMMUNIZATION ADMIN EACH ADD: CPT | Mod: S$GLB,,, | Performed by: FAMILY MEDICINE

## 2020-12-16 PROCEDURE — 3008F PR BODY MASS INDEX (BMI) DOCUMENTED: ICD-10-PCS | Mod: CPTII,S$GLB,, | Performed by: FAMILY MEDICINE

## 2020-12-16 PROCEDURE — 3078F DIAST BP <80 MM HG: CPT | Mod: CPTII,S$GLB,, | Performed by: FAMILY MEDICINE

## 2020-12-16 PROCEDURE — 90471 IMMUNIZATION ADMIN: CPT | Mod: S$GLB,,, | Performed by: FAMILY MEDICINE

## 2020-12-16 PROCEDURE — 3075F PR MOST RECENT SYSTOLIC BLOOD PRESS GE 130-139MM HG: ICD-10-PCS | Mod: CPTII,S$GLB,, | Performed by: FAMILY MEDICINE

## 2020-12-16 PROCEDURE — 99214 PR OFFICE/OUTPT VISIT, EST, LEVL IV, 30-39 MIN: ICD-10-PCS | Mod: 25,S$GLB,, | Performed by: FAMILY MEDICINE

## 2020-12-16 PROCEDURE — 1126F PR PAIN SEVERITY QUANTIFIED, NO PAIN PRESENT: ICD-10-PCS | Mod: S$GLB,,, | Performed by: FAMILY MEDICINE

## 2020-12-16 PROCEDURE — 90686 IIV4 VACC NO PRSV 0.5 ML IM: CPT | Mod: S$GLB,,, | Performed by: FAMILY MEDICINE

## 2020-12-16 PROCEDURE — 99999 PR PBB SHADOW E&M-EST. PATIENT-LVL IV: CPT | Mod: PBBFAC,,, | Performed by: FAMILY MEDICINE

## 2020-12-16 PROCEDURE — 90472 ZOSTER RECOMBINANT VACCINE: ICD-10-PCS | Mod: S$GLB,,, | Performed by: FAMILY MEDICINE

## 2020-12-16 PROCEDURE — 3075F SYST BP GE 130 - 139MM HG: CPT | Mod: CPTII,S$GLB,, | Performed by: FAMILY MEDICINE

## 2020-12-16 PROCEDURE — 90686 FLU VACCINE (QUAD) GREATER THAN OR EQUAL TO 3YO PRESERVATIVE FREE IM: ICD-10-PCS | Mod: S$GLB,,, | Performed by: FAMILY MEDICINE

## 2020-12-16 PROCEDURE — 99999 PR PBB SHADOW E&M-EST. PATIENT-LVL IV: ICD-10-PCS | Mod: PBBFAC,,, | Performed by: FAMILY MEDICINE

## 2020-12-16 PROCEDURE — 90750 HZV VACC RECOMBINANT IM: CPT | Mod: S$GLB,,, | Performed by: FAMILY MEDICINE

## 2020-12-16 PROCEDURE — 1126F AMNT PAIN NOTED NONE PRSNT: CPT | Mod: S$GLB,,, | Performed by: FAMILY MEDICINE

## 2020-12-16 PROCEDURE — 3008F BODY MASS INDEX DOCD: CPT | Mod: CPTII,S$GLB,, | Performed by: FAMILY MEDICINE

## 2020-12-16 PROCEDURE — 3078F PR MOST RECENT DIASTOLIC BLOOD PRESSURE < 80 MM HG: ICD-10-PCS | Mod: CPTII,S$GLB,, | Performed by: FAMILY MEDICINE

## 2020-12-16 PROCEDURE — 90750 ZOSTER RECOMBINANT VACCINE: ICD-10-PCS | Mod: S$GLB,,, | Performed by: FAMILY MEDICINE

## 2020-12-16 PROCEDURE — 90471 FLU VACCINE (QUAD) GREATER THAN OR EQUAL TO 3YO PRESERVATIVE FREE IM: ICD-10-PCS | Mod: S$GLB,,, | Performed by: FAMILY MEDICINE

## 2020-12-16 RX ORDER — HYDRALAZINE HYDROCHLORIDE 100 MG/1
100 TABLET, FILM COATED ORAL 3 TIMES DAILY
Qty: 270 TABLET | Refills: 1 | Status: SHIPPED | OUTPATIENT
Start: 2020-12-16 | End: 2021-11-29

## 2020-12-16 NOTE — PROGRESS NOTES
Chief Complaint:    Chief Complaint   Patient presents with    Follow-up       History of Present Illness:    Here for 6 month follow-up of chronic medical problems:  He is on testosterone replacement therapy doing okay no side effects  His labs are okay still continues to drink beer   Says blood pressure been running high is been checking his work.  It has gained a lot a weight.  Has complains of some back pain    ROS:  Review of Systems   Constitutional: Negative for activity change, chills, fatigue, fever and unexpected weight change.   HENT: Negative for congestion, ear discharge, ear pain, hearing loss, postnasal drip and rhinorrhea.    Eyes: Negative for pain and visual disturbance.   Respiratory: Negative for cough, chest tightness and shortness of breath.    Cardiovascular: Negative for chest pain and palpitations.   Gastrointestinal: Negative for abdominal pain, diarrhea and vomiting.   Endocrine: Negative for heat intolerance.   Genitourinary: Negative for dysuria, flank pain, frequency and hematuria.   Musculoskeletal: Negative for back pain, gait problem and neck pain.   Skin: Negative for color change and rash.   Neurological: Negative for dizziness, tremors, seizures, numbness and headaches.   Psychiatric/Behavioral: Negative for agitation, hallucinations, self-injury, sleep disturbance and suicidal ideas. The patient is not nervous/anxious.        Past Medical History:   Diagnosis Date    Allergy     Anxiety     Depression     Hyperlipidemia     Hypertension        Social History:  Social History     Socioeconomic History    Marital status:      Spouse name: Not on file    Number of children: Not on file    Years of education: Not on file    Highest education level: Not on file   Occupational History     Employer: [employer]   Social Needs    Financial resource strain: Not on file    Food insecurity     Worry: Not on file     Inability: Not on file    Transportation needs  "    Medical: Not on file     Non-medical: Not on file   Tobacco Use    Smoking status: Never Smoker    Smokeless tobacco: Current User    Tobacco comment: dips    Substance and Sexual Activity    Alcohol use: Yes    Drug use: No    Sexual activity: Yes     Partners: Female     Birth control/protection: None   Lifestyle    Physical activity     Days per week: Not on file     Minutes per session: Not on file    Stress: Not on file   Relationships    Social connections     Talks on phone: Not on file     Gets together: Not on file     Attends [employer] service: Not on file     Active member of club or organization: Not on file     Attends meetings of clubs or organizations: Not on file     Relationship status: Not on file   Other Topics Concern    Not on file   Social History Narrative    Not on file       Family History:   family history includes Cancer in his maternal uncle; Hypertension in his mother; No Known Problems in his brother, maternal aunt, maternal grandfather, maternal grandmother, paternal aunt, paternal grandfather, paternal grandmother, paternal uncle, and sister.    Health Maintenance   Topic Date Due    Hepatitis C Screening  1965    Lipid Panel  12/09/2021    TETANUS VACCINE  10/31/2027       Physical Exam:    Vital Signs  Temp: 98.2 °F (36.8 °C)  Temp src: Oral  Pulse: 90  SpO2: 99 %  BP: 132/70  BP Location: Left arm  Patient Position: Sitting  Pain Score: 0-No pain  Height and Weight  Height: 5' 8" (172.7 cm)  Weight: 98.8 kg (217 lb 11.3 oz)  BSA (Calculated - sq m): 2.18 sq meters  BMI (Calculated): 33.1  Weight in (lb) to have BMI = 25: 164.1]    Body mass index is 33.1 kg/m².    Physical Exam  Constitutional:       Appearance: He is well-developed.   Eyes:      Conjunctiva/sclera: Conjunctivae normal.      Pupils: Pupils are equal, round, and reactive to light.   Neck:      Musculoskeletal: Normal range of motion and neck supple.   Cardiovascular:      Rate and Rhythm: " Normal rate and regular rhythm.      Heart sounds: Normal heart sounds. No murmur.   Pulmonary:      Effort: Pulmonary effort is normal. No respiratory distress.      Breath sounds: Normal breath sounds. No wheezing or rales.   Chest:      Chest wall: No tenderness.   Abdominal:      General: There is no distension.      Palpations: Abdomen is soft. There is no mass.      Tenderness: There is no abdominal tenderness. There is no guarding.   Musculoskeletal:         General: No tenderness.        Back:    Lymphadenopathy:      Cervical: No cervical adenopathy.   Skin:     General: Skin is warm and dry.   Neurological:      Mental Status: He is alert and oriented to person, place, and time.      Deep Tendon Reflexes: Reflexes are normal and symmetric.   Psychiatric:         Behavior: Behavior normal.         Thought Content: Thought content normal.         Judgment: Judgment normal.           Assessment:      ICD-10-CM ICD-9-CM   1. Encounter for monitoring testosterone replacement therapy  Z51.81 V58.83    Z79.890 V58.69   2. Essential hypertension  I10 401.9   3. Mixed hyperlipidemia  E78.2 272.2   4. Alcohol ingestion, more than 4 drinks/day  Z78.9 V69.8         Plan:    Increase hydralazine to 100 mg 3 times a day monitor blood pressure carefully continue other meds  Cut back on alcohol intake  Handout on hip exercises given  Other medical problems stable  Please work on weight loss  Discussed stress reduction techniques  Follow-up 6 months      Orders Placed This Encounter   Procedures    (In Office Administered) Zoster Recombinant Vaccine    Comprehensive Metabolic Panel    CBC Auto Differential    Lipid Panel    PSA, Screening    Testosterone    HIV 1/2 Ag/Ab (4th Gen)    Hepatitis C Antibody       Current Outpatient Medications   Medication Sig Dispense Refill    allopurinoL (ZYLOPRIM) 300 MG tablet Take 1 tablet (300 mg total) by mouth once daily. 90 tablet 1    amLODIPine-benazepriL (LOTREL) 10-40  mg per capsule Take 1 capsule by mouth once daily. 90 capsule 1    fluticasone propionate (FLONASE) 50 mcg/actuation nasal spray 1 spray (50 mcg total) by Each Nostril route once daily. 3 g 5    gabapentin (NEURONTIN) 300 MG capsule Take 1 capsule (300mg) during the day and 2 capsules (600mg) QHS. Total 900mg per day. 90 day supply. 270 capsule 1    hydrALAZINE (APRESOLINE) 100 MG tablet Take 1 tablet (100 mg total) by mouth 3 (three) times daily. 270 tablet 1    hydroCHLOROthiazide (HYDRODIURIL) 25 MG tablet Take 1 tablet (25 mg total) by mouth once daily. 90 tablet 1    isosorbide mononitrate (IMDUR) 30 MG 24 hr tablet Take 1 tablet (30 mg total) by mouth once daily. 90 tablet 1    loratadine (CLARITIN) 10 mg tablet Take 1 tablet (10 mg total) by mouth once daily. 90 tablet 1    meloxicam (MOBIC) 15 MG tablet TAKE 1 TABLET BY MOUTH DAILY AS NEEDED FOR PAIN 90 tablet 1    methocarbamoL (ROBAXIN) 750 MG Tab Take 1 tablet (750 mg total) by mouth 3 (three) times daily as needed. 90 tablet 1    potassium chloride SA (K-DUR,KLOR-CON) 10 MEQ tablet TAKE 2 TABLETS (20 MEQ TOTAL) BY MOUTH ONCE DAILY. 90 tablet 3    potassium chloride SA (K-DUR,KLOR-CON) 10 MEQ tablet Take one tablet by mouth daily 90 tablet 1    pravastatin (PRAVACHOL) 40 MG tablet Take 1 tablet (40 mg total) by mouth once daily. 90 tablet 1    sertraline (ZOLOFT) 50 MG tablet Take 1 tablet (50 mg total) by mouth once daily. 90 tablet 1    testosterone cypionate (DEPOTESTOTERONE CYPIONATE) 200 mg/mL injection INJECT 1 ML INTO MUSCLE EVERY 14 DAYS AS DIRECTED 2 mL 5    traZODone (DESYREL) 50 MG tablet Take 1 tablet (50 mg total) by mouth once daily. 90 tablet 1     No current facility-administered medications for this visit.        Medications Discontinued During This Encounter   Medication Reason    amLODIPine-benazepriL (LOTREL) 10-40 mg per capsule Duplicate Order    hydrALAZINE (APRESOLINE) 50 MG tablet        No follow-ups on  file.      Mohan Schwartz MD

## 2021-06-16 ENCOUNTER — LAB VISIT (OUTPATIENT)
Dept: LAB | Facility: HOSPITAL | Age: 56
End: 2021-06-16
Attending: FAMILY MEDICINE
Payer: COMMERCIAL

## 2021-06-16 DIAGNOSIS — Z51.81 ENCOUNTER FOR MONITORING TESTOSTERONE REPLACEMENT THERAPY: ICD-10-CM

## 2021-06-16 DIAGNOSIS — I10 ESSENTIAL HYPERTENSION: ICD-10-CM

## 2021-06-16 DIAGNOSIS — Z79.890 ENCOUNTER FOR MONITORING TESTOSTERONE REPLACEMENT THERAPY: ICD-10-CM

## 2021-06-16 DIAGNOSIS — E78.2 MIXED HYPERLIPIDEMIA: ICD-10-CM

## 2021-06-16 LAB
BASOPHILS # BLD AUTO: 0.04 K/UL (ref 0–0.2)
BASOPHILS NFR BLD: 0.7 % (ref 0–1.9)
DIFFERENTIAL METHOD: ABNORMAL
EOSINOPHIL # BLD AUTO: 0.1 K/UL (ref 0–0.5)
EOSINOPHIL NFR BLD: 1.1 % (ref 0–8)
ERYTHROCYTE [DISTWIDTH] IN BLOOD BY AUTOMATED COUNT: 14.6 % (ref 11.5–14.5)
HCT VFR BLD AUTO: 48.9 % (ref 40–54)
HGB BLD-MCNC: 16 G/DL (ref 14–18)
IMM GRANULOCYTES # BLD AUTO: 0.06 K/UL (ref 0–0.04)
IMM GRANULOCYTES NFR BLD AUTO: 1 % (ref 0–0.5)
LYMPHOCYTES # BLD AUTO: 1.4 K/UL (ref 1–4.8)
LYMPHOCYTES NFR BLD: 22.7 % (ref 18–48)
MCH RBC QN AUTO: 30.5 PG (ref 27–31)
MCHC RBC AUTO-ENTMCNC: 32.7 G/DL (ref 32–36)
MCV RBC AUTO: 93 FL (ref 82–98)
MONOCYTES # BLD AUTO: 0.5 K/UL (ref 0.3–1)
MONOCYTES NFR BLD: 8.8 % (ref 4–15)
NEUTROPHILS # BLD AUTO: 4 K/UL (ref 1.8–7.7)
NEUTROPHILS NFR BLD: 65.7 % (ref 38–73)
NRBC BLD-RTO: 0 /100 WBC
PLATELET # BLD AUTO: 259 K/UL (ref 150–450)
PMV BLD AUTO: 10.6 FL (ref 9.2–12.9)
RBC # BLD AUTO: 5.25 M/UL (ref 4.6–6.2)
WBC # BLD AUTO: 6.13 K/UL (ref 3.9–12.7)

## 2021-06-16 PROCEDURE — 86703 HIV-1/HIV-2 1 RESULT ANTBDY: CPT | Performed by: FAMILY MEDICINE

## 2021-06-16 PROCEDURE — 84403 ASSAY OF TOTAL TESTOSTERONE: CPT | Performed by: FAMILY MEDICINE

## 2021-06-16 PROCEDURE — 36415 COLL VENOUS BLD VENIPUNCTURE: CPT | Mod: PO | Performed by: FAMILY MEDICINE

## 2021-06-16 PROCEDURE — 80061 LIPID PANEL: CPT | Performed by: FAMILY MEDICINE

## 2021-06-16 PROCEDURE — 80053 COMPREHEN METABOLIC PANEL: CPT | Performed by: FAMILY MEDICINE

## 2021-06-16 PROCEDURE — 84153 ASSAY OF PSA TOTAL: CPT | Performed by: FAMILY MEDICINE

## 2021-06-16 PROCEDURE — 86803 HEPATITIS C AB TEST: CPT | Performed by: FAMILY MEDICINE

## 2021-06-16 PROCEDURE — 85025 COMPLETE CBC W/AUTO DIFF WBC: CPT | Performed by: FAMILY MEDICINE

## 2021-06-17 LAB
ALBUMIN SERPL BCP-MCNC: 3.8 G/DL (ref 3.5–5.2)
ALP SERPL-CCNC: 65 U/L (ref 55–135)
ALT SERPL W/O P-5'-P-CCNC: 19 U/L (ref 10–44)
ANION GAP SERPL CALC-SCNC: 13 MMOL/L (ref 8–16)
AST SERPL-CCNC: 16 U/L (ref 10–40)
BILIRUB SERPL-MCNC: 0.4 MG/DL (ref 0.1–1)
BUN SERPL-MCNC: 9 MG/DL (ref 6–20)
CALCIUM SERPL-MCNC: 9.4 MG/DL (ref 8.7–10.5)
CHLORIDE SERPL-SCNC: 102 MMOL/L (ref 95–110)
CHOLEST SERPL-MCNC: 146 MG/DL (ref 120–199)
CHOLEST/HDLC SERPL: 3.7 {RATIO} (ref 2–5)
CO2 SERPL-SCNC: 24 MMOL/L (ref 23–29)
COMPLEXED PSA SERPL-MCNC: 0.55 NG/ML (ref 0–4)
CREAT SERPL-MCNC: 1 MG/DL (ref 0.5–1.4)
EST. GFR  (AFRICAN AMERICAN): >60 ML/MIN/1.73 M^2
EST. GFR  (NON AFRICAN AMERICAN): >60 ML/MIN/1.73 M^2
GLUCOSE SERPL-MCNC: 112 MG/DL (ref 70–110)
HDLC SERPL-MCNC: 40 MG/DL (ref 40–75)
HDLC SERPL: 27.4 % (ref 20–50)
LDLC SERPL CALC-MCNC: 79.4 MG/DL (ref 63–159)
NONHDLC SERPL-MCNC: 106 MG/DL
POTASSIUM SERPL-SCNC: 4 MMOL/L (ref 3.5–5.1)
PROT SERPL-MCNC: 7.1 G/DL (ref 6–8.4)
SODIUM SERPL-SCNC: 139 MMOL/L (ref 136–145)
TESTOST SERPL-MCNC: 631 NG/DL (ref 304–1227)
TRIGL SERPL-MCNC: 133 MG/DL (ref 30–150)

## 2021-06-18 LAB
HCV AB SERPL QL IA: NEGATIVE
HIV 1+2 AB+HIV1 P24 AG SERPL QL IA: NEGATIVE

## 2021-06-23 ENCOUNTER — OFFICE VISIT (OUTPATIENT)
Dept: FAMILY MEDICINE | Facility: CLINIC | Age: 56
End: 2021-06-23
Payer: COMMERCIAL

## 2021-06-23 VITALS
BODY MASS INDEX: 33.82 KG/M2 | OXYGEN SATURATION: 98 % | SYSTOLIC BLOOD PRESSURE: 132 MMHG | WEIGHT: 223.13 LBS | HEART RATE: 81 BPM | TEMPERATURE: 99 F | DIASTOLIC BLOOD PRESSURE: 84 MMHG | HEIGHT: 68 IN

## 2021-06-23 DIAGNOSIS — I10 ESSENTIAL HYPERTENSION: Primary | ICD-10-CM

## 2021-06-23 DIAGNOSIS — E78.2 MIXED HYPERLIPIDEMIA: ICD-10-CM

## 2021-06-23 DIAGNOSIS — K82.4 GALLBLADDER POLYP: ICD-10-CM

## 2021-06-23 DIAGNOSIS — Z12.11 COLON CANCER SCREENING: ICD-10-CM

## 2021-06-23 DIAGNOSIS — Z51.81 ENCOUNTER FOR MONITORING TESTOSTERONE REPLACEMENT THERAPY: ICD-10-CM

## 2021-06-23 DIAGNOSIS — Z79.890 ENCOUNTER FOR MONITORING TESTOSTERONE REPLACEMENT THERAPY: ICD-10-CM

## 2021-06-23 DIAGNOSIS — R06.83 SNORING: ICD-10-CM

## 2021-06-23 DIAGNOSIS — G47.10 EXCESSIVE SLEEPINESS: ICD-10-CM

## 2021-06-23 DIAGNOSIS — R79.89 LOW TESTOSTERONE: ICD-10-CM

## 2021-06-23 DIAGNOSIS — Z78.9: ICD-10-CM

## 2021-06-23 PROCEDURE — 99999 PR PBB SHADOW E&M-EST. PATIENT-LVL V: ICD-10-PCS | Mod: PBBFAC,,, | Performed by: FAMILY MEDICINE

## 2021-06-23 PROCEDURE — 99214 OFFICE O/P EST MOD 30 MIN: CPT | Mod: S$GLB,,, | Performed by: FAMILY MEDICINE

## 2021-06-23 PROCEDURE — 1126F AMNT PAIN NOTED NONE PRSNT: CPT | Mod: S$GLB,,, | Performed by: FAMILY MEDICINE

## 2021-06-23 PROCEDURE — 99999 PR PBB SHADOW E&M-EST. PATIENT-LVL V: CPT | Mod: PBBFAC,,, | Performed by: FAMILY MEDICINE

## 2021-06-23 PROCEDURE — 3075F PR MOST RECENT SYSTOLIC BLOOD PRESS GE 130-139MM HG: ICD-10-PCS | Mod: CPTII,S$GLB,, | Performed by: FAMILY MEDICINE

## 2021-06-23 PROCEDURE — 99214 PR OFFICE/OUTPT VISIT, EST, LEVL IV, 30-39 MIN: ICD-10-PCS | Mod: S$GLB,,, | Performed by: FAMILY MEDICINE

## 2021-06-23 PROCEDURE — 3008F BODY MASS INDEX DOCD: CPT | Mod: CPTII,S$GLB,, | Performed by: FAMILY MEDICINE

## 2021-06-23 PROCEDURE — 3075F SYST BP GE 130 - 139MM HG: CPT | Mod: CPTII,S$GLB,, | Performed by: FAMILY MEDICINE

## 2021-06-23 PROCEDURE — 3079F PR MOST RECENT DIASTOLIC BLOOD PRESSURE 80-89 MM HG: ICD-10-PCS | Mod: CPTII,S$GLB,, | Performed by: FAMILY MEDICINE

## 2021-06-23 PROCEDURE — 3079F DIAST BP 80-89 MM HG: CPT | Mod: CPTII,S$GLB,, | Performed by: FAMILY MEDICINE

## 2021-06-23 PROCEDURE — 3008F PR BODY MASS INDEX (BMI) DOCUMENTED: ICD-10-PCS | Mod: CPTII,S$GLB,, | Performed by: FAMILY MEDICINE

## 2021-06-23 PROCEDURE — 1126F PR PAIN SEVERITY QUANTIFIED, NO PAIN PRESENT: ICD-10-PCS | Mod: S$GLB,,, | Performed by: FAMILY MEDICINE

## 2021-06-24 ENCOUNTER — TELEPHONE (OUTPATIENT)
Dept: PULMONOLOGY | Facility: CLINIC | Age: 56
End: 2021-06-24

## 2021-06-25 RX ORDER — SODIUM, POTASSIUM,MAG SULFATES 17.5-3.13G
1 SOLUTION, RECONSTITUTED, ORAL ORAL DAILY
Qty: 1 KIT | Refills: 0 | Status: SHIPPED | OUTPATIENT
Start: 2021-06-25 | End: 2021-06-27

## 2021-06-28 ENCOUNTER — HOSPITAL ENCOUNTER (OUTPATIENT)
Dept: RADIOLOGY | Facility: HOSPITAL | Age: 56
Discharge: HOME | End: 2021-06-28
Attending: FAMILY MEDICINE
Payer: COMMERCIAL

## 2021-06-28 DIAGNOSIS — K82.4 GALLBLADDER POLYP: ICD-10-CM

## 2021-06-28 DIAGNOSIS — N28.89 LEFT RENAL MASS: Primary | ICD-10-CM

## 2021-06-28 PROCEDURE — 76705 ECHO EXAM OF ABDOMEN: CPT | Mod: TC

## 2021-06-29 ENCOUNTER — TELEPHONE (OUTPATIENT)
Dept: FAMILY MEDICINE | Facility: CLINIC | Age: 56
End: 2021-06-29

## 2021-06-30 ENCOUNTER — TELEPHONE (OUTPATIENT)
Dept: FAMILY MEDICINE | Facility: CLINIC | Age: 56
End: 2021-06-30

## 2021-06-30 DIAGNOSIS — N28.89 OTHER SPECIFIED DISORDERS OF KIDNEY AND URETER: ICD-10-CM

## 2021-06-30 DIAGNOSIS — N28.89 RENAL MASS: Primary | ICD-10-CM

## 2021-07-01 ENCOUNTER — TELEPHONE (OUTPATIENT)
Dept: FAMILY MEDICINE | Facility: CLINIC | Age: 56
End: 2021-07-01

## 2021-07-06 ENCOUNTER — TELEPHONE (OUTPATIENT)
Dept: FAMILY MEDICINE | Facility: CLINIC | Age: 56
End: 2021-07-06

## 2021-07-06 DIAGNOSIS — N28.89 OTHER SPECIFIED DISORDERS OF KIDNEY AND URETER: ICD-10-CM

## 2021-07-06 DIAGNOSIS — N28.89 RENAL MASS: Primary | ICD-10-CM

## 2021-07-07 ENCOUNTER — TELEPHONE (OUTPATIENT)
Dept: FAMILY MEDICINE | Facility: CLINIC | Age: 56
End: 2021-07-07

## 2021-07-07 DIAGNOSIS — N28.89 RENAL MASS: Primary | ICD-10-CM

## 2021-07-13 ENCOUNTER — OFFICE VISIT (OUTPATIENT)
Dept: UROLOGY | Facility: CLINIC | Age: 56
End: 2021-07-13
Payer: COMMERCIAL

## 2021-07-13 ENCOUNTER — TELEPHONE (OUTPATIENT)
Dept: FAMILY MEDICINE | Facility: CLINIC | Age: 56
End: 2021-07-13

## 2021-07-13 VITALS — WEIGHT: 223 LBS | BODY MASS INDEX: 33.91 KG/M2

## 2021-07-13 DIAGNOSIS — N28.89 LEFT RENAL MASS: Primary | ICD-10-CM

## 2021-07-13 PROCEDURE — 99999 PR PBB SHADOW E&M-EST. PATIENT-LVL II: CPT | Mod: PBBFAC,,, | Performed by: UROLOGY

## 2021-07-13 PROCEDURE — 3008F BODY MASS INDEX DOCD: CPT | Mod: CPTII,S$GLB,, | Performed by: UROLOGY

## 2021-07-13 PROCEDURE — 1126F PR PAIN SEVERITY QUANTIFIED, NO PAIN PRESENT: ICD-10-PCS | Mod: S$GLB,,, | Performed by: UROLOGY

## 2021-07-13 PROCEDURE — 99999 PR PBB SHADOW E&M-EST. PATIENT-LVL II: ICD-10-PCS | Mod: PBBFAC,,, | Performed by: UROLOGY

## 2021-07-13 PROCEDURE — 3008F PR BODY MASS INDEX (BMI) DOCUMENTED: ICD-10-PCS | Mod: CPTII,S$GLB,, | Performed by: UROLOGY

## 2021-07-13 PROCEDURE — 99203 PR OFFICE/OUTPT VISIT, NEW, LEVL III, 30-44 MIN: ICD-10-PCS | Mod: S$GLB,,, | Performed by: UROLOGY

## 2021-07-13 PROCEDURE — 1126F AMNT PAIN NOTED NONE PRSNT: CPT | Mod: S$GLB,,, | Performed by: UROLOGY

## 2021-07-13 PROCEDURE — 99203 OFFICE O/P NEW LOW 30 MIN: CPT | Mod: S$GLB,,, | Performed by: UROLOGY

## 2021-07-14 ENCOUNTER — TELEPHONE (OUTPATIENT)
Dept: UROLOGY | Facility: CLINIC | Age: 56
End: 2021-07-14

## 2021-07-14 DIAGNOSIS — N28.89 LEFT RENAL MASS: Primary | ICD-10-CM

## 2021-07-15 ENCOUNTER — PATIENT OUTREACH (OUTPATIENT)
Dept: ADMINISTRATIVE | Facility: OTHER | Age: 56
End: 2021-07-15

## 2021-07-16 ENCOUNTER — TELEPHONE (OUTPATIENT)
Dept: UROLOGY | Facility: CLINIC | Age: 56
End: 2021-07-16

## 2021-07-20 ENCOUNTER — OFFICE VISIT (OUTPATIENT)
Dept: UROLOGY | Facility: CLINIC | Age: 56
End: 2021-07-20
Payer: COMMERCIAL

## 2021-07-20 ENCOUNTER — TELEPHONE (OUTPATIENT)
Dept: UROLOGY | Facility: CLINIC | Age: 56
End: 2021-07-20

## 2021-07-20 VITALS
HEART RATE: 80 BPM | WEIGHT: 222.69 LBS | DIASTOLIC BLOOD PRESSURE: 72 MMHG | SYSTOLIC BLOOD PRESSURE: 123 MMHG | HEIGHT: 68 IN | BODY MASS INDEX: 33.75 KG/M2

## 2021-07-20 DIAGNOSIS — N28.89 RENAL MASS: Primary | ICD-10-CM

## 2021-07-20 DIAGNOSIS — N28.89 LEFT RENAL MASS: Primary | ICD-10-CM

## 2021-07-20 DIAGNOSIS — Z88.8 ALLERGY TO IODINE COMPOUND: ICD-10-CM

## 2021-07-20 PROBLEM — C61 PROSTATE CANCER: Status: ACTIVE | Noted: 2021-07-20

## 2021-07-20 PROCEDURE — 1126F AMNT PAIN NOTED NONE PRSNT: CPT | Mod: CPTII,S$GLB,, | Performed by: UROLOGY

## 2021-07-20 PROCEDURE — 99214 OFFICE O/P EST MOD 30 MIN: CPT | Mod: S$GLB,,, | Performed by: UROLOGY

## 2021-07-20 PROCEDURE — 99999 PR PBB SHADOW E&M-EST. PATIENT-LVL IV: ICD-10-PCS | Mod: PBBFAC,,, | Performed by: UROLOGY

## 2021-07-20 PROCEDURE — 3074F PR MOST RECENT SYSTOLIC BLOOD PRESSURE < 130 MM HG: ICD-10-PCS | Mod: CPTII,S$GLB,, | Performed by: UROLOGY

## 2021-07-20 PROCEDURE — 3008F PR BODY MASS INDEX (BMI) DOCUMENTED: ICD-10-PCS | Mod: CPTII,S$GLB,, | Performed by: UROLOGY

## 2021-07-20 PROCEDURE — 3008F BODY MASS INDEX DOCD: CPT | Mod: CPTII,S$GLB,, | Performed by: UROLOGY

## 2021-07-20 PROCEDURE — 99999 PR PBB SHADOW E&M-EST. PATIENT-LVL IV: CPT | Mod: PBBFAC,,, | Performed by: UROLOGY

## 2021-07-20 PROCEDURE — 3078F PR MOST RECENT DIASTOLIC BLOOD PRESSURE < 80 MM HG: ICD-10-PCS | Mod: CPTII,S$GLB,, | Performed by: UROLOGY

## 2021-07-20 PROCEDURE — 3074F SYST BP LT 130 MM HG: CPT | Mod: CPTII,S$GLB,, | Performed by: UROLOGY

## 2021-07-20 PROCEDURE — 1126F PR PAIN SEVERITY QUANTIFIED, NO PAIN PRESENT: ICD-10-PCS | Mod: CPTII,S$GLB,, | Performed by: UROLOGY

## 2021-07-20 PROCEDURE — 3078F DIAST BP <80 MM HG: CPT | Mod: CPTII,S$GLB,, | Performed by: UROLOGY

## 2021-07-20 PROCEDURE — 99214 PR OFFICE/OUTPT VISIT, EST, LEVL IV, 30-39 MIN: ICD-10-PCS | Mod: S$GLB,,, | Performed by: UROLOGY

## 2021-07-20 RX ORDER — PREDNISONE 50 MG/1
50 TABLET ORAL SEE ADMIN INSTRUCTIONS
Qty: 3 TABLET | Refills: 0 | Status: SHIPPED | OUTPATIENT
Start: 2021-07-29 | End: 2021-10-13

## 2021-07-20 RX ORDER — DIPHENHYDRAMINE HCL 50 MG
50 CAPSULE ORAL ONCE
Qty: 1 CAPSULE | Refills: 0 | Status: SHIPPED | OUTPATIENT
Start: 2021-07-20 | End: 2021-07-20

## 2021-07-22 PROBLEM — C64.2 RENAL CANCER, LEFT: Status: ACTIVE | Noted: 2021-07-13

## 2021-07-30 ENCOUNTER — HOSPITAL ENCOUNTER (OUTPATIENT)
Dept: RADIOLOGY | Facility: HOSPITAL | Age: 56
Discharge: HOME | End: 2021-07-30
Attending: UROLOGY
Payer: COMMERCIAL

## 2021-07-30 DIAGNOSIS — N28.89 LEFT RENAL MASS: ICD-10-CM

## 2021-07-30 PROCEDURE — 25500020 PHARM REV CODE 255: Performed by: UROLOGY

## 2021-07-30 PROCEDURE — A9562 TC99M MERTIATIDE: HCPCS

## 2021-07-30 PROCEDURE — 74175 CTA ABDOMEN W/CONTRAST: CPT | Mod: TC

## 2021-07-30 PROCEDURE — 63600175 PHARM REV CODE 636 W HCPCS: Performed by: RADIOLOGY

## 2021-07-30 RX ORDER — DIPHENHYDRAMINE HYDROCHLORIDE 50 MG/ML
50 INJECTION INTRAMUSCULAR; INTRAVENOUS ONCE
Status: COMPLETED | OUTPATIENT
Start: 2021-07-30 | End: 2021-07-30

## 2021-07-30 RX ORDER — FUROSEMIDE 10 MG/ML
40 INJECTION INTRAMUSCULAR; INTRAVENOUS ONCE
Status: COMPLETED | OUTPATIENT
Start: 2021-07-30 | End: 2021-07-30

## 2021-07-30 RX ADMIN — IOHEXOL 100 ML: 350 INJECTION, SOLUTION INTRAVENOUS at 09:07

## 2021-07-30 RX ADMIN — FUROSEMIDE 40 MG: 10 INJECTION, SOLUTION INTRAMUSCULAR; INTRAVENOUS at 09:07

## 2021-07-30 RX ADMIN — DIPHENHYDRAMINE HYDROCHLORIDE 50 MG: 50 INJECTION INTRAMUSCULAR; INTRAVENOUS at 09:07

## 2021-08-04 ENCOUNTER — TELEPHONE (OUTPATIENT)
Dept: UROLOGY | Facility: CLINIC | Age: 56
End: 2021-08-04

## 2021-08-06 ENCOUNTER — TELEPHONE (OUTPATIENT)
Dept: UROLOGY | Facility: CLINIC | Age: 56
End: 2021-08-06

## 2021-08-06 ENCOUNTER — OFFICE VISIT (OUTPATIENT)
Dept: UROLOGY | Facility: CLINIC | Age: 56
End: 2021-08-06
Payer: COMMERCIAL

## 2021-08-06 VITALS
BODY MASS INDEX: 33.42 KG/M2 | DIASTOLIC BLOOD PRESSURE: 75 MMHG | WEIGHT: 219.81 LBS | SYSTOLIC BLOOD PRESSURE: 140 MMHG

## 2021-08-06 DIAGNOSIS — N28.89 LEFT RENAL MASS: Primary | ICD-10-CM

## 2021-08-06 PROCEDURE — 1126F AMNT PAIN NOTED NONE PRSNT: CPT | Mod: CPTII,S$GLB,, | Performed by: UROLOGY

## 2021-08-06 PROCEDURE — 3008F BODY MASS INDEX DOCD: CPT | Mod: CPTII,S$GLB,, | Performed by: UROLOGY

## 2021-08-06 PROCEDURE — 99999 PR PBB SHADOW E&M-EST. PATIENT-LVL II: ICD-10-PCS | Mod: PBBFAC,,, | Performed by: UROLOGY

## 2021-08-06 PROCEDURE — 99213 OFFICE O/P EST LOW 20 MIN: CPT | Mod: S$GLB,,, | Performed by: UROLOGY

## 2021-08-06 PROCEDURE — 3077F SYST BP >= 140 MM HG: CPT | Mod: CPTII,S$GLB,, | Performed by: UROLOGY

## 2021-08-06 PROCEDURE — 3077F PR MOST RECENT SYSTOLIC BLOOD PRESSURE >= 140 MM HG: ICD-10-PCS | Mod: CPTII,S$GLB,, | Performed by: UROLOGY

## 2021-08-06 PROCEDURE — 99213 PR OFFICE/OUTPT VISIT, EST, LEVL III, 20-29 MIN: ICD-10-PCS | Mod: S$GLB,,, | Performed by: UROLOGY

## 2021-08-06 PROCEDURE — 99999 PR PBB SHADOW E&M-EST. PATIENT-LVL II: CPT | Mod: PBBFAC,,, | Performed by: UROLOGY

## 2021-08-06 PROCEDURE — 3078F PR MOST RECENT DIASTOLIC BLOOD PRESSURE < 80 MM HG: ICD-10-PCS | Mod: CPTII,S$GLB,, | Performed by: UROLOGY

## 2021-08-06 PROCEDURE — 3078F DIAST BP <80 MM HG: CPT | Mod: CPTII,S$GLB,, | Performed by: UROLOGY

## 2021-08-06 PROCEDURE — 1126F PR PAIN SEVERITY QUANTIFIED, NO PAIN PRESENT: ICD-10-PCS | Mod: CPTII,S$GLB,, | Performed by: UROLOGY

## 2021-08-06 PROCEDURE — 3008F PR BODY MASS INDEX (BMI) DOCUMENTED: ICD-10-PCS | Mod: CPTII,S$GLB,, | Performed by: UROLOGY

## 2021-08-17 ENCOUNTER — TELEPHONE (OUTPATIENT)
Dept: UROLOGY | Facility: CLINIC | Age: 56
End: 2021-08-17

## 2021-08-18 ENCOUNTER — PATIENT MESSAGE (OUTPATIENT)
Dept: UROLOGY | Facility: CLINIC | Age: 56
End: 2021-08-18

## 2021-08-26 ENCOUNTER — TELEPHONE (OUTPATIENT)
Dept: UROLOGY | Facility: CLINIC | Age: 56
End: 2021-08-26

## 2021-08-27 ENCOUNTER — OFFICE VISIT (OUTPATIENT)
Dept: NEPHROLOGY | Facility: CLINIC | Age: 56
End: 2021-08-27
Payer: COMMERCIAL

## 2021-08-27 VITALS
BODY MASS INDEX: 33.82 KG/M2 | RESPIRATION RATE: 22 BRPM | DIASTOLIC BLOOD PRESSURE: 80 MMHG | HEIGHT: 68 IN | SYSTOLIC BLOOD PRESSURE: 122 MMHG | WEIGHT: 223.13 LBS | HEART RATE: 71 BPM

## 2021-08-27 DIAGNOSIS — Z00.00 ROUTINE ADULT HEALTH MAINTENANCE: Primary | ICD-10-CM

## 2021-08-27 PROCEDURE — 3079F PR MOST RECENT DIASTOLIC BLOOD PRESSURE 80-89 MM HG: ICD-10-PCS | Mod: CPTII,S$GLB,, | Performed by: INTERNAL MEDICINE

## 2021-08-27 PROCEDURE — 3074F PR MOST RECENT SYSTOLIC BLOOD PRESSURE < 130 MM HG: ICD-10-PCS | Mod: CPTII,S$GLB,, | Performed by: INTERNAL MEDICINE

## 2021-08-27 PROCEDURE — 3079F DIAST BP 80-89 MM HG: CPT | Mod: CPTII,S$GLB,, | Performed by: INTERNAL MEDICINE

## 2021-08-27 PROCEDURE — 3074F SYST BP LT 130 MM HG: CPT | Mod: CPTII,S$GLB,, | Performed by: INTERNAL MEDICINE

## 2021-08-27 PROCEDURE — 1159F MED LIST DOCD IN RCRD: CPT | Mod: CPTII,S$GLB,, | Performed by: INTERNAL MEDICINE

## 2021-08-27 PROCEDURE — 1159F PR MEDICATION LIST DOCUMENTED IN MEDICAL RECORD: ICD-10-PCS | Mod: CPTII,S$GLB,, | Performed by: INTERNAL MEDICINE

## 2021-08-27 PROCEDURE — 99999 PR PBB SHADOW E&M-EST. PATIENT-LVL III: CPT | Mod: PBBFAC,,, | Performed by: INTERNAL MEDICINE

## 2021-08-27 PROCEDURE — 99205 PR OFFICE/OUTPT VISIT, NEW, LEVL V, 60-74 MIN: ICD-10-PCS | Mod: S$GLB,,, | Performed by: INTERNAL MEDICINE

## 2021-08-27 PROCEDURE — 3008F BODY MASS INDEX DOCD: CPT | Mod: CPTII,S$GLB,, | Performed by: INTERNAL MEDICINE

## 2021-08-27 PROCEDURE — 99205 OFFICE O/P NEW HI 60 MIN: CPT | Mod: S$GLB,,, | Performed by: INTERNAL MEDICINE

## 2021-08-27 PROCEDURE — 3008F PR BODY MASS INDEX (BMI) DOCUMENTED: ICD-10-PCS | Mod: CPTII,S$GLB,, | Performed by: INTERNAL MEDICINE

## 2021-08-27 PROCEDURE — 99999 PR PBB SHADOW E&M-EST. PATIENT-LVL III: ICD-10-PCS | Mod: PBBFAC,,, | Performed by: INTERNAL MEDICINE

## 2021-08-27 RX ORDER — ALLOPURINOL 100 MG/1
100 TABLET ORAL DAILY
Qty: 90 TABLET | Refills: 3 | Status: SHIPPED | OUTPATIENT
Start: 2021-08-27 | End: 2021-11-29

## 2021-08-31 ENCOUNTER — TELEPHONE (OUTPATIENT)
Dept: UROLOGY | Facility: CLINIC | Age: 56
End: 2021-08-31

## 2021-09-01 ENCOUNTER — OFFICE VISIT (OUTPATIENT)
Dept: UROLOGY | Facility: CLINIC | Age: 56
End: 2021-09-01
Payer: COMMERCIAL

## 2021-09-01 VITALS
WEIGHT: 223 LBS | DIASTOLIC BLOOD PRESSURE: 72 MMHG | BODY MASS INDEX: 33.91 KG/M2 | SYSTOLIC BLOOD PRESSURE: 124 MMHG | HEART RATE: 90 BPM

## 2021-09-01 DIAGNOSIS — N28.89 RENAL MASS, LEFT: ICD-10-CM

## 2021-09-01 DIAGNOSIS — Z01.818 PREOP TESTING: Primary | ICD-10-CM

## 2021-09-01 DIAGNOSIS — N28.89 LEFT RENAL MASS: ICD-10-CM

## 2021-09-01 PROCEDURE — 3074F SYST BP LT 130 MM HG: CPT | Mod: CPTII,S$GLB,, | Performed by: UROLOGY

## 2021-09-01 PROCEDURE — 99999 PR PBB SHADOW E&M-EST. PATIENT-LVL V: CPT | Mod: PBBFAC,,, | Performed by: UROLOGY

## 2021-09-01 PROCEDURE — 3078F DIAST BP <80 MM HG: CPT | Mod: CPTII,S$GLB,, | Performed by: UROLOGY

## 2021-09-01 PROCEDURE — 1159F MED LIST DOCD IN RCRD: CPT | Mod: CPTII,S$GLB,, | Performed by: UROLOGY

## 2021-09-01 PROCEDURE — 3078F PR MOST RECENT DIASTOLIC BLOOD PRESSURE < 80 MM HG: ICD-10-PCS | Mod: CPTII,S$GLB,, | Performed by: UROLOGY

## 2021-09-01 PROCEDURE — 3008F PR BODY MASS INDEX (BMI) DOCUMENTED: ICD-10-PCS | Mod: CPTII,S$GLB,, | Performed by: UROLOGY

## 2021-09-01 PROCEDURE — 99214 OFFICE O/P EST MOD 30 MIN: CPT | Mod: S$GLB,,, | Performed by: UROLOGY

## 2021-09-01 PROCEDURE — 99999 PR PBB SHADOW E&M-EST. PATIENT-LVL V: ICD-10-PCS | Mod: PBBFAC,,, | Performed by: UROLOGY

## 2021-09-01 PROCEDURE — 99214 PR OFFICE/OUTPT VISIT, EST, LEVL IV, 30-39 MIN: ICD-10-PCS | Mod: S$GLB,,, | Performed by: UROLOGY

## 2021-09-01 PROCEDURE — 3008F BODY MASS INDEX DOCD: CPT | Mod: CPTII,S$GLB,, | Performed by: UROLOGY

## 2021-09-01 PROCEDURE — 3074F PR MOST RECENT SYSTOLIC BLOOD PRESSURE < 130 MM HG: ICD-10-PCS | Mod: CPTII,S$GLB,, | Performed by: UROLOGY

## 2021-09-01 PROCEDURE — 1159F PR MEDICATION LIST DOCUMENTED IN MEDICAL RECORD: ICD-10-PCS | Mod: CPTII,S$GLB,, | Performed by: UROLOGY

## 2021-09-01 RX ORDER — ENOXAPARIN SODIUM 100 MG/ML
40 INJECTION SUBCUTANEOUS EVERY 24 HOURS
Status: CANCELLED | OUTPATIENT
Start: 2021-09-01

## 2021-09-07 ENCOUNTER — HOSPITAL ENCOUNTER (OUTPATIENT)
Dept: CARDIOLOGY | Facility: HOSPITAL | Age: 56
Discharge: HOME | End: 2021-09-07
Attending: UROLOGY
Payer: COMMERCIAL

## 2021-09-07 ENCOUNTER — HOSPITAL ENCOUNTER (OUTPATIENT)
Dept: RADIOLOGY | Facility: HOSPITAL | Age: 56
Discharge: HOME | End: 2021-09-07
Attending: UROLOGY
Payer: COMMERCIAL

## 2021-09-07 DIAGNOSIS — Z01.818 PREOP TESTING: ICD-10-CM

## 2021-09-07 PROCEDURE — 71046 XR CHEST PA AND LATERAL PRE-OP: ICD-10-PCS | Mod: 26,,, | Performed by: RADIOLOGY

## 2021-09-07 PROCEDURE — 93005 ELECTROCARDIOGRAM TRACING: CPT

## 2021-09-07 PROCEDURE — 93010 EKG 12-LEAD: ICD-10-PCS | Mod: ,,, | Performed by: INTERNAL MEDICINE

## 2021-09-07 PROCEDURE — 71046 X-RAY EXAM CHEST 2 VIEWS: CPT | Mod: 26,,, | Performed by: RADIOLOGY

## 2021-09-07 PROCEDURE — 93010 ELECTROCARDIOGRAM REPORT: CPT | Mod: ,,, | Performed by: INTERNAL MEDICINE

## 2021-09-07 PROCEDURE — 71046 X-RAY EXAM CHEST 2 VIEWS: CPT | Mod: TC

## 2021-09-08 ENCOUNTER — TELEPHONE (OUTPATIENT)
Dept: UROLOGY | Facility: CLINIC | Age: 56
End: 2021-09-08

## 2021-09-08 DIAGNOSIS — Z01.818 PREOP TESTING: Primary | ICD-10-CM

## 2021-09-10 ENCOUNTER — TELEPHONE (OUTPATIENT)
Dept: UROLOGY | Facility: CLINIC | Age: 56
End: 2021-09-10

## 2021-09-10 ENCOUNTER — PATIENT MESSAGE (OUTPATIENT)
Dept: SURGERY | Facility: HOSPITAL | Age: 56
End: 2021-09-10

## 2021-09-10 ENCOUNTER — OFFICE VISIT (OUTPATIENT)
Dept: CARDIOLOGY | Facility: CLINIC | Age: 56
End: 2021-09-10
Payer: COMMERCIAL

## 2021-09-10 VITALS
WEIGHT: 223.13 LBS | DIASTOLIC BLOOD PRESSURE: 64 MMHG | HEART RATE: 74 BPM | OXYGEN SATURATION: 97 % | HEIGHT: 68 IN | BODY MASS INDEX: 33.82 KG/M2 | SYSTOLIC BLOOD PRESSURE: 118 MMHG

## 2021-09-10 DIAGNOSIS — Z01.810 PREOP CARDIOVASCULAR EXAM: ICD-10-CM

## 2021-09-10 DIAGNOSIS — E78.2 MIXED HYPERLIPIDEMIA: ICD-10-CM

## 2021-09-10 DIAGNOSIS — I10 ESSENTIAL HYPERTENSION: ICD-10-CM

## 2021-09-10 DIAGNOSIS — Z78.9: Primary | ICD-10-CM

## 2021-09-10 PROCEDURE — 3074F SYST BP LT 130 MM HG: CPT | Mod: CPTII,S$GLB,, | Performed by: INTERNAL MEDICINE

## 2021-09-10 PROCEDURE — 3066F NEPHROPATHY DOC TX: CPT | Mod: CPTII,S$GLB,, | Performed by: INTERNAL MEDICINE

## 2021-09-10 PROCEDURE — 4010F ACE/ARB THERAPY RXD/TAKEN: CPT | Mod: CPTII,S$GLB,, | Performed by: INTERNAL MEDICINE

## 2021-09-10 PROCEDURE — 99999 PR PBB SHADOW E&M-EST. PATIENT-LVL IV: CPT | Mod: PBBFAC,,, | Performed by: INTERNAL MEDICINE

## 2021-09-10 PROCEDURE — 3078F PR MOST RECENT DIASTOLIC BLOOD PRESSURE < 80 MM HG: ICD-10-PCS | Mod: CPTII,S$GLB,, | Performed by: INTERNAL MEDICINE

## 2021-09-10 PROCEDURE — 1159F PR MEDICATION LIST DOCUMENTED IN MEDICAL RECORD: ICD-10-PCS | Mod: CPTII,S$GLB,, | Performed by: INTERNAL MEDICINE

## 2021-09-10 PROCEDURE — 3074F PR MOST RECENT SYSTOLIC BLOOD PRESSURE < 130 MM HG: ICD-10-PCS | Mod: CPTII,S$GLB,, | Performed by: INTERNAL MEDICINE

## 2021-09-10 PROCEDURE — 99999 PR PBB SHADOW E&M-EST. PATIENT-LVL IV: ICD-10-PCS | Mod: PBBFAC,,, | Performed by: INTERNAL MEDICINE

## 2021-09-10 PROCEDURE — 99204 OFFICE O/P NEW MOD 45 MIN: CPT | Mod: S$GLB,,, | Performed by: INTERNAL MEDICINE

## 2021-09-10 PROCEDURE — 3008F BODY MASS INDEX DOCD: CPT | Mod: CPTII,S$GLB,, | Performed by: INTERNAL MEDICINE

## 2021-09-10 PROCEDURE — 99204 PR OFFICE/OUTPT VISIT, NEW, LEVL IV, 45-59 MIN: ICD-10-PCS | Mod: S$GLB,,, | Performed by: INTERNAL MEDICINE

## 2021-09-10 PROCEDURE — 3008F PR BODY MASS INDEX (BMI) DOCUMENTED: ICD-10-PCS | Mod: CPTII,S$GLB,, | Performed by: INTERNAL MEDICINE

## 2021-09-10 PROCEDURE — 3078F DIAST BP <80 MM HG: CPT | Mod: CPTII,S$GLB,, | Performed by: INTERNAL MEDICINE

## 2021-09-10 PROCEDURE — 4010F PR ACE/ARB THEARPY RXD/TAKEN: ICD-10-PCS | Mod: CPTII,S$GLB,, | Performed by: INTERNAL MEDICINE

## 2021-09-10 PROCEDURE — 1159F MED LIST DOCD IN RCRD: CPT | Mod: CPTII,S$GLB,, | Performed by: INTERNAL MEDICINE

## 2021-09-10 PROCEDURE — 3066F PR DOCUMENTATION OF TREATMENT FOR NEPHROPATHY: ICD-10-PCS | Mod: CPTII,S$GLB,, | Performed by: INTERNAL MEDICINE

## 2021-09-10 RX ORDER — METOPROLOL SUCCINATE 25 MG/1
25 TABLET, EXTENDED RELEASE ORAL DAILY
Qty: 30 TABLET | Refills: 11 | Status: SHIPPED | OUTPATIENT
Start: 2021-09-10 | End: 2022-01-03 | Stop reason: SDUPTHER

## 2021-09-13 ENCOUNTER — LAB VISIT (OUTPATIENT)
Dept: URGENT CARE | Facility: CLINIC | Age: 56
End: 2021-09-13
Payer: COMMERCIAL

## 2021-09-13 ENCOUNTER — TELEPHONE (OUTPATIENT)
Dept: UROLOGY | Facility: CLINIC | Age: 56
End: 2021-09-13

## 2021-09-13 DIAGNOSIS — Z01.818 PREOP TESTING: ICD-10-CM

## 2021-09-13 LAB
SARS-COV-2 RNA RESP QL NAA+PROBE: NOT DETECTED
SARS-COV-2- CYCLE NUMBER: NORMAL

## 2021-09-13 PROCEDURE — U0003 INFECTIOUS AGENT DETECTION BY NUCLEIC ACID (DNA OR RNA); SEVERE ACUTE RESPIRATORY SYNDROME CORONAVIRUS 2 (SARS-COV-2) (CORONAVIRUS DISEASE [COVID-19]), AMPLIFIED PROBE TECHNIQUE, MAKING USE OF HIGH THROUGHPUT TECHNOLOGIES AS DESCRIBED BY CMS-2020-01-R: HCPCS | Performed by: UROLOGY

## 2021-09-13 PROCEDURE — U0005 INFEC AGEN DETEC AMPLI PROBE: HCPCS | Performed by: UROLOGY

## 2021-09-13 RX ORDER — ALPRAZOLAM 0.25 MG/1
0.5 TABLET ORAL 2 TIMES DAILY PRN
Qty: 15 TABLET | Refills: 0 | Status: SHIPPED | OUTPATIENT
Start: 2021-09-13 | End: 2021-11-05 | Stop reason: SDUPTHER

## 2021-09-15 ENCOUNTER — ANESTHESIA EVENT (OUTPATIENT)
Dept: SURGERY | Facility: HOSPITAL | Age: 56
DRG: 658 | End: 2021-09-15
Payer: COMMERCIAL

## 2021-09-16 ENCOUNTER — ANESTHESIA (OUTPATIENT)
Dept: SURGERY | Facility: HOSPITAL | Age: 56
DRG: 658 | End: 2021-09-16
Payer: COMMERCIAL

## 2021-09-16 ENCOUNTER — HOSPITAL ENCOUNTER (INPATIENT)
Facility: HOSPITAL | Age: 56
LOS: 2 days | Discharge: HOME | DRG: 658 | End: 2021-09-19
Attending: UROLOGY | Admitting: UROLOGY
Payer: COMMERCIAL

## 2021-09-16 DIAGNOSIS — N28.89 RENAL MASS, LEFT: Primary | ICD-10-CM

## 2021-09-16 DIAGNOSIS — N28.89 LEFT RENAL MASS: ICD-10-CM

## 2021-09-16 LAB
ABO + RH BLD: NORMAL
ANION GAP SERPL CALC-SCNC: 11 MMOL/L (ref 8–16)
BASOPHILS # BLD AUTO: 0.05 K/UL (ref 0–0.2)
BASOPHILS NFR BLD: 0.2 % (ref 0–1.9)
BLD GP AB SCN CELLS X3 SERPL QL: NORMAL
BUN SERPL-MCNC: 13 MG/DL (ref 6–20)
CALCIUM SERPL-MCNC: 9.2 MG/DL (ref 8.7–10.5)
CHLORIDE SERPL-SCNC: 105 MMOL/L (ref 95–110)
CO2 SERPL-SCNC: 25 MMOL/L (ref 23–29)
CREAT SERPL-MCNC: 1.3 MG/DL (ref 0.5–1.4)
DIFFERENTIAL METHOD: ABNORMAL
EOSINOPHIL # BLD AUTO: 0 K/UL (ref 0–0.5)
EOSINOPHIL NFR BLD: 0.1 % (ref 0–8)
ERYTHROCYTE [DISTWIDTH] IN BLOOD BY AUTOMATED COUNT: 13.6 % (ref 11.5–14.5)
EST. GFR  (AFRICAN AMERICAN): >60 ML/MIN/1.73 M^2
EST. GFR  (NON AFRICAN AMERICAN): >60 ML/MIN/1.73 M^2
GLUCOSE SERPL-MCNC: 120 MG/DL (ref 70–110)
HCT VFR BLD AUTO: 47.6 % (ref 40–54)
HGB BLD-MCNC: 15.3 G/DL (ref 14–18)
IMM GRANULOCYTES # BLD AUTO: 0.1 K/UL (ref 0–0.04)
IMM GRANULOCYTES NFR BLD AUTO: 0.5 % (ref 0–0.5)
LYMPHOCYTES # BLD AUTO: 1.1 K/UL (ref 1–4.8)
LYMPHOCYTES NFR BLD: 4.9 % (ref 18–48)
MCH RBC QN AUTO: 29.4 PG (ref 27–31)
MCHC RBC AUTO-ENTMCNC: 32.1 G/DL (ref 32–36)
MCV RBC AUTO: 91 FL (ref 82–98)
MONOCYTES # BLD AUTO: 1.2 K/UL (ref 0.3–1)
MONOCYTES NFR BLD: 5.5 % (ref 4–15)
NEUTROPHILS # BLD AUTO: 19.1 K/UL (ref 1.8–7.7)
NEUTROPHILS NFR BLD: 88.8 % (ref 38–73)
NRBC BLD-RTO: 0 /100 WBC
PLATELET # BLD AUTO: 234 K/UL (ref 150–450)
PMV BLD AUTO: 10.4 FL (ref 9.2–12.9)
POTASSIUM SERPL-SCNC: 3.6 MMOL/L (ref 3.5–5.1)
RBC # BLD AUTO: 5.21 M/UL (ref 4.6–6.2)
SODIUM SERPL-SCNC: 141 MMOL/L (ref 136–145)
WBC # BLD AUTO: 21.52 K/UL (ref 3.9–12.7)

## 2021-09-16 PROCEDURE — 25000003 PHARM REV CODE 250

## 2021-09-16 PROCEDURE — 63600175 PHARM REV CODE 636 W HCPCS: Performed by: UROLOGY

## 2021-09-16 PROCEDURE — 25000003 PHARM REV CODE 250: Performed by: UROLOGY

## 2021-09-16 PROCEDURE — 86900 BLOOD TYPING SEROLOGIC ABO: CPT | Performed by: UROLOGY

## 2021-09-16 PROCEDURE — 37000008 HC ANESTHESIA 1ST 15 MINUTES: Performed by: UROLOGY

## 2021-09-16 PROCEDURE — 63600175 PHARM REV CODE 636 W HCPCS: Performed by: ANESTHESIOLOGY

## 2021-09-16 PROCEDURE — 50545 LAPARO RADICAL NEPHRECTOMY: CPT | Mod: LT,,, | Performed by: UROLOGY

## 2021-09-16 PROCEDURE — 88307 TISSUE EXAM BY PATHOLOGIST: CPT | Mod: 26,,, | Performed by: PATHOLOGY

## 2021-09-16 PROCEDURE — 36000711: Performed by: UROLOGY

## 2021-09-16 PROCEDURE — 36000710: Performed by: UROLOGY

## 2021-09-16 PROCEDURE — 37000009 HC ANESTHESIA EA ADD 15 MINS: Performed by: UROLOGY

## 2021-09-16 PROCEDURE — 88307 PR  SURG PATH,LEVEL V: ICD-10-PCS | Mod: 26,,, | Performed by: PATHOLOGY

## 2021-09-16 PROCEDURE — 71000033 HC RECOVERY, INTIAL HOUR: Performed by: UROLOGY

## 2021-09-16 PROCEDURE — 71000039 HC RECOVERY, EACH ADD'L HOUR: Performed by: UROLOGY

## 2021-09-16 PROCEDURE — 88307 TISSUE EXAM BY PATHOLOGIST: CPT | Performed by: PATHOLOGY

## 2021-09-16 PROCEDURE — 99900035 HC TECH TIME PER 15 MIN (STAT)

## 2021-09-16 PROCEDURE — 50545 PR LAP, RADICAL NEPHRECTOMY: ICD-10-PCS | Mod: LT,,, | Performed by: UROLOGY

## 2021-09-16 PROCEDURE — 85025 COMPLETE CBC W/AUTO DIFF WBC: CPT | Performed by: UROLOGY

## 2021-09-16 PROCEDURE — 80048 BASIC METABOLIC PNL TOTAL CA: CPT | Performed by: UROLOGY

## 2021-09-16 PROCEDURE — 27201423 OPTIME MED/SURG SUP & DEVICES STERILE SUPPLY: Performed by: UROLOGY

## 2021-09-16 PROCEDURE — 63600175 PHARM REV CODE 636 W HCPCS

## 2021-09-16 RX ORDER — ENOXAPARIN SODIUM 100 MG/ML
40 INJECTION SUBCUTANEOUS EVERY 24 HOURS
Status: DISCONTINUED | OUTPATIENT
Start: 2021-09-16 | End: 2021-09-16

## 2021-09-16 RX ORDER — HYDROCHLOROTHIAZIDE 25 MG/1
25 TABLET ORAL DAILY
Status: DISCONTINUED | OUTPATIENT
Start: 2021-09-16 | End: 2021-09-19 | Stop reason: HOSPADM

## 2021-09-16 RX ORDER — DEXAMETHASONE SODIUM PHOSPHATE 4 MG/ML
INJECTION, SOLUTION INTRA-ARTICULAR; INTRALESIONAL; INTRAMUSCULAR; INTRAVENOUS; SOFT TISSUE
Status: DISCONTINUED | OUTPATIENT
Start: 2021-09-16 | End: 2021-09-16

## 2021-09-16 RX ORDER — TRAZODONE HYDROCHLORIDE 50 MG/1
50 TABLET ORAL NIGHTLY
Status: DISCONTINUED | OUTPATIENT
Start: 2021-09-16 | End: 2021-09-19 | Stop reason: HOSPADM

## 2021-09-16 RX ORDER — ENOXAPARIN SODIUM 100 MG/ML
40 INJECTION SUBCUTANEOUS EVERY 24 HOURS
Status: DISCONTINUED | OUTPATIENT
Start: 2021-09-16 | End: 2021-09-16 | Stop reason: ALTCHOICE

## 2021-09-16 RX ORDER — CEFAZOLIN SODIUM 2 G/50ML
2 SOLUTION INTRAVENOUS
Status: COMPLETED | OUTPATIENT
Start: 2021-09-16 | End: 2021-09-16

## 2021-09-16 RX ORDER — ACETAMINOPHEN 500 MG
1000 TABLET ORAL EVERY 8 HOURS
Status: DISPENSED | OUTPATIENT
Start: 2021-09-16 | End: 2021-09-17

## 2021-09-16 RX ORDER — SERTRALINE HYDROCHLORIDE 50 MG/1
50 TABLET, FILM COATED ORAL DAILY
Status: DISCONTINUED | OUTPATIENT
Start: 2021-09-16 | End: 2021-09-19 | Stop reason: HOSPADM

## 2021-09-16 RX ORDER — ALPRAZOLAM 0.5 MG/1
0.5 TABLET ORAL 2 TIMES DAILY PRN
Status: DISCONTINUED | OUTPATIENT
Start: 2021-09-16 | End: 2021-09-19 | Stop reason: HOSPADM

## 2021-09-16 RX ORDER — CEFAZOLIN SODIUM 2 G/50ML
2 SOLUTION INTRAVENOUS
Status: COMPLETED | OUTPATIENT
Start: 2021-09-16 | End: 2021-09-17

## 2021-09-16 RX ORDER — ACETAMINOPHEN 10 MG/ML
1000 INJECTION, SOLUTION INTRAVENOUS ONCE
Status: COMPLETED | OUTPATIENT
Start: 2021-09-16 | End: 2021-09-16

## 2021-09-16 RX ORDER — HYDRALAZINE HYDROCHLORIDE 50 MG/1
100 TABLET, FILM COATED ORAL 3 TIMES DAILY
Status: DISCONTINUED | OUTPATIENT
Start: 2021-09-16 | End: 2021-09-19 | Stop reason: HOSPADM

## 2021-09-16 RX ORDER — FENTANYL CITRATE 50 UG/ML
INJECTION, SOLUTION INTRAMUSCULAR; INTRAVENOUS
Status: DISCONTINUED | OUTPATIENT
Start: 2021-09-16 | End: 2021-09-16

## 2021-09-16 RX ORDER — ALLOPURINOL 100 MG/1
100 TABLET ORAL DAILY
Status: DISCONTINUED | OUTPATIENT
Start: 2021-09-16 | End: 2021-09-19 | Stop reason: HOSPADM

## 2021-09-16 RX ORDER — METHOCARBAMOL 750 MG/1
750 TABLET, FILM COATED ORAL 3 TIMES DAILY PRN
Status: DISCONTINUED | OUTPATIENT
Start: 2021-09-16 | End: 2021-09-19 | Stop reason: HOSPADM

## 2021-09-16 RX ORDER — HYDROMORPHONE HYDROCHLORIDE 2 MG/ML
1 INJECTION, SOLUTION INTRAMUSCULAR; INTRAVENOUS; SUBCUTANEOUS
Status: DISCONTINUED | OUTPATIENT
Start: 2021-09-16 | End: 2021-09-19 | Stop reason: HOSPADM

## 2021-09-16 RX ORDER — PHENYLEPHRINE HYDROCHLORIDE 10 MG/ML
INJECTION INTRAVENOUS
Status: DISCONTINUED | OUTPATIENT
Start: 2021-09-16 | End: 2021-09-16

## 2021-09-16 RX ORDER — MIDAZOLAM HYDROCHLORIDE 1 MG/ML
INJECTION, SOLUTION INTRAMUSCULAR; INTRAVENOUS
Status: DISCONTINUED | OUTPATIENT
Start: 2021-09-16 | End: 2021-09-16

## 2021-09-16 RX ORDER — BUPIVACAINE HYDROCHLORIDE 2.5 MG/ML
INJECTION, SOLUTION EPIDURAL; INFILTRATION; INTRACAUDAL
Status: DISCONTINUED | OUTPATIENT
Start: 2021-09-16 | End: 2021-09-16 | Stop reason: HOSPADM

## 2021-09-16 RX ORDER — LIDOCAINE HYDROCHLORIDE 10 MG/ML
INJECTION, SOLUTION EPIDURAL; INFILTRATION; INTRACAUDAL; PERINEURAL
Status: DISCONTINUED | OUTPATIENT
Start: 2021-09-16 | End: 2021-09-16

## 2021-09-16 RX ORDER — PREDNISONE 50 MG/1
50 TABLET ORAL SEE ADMIN INSTRUCTIONS
Status: DISCONTINUED | OUTPATIENT
Start: 2021-09-16 | End: 2021-09-16

## 2021-09-16 RX ORDER — TALC
6 POWDER (GRAM) TOPICAL NIGHTLY PRN
Status: DISCONTINUED | OUTPATIENT
Start: 2021-09-16 | End: 2021-09-19 | Stop reason: HOSPADM

## 2021-09-16 RX ORDER — PROPOFOL 10 MG/ML
VIAL (ML) INTRAVENOUS
Status: DISCONTINUED | OUTPATIENT
Start: 2021-09-16 | End: 2021-09-16

## 2021-09-16 RX ORDER — ONDANSETRON 2 MG/ML
4 INJECTION INTRAMUSCULAR; INTRAVENOUS EVERY 8 HOURS PRN
Status: DISCONTINUED | OUTPATIENT
Start: 2021-09-16 | End: 2021-09-19 | Stop reason: HOSPADM

## 2021-09-16 RX ORDER — SUCCINYLCHOLINE CHLORIDE 20 MG/ML
INJECTION INTRAMUSCULAR; INTRAVENOUS
Status: DISCONTINUED | OUTPATIENT
Start: 2021-09-16 | End: 2021-09-16

## 2021-09-16 RX ORDER — DOCUSATE SODIUM 100 MG/1
100 CAPSULE, LIQUID FILLED ORAL 2 TIMES DAILY
Status: DISCONTINUED | OUTPATIENT
Start: 2021-09-16 | End: 2021-09-19 | Stop reason: HOSPADM

## 2021-09-16 RX ORDER — METOPROLOL SUCCINATE 25 MG/1
25 TABLET, EXTENDED RELEASE ORAL DAILY
Status: DISCONTINUED | OUTPATIENT
Start: 2021-09-16 | End: 2021-09-19 | Stop reason: HOSPADM

## 2021-09-16 RX ORDER — ONDANSETRON 2 MG/ML
INJECTION INTRAMUSCULAR; INTRAVENOUS
Status: DISCONTINUED | OUTPATIENT
Start: 2021-09-16 | End: 2021-09-16

## 2021-09-16 RX ORDER — SODIUM CHLORIDE, SODIUM LACTATE, POTASSIUM CHLORIDE, CALCIUM CHLORIDE 600; 310; 30; 20 MG/100ML; MG/100ML; MG/100ML; MG/100ML
INJECTION, SOLUTION INTRAVENOUS CONTINUOUS
Status: DISCONTINUED | OUTPATIENT
Start: 2021-09-16 | End: 2021-09-19 | Stop reason: HOSPADM

## 2021-09-16 RX ORDER — HYDROMORPHONE HYDROCHLORIDE 2 MG/ML
0.2 INJECTION, SOLUTION INTRAMUSCULAR; INTRAVENOUS; SUBCUTANEOUS EVERY 5 MIN PRN
Status: DISCONTINUED | OUTPATIENT
Start: 2021-09-16 | End: 2021-09-16 | Stop reason: HOSPADM

## 2021-09-16 RX ORDER — HEPARIN SODIUM 5000 [USP'U]/ML
5000 INJECTION, SOLUTION INTRAVENOUS; SUBCUTANEOUS
Status: COMPLETED | OUTPATIENT
Start: 2021-09-16 | End: 2021-09-16

## 2021-09-16 RX ORDER — PRAVASTATIN SODIUM 20 MG/1
40 TABLET ORAL DAILY
Status: DISCONTINUED | OUTPATIENT
Start: 2021-09-16 | End: 2021-09-19 | Stop reason: HOSPADM

## 2021-09-16 RX ORDER — ONDANSETRON 2 MG/ML
4 INJECTION INTRAMUSCULAR; INTRAVENOUS DAILY PRN
Status: DISCONTINUED | OUTPATIENT
Start: 2021-09-16 | End: 2021-09-16 | Stop reason: HOSPADM

## 2021-09-16 RX ORDER — FLUTICASONE PROPIONATE 50 MCG
1 SPRAY, SUSPENSION (ML) NASAL DAILY
Status: DISCONTINUED | OUTPATIENT
Start: 2021-09-16 | End: 2021-09-19 | Stop reason: HOSPADM

## 2021-09-16 RX ORDER — OXYCODONE HYDROCHLORIDE 5 MG/1
5 TABLET ORAL EVERY 4 HOURS PRN
Status: DISCONTINUED | OUTPATIENT
Start: 2021-09-16 | End: 2021-09-19 | Stop reason: HOSPADM

## 2021-09-16 RX ORDER — ROCURONIUM BROMIDE 10 MG/ML
INJECTION, SOLUTION INTRAVENOUS
Status: DISCONTINUED | OUTPATIENT
Start: 2021-09-16 | End: 2021-09-16

## 2021-09-16 RX ADMIN — ACETAMINOPHEN 1000 MG: 10 INJECTION INTRAVENOUS at 10:09

## 2021-09-16 RX ADMIN — ROCURONIUM BROMIDE 10 MG: 10 INJECTION, SOLUTION INTRAVENOUS at 08:09

## 2021-09-16 RX ADMIN — ONDANSETRON 4 MG: 2 INJECTION, SOLUTION INTRAMUSCULAR; INTRAVENOUS at 09:09

## 2021-09-16 RX ADMIN — HYDROMORPHONE HYDROCHLORIDE 0.2 MG: 2 INJECTION INTRAMUSCULAR; INTRAVENOUS; SUBCUTANEOUS at 10:09

## 2021-09-16 RX ADMIN — OXYCODONE 5 MG: 5 TABLET ORAL at 09:09

## 2021-09-16 RX ADMIN — SODIUM CHLORIDE, SODIUM LACTATE, POTASSIUM CHLORIDE, AND CALCIUM CHLORIDE: .6; .31; .03; .02 INJECTION, SOLUTION INTRAVENOUS at 08:09

## 2021-09-16 RX ADMIN — HYDRALAZINE HYDROCHLORIDE 100 MG: 50 TABLET ORAL at 09:09

## 2021-09-16 RX ADMIN — SODIUM CHLORIDE, SODIUM LACTATE, POTASSIUM CHLORIDE, AND CALCIUM CHLORIDE: .6; .31; .03; .02 INJECTION, SOLUTION INTRAVENOUS at 07:09

## 2021-09-16 RX ADMIN — GLYCOPYRROLATE 0.2 MG: 0.2 INJECTION, SOLUTION INTRAMUSCULAR; INTRAVENOUS at 08:09

## 2021-09-16 RX ADMIN — CEFAZOLIN SODIUM 2 G: 2 SOLUTION INTRAVENOUS at 03:09

## 2021-09-16 RX ADMIN — HYDROMORPHONE HYDROCHLORIDE 0.2 MG: 2 INJECTION INTRAMUSCULAR; INTRAVENOUS; SUBCUTANEOUS at 11:09

## 2021-09-16 RX ADMIN — SODIUM CHLORIDE, SODIUM LACTATE, POTASSIUM CHLORIDE, AND CALCIUM CHLORIDE: .6; .31; .03; .02 INJECTION, SOLUTION INTRAVENOUS at 10:09

## 2021-09-16 RX ADMIN — LIDOCAINE HYDROCHLORIDE 50 MG: 10 INJECTION, SOLUTION EPIDURAL; INFILTRATION; INTRACAUDAL; PERINEURAL at 07:09

## 2021-09-16 RX ADMIN — PHENYLEPHRINE HYDROCHLORIDE 100 MCG: 10 INJECTION INTRAVENOUS at 08:09

## 2021-09-16 RX ADMIN — ROCURONIUM BROMIDE 20 MG: 10 INJECTION, SOLUTION INTRAVENOUS at 07:09

## 2021-09-16 RX ADMIN — PROPOFOL 25 MG: 10 INJECTION, EMULSION INTRAVENOUS at 09:09

## 2021-09-16 RX ADMIN — CEFAZOLIN SODIUM 2 G: 2 SOLUTION INTRAVENOUS at 07:09

## 2021-09-16 RX ADMIN — ROCURONIUM BROMIDE 25 MG: 10 INJECTION, SOLUTION INTRAVENOUS at 07:09

## 2021-09-16 RX ADMIN — HYDRALAZINE HYDROCHLORIDE 100 MG: 50 TABLET ORAL at 03:09

## 2021-09-16 RX ADMIN — FENTANYL CITRATE 50 MCG: 50 INJECTION, SOLUTION INTRAMUSCULAR; INTRAVENOUS at 07:09

## 2021-09-16 RX ADMIN — FENTANYL CITRATE 100 MCG: 50 INJECTION, SOLUTION INTRAMUSCULAR; INTRAVENOUS at 07:09

## 2021-09-16 RX ADMIN — SUCCINYLCHOLINE CHLORIDE 200 MG: 20 INJECTION, SOLUTION INTRAMUSCULAR; INTRAVENOUS at 07:09

## 2021-09-16 RX ADMIN — SERTRALINE HYDROCHLORIDE 50 MG: 50 TABLET ORAL at 12:09

## 2021-09-16 RX ADMIN — DOCUSATE SODIUM 100 MG: 100 CAPSULE, LIQUID FILLED ORAL at 12:09

## 2021-09-16 RX ADMIN — ROCURONIUM BROMIDE 10 MG: 10 INJECTION, SOLUTION INTRAVENOUS at 09:09

## 2021-09-16 RX ADMIN — ROCURONIUM BROMIDE 5 MG: 10 INJECTION, SOLUTION INTRAVENOUS at 07:09

## 2021-09-16 RX ADMIN — FENTANYL CITRATE 100 MCG: 50 INJECTION, SOLUTION INTRAMUSCULAR; INTRAVENOUS at 10:09

## 2021-09-16 RX ADMIN — DOCUSATE SODIUM 100 MG: 100 CAPSULE, LIQUID FILLED ORAL at 09:09

## 2021-09-16 RX ADMIN — HEPARIN SODIUM 5000 UNITS: 5000 INJECTION, SOLUTION INTRAVENOUS; SUBCUTANEOUS at 07:09

## 2021-09-16 RX ADMIN — SODIUM CHLORIDE, SODIUM LACTATE, POTASSIUM CHLORIDE, AND CALCIUM CHLORIDE: .6; .31; .03; .02 INJECTION, SOLUTION INTRAVENOUS at 12:09

## 2021-09-16 RX ADMIN — ACETAMINOPHEN 1000 MG: 500 TABLET ORAL at 10:09

## 2021-09-16 RX ADMIN — DEXAMETHASONE SODIUM PHOSPHATE 4 MG: 4 INJECTION, SOLUTION INTRAMUSCULAR; INTRAVENOUS at 09:09

## 2021-09-16 RX ADMIN — ALLOPURINOL 100 MG: 100 TABLET ORAL at 12:09

## 2021-09-16 RX ADMIN — PHENYLEPHRINE HYDROCHLORIDE 100 MCG: 10 INJECTION INTRAVENOUS at 09:09

## 2021-09-16 RX ADMIN — PROPOFOL 150 MG: 10 INJECTION, EMULSION INTRAVENOUS at 07:09

## 2021-09-16 RX ADMIN — OXYCODONE 5 MG: 5 TABLET ORAL at 12:09

## 2021-09-16 RX ADMIN — FENTANYL CITRATE 50 MCG: 50 INJECTION, SOLUTION INTRAMUSCULAR; INTRAVENOUS at 09:09

## 2021-09-16 RX ADMIN — OXYCODONE 5 MG: 5 TABLET ORAL at 04:09

## 2021-09-16 RX ADMIN — TRAZODONE HYDROCHLORIDE 50 MG: 50 TABLET ORAL at 09:09

## 2021-09-16 RX ADMIN — MIDAZOLAM HYDROCHLORIDE 2 MG: 1 INJECTION, SOLUTION INTRAMUSCULAR; INTRAVENOUS at 07:09

## 2021-09-17 LAB
ANION GAP SERPL CALC-SCNC: 7 MMOL/L (ref 8–16)
BASOPHILS # BLD AUTO: 0.03 K/UL (ref 0–0.2)
BASOPHILS NFR BLD: 0.2 % (ref 0–1.9)
BUN SERPL-MCNC: 17 MG/DL (ref 6–20)
CALCIUM SERPL-MCNC: 9.5 MG/DL (ref 8.7–10.5)
CHLORIDE SERPL-SCNC: 102 MMOL/L (ref 95–110)
CO2 SERPL-SCNC: 28 MMOL/L (ref 23–29)
CREAT SERPL-MCNC: 1.9 MG/DL (ref 0.5–1.4)
DIFFERENTIAL METHOD: ABNORMAL
EOSINOPHIL # BLD AUTO: 0 K/UL (ref 0–0.5)
EOSINOPHIL NFR BLD: 0 % (ref 0–8)
ERYTHROCYTE [DISTWIDTH] IN BLOOD BY AUTOMATED COUNT: 13.4 % (ref 11.5–14.5)
EST. GFR  (AFRICAN AMERICAN): 45 ML/MIN/1.73 M^2
EST. GFR  (NON AFRICAN AMERICAN): 39 ML/MIN/1.73 M^2
GLUCOSE SERPL-MCNC: 115 MG/DL (ref 70–110)
HCT VFR BLD AUTO: 46.7 % (ref 40–54)
HGB BLD-MCNC: 15 G/DL (ref 14–18)
IMM GRANULOCYTES # BLD AUTO: 0.07 K/UL (ref 0–0.04)
IMM GRANULOCYTES NFR BLD AUTO: 0.5 % (ref 0–0.5)
LYMPHOCYTES # BLD AUTO: 0.7 K/UL (ref 1–4.8)
LYMPHOCYTES NFR BLD: 4.3 % (ref 18–48)
MCH RBC QN AUTO: 29 PG (ref 27–31)
MCHC RBC AUTO-ENTMCNC: 32.1 G/DL (ref 32–36)
MCV RBC AUTO: 90 FL (ref 82–98)
MONOCYTES # BLD AUTO: 1 K/UL (ref 0.3–1)
MONOCYTES NFR BLD: 6.6 % (ref 4–15)
NEUTROPHILS # BLD AUTO: 13.6 K/UL (ref 1.8–7.7)
NEUTROPHILS NFR BLD: 88.4 % (ref 38–73)
NRBC BLD-RTO: 0 /100 WBC
PLATELET # BLD AUTO: 215 K/UL (ref 150–450)
PMV BLD AUTO: 10 FL (ref 9.2–12.9)
POTASSIUM SERPL-SCNC: 4.3 MMOL/L (ref 3.5–5.1)
RBC # BLD AUTO: 5.17 M/UL (ref 4.6–6.2)
SODIUM SERPL-SCNC: 137 MMOL/L (ref 136–145)
WBC # BLD AUTO: 15.38 K/UL (ref 3.9–12.7)

## 2021-09-17 PROCEDURE — 36415 COLL VENOUS BLD VENIPUNCTURE: CPT | Performed by: UROLOGY

## 2021-09-17 PROCEDURE — 63600175 PHARM REV CODE 636 W HCPCS: Performed by: UROLOGY

## 2021-09-17 PROCEDURE — 25000003 PHARM REV CODE 250: Performed by: UROLOGY

## 2021-09-17 PROCEDURE — 85025 COMPLETE CBC W/AUTO DIFF WBC: CPT | Performed by: UROLOGY

## 2021-09-17 PROCEDURE — 11000001 HC ACUTE MED/SURG PRIVATE ROOM

## 2021-09-17 PROCEDURE — 94799 UNLISTED PULMONARY SVC/PX: CPT

## 2021-09-17 PROCEDURE — 25000242 PHARM REV CODE 250 ALT 637 W/ HCPCS: Performed by: UROLOGY

## 2021-09-17 PROCEDURE — 80048 BASIC METABOLIC PNL TOTAL CA: CPT | Performed by: UROLOGY

## 2021-09-17 RX ADMIN — FLUTICASONE PROPIONATE 50 MCG: 50 SPRAY, METERED NASAL at 09:09

## 2021-09-17 RX ADMIN — DOCUSATE SODIUM 100 MG: 100 CAPSULE, LIQUID FILLED ORAL at 08:09

## 2021-09-17 RX ADMIN — TRAZODONE HYDROCHLORIDE 50 MG: 50 TABLET ORAL at 08:09

## 2021-09-17 RX ADMIN — ALLOPURINOL 100 MG: 100 TABLET ORAL at 09:09

## 2021-09-17 RX ADMIN — CEFAZOLIN SODIUM 2 G: 2 SOLUTION INTRAVENOUS at 07:09

## 2021-09-17 RX ADMIN — PRAVASTATIN SODIUM 40 MG: 20 TABLET ORAL at 09:09

## 2021-09-17 RX ADMIN — METOPROLOL SUCCINATE 25 MG: 25 TABLET, EXTENDED RELEASE ORAL at 09:09

## 2021-09-17 RX ADMIN — OXYCODONE 5 MG: 5 TABLET ORAL at 09:09

## 2021-09-17 RX ADMIN — OXYCODONE 5 MG: 5 TABLET ORAL at 07:09

## 2021-09-17 RX ADMIN — HYDRALAZINE HYDROCHLORIDE 100 MG: 50 TABLET ORAL at 02:09

## 2021-09-17 RX ADMIN — HYDRALAZINE HYDROCHLORIDE 100 MG: 50 TABLET ORAL at 09:09

## 2021-09-17 RX ADMIN — HYDRALAZINE HYDROCHLORIDE 100 MG: 50 TABLET ORAL at 08:09

## 2021-09-17 RX ADMIN — CEFAZOLIN SODIUM 2 G: 2 SOLUTION INTRAVENOUS at 12:09

## 2021-09-17 RX ADMIN — OXYCODONE 5 MG: 5 TABLET ORAL at 02:09

## 2021-09-17 RX ADMIN — SERTRALINE HYDROCHLORIDE 50 MG: 50 TABLET ORAL at 09:09

## 2021-09-17 RX ADMIN — HYDROCHLOROTHIAZIDE 25 MG: 25 TABLET ORAL at 09:09

## 2021-09-17 RX ADMIN — ACETAMINOPHEN 1000 MG: 500 TABLET ORAL at 05:09

## 2021-09-17 RX ADMIN — DOCUSATE SODIUM 100 MG: 100 CAPSULE, LIQUID FILLED ORAL at 09:09

## 2021-09-18 LAB
ANION GAP SERPL CALC-SCNC: 10 MMOL/L (ref 8–16)
BASOPHILS # BLD AUTO: 0.04 K/UL (ref 0–0.2)
BASOPHILS NFR BLD: 0.3 % (ref 0–1.9)
BUN SERPL-MCNC: 17 MG/DL (ref 6–20)
CALCIUM SERPL-MCNC: 10 MG/DL (ref 8.7–10.5)
CHLORIDE SERPL-SCNC: 98 MMOL/L (ref 95–110)
CO2 SERPL-SCNC: 29 MMOL/L (ref 23–29)
CREAT SERPL-MCNC: 2 MG/DL (ref 0.5–1.4)
DIFFERENTIAL METHOD: ABNORMAL
EOSINOPHIL # BLD AUTO: 0.1 K/UL (ref 0–0.5)
EOSINOPHIL NFR BLD: 0.4 % (ref 0–8)
ERYTHROCYTE [DISTWIDTH] IN BLOOD BY AUTOMATED COUNT: 13.6 % (ref 11.5–14.5)
EST. GFR  (AFRICAN AMERICAN): 42 ML/MIN/1.73 M^2
EST. GFR  (NON AFRICAN AMERICAN): 36 ML/MIN/1.73 M^2
GLUCOSE SERPL-MCNC: 110 MG/DL (ref 70–110)
HCT VFR BLD AUTO: 45.9 % (ref 40–54)
HGB BLD-MCNC: 14.6 G/DL (ref 14–18)
IMM GRANULOCYTES # BLD AUTO: 0.12 K/UL (ref 0–0.04)
IMM GRANULOCYTES NFR BLD AUTO: 0.9 % (ref 0–0.5)
LYMPHOCYTES # BLD AUTO: 0.8 K/UL (ref 1–4.8)
LYMPHOCYTES NFR BLD: 6.3 % (ref 18–48)
MCH RBC QN AUTO: 29.1 PG (ref 27–31)
MCHC RBC AUTO-ENTMCNC: 31.8 G/DL (ref 32–36)
MCV RBC AUTO: 92 FL (ref 82–98)
MONOCYTES # BLD AUTO: 1 K/UL (ref 0.3–1)
MONOCYTES NFR BLD: 7.3 % (ref 4–15)
NEUTROPHILS # BLD AUTO: 11.3 K/UL (ref 1.8–7.7)
NEUTROPHILS NFR BLD: 84.8 % (ref 38–73)
NRBC BLD-RTO: 0 /100 WBC
PLATELET # BLD AUTO: 214 K/UL (ref 150–450)
PMV BLD AUTO: 10.5 FL (ref 9.2–12.9)
POTASSIUM SERPL-SCNC: 4 MMOL/L (ref 3.5–5.1)
RBC # BLD AUTO: 5.01 M/UL (ref 4.6–6.2)
SODIUM SERPL-SCNC: 137 MMOL/L (ref 136–145)
WBC # BLD AUTO: 13.36 K/UL (ref 3.9–12.7)

## 2021-09-18 PROCEDURE — 99900035 HC TECH TIME PER 15 MIN (STAT)

## 2021-09-18 PROCEDURE — 11000001 HC ACUTE MED/SURG PRIVATE ROOM

## 2021-09-18 PROCEDURE — 94799 UNLISTED PULMONARY SVC/PX: CPT

## 2021-09-18 PROCEDURE — 25000003 PHARM REV CODE 250: Performed by: UROLOGY

## 2021-09-18 PROCEDURE — 63600175 PHARM REV CODE 636 W HCPCS: Performed by: UROLOGY

## 2021-09-18 PROCEDURE — 36415 COLL VENOUS BLD VENIPUNCTURE: CPT | Performed by: UROLOGY

## 2021-09-18 PROCEDURE — 85025 COMPLETE CBC W/AUTO DIFF WBC: CPT | Performed by: UROLOGY

## 2021-09-18 PROCEDURE — 80048 BASIC METABOLIC PNL TOTAL CA: CPT | Performed by: UROLOGY

## 2021-09-18 PROCEDURE — 27000221 HC OXYGEN, UP TO 24 HOURS

## 2021-09-18 PROCEDURE — 94761 N-INVAS EAR/PLS OXIMETRY MLT: CPT

## 2021-09-18 RX ADMIN — HYDRALAZINE HYDROCHLORIDE 100 MG: 50 TABLET ORAL at 09:09

## 2021-09-18 RX ADMIN — OXYCODONE 5 MG: 5 TABLET ORAL at 01:09

## 2021-09-18 RX ADMIN — PRAVASTATIN SODIUM 40 MG: 20 TABLET ORAL at 09:09

## 2021-09-18 RX ADMIN — OXYCODONE 5 MG: 5 TABLET ORAL at 11:09

## 2021-09-18 RX ADMIN — FLUTICASONE PROPIONATE 50 MCG: 50 SPRAY, METERED NASAL at 09:09

## 2021-09-18 RX ADMIN — SODIUM CHLORIDE, SODIUM LACTATE, POTASSIUM CHLORIDE, AND CALCIUM CHLORIDE: .6; .31; .03; .02 INJECTION, SOLUTION INTRAVENOUS at 08:09

## 2021-09-18 RX ADMIN — METOPROLOL SUCCINATE 25 MG: 25 TABLET, EXTENDED RELEASE ORAL at 09:09

## 2021-09-18 RX ADMIN — HYDRALAZINE HYDROCHLORIDE 100 MG: 50 TABLET ORAL at 03:09

## 2021-09-18 RX ADMIN — OXYCODONE 5 MG: 5 TABLET ORAL at 05:09

## 2021-09-18 RX ADMIN — ALLOPURINOL 100 MG: 100 TABLET ORAL at 09:09

## 2021-09-18 RX ADMIN — DOCUSATE SODIUM 100 MG: 100 CAPSULE, LIQUID FILLED ORAL at 08:09

## 2021-09-18 RX ADMIN — SODIUM CHLORIDE, SODIUM LACTATE, POTASSIUM CHLORIDE, AND CALCIUM CHLORIDE: .6; .31; .03; .02 INJECTION, SOLUTION INTRAVENOUS at 11:09

## 2021-09-18 RX ADMIN — HYDRALAZINE HYDROCHLORIDE 100 MG: 50 TABLET ORAL at 08:09

## 2021-09-18 RX ADMIN — TRAZODONE HYDROCHLORIDE 50 MG: 50 TABLET ORAL at 08:09

## 2021-09-18 RX ADMIN — DOCUSATE SODIUM 100 MG: 100 CAPSULE, LIQUID FILLED ORAL at 09:09

## 2021-09-18 RX ADMIN — HYDROCHLOROTHIAZIDE 25 MG: 25 TABLET ORAL at 09:09

## 2021-09-19 VITALS
WEIGHT: 217.63 LBS | BODY MASS INDEX: 32.98 KG/M2 | HEART RATE: 70 BPM | TEMPERATURE: 98 F | SYSTOLIC BLOOD PRESSURE: 137 MMHG | DIASTOLIC BLOOD PRESSURE: 71 MMHG | HEIGHT: 68 IN | OXYGEN SATURATION: 98 % | RESPIRATION RATE: 18 BRPM

## 2021-09-19 LAB
ANION GAP SERPL CALC-SCNC: 12 MMOL/L (ref 8–16)
BASOPHILS # BLD AUTO: 0.04 K/UL (ref 0–0.2)
BASOPHILS NFR BLD: 0.4 % (ref 0–1.9)
BUN SERPL-MCNC: 18 MG/DL (ref 6–20)
CALCIUM SERPL-MCNC: 9.7 MG/DL (ref 8.7–10.5)
CHLORIDE SERPL-SCNC: 100 MMOL/L (ref 95–110)
CO2 SERPL-SCNC: 24 MMOL/L (ref 23–29)
CREAT SERPL-MCNC: 1.8 MG/DL (ref 0.5–1.4)
DIFFERENTIAL METHOD: ABNORMAL
EOSINOPHIL # BLD AUTO: 0.3 K/UL (ref 0–0.5)
EOSINOPHIL NFR BLD: 2.4 % (ref 0–8)
ERYTHROCYTE [DISTWIDTH] IN BLOOD BY AUTOMATED COUNT: 13.4 % (ref 11.5–14.5)
EST. GFR  (AFRICAN AMERICAN): 48 ML/MIN/1.73 M^2
EST. GFR  (NON AFRICAN AMERICAN): 41 ML/MIN/1.73 M^2
GLUCOSE SERPL-MCNC: 84 MG/DL (ref 70–110)
HCT VFR BLD AUTO: 44 % (ref 40–54)
HGB BLD-MCNC: 14.2 G/DL (ref 14–18)
IMM GRANULOCYTES # BLD AUTO: 0.11 K/UL (ref 0–0.04)
IMM GRANULOCYTES NFR BLD AUTO: 1 % (ref 0–0.5)
LYMPHOCYTES # BLD AUTO: 1.1 K/UL (ref 1–4.8)
LYMPHOCYTES NFR BLD: 9.8 % (ref 18–48)
MCH RBC QN AUTO: 29.6 PG (ref 27–31)
MCHC RBC AUTO-ENTMCNC: 32.3 G/DL (ref 32–36)
MCV RBC AUTO: 92 FL (ref 82–98)
MONOCYTES # BLD AUTO: 0.8 K/UL (ref 0.3–1)
MONOCYTES NFR BLD: 7.2 % (ref 4–15)
NEUTROPHILS # BLD AUTO: 8.8 K/UL (ref 1.8–7.7)
NEUTROPHILS NFR BLD: 79.2 % (ref 38–73)
NRBC BLD-RTO: 0 /100 WBC
PLATELET # BLD AUTO: 204 K/UL (ref 150–450)
PMV BLD AUTO: 10.3 FL (ref 9.2–12.9)
POTASSIUM SERPL-SCNC: 4.2 MMOL/L (ref 3.5–5.1)
RBC # BLD AUTO: 4.79 M/UL (ref 4.6–6.2)
SODIUM SERPL-SCNC: 136 MMOL/L (ref 136–145)
WBC # BLD AUTO: 11.1 K/UL (ref 3.9–12.7)

## 2021-09-19 PROCEDURE — 94799 UNLISTED PULMONARY SVC/PX: CPT

## 2021-09-19 PROCEDURE — 25000003 PHARM REV CODE 250: Performed by: UROLOGY

## 2021-09-19 PROCEDURE — 27000221 HC OXYGEN, UP TO 24 HOURS

## 2021-09-19 PROCEDURE — 94761 N-INVAS EAR/PLS OXIMETRY MLT: CPT

## 2021-09-19 PROCEDURE — 99900035 HC TECH TIME PER 15 MIN (STAT)

## 2021-09-19 PROCEDURE — 80048 BASIC METABOLIC PNL TOTAL CA: CPT | Performed by: UROLOGY

## 2021-09-19 PROCEDURE — 85025 COMPLETE CBC W/AUTO DIFF WBC: CPT | Performed by: UROLOGY

## 2021-09-19 PROCEDURE — 36415 COLL VENOUS BLD VENIPUNCTURE: CPT | Performed by: UROLOGY

## 2021-09-19 RX ORDER — DOCUSATE SODIUM 100 MG/1
100 CAPSULE, LIQUID FILLED ORAL 2 TIMES DAILY
Qty: 60 CAPSULE | Refills: 0 | Status: SHIPPED | OUTPATIENT
Start: 2021-09-19 | End: 2023-05-16

## 2021-09-19 RX ORDER — OXYCODONE AND ACETAMINOPHEN 5; 325 MG/1; MG/1
1 TABLET ORAL EVERY 4 HOURS PRN
Qty: 35 TABLET | Refills: 0 | Status: SHIPPED | OUTPATIENT
Start: 2021-09-19 | End: 2023-05-16

## 2021-09-19 RX ADMIN — SERTRALINE HYDROCHLORIDE 50 MG: 50 TABLET ORAL at 09:09

## 2021-09-19 RX ADMIN — DOCUSATE SODIUM 100 MG: 100 CAPSULE, LIQUID FILLED ORAL at 09:09

## 2021-09-19 RX ADMIN — OXYCODONE 5 MG: 5 TABLET ORAL at 09:09

## 2021-09-19 RX ADMIN — HYDRALAZINE HYDROCHLORIDE 100 MG: 50 TABLET ORAL at 09:09

## 2021-09-19 RX ADMIN — METOPROLOL SUCCINATE 25 MG: 25 TABLET, EXTENDED RELEASE ORAL at 09:09

## 2021-09-19 RX ADMIN — ALLOPURINOL 100 MG: 100 TABLET ORAL at 09:09

## 2021-09-19 RX ADMIN — HYDROCHLOROTHIAZIDE 25 MG: 25 TABLET ORAL at 09:09

## 2021-09-19 RX ADMIN — PRAVASTATIN SODIUM 40 MG: 20 TABLET ORAL at 09:09

## 2021-09-19 RX ADMIN — FLUTICASONE PROPIONATE 50 MCG: 50 SPRAY, METERED NASAL at 09:09

## 2021-09-20 ENCOUNTER — PATIENT OUTREACH (OUTPATIENT)
Dept: ADMINISTRATIVE | Facility: CLINIC | Age: 56
End: 2021-09-20

## 2021-09-20 ENCOUNTER — PATIENT MESSAGE (OUTPATIENT)
Dept: ADMINISTRATIVE | Facility: CLINIC | Age: 56
End: 2021-09-20

## 2021-09-20 LAB
FINAL PATHOLOGIC DIAGNOSIS: NORMAL
GROSS: NORMAL
Lab: NORMAL

## 2021-10-06 ENCOUNTER — TELEPHONE (OUTPATIENT)
Dept: UROLOGY | Facility: CLINIC | Age: 56
End: 2021-10-06

## 2021-10-06 ENCOUNTER — TELEPHONE (OUTPATIENT)
Dept: ENDOSCOPY | Facility: HOSPITAL | Age: 56
End: 2021-10-06

## 2021-10-06 ENCOUNTER — TELEPHONE (OUTPATIENT)
Dept: FAMILY MEDICINE | Facility: CLINIC | Age: 56
End: 2021-10-06

## 2021-10-06 RX ORDER — SODIUM, POTASSIUM,MAG SULFATES 17.5-3.13G
1 SOLUTION, RECONSTITUTED, ORAL ORAL DAILY
Qty: 1 KIT | Refills: 0 | Status: CANCELLED | OUTPATIENT
Start: 2021-10-06 | End: 2021-10-08

## 2021-10-13 ENCOUNTER — OFFICE VISIT (OUTPATIENT)
Dept: FAMILY MEDICINE | Facility: CLINIC | Age: 56
End: 2021-10-13
Payer: COMMERCIAL

## 2021-10-13 ENCOUNTER — PATIENT MESSAGE (OUTPATIENT)
Dept: ENDOSCOPY | Facility: HOSPITAL | Age: 56
End: 2021-10-13
Payer: COMMERCIAL

## 2021-10-13 ENCOUNTER — LAB VISIT (OUTPATIENT)
Dept: LAB | Facility: HOSPITAL | Age: 56
End: 2021-10-13
Attending: FAMILY MEDICINE
Payer: COMMERCIAL

## 2021-10-13 VITALS
WEIGHT: 205.38 LBS | DIASTOLIC BLOOD PRESSURE: 60 MMHG | HEART RATE: 94 BPM | HEIGHT: 68 IN | TEMPERATURE: 99 F | SYSTOLIC BLOOD PRESSURE: 110 MMHG | BODY MASS INDEX: 31.13 KG/M2 | OXYGEN SATURATION: 98 %

## 2021-10-13 DIAGNOSIS — Z90.5 S/P NEPHRECTOMY: Primary | ICD-10-CM

## 2021-10-13 DIAGNOSIS — I10 ESSENTIAL HYPERTENSION: ICD-10-CM

## 2021-10-13 DIAGNOSIS — R79.89 LOW TESTOSTERONE: ICD-10-CM

## 2021-10-13 DIAGNOSIS — Z01.818 PREOP TESTING: Primary | ICD-10-CM

## 2021-10-13 DIAGNOSIS — Z90.5 S/P NEPHRECTOMY: ICD-10-CM

## 2021-10-13 DIAGNOSIS — F10.11 ALCOHOL ABUSE, IN REMISSION: ICD-10-CM

## 2021-10-13 LAB
ALBUMIN SERPL BCP-MCNC: 4.1 G/DL (ref 3.5–5.2)
ALP SERPL-CCNC: 71 U/L (ref 55–135)
ALT SERPL W/O P-5'-P-CCNC: 29 U/L (ref 10–44)
ANION GAP SERPL CALC-SCNC: 14 MMOL/L (ref 8–16)
AST SERPL-CCNC: 21 U/L (ref 10–40)
BILIRUB SERPL-MCNC: 0.6 MG/DL (ref 0.1–1)
BUN SERPL-MCNC: 15 MG/DL (ref 6–20)
CALCIUM SERPL-MCNC: 9.9 MG/DL (ref 8.7–10.5)
CHLORIDE SERPL-SCNC: 102 MMOL/L (ref 95–110)
CO2 SERPL-SCNC: 22 MMOL/L (ref 23–29)
CREAT SERPL-MCNC: 1.8 MG/DL (ref 0.5–1.4)
EST. GFR  (AFRICAN AMERICAN): 47.9 ML/MIN/1.73 M^2
EST. GFR  (NON AFRICAN AMERICAN): 41.4 ML/MIN/1.73 M^2
GLUCOSE SERPL-MCNC: 84 MG/DL (ref 70–110)
POTASSIUM SERPL-SCNC: 4.7 MMOL/L (ref 3.5–5.1)
PROT SERPL-MCNC: 7.7 G/DL (ref 6–8.4)
SODIUM SERPL-SCNC: 138 MMOL/L (ref 136–145)

## 2021-10-13 PROCEDURE — 4010F ACE/ARB THERAPY RXD/TAKEN: CPT | Mod: CPTII,S$GLB,, | Performed by: FAMILY MEDICINE

## 2021-10-13 PROCEDURE — 99999 PR PBB SHADOW E&M-EST. PATIENT-LVL IV: ICD-10-PCS | Mod: PBBFAC,,, | Performed by: FAMILY MEDICINE

## 2021-10-13 PROCEDURE — 1159F PR MEDICATION LIST DOCUMENTED IN MEDICAL RECORD: ICD-10-PCS | Mod: CPTII,S$GLB,, | Performed by: FAMILY MEDICINE

## 2021-10-13 PROCEDURE — 3066F NEPHROPATHY DOC TX: CPT | Mod: CPTII,S$GLB,, | Performed by: FAMILY MEDICINE

## 2021-10-13 PROCEDURE — 3078F DIAST BP <80 MM HG: CPT | Mod: CPTII,S$GLB,, | Performed by: FAMILY MEDICINE

## 2021-10-13 PROCEDURE — 99214 OFFICE O/P EST MOD 30 MIN: CPT | Mod: S$GLB,,, | Performed by: FAMILY MEDICINE

## 2021-10-13 PROCEDURE — 1111F DSCHRG MED/CURRENT MED MERGE: CPT | Mod: CPTII,S$GLB,, | Performed by: FAMILY MEDICINE

## 2021-10-13 PROCEDURE — 3074F PR MOST RECENT SYSTOLIC BLOOD PRESSURE < 130 MM HG: ICD-10-PCS | Mod: CPTII,S$GLB,, | Performed by: FAMILY MEDICINE

## 2021-10-13 PROCEDURE — 1111F PR DISCHARGE MEDS RECONCILED W/ CURRENT OUTPATIENT MED LIST: ICD-10-PCS | Mod: CPTII,S$GLB,, | Performed by: FAMILY MEDICINE

## 2021-10-13 PROCEDURE — 3066F PR DOCUMENTATION OF TREATMENT FOR NEPHROPATHY: ICD-10-PCS | Mod: CPTII,S$GLB,, | Performed by: FAMILY MEDICINE

## 2021-10-13 PROCEDURE — 36415 COLL VENOUS BLD VENIPUNCTURE: CPT | Mod: PO | Performed by: FAMILY MEDICINE

## 2021-10-13 PROCEDURE — 99214 PR OFFICE/OUTPT VISIT, EST, LEVL IV, 30-39 MIN: ICD-10-PCS | Mod: S$GLB,,, | Performed by: FAMILY MEDICINE

## 2021-10-13 PROCEDURE — 1159F MED LIST DOCD IN RCRD: CPT | Mod: CPTII,S$GLB,, | Performed by: FAMILY MEDICINE

## 2021-10-13 PROCEDURE — 3078F PR MOST RECENT DIASTOLIC BLOOD PRESSURE < 80 MM HG: ICD-10-PCS | Mod: CPTII,S$GLB,, | Performed by: FAMILY MEDICINE

## 2021-10-13 PROCEDURE — 3008F BODY MASS INDEX DOCD: CPT | Mod: CPTII,S$GLB,, | Performed by: FAMILY MEDICINE

## 2021-10-13 PROCEDURE — 3074F SYST BP LT 130 MM HG: CPT | Mod: CPTII,S$GLB,, | Performed by: FAMILY MEDICINE

## 2021-10-13 PROCEDURE — 3008F PR BODY MASS INDEX (BMI) DOCUMENTED: ICD-10-PCS | Mod: CPTII,S$GLB,, | Performed by: FAMILY MEDICINE

## 2021-10-13 PROCEDURE — 4010F PR ACE/ARB THEARPY RXD/TAKEN: ICD-10-PCS | Mod: CPTII,S$GLB,, | Performed by: FAMILY MEDICINE

## 2021-10-13 PROCEDURE — 80053 COMPREHEN METABOLIC PANEL: CPT | Performed by: FAMILY MEDICINE

## 2021-10-13 PROCEDURE — 99999 PR PBB SHADOW E&M-EST. PATIENT-LVL IV: CPT | Mod: PBBFAC,,, | Performed by: FAMILY MEDICINE

## 2021-10-15 ENCOUNTER — TELEPHONE (OUTPATIENT)
Dept: FAMILY MEDICINE | Facility: CLINIC | Age: 56
End: 2021-10-15

## 2021-10-22 ENCOUNTER — OFFICE VISIT (OUTPATIENT)
Dept: FAMILY MEDICINE | Facility: CLINIC | Age: 56
End: 2021-10-22
Payer: COMMERCIAL

## 2021-10-22 VITALS
OXYGEN SATURATION: 99 % | SYSTOLIC BLOOD PRESSURE: 112 MMHG | BODY MASS INDEX: 31.14 KG/M2 | DIASTOLIC BLOOD PRESSURE: 78 MMHG | HEIGHT: 68 IN | HEART RATE: 77 BPM | WEIGHT: 205.5 LBS | TEMPERATURE: 99 F

## 2021-10-22 DIAGNOSIS — K64.5 EXTERNAL HEMORRHOID, THROMBOSED: Primary | ICD-10-CM

## 2021-10-22 PROCEDURE — 99213 PR OFFICE/OUTPT VISIT, EST, LEVL III, 20-29 MIN: ICD-10-PCS | Mod: S$GLB,,, | Performed by: FAMILY MEDICINE

## 2021-10-22 PROCEDURE — 4010F ACE/ARB THERAPY RXD/TAKEN: CPT | Mod: CPTII,S$GLB,, | Performed by: FAMILY MEDICINE

## 2021-10-22 PROCEDURE — 3078F DIAST BP <80 MM HG: CPT | Mod: CPTII,S$GLB,, | Performed by: FAMILY MEDICINE

## 2021-10-22 PROCEDURE — 3074F PR MOST RECENT SYSTOLIC BLOOD PRESSURE < 130 MM HG: ICD-10-PCS | Mod: CPTII,S$GLB,, | Performed by: FAMILY MEDICINE

## 2021-10-22 PROCEDURE — 3008F BODY MASS INDEX DOCD: CPT | Mod: CPTII,S$GLB,, | Performed by: FAMILY MEDICINE

## 2021-10-22 PROCEDURE — 3078F PR MOST RECENT DIASTOLIC BLOOD PRESSURE < 80 MM HG: ICD-10-PCS | Mod: CPTII,S$GLB,, | Performed by: FAMILY MEDICINE

## 2021-10-22 PROCEDURE — 3008F PR BODY MASS INDEX (BMI) DOCUMENTED: ICD-10-PCS | Mod: CPTII,S$GLB,, | Performed by: FAMILY MEDICINE

## 2021-10-22 PROCEDURE — 3074F SYST BP LT 130 MM HG: CPT | Mod: CPTII,S$GLB,, | Performed by: FAMILY MEDICINE

## 2021-10-22 PROCEDURE — 4010F PR ACE/ARB THEARPY RXD/TAKEN: ICD-10-PCS | Mod: CPTII,S$GLB,, | Performed by: FAMILY MEDICINE

## 2021-10-22 PROCEDURE — 3066F PR DOCUMENTATION OF TREATMENT FOR NEPHROPATHY: ICD-10-PCS | Mod: CPTII,S$GLB,, | Performed by: FAMILY MEDICINE

## 2021-10-22 PROCEDURE — 99999 PR PBB SHADOW E&M-EST. PATIENT-LVL III: CPT | Mod: PBBFAC,,, | Performed by: FAMILY MEDICINE

## 2021-10-22 PROCEDURE — 99999 PR PBB SHADOW E&M-EST. PATIENT-LVL III: ICD-10-PCS | Mod: PBBFAC,,, | Performed by: FAMILY MEDICINE

## 2021-10-22 PROCEDURE — 99213 OFFICE O/P EST LOW 20 MIN: CPT | Mod: S$GLB,,, | Performed by: FAMILY MEDICINE

## 2021-10-22 PROCEDURE — 3066F NEPHROPATHY DOC TX: CPT | Mod: CPTII,S$GLB,, | Performed by: FAMILY MEDICINE

## 2021-10-29 ENCOUNTER — OFFICE VISIT (OUTPATIENT)
Dept: UROLOGY | Facility: CLINIC | Age: 56
End: 2021-10-29
Payer: COMMERCIAL

## 2021-10-29 VITALS
HEIGHT: 68 IN | SYSTOLIC BLOOD PRESSURE: 105 MMHG | HEART RATE: 83 BPM | BODY MASS INDEX: 31.14 KG/M2 | DIASTOLIC BLOOD PRESSURE: 68 MMHG | WEIGHT: 205.5 LBS

## 2021-10-29 DIAGNOSIS — C64.2 RENAL CELL CARCINOMA OF LEFT KIDNEY: Primary | ICD-10-CM

## 2021-10-29 PROCEDURE — 99999 PR PBB SHADOW E&M-EST. PATIENT-LVL III: CPT | Mod: PBBFAC,,, | Performed by: UROLOGY

## 2021-10-29 PROCEDURE — 3066F PR DOCUMENTATION OF TREATMENT FOR NEPHROPATHY: ICD-10-PCS | Mod: CPTII,S$GLB,, | Performed by: UROLOGY

## 2021-10-29 PROCEDURE — 99024 POSTOP FOLLOW-UP VISIT: CPT | Mod: S$GLB,,, | Performed by: UROLOGY

## 2021-10-29 PROCEDURE — 3008F PR BODY MASS INDEX (BMI) DOCUMENTED: ICD-10-PCS | Mod: CPTII,S$GLB,, | Performed by: UROLOGY

## 2021-10-29 PROCEDURE — 4010F ACE/ARB THERAPY RXD/TAKEN: CPT | Mod: CPTII,S$GLB,, | Performed by: UROLOGY

## 2021-10-29 PROCEDURE — 3078F PR MOST RECENT DIASTOLIC BLOOD PRESSURE < 80 MM HG: ICD-10-PCS | Mod: CPTII,S$GLB,, | Performed by: UROLOGY

## 2021-10-29 PROCEDURE — 3074F SYST BP LT 130 MM HG: CPT | Mod: CPTII,S$GLB,, | Performed by: UROLOGY

## 2021-10-29 PROCEDURE — 3074F PR MOST RECENT SYSTOLIC BLOOD PRESSURE < 130 MM HG: ICD-10-PCS | Mod: CPTII,S$GLB,, | Performed by: UROLOGY

## 2021-10-29 PROCEDURE — 3008F BODY MASS INDEX DOCD: CPT | Mod: CPTII,S$GLB,, | Performed by: UROLOGY

## 2021-10-29 PROCEDURE — 99999 PR PBB SHADOW E&M-EST. PATIENT-LVL III: ICD-10-PCS | Mod: PBBFAC,,, | Performed by: UROLOGY

## 2021-10-29 PROCEDURE — 3066F NEPHROPATHY DOC TX: CPT | Mod: CPTII,S$GLB,, | Performed by: UROLOGY

## 2021-10-29 PROCEDURE — 4010F PR ACE/ARB THEARPY RXD/TAKEN: ICD-10-PCS | Mod: CPTII,S$GLB,, | Performed by: UROLOGY

## 2021-10-29 PROCEDURE — 3078F DIAST BP <80 MM HG: CPT | Mod: CPTII,S$GLB,, | Performed by: UROLOGY

## 2021-10-29 PROCEDURE — 99024 PR POST-OP FOLLOW-UP VISIT: ICD-10-PCS | Mod: S$GLB,,, | Performed by: UROLOGY

## 2021-11-05 RX ORDER — ALPRAZOLAM 0.25 MG/1
0.5 TABLET ORAL 2 TIMES DAILY PRN
Qty: 15 TABLET | Refills: 0 | Status: SHIPPED | OUTPATIENT
Start: 2021-11-05 | End: 2023-05-16

## 2021-11-11 ENCOUNTER — HOSPITAL ENCOUNTER (OUTPATIENT)
Dept: RADIOLOGY | Facility: HOSPITAL | Age: 56
Discharge: HOME | End: 2021-11-11
Attending: UROLOGY
Payer: COMMERCIAL

## 2021-11-11 DIAGNOSIS — C64.2 RENAL CELL CARCINOMA OF LEFT KIDNEY: ICD-10-CM

## 2021-11-11 PROCEDURE — 74176 CT ABD & PELVIS W/O CONTRAST: CPT | Mod: TC

## 2021-11-17 ENCOUNTER — HOSPITAL ENCOUNTER (OUTPATIENT)
Facility: HOSPITAL | Age: 56
Discharge: HOME | End: 2021-11-17
Attending: COLON & RECTAL SURGERY | Admitting: COLON & RECTAL SURGERY
Payer: COMMERCIAL

## 2021-11-17 ENCOUNTER — ANESTHESIA (OUTPATIENT)
Dept: ENDOSCOPY | Facility: HOSPITAL | Age: 56
End: 2021-11-17
Payer: COMMERCIAL

## 2021-11-17 ENCOUNTER — ANESTHESIA EVENT (OUTPATIENT)
Dept: ENDOSCOPY | Facility: HOSPITAL | Age: 56
End: 2021-11-17
Payer: COMMERCIAL

## 2021-11-17 VITALS
TEMPERATURE: 98 F | BODY MASS INDEX: 32.13 KG/M2 | RESPIRATION RATE: 16 BRPM | HEART RATE: 70 BPM | WEIGHT: 212 LBS | SYSTOLIC BLOOD PRESSURE: 113 MMHG | OXYGEN SATURATION: 97 % | HEIGHT: 68 IN | DIASTOLIC BLOOD PRESSURE: 74 MMHG

## 2021-11-17 DIAGNOSIS — Z12.11 SCREENING FOR COLON CANCER: ICD-10-CM

## 2021-11-17 PROCEDURE — 63600175 PHARM REV CODE 636 W HCPCS: Performed by: NURSE ANESTHETIST, CERTIFIED REGISTERED

## 2021-11-17 PROCEDURE — 88305 TISSUE EXAM BY PATHOLOGIST: ICD-10-PCS | Mod: 26,,, | Performed by: PATHOLOGY

## 2021-11-17 PROCEDURE — 45385 COLONOSCOPY W/LESION REMOVAL: CPT | Performed by: COLON & RECTAL SURGERY

## 2021-11-17 PROCEDURE — 27200997: Performed by: COLON & RECTAL SURGERY

## 2021-11-17 PROCEDURE — 27201012 HC FORCEPS, HOT/COLD, DISP: Performed by: COLON & RECTAL SURGERY

## 2021-11-17 PROCEDURE — 88305 TISSUE EXAM BY PATHOLOGIST: CPT | Mod: 59 | Performed by: PATHOLOGY

## 2021-11-17 PROCEDURE — 88305 TISSUE EXAM BY PATHOLOGIST: CPT | Mod: 26,,, | Performed by: PATHOLOGY

## 2021-11-17 PROCEDURE — 25000003 PHARM REV CODE 250: Performed by: NURSE ANESTHETIST, CERTIFIED REGISTERED

## 2021-11-17 PROCEDURE — 45380 PR COLONOSCOPY,BIOPSY: ICD-10-PCS | Mod: 59,,, | Performed by: COLON & RECTAL SURGERY

## 2021-11-17 PROCEDURE — 37000008 HC ANESTHESIA 1ST 15 MINUTES: Performed by: COLON & RECTAL SURGERY

## 2021-11-17 PROCEDURE — 45380 COLONOSCOPY AND BIOPSY: CPT | Performed by: COLON & RECTAL SURGERY

## 2021-11-17 PROCEDURE — 27201089 HC SNARE, DISP (ANY): Performed by: COLON & RECTAL SURGERY

## 2021-11-17 PROCEDURE — 45380 COLONOSCOPY AND BIOPSY: CPT | Mod: 59,,, | Performed by: COLON & RECTAL SURGERY

## 2021-11-17 PROCEDURE — 37000009 HC ANESTHESIA EA ADD 15 MINS: Performed by: COLON & RECTAL SURGERY

## 2021-11-17 PROCEDURE — 45385 COLONOSCOPY W/LESION REMOVAL: CPT | Mod: 33,,, | Performed by: COLON & RECTAL SURGERY

## 2021-11-17 PROCEDURE — 45385 PR COLONOSCOPY,REMV LESN,SNARE: ICD-10-PCS | Mod: 33,,, | Performed by: COLON & RECTAL SURGERY

## 2021-11-17 RX ORDER — PROPOFOL 10 MG/ML
VIAL (ML) INTRAVENOUS
Status: DISCONTINUED | OUTPATIENT
Start: 2021-11-17 | End: 2021-11-17

## 2021-11-17 RX ORDER — EPHEDRINE SULFATE 50 MG/ML
INJECTION, SOLUTION INTRAVENOUS
Status: DISCONTINUED | OUTPATIENT
Start: 2021-11-17 | End: 2021-11-17

## 2021-11-17 RX ORDER — SODIUM CHLORIDE, SODIUM LACTATE, POTASSIUM CHLORIDE, CALCIUM CHLORIDE 600; 310; 30; 20 MG/100ML; MG/100ML; MG/100ML; MG/100ML
INJECTION, SOLUTION INTRAVENOUS CONTINUOUS PRN
Status: DISCONTINUED | OUTPATIENT
Start: 2021-11-17 | End: 2021-11-17

## 2021-11-17 RX ORDER — LIDOCAINE HYDROCHLORIDE 10 MG/ML
INJECTION, SOLUTION EPIDURAL; INFILTRATION; INTRACAUDAL; PERINEURAL
Status: DISCONTINUED | OUTPATIENT
Start: 2021-11-17 | End: 2021-11-17

## 2021-11-17 RX ORDER — PHENYLEPHRINE HYDROCHLORIDE 10 MG/ML
INJECTION INTRAVENOUS
Status: DISCONTINUED | OUTPATIENT
Start: 2021-11-17 | End: 2021-11-17

## 2021-11-17 RX ADMIN — PROPOFOL 20 MG: 10 INJECTION, EMULSION INTRAVENOUS at 09:11

## 2021-11-17 RX ADMIN — EPHEDRINE SULFATE 25 MG: 50 INJECTION INTRAVENOUS at 09:11

## 2021-11-17 RX ADMIN — SODIUM CHLORIDE, SODIUM LACTATE, POTASSIUM CHLORIDE, AND CALCIUM CHLORIDE: .6; .31; .03; .02 INJECTION, SOLUTION INTRAVENOUS at 09:11

## 2021-11-17 RX ADMIN — PROPOFOL 50 MG: 10 INJECTION, EMULSION INTRAVENOUS at 09:11

## 2021-11-17 RX ADMIN — LIDOCAINE HYDROCHLORIDE 50 MG: 10 INJECTION, SOLUTION EPIDURAL; INFILTRATION; INTRACAUDAL; PERINEURAL at 09:11

## 2021-11-17 RX ADMIN — PHENYLEPHRINE HYDROCHLORIDE 200 MCG: 10 INJECTION INTRAVENOUS at 09:11

## 2021-11-17 RX ADMIN — PROPOFOL 100 MG: 10 INJECTION, EMULSION INTRAVENOUS at 09:11

## 2021-11-17 RX ADMIN — EPHEDRINE SULFATE 15 MG: 50 INJECTION INTRAVENOUS at 09:11

## 2021-11-17 RX ADMIN — EPHEDRINE SULFATE 10 MG: 50 INJECTION INTRAVENOUS at 09:11

## 2021-11-17 RX ADMIN — PHENYLEPHRINE HYDROCHLORIDE 100 MCG: 10 INJECTION INTRAVENOUS at 09:11

## 2021-11-18 ENCOUNTER — PATIENT MESSAGE (OUTPATIENT)
Dept: UROLOGY | Facility: CLINIC | Age: 56
End: 2021-11-18
Payer: COMMERCIAL

## 2021-11-24 LAB
FINAL PATHOLOGIC DIAGNOSIS: NORMAL
GROSS: NORMAL
Lab: NORMAL

## 2021-12-13 ENCOUNTER — OFFICE VISIT (OUTPATIENT)
Dept: CARDIOLOGY | Facility: CLINIC | Age: 56
End: 2021-12-13
Payer: COMMERCIAL

## 2021-12-13 VITALS
BODY MASS INDEX: 33.15 KG/M2 | HEART RATE: 81 BPM | WEIGHT: 218.69 LBS | SYSTOLIC BLOOD PRESSURE: 134 MMHG | DIASTOLIC BLOOD PRESSURE: 78 MMHG | HEIGHT: 68 IN | OXYGEN SATURATION: 98 %

## 2021-12-13 DIAGNOSIS — I10 ESSENTIAL HYPERTENSION: ICD-10-CM

## 2021-12-13 DIAGNOSIS — N28.89 LEFT RENAL MASS: ICD-10-CM

## 2021-12-13 DIAGNOSIS — E78.00 PURE HYPERCHOLESTEROLEMIA: Primary | ICD-10-CM

## 2021-12-13 PROCEDURE — 99214 OFFICE O/P EST MOD 30 MIN: CPT | Mod: S$GLB,,, | Performed by: INTERNAL MEDICINE

## 2021-12-13 PROCEDURE — 3066F NEPHROPATHY DOC TX: CPT | Mod: CPTII,S$GLB,, | Performed by: INTERNAL MEDICINE

## 2021-12-13 PROCEDURE — 4010F PR ACE/ARB THEARPY RXD/TAKEN: ICD-10-PCS | Mod: CPTII,S$GLB,, | Performed by: INTERNAL MEDICINE

## 2021-12-13 PROCEDURE — 99999 PR PBB SHADOW E&M-EST. PATIENT-LVL IV: CPT | Mod: PBBFAC,,, | Performed by: INTERNAL MEDICINE

## 2021-12-13 PROCEDURE — 4010F ACE/ARB THERAPY RXD/TAKEN: CPT | Mod: CPTII,S$GLB,, | Performed by: INTERNAL MEDICINE

## 2021-12-13 PROCEDURE — 3066F PR DOCUMENTATION OF TREATMENT FOR NEPHROPATHY: ICD-10-PCS | Mod: CPTII,S$GLB,, | Performed by: INTERNAL MEDICINE

## 2021-12-13 PROCEDURE — 99214 PR OFFICE/OUTPT VISIT, EST, LEVL IV, 30-39 MIN: ICD-10-PCS | Mod: S$GLB,,, | Performed by: INTERNAL MEDICINE

## 2021-12-13 PROCEDURE — 99999 PR PBB SHADOW E&M-EST. PATIENT-LVL IV: ICD-10-PCS | Mod: PBBFAC,,, | Performed by: INTERNAL MEDICINE

## 2021-12-23 ENCOUNTER — LAB VISIT (OUTPATIENT)
Dept: LAB | Facility: HOSPITAL | Age: 56
End: 2021-12-23
Attending: FAMILY MEDICINE
Payer: COMMERCIAL

## 2021-12-23 DIAGNOSIS — Z79.890 ENCOUNTER FOR MONITORING TESTOSTERONE REPLACEMENT THERAPY: ICD-10-CM

## 2021-12-23 DIAGNOSIS — Z51.81 ENCOUNTER FOR MONITORING TESTOSTERONE REPLACEMENT THERAPY: ICD-10-CM

## 2021-12-23 LAB
ALBUMIN SERPL BCP-MCNC: 3.9 G/DL (ref 3.5–5.2)
ALP SERPL-CCNC: 76 U/L (ref 55–135)
ALT SERPL W/O P-5'-P-CCNC: 13 U/L (ref 10–44)
ANION GAP SERPL CALC-SCNC: 7 MMOL/L (ref 8–16)
AST SERPL-CCNC: 15 U/L (ref 10–40)
BASOPHILS # BLD AUTO: 0.06 K/UL (ref 0–0.2)
BASOPHILS NFR BLD: 0.8 % (ref 0–1.9)
BILIRUB SERPL-MCNC: 0.5 MG/DL (ref 0.1–1)
BUN SERPL-MCNC: 21 MG/DL (ref 6–20)
CALCIUM SERPL-MCNC: 9.7 MG/DL (ref 8.7–10.5)
CHLORIDE SERPL-SCNC: 103 MMOL/L (ref 95–110)
CHOLEST SERPL-MCNC: 146 MG/DL (ref 120–199)
CHOLEST/HDLC SERPL: 3.2 {RATIO} (ref 2–5)
CO2 SERPL-SCNC: 26 MMOL/L (ref 23–29)
COMPLEXED PSA SERPL-MCNC: 0.57 NG/ML (ref 0–4)
CREAT SERPL-MCNC: 2 MG/DL (ref 0.5–1.4)
DIFFERENTIAL METHOD: ABNORMAL
EOSINOPHIL # BLD AUTO: 0.2 K/UL (ref 0–0.5)
EOSINOPHIL NFR BLD: 2.5 % (ref 0–8)
ERYTHROCYTE [DISTWIDTH] IN BLOOD BY AUTOMATED COUNT: 13 % (ref 11.5–14.5)
EST. GFR  (AFRICAN AMERICAN): 42.2 ML/MIN/1.73 M^2
EST. GFR  (NON AFRICAN AMERICAN): 36.5 ML/MIN/1.73 M^2
GLUCOSE SERPL-MCNC: 104 MG/DL (ref 70–110)
HCT VFR BLD AUTO: 41.6 % (ref 40–54)
HDLC SERPL-MCNC: 45 MG/DL (ref 40–75)
HDLC SERPL: 30.8 % (ref 20–50)
HGB BLD-MCNC: 13.5 G/DL (ref 14–18)
IMM GRANULOCYTES # BLD AUTO: 0.04 K/UL (ref 0–0.04)
IMM GRANULOCYTES NFR BLD AUTO: 0.6 % (ref 0–0.5)
LDLC SERPL CALC-MCNC: 77 MG/DL (ref 63–159)
LYMPHOCYTES # BLD AUTO: 1.5 K/UL (ref 1–4.8)
LYMPHOCYTES NFR BLD: 21.2 % (ref 18–48)
MCH RBC QN AUTO: 30.3 PG (ref 27–31)
MCHC RBC AUTO-ENTMCNC: 32.5 G/DL (ref 32–36)
MCV RBC AUTO: 93 FL (ref 82–98)
MONOCYTES # BLD AUTO: 0.6 K/UL (ref 0.3–1)
MONOCYTES NFR BLD: 8 % (ref 4–15)
NEUTROPHILS # BLD AUTO: 4.8 K/UL (ref 1.8–7.7)
NEUTROPHILS NFR BLD: 66.9 % (ref 38–73)
NONHDLC SERPL-MCNC: 101 MG/DL
NRBC BLD-RTO: 0 /100 WBC
PLATELET # BLD AUTO: 239 K/UL (ref 150–450)
PMV BLD AUTO: 11.5 FL (ref 9.2–12.9)
POTASSIUM SERPL-SCNC: 5.2 MMOL/L (ref 3.5–5.1)
PROT SERPL-MCNC: 7.2 G/DL (ref 6–8.4)
RBC # BLD AUTO: 4.46 M/UL (ref 4.6–6.2)
SODIUM SERPL-SCNC: 136 MMOL/L (ref 136–145)
TESTOST SERPL-MCNC: 188 NG/DL (ref 304–1227)
TRIGL SERPL-MCNC: 120 MG/DL (ref 30–150)
WBC # BLD AUTO: 7.23 K/UL (ref 3.9–12.7)

## 2021-12-23 PROCEDURE — 80061 LIPID PANEL: CPT | Performed by: FAMILY MEDICINE

## 2021-12-23 PROCEDURE — 84403 ASSAY OF TOTAL TESTOSTERONE: CPT | Performed by: FAMILY MEDICINE

## 2021-12-23 PROCEDURE — 80053 COMPREHEN METABOLIC PANEL: CPT | Performed by: FAMILY MEDICINE

## 2021-12-23 PROCEDURE — 36415 COLL VENOUS BLD VENIPUNCTURE: CPT | Mod: PO | Performed by: FAMILY MEDICINE

## 2021-12-23 PROCEDURE — 85025 COMPLETE CBC W/AUTO DIFF WBC: CPT | Performed by: FAMILY MEDICINE

## 2021-12-23 PROCEDURE — 84153 ASSAY OF PSA TOTAL: CPT | Performed by: FAMILY MEDICINE

## 2022-01-03 ENCOUNTER — OFFICE VISIT (OUTPATIENT)
Dept: FAMILY MEDICINE | Facility: CLINIC | Age: 57
End: 2022-01-03
Payer: COMMERCIAL

## 2022-01-03 VITALS
HEIGHT: 68 IN | SYSTOLIC BLOOD PRESSURE: 108 MMHG | DIASTOLIC BLOOD PRESSURE: 66 MMHG | TEMPERATURE: 98 F | BODY MASS INDEX: 33.68 KG/M2 | HEART RATE: 72 BPM | WEIGHT: 222.25 LBS | OXYGEN SATURATION: 97 %

## 2022-01-03 DIAGNOSIS — M1A.0710 IDIOPATHIC CHRONIC GOUT OF RIGHT FOOT WITHOUT TOPHUS: ICD-10-CM

## 2022-01-03 DIAGNOSIS — N28.89 OTHER SPECIFIED DISORDERS OF KIDNEY AND URETER: ICD-10-CM

## 2022-01-03 DIAGNOSIS — R79.89 LOW TESTOSTERONE: ICD-10-CM

## 2022-01-03 DIAGNOSIS — F41.9 ANXIETY: ICD-10-CM

## 2022-01-03 DIAGNOSIS — N18.32 STAGE 3B CHRONIC KIDNEY DISEASE: ICD-10-CM

## 2022-01-03 DIAGNOSIS — D64.9 MILD ANEMIA: ICD-10-CM

## 2022-01-03 DIAGNOSIS — G47.09 OTHER INSOMNIA: ICD-10-CM

## 2022-01-03 DIAGNOSIS — E78.2 MIXED HYPERLIPIDEMIA: ICD-10-CM

## 2022-01-03 DIAGNOSIS — C64.2 RENAL CELL CARCINOMA OF LEFT KIDNEY: ICD-10-CM

## 2022-01-03 DIAGNOSIS — I10 ESSENTIAL HYPERTENSION: Primary | ICD-10-CM

## 2022-01-03 PROCEDURE — 3008F BODY MASS INDEX DOCD: CPT | Mod: CPTII,S$GLB,, | Performed by: FAMILY MEDICINE

## 2022-01-03 PROCEDURE — 99214 PR OFFICE/OUTPT VISIT, EST, LEVL IV, 30-39 MIN: ICD-10-PCS | Mod: 25,S$GLB,, | Performed by: FAMILY MEDICINE

## 2022-01-03 PROCEDURE — 90750 HZV VACC RECOMBINANT IM: CPT | Mod: S$GLB,,, | Performed by: FAMILY MEDICINE

## 2022-01-03 PROCEDURE — 3074F PR MOST RECENT SYSTOLIC BLOOD PRESSURE < 130 MM HG: ICD-10-PCS | Mod: CPTII,S$GLB,, | Performed by: FAMILY MEDICINE

## 2022-01-03 PROCEDURE — 90471 IMMUNIZATION ADMIN: CPT | Mod: S$GLB,,, | Performed by: FAMILY MEDICINE

## 2022-01-03 PROCEDURE — 1159F MED LIST DOCD IN RCRD: CPT | Mod: CPTII,S$GLB,, | Performed by: FAMILY MEDICINE

## 2022-01-03 PROCEDURE — 3008F PR BODY MASS INDEX (BMI) DOCUMENTED: ICD-10-PCS | Mod: CPTII,S$GLB,, | Performed by: FAMILY MEDICINE

## 2022-01-03 PROCEDURE — 99999 PR PBB SHADOW E&M-EST. PATIENT-LVL IV: ICD-10-PCS | Mod: PBBFAC,,, | Performed by: FAMILY MEDICINE

## 2022-01-03 PROCEDURE — 90471 ZOSTER RECOMBINANT VACCINE: ICD-10-PCS | Mod: S$GLB,,, | Performed by: FAMILY MEDICINE

## 2022-01-03 PROCEDURE — 90750 ZOSTER RECOMBINANT VACCINE: ICD-10-PCS | Mod: S$GLB,,, | Performed by: FAMILY MEDICINE

## 2022-01-03 PROCEDURE — 4010F PR ACE/ARB THEARPY RXD/TAKEN: ICD-10-PCS | Mod: CPTII,S$GLB,, | Performed by: FAMILY MEDICINE

## 2022-01-03 PROCEDURE — 3078F DIAST BP <80 MM HG: CPT | Mod: CPTII,S$GLB,, | Performed by: FAMILY MEDICINE

## 2022-01-03 PROCEDURE — 99999 PR PBB SHADOW E&M-EST. PATIENT-LVL IV: CPT | Mod: PBBFAC,,, | Performed by: FAMILY MEDICINE

## 2022-01-03 PROCEDURE — 3074F SYST BP LT 130 MM HG: CPT | Mod: CPTII,S$GLB,, | Performed by: FAMILY MEDICINE

## 2022-01-03 PROCEDURE — 1159F PR MEDICATION LIST DOCUMENTED IN MEDICAL RECORD: ICD-10-PCS | Mod: CPTII,S$GLB,, | Performed by: FAMILY MEDICINE

## 2022-01-03 PROCEDURE — 3078F PR MOST RECENT DIASTOLIC BLOOD PRESSURE < 80 MM HG: ICD-10-PCS | Mod: CPTII,S$GLB,, | Performed by: FAMILY MEDICINE

## 2022-01-03 PROCEDURE — 99214 OFFICE O/P EST MOD 30 MIN: CPT | Mod: 25,S$GLB,, | Performed by: FAMILY MEDICINE

## 2022-01-03 PROCEDURE — 4010F ACE/ARB THERAPY RXD/TAKEN: CPT | Mod: CPTII,S$GLB,, | Performed by: FAMILY MEDICINE

## 2022-01-03 RX ORDER — AMLODIPINE AND BENAZEPRIL HYDROCHLORIDE 10; 40 MG/1; MG/1
1 CAPSULE ORAL DAILY
Qty: 90 CAPSULE | Refills: 3 | Status: SHIPPED | OUTPATIENT
Start: 2022-01-03 | End: 2023-02-09

## 2022-01-03 RX ORDER — HYDRALAZINE HYDROCHLORIDE 100 MG/1
100 TABLET, FILM COATED ORAL 3 TIMES DAILY
Qty: 270 TABLET | Refills: 2 | Status: SHIPPED | OUTPATIENT
Start: 2022-01-03 | End: 2022-08-18

## 2022-01-03 RX ORDER — PRAVASTATIN SODIUM 40 MG/1
40 TABLET ORAL DAILY
Qty: 90 TABLET | Refills: 3 | Status: SHIPPED | OUTPATIENT
Start: 2022-01-03

## 2022-01-03 RX ORDER — HYDROCHLOROTHIAZIDE 25 MG/1
25 TABLET ORAL DAILY
Qty: 90 TABLET | Refills: 2 | Status: SHIPPED | OUTPATIENT
Start: 2022-01-03 | End: 2022-05-06

## 2022-01-03 RX ORDER — METOPROLOL SUCCINATE 25 MG/1
25 TABLET, EXTENDED RELEASE ORAL DAILY
Qty: 90 TABLET | Refills: 3 | Status: SHIPPED | OUTPATIENT
Start: 2022-01-03 | End: 2023-02-09

## 2022-01-03 RX ORDER — SERTRALINE HYDROCHLORIDE 50 MG/1
50 TABLET, FILM COATED ORAL DAILY
Qty: 90 TABLET | Refills: 3 | Status: SHIPPED | OUTPATIENT
Start: 2022-01-03 | End: 2023-05-16

## 2022-01-03 RX ORDER — ALLOPURINOL 100 MG/1
100 TABLET ORAL DAILY
Qty: 90 TABLET | Refills: 2 | Status: SHIPPED | OUTPATIENT
Start: 2022-01-03 | End: 2022-11-16

## 2022-01-03 RX ORDER — TRAZODONE HYDROCHLORIDE 50 MG/1
50 TABLET ORAL DAILY
Qty: 90 TABLET | Refills: 2 | Status: SHIPPED | OUTPATIENT
Start: 2022-01-03 | End: 2022-02-18

## 2022-01-03 RX ORDER — POTASSIUM CHLORIDE 750 MG/1
10 TABLET, EXTENDED RELEASE ORAL ONCE
Qty: 90 TABLET | Refills: 3 | Status: SHIPPED | OUTPATIENT
Start: 2022-01-03 | End: 2022-08-18

## 2022-01-03 NOTE — PROGRESS NOTES
shingrix given Im left deltoid, tolerated well, Recommended 15 minute wait to watch for adverse reactions

## 2022-01-03 NOTE — PROGRESS NOTES
Chief Complaint:    Chief Complaint   Patient presents with    Follow-up    Results       History of Present Illness:    Here for 6 month follow-up of chronic medical problems:  Doing okay no side effects  He is on testosterone replacement therapy     Testosterone slightly low  CBC shows slightly anemic  Other labs are okay still continues to drink beer     Gallbladder polyp remained the same size as before based on the ultrasound done in June of 2021  ROS:  Review of Systems   Constitutional: Negative for activity change, chills, fatigue, fever and unexpected weight change.   HENT: Negative for congestion, ear discharge, ear pain, hearing loss, postnasal drip and rhinorrhea.    Eyes: Negative for pain and visual disturbance.   Respiratory: Negative for cough, chest tightness and shortness of breath.    Cardiovascular: Negative for chest pain and palpitations.   Gastrointestinal: Negative for abdominal pain, diarrhea and vomiting.   Endocrine: Negative for heat intolerance.   Genitourinary: Negative for dysuria, flank pain, frequency and hematuria.   Musculoskeletal: Negative for back pain, gait problem and neck pain.   Skin: Negative for color change and rash.   Neurological: Negative for dizziness, tremors, seizures, numbness and headaches.   Psychiatric/Behavioral: Negative for agitation, hallucinations, self-injury, sleep disturbance and suicidal ideas. The patient is not nervous/anxious.        Past Medical History:   Diagnosis Date    Allergy     Anxiety     Cancer of kidney     Depression     Hyperlipidemia     Hypertension        Social History:  Social History     Socioeconomic History    Marital status:    Occupational History     Employer: [employer]   Tobacco Use    Smoking status: Never Smoker    Smokeless tobacco: Current User    Tobacco comment: dips    Substance and Sexual Activity    Alcohol use: Yes    Drug use: No    Sexual activity: Yes     Partners: Female     Birth  "control/protection: None       Family History:   family history includes Cancer in his maternal uncle; Hypertension in his mother; No Known Problems in his brother, maternal aunt, maternal grandfather, maternal grandmother, paternal aunt, paternal grandfather, paternal grandmother, paternal uncle, and sister.    Health Maintenance   Topic Date Due    Lipid Panel  12/23/2022    TETANUS VACCINE  10/31/2027    Hepatitis C Screening  Completed       Physical Exam:    Vital Signs  Temp: 98.1 °F (36.7 °C)  Temp src: Temporal  Pulse: 72  SpO2: 97 %  BP: 108/66  Pain Score: 0-No pain  Height and Weight  Height: 5' 8" (172.7 cm)  Weight: 100.8 kg (222 lb 3.6 oz)  BSA (Calculated - sq m): 2.2 sq meters  BMI (Calculated): 33.8  Weight in (lb) to have BMI = 25: 164.1]    Body mass index is 33.79 kg/m².    Physical Exam  Constitutional:       Appearance: He is well-developed.   Eyes:      Conjunctiva/sclera: Conjunctivae normal.      Pupils: Pupils are equal, round, and reactive to light.   Cardiovascular:      Rate and Rhythm: Normal rate and regular rhythm.      Heart sounds: Normal heart sounds. No murmur heard.      Pulmonary:      Effort: Pulmonary effort is normal. No respiratory distress.      Breath sounds: Normal breath sounds. No wheezing or rales.   Chest:      Chest wall: No tenderness.   Abdominal:      General: There is no distension.      Palpations: Abdomen is soft. There is no mass.      Tenderness: There is no abdominal tenderness. There is no guarding.   Musculoskeletal:         General: No tenderness.      Cervical back: Normal range of motion and neck supple.   Lymphadenopathy:      Cervical: No cervical adenopathy.   Skin:     General: Skin is warm and dry.   Neurological:      Mental Status: He is alert and oriented to person, place, and time.      Deep Tendon Reflexes: Reflexes are normal and symmetric.   Psychiatric:         Behavior: Behavior normal.         Thought Content: Thought content normal.   "       Judgment: Judgment normal.           Assessment:      ICD-10-CM ICD-9-CM   1. Essential hypertension  I10 401.9   2. Low testosterone  R79.89 790.99   3. Mixed hyperlipidemia  E78.2 272.2   4. Other insomnia  G47.09 780.52   5. Idiopathic chronic gout of right foot without tophus  M1A.0710 274.02   6. Other specified disorders of kidney and ureter  N28.89 593.89   7. Anxiety  F41.9 300.00   8. Stage 3b chronic kidney disease  N18.32 585.3   9. Renal cell carcinoma of left kidney  C64.2 189.0   10. Mild anemia  D64.9 285.9         Plan:    Zoster Recombinant vaccination  Today  Recheck anemia in 3 months  Chronic kidney disease due to nephrectomy  Continue current dosage of testosterone replacement therapy   Other medical problems stable  Please work on weight loss  Labs as below   Follow-up 3 months      Orders Placed This Encounter   Procedures    (In Office Administered) Zoster Recombinant Vaccine    Testosterone    CBC Auto Differential    Comprehensive Metabolic Panel       Current Outpatient Medications   Medication Sig Dispense Refill    docusate sodium (COLACE) 100 MG capsule Take 1 capsule (100 mg total) by mouth 2 (two) times daily. 60 capsule 0    fluticasone propionate (FLONASE) 50 mcg/actuation nasal spray 1 spray (50 mcg total) by Each Nostril route once daily. 3 g 5    loratadine (CLARITIN) 10 mg tablet TAKE 1 TABLET BY MOUTH EVERY DAY 90 tablet 1    oxyCODONE-acetaminophen (PERCOCET) 5-325 mg per tablet Take 1 tablet by mouth every 4 (four) hours as needed for Pain. 35 tablet 0    testosterone cypionate (DEPOTESTOTERONE CYPIONATE) 200 mg/mL injection INJECT 1ml into THE MUSCLE EVERY 14 DAYS 2 mL 0    allopurinoL (ZYLOPRIM) 100 MG tablet Take 1 tablet (100 mg total) by mouth once daily. 90 tablet 2    ALPRAZolam (XANAX) 0.25 MG tablet Take 2 tablets (0.5 mg total) by mouth 2 (two) times daily as needed for Anxiety. 15 tablet 0    amLODIPine-benazepriL (LOTREL) 10-40 mg per capsule  Take 1 capsule by mouth once daily. 90 capsule 3    gabapentin (NEURONTIN) 300 MG capsule Take 1 capsule (300mg) during the day and 2 capsules (600mg) QHS. Total 900mg per day. 90 day supply. 270 capsule 1    hydrALAZINE (APRESOLINE) 100 MG tablet Take 1 tablet (100 mg total) by mouth 3 (three) times daily. 270 tablet 2    hydroCHLOROthiazide (HYDRODIURIL) 25 MG tablet Take 1 tablet (25 mg total) by mouth once daily. 90 tablet 2    metoprolol succinate (TOPROL-XL) 25 MG 24 hr tablet Take 1 tablet (25 mg total) by mouth once daily. 90 tablet 3    potassium chloride SA (K-DUR,KLOR-CON) 10 MEQ tablet Take 1 tablet (10 mEq total) by mouth once. for 1 dose 90 tablet 3    pravastatin (PRAVACHOL) 40 MG tablet Take 1 tablet (40 mg total) by mouth once daily. 90 tablet 3    sertraline (ZOLOFT) 50 MG tablet Take 1 tablet (50 mg total) by mouth once daily. 90 tablet 3    traZODone (DESYREL) 50 MG tablet Take 1 tablet (50 mg total) by mouth once daily. 90 tablet 2     No current facility-administered medications for this visit.       Medications Discontinued During This Encounter   Medication Reason    potassium chloride SA (K-DUR,KLOR-CON) 10 MEQ tablet Reorder    hydroCHLOROthiazide (HYDRODIURIL) 25 MG tablet Reorder    pravastatin (PRAVACHOL) 40 MG tablet Reorder    sertraline (ZOLOFT) 50 MG tablet Reorder    amLODIPine-benazepriL (LOTREL) 10-40 mg per capsule Reorder    metoprolol succinate (TOPROL-XL) 25 MG 24 hr tablet Reorder    traZODone (DESYREL) 50 MG tablet Reorder    hydrALAZINE (APRESOLINE) 100 MG tablet Reorder    allopurinoL (ZYLOPRIM) 100 MG tablet Reorder       Follow up in about 3 months (around 4/3/2022).      Mohan Schwartz MD    Scribe Attestation:   IRoscoe, am scribing for, and in the presence of, Dr.Arif Schwartz I performed the above scribed service and the documentation accurately describes the services I performed. I attest to the accuracy of the note.    I, Dr. Mohan Schwartz,  reviewed documentation as scribed above. I personally performed the services described in this documentation.  I agree that the record reflects my personal performance and is accurate and complete. Mohan Schwartz MD.  10/04/202

## 2022-01-04 DIAGNOSIS — E34.9 TESTOSTERONE DEFICIENCY: ICD-10-CM

## 2022-01-04 NOTE — TELEPHONE ENCOUNTER
No new care gaps identified.  Powered by [employer]. Reference number: [id number].   1/04/2022 5:23:42 PM CST

## 2022-01-05 RX ORDER — TESTOSTERONE CYPIONATE 200 MG/ML
INJECTION, SOLUTION INTRAMUSCULAR
Qty: 2 ML | Refills: 0 | Status: SHIPPED | OUTPATIENT
Start: 2022-01-05 | End: 2022-04-18

## 2022-02-18 DIAGNOSIS — G47.09 OTHER INSOMNIA: ICD-10-CM

## 2022-02-18 RX ORDER — GABAPENTIN 300 MG/1
CAPSULE ORAL
Qty: 270 CAPSULE | Refills: 1 | Status: SHIPPED | OUTPATIENT
Start: 2022-02-18 | End: 2022-05-18

## 2022-02-18 RX ORDER — TRAZODONE HYDROCHLORIDE 50 MG/1
TABLET ORAL
Qty: 90 TABLET | Refills: 1 | Status: SHIPPED | OUTPATIENT
Start: 2022-02-18 | End: 2023-02-09

## 2022-02-18 NOTE — TELEPHONE ENCOUNTER
----- Message from Maggie Hernandez sent at 2/18/2022  1:12 PM CST -----  Pt need a refill on gabapentin (NEURONTIN) 300 MG capsule.         [facility] - [place], [place] - [address]  [address] [address]  Phone: [phone] Fax: [phone]        Pt can be reached at [phone] (home) [phone] (work)

## 2022-02-18 NOTE — TELEPHONE ENCOUNTER
Care Due:                  Date            Visit Type   Department     Provider  --------------------------------------------------------------------------------                                EP -                              [facility]  Last Visit: 01-      CARE ([facility])   MEDICINE       Mohan Schwartz                              [facility]  Next Visit: 04-      CARE ([facility])   [facility]       Mohan Schwartz                                                            Last  Test          Frequency    Reason                     Performed    Due Date  --------------------------------------------------------------------------------    Uric Acid...  12 months..  allopurinoL..............  06- 05-    Powered by [employer]. Reference number: [id number].   2/18/2022 11:27:14 AM CST

## 2022-04-04 ENCOUNTER — LAB VISIT (OUTPATIENT)
Dept: LAB | Facility: HOSPITAL | Age: 57
End: 2022-04-04
Attending: FAMILY MEDICINE
Payer: COMMERCIAL

## 2022-04-04 DIAGNOSIS — C64.2 RENAL CELL CARCINOMA OF LEFT KIDNEY: ICD-10-CM

## 2022-04-04 DIAGNOSIS — N28.89 OTHER SPECIFIED DISORDERS OF KIDNEY AND URETER: ICD-10-CM

## 2022-04-04 DIAGNOSIS — I10 ESSENTIAL HYPERTENSION: ICD-10-CM

## 2022-04-04 DIAGNOSIS — G47.09 OTHER INSOMNIA: ICD-10-CM

## 2022-04-04 DIAGNOSIS — E78.2 MIXED HYPERLIPIDEMIA: ICD-10-CM

## 2022-04-04 DIAGNOSIS — R79.89 LOW TESTOSTERONE: ICD-10-CM

## 2022-04-04 DIAGNOSIS — M1A.0710 IDIOPATHIC CHRONIC GOUT OF RIGHT FOOT WITHOUT TOPHUS: ICD-10-CM

## 2022-04-04 DIAGNOSIS — N18.32 STAGE 3B CHRONIC KIDNEY DISEASE: ICD-10-CM

## 2022-04-04 DIAGNOSIS — F41.9 ANXIETY: ICD-10-CM

## 2022-04-04 LAB
ALBUMIN SERPL BCP-MCNC: 3.9 G/DL (ref 3.5–5.2)
ALP SERPL-CCNC: 83 U/L (ref 55–135)
ALT SERPL W/O P-5'-P-CCNC: 19 U/L (ref 10–44)
ANION GAP SERPL CALC-SCNC: 10 MMOL/L (ref 8–16)
AST SERPL-CCNC: 19 U/L (ref 10–40)
BASOPHILS # BLD AUTO: 0.05 K/UL (ref 0–0.2)
BASOPHILS NFR BLD: 0.5 % (ref 0–1.9)
BILIRUB SERPL-MCNC: 0.5 MG/DL (ref 0.1–1)
BUN SERPL-MCNC: 27 MG/DL (ref 6–20)
CALCIUM SERPL-MCNC: 9.8 MG/DL (ref 8.7–10.5)
CHLORIDE SERPL-SCNC: 103 MMOL/L (ref 95–110)
CO2 SERPL-SCNC: 27 MMOL/L (ref 23–29)
CREAT SERPL-MCNC: 1.9 MG/DL (ref 0.5–1.4)
DIFFERENTIAL METHOD: ABNORMAL
EOSINOPHIL # BLD AUTO: 0.1 K/UL (ref 0–0.5)
EOSINOPHIL NFR BLD: 1 % (ref 0–8)
ERYTHROCYTE [DISTWIDTH] IN BLOOD BY AUTOMATED COUNT: 12.6 % (ref 11.5–14.5)
EST. GFR  (AFRICAN AMERICAN): 44.9 ML/MIN/1.73 M^2
EST. GFR  (NON AFRICAN AMERICAN): 38.8 ML/MIN/1.73 M^2
GLUCOSE SERPL-MCNC: 111 MG/DL (ref 70–110)
HCT VFR BLD AUTO: 43 % (ref 40–54)
HGB BLD-MCNC: 14 G/DL (ref 14–18)
IMM GRANULOCYTES # BLD AUTO: 0.07 K/UL (ref 0–0.04)
IMM GRANULOCYTES NFR BLD AUTO: 0.7 % (ref 0–0.5)
LYMPHOCYTES # BLD AUTO: 1.8 K/UL (ref 1–4.8)
LYMPHOCYTES NFR BLD: 17.2 % (ref 18–48)
MCH RBC QN AUTO: 30 PG (ref 27–31)
MCHC RBC AUTO-ENTMCNC: 32.6 G/DL (ref 32–36)
MCV RBC AUTO: 92 FL (ref 82–98)
MONOCYTES # BLD AUTO: 0.6 K/UL (ref 0.3–1)
MONOCYTES NFR BLD: 6 % (ref 4–15)
NEUTROPHILS # BLD AUTO: 7.7 K/UL (ref 1.8–7.7)
NEUTROPHILS NFR BLD: 74.6 % (ref 38–73)
NRBC BLD-RTO: 0 /100 WBC
PLATELET # BLD AUTO: 273 K/UL (ref 150–450)
PMV BLD AUTO: 11.9 FL (ref 9.2–12.9)
POTASSIUM SERPL-SCNC: 4.5 MMOL/L (ref 3.5–5.1)
PROT SERPL-MCNC: 7.7 G/DL (ref 6–8.4)
RBC # BLD AUTO: 4.67 M/UL (ref 4.6–6.2)
SODIUM SERPL-SCNC: 140 MMOL/L (ref 136–145)
TESTOST SERPL-MCNC: 247 NG/DL (ref 304–1227)
WBC # BLD AUTO: 10.33 K/UL (ref 3.9–12.7)

## 2022-04-04 PROCEDURE — 85025 COMPLETE CBC W/AUTO DIFF WBC: CPT | Performed by: FAMILY MEDICINE

## 2022-04-04 PROCEDURE — 36415 COLL VENOUS BLD VENIPUNCTURE: CPT | Mod: PO | Performed by: FAMILY MEDICINE

## 2022-04-04 PROCEDURE — 84403 ASSAY OF TOTAL TESTOSTERONE: CPT | Performed by: FAMILY MEDICINE

## 2022-04-04 PROCEDURE — 80053 COMPREHEN METABOLIC PANEL: CPT | Performed by: FAMILY MEDICINE

## 2022-04-05 ENCOUNTER — PATIENT MESSAGE (OUTPATIENT)
Dept: FAMILY MEDICINE | Facility: CLINIC | Age: 57
End: 2022-04-05
Payer: COMMERCIAL

## 2022-04-11 ENCOUNTER — OFFICE VISIT (OUTPATIENT)
Dept: FAMILY MEDICINE | Facility: CLINIC | Age: 57
End: 2022-04-11
Payer: COMMERCIAL

## 2022-04-11 VITALS
TEMPERATURE: 99 F | SYSTOLIC BLOOD PRESSURE: 108 MMHG | HEIGHT: 68 IN | HEART RATE: 71 BPM | DIASTOLIC BLOOD PRESSURE: 60 MMHG | WEIGHT: 224.19 LBS | BODY MASS INDEX: 33.98 KG/M2 | OXYGEN SATURATION: 95 %

## 2022-04-11 DIAGNOSIS — Z51.81 ENCOUNTER FOR MONITORING TESTOSTERONE REPLACEMENT THERAPY: Primary | ICD-10-CM

## 2022-04-11 DIAGNOSIS — I10 ESSENTIAL HYPERTENSION: ICD-10-CM

## 2022-04-11 DIAGNOSIS — Z79.890 ENCOUNTER FOR MONITORING TESTOSTERONE REPLACEMENT THERAPY: Primary | ICD-10-CM

## 2022-04-11 DIAGNOSIS — S89.91XA INJURY OF RIGHT KNEE, INITIAL ENCOUNTER: ICD-10-CM

## 2022-04-11 DIAGNOSIS — N18.32 STAGE 3B CHRONIC KIDNEY DISEASE: ICD-10-CM

## 2022-04-11 DIAGNOSIS — Z72.0 DIPS TOBACCO: ICD-10-CM

## 2022-04-11 DIAGNOSIS — E78.00 PURE HYPERCHOLESTEROLEMIA: ICD-10-CM

## 2022-04-11 PROCEDURE — 3078F DIAST BP <80 MM HG: CPT | Mod: CPTII,S$GLB,, | Performed by: FAMILY MEDICINE

## 2022-04-11 PROCEDURE — 3074F PR MOST RECENT SYSTOLIC BLOOD PRESSURE < 130 MM HG: ICD-10-PCS | Mod: CPTII,S$GLB,, | Performed by: FAMILY MEDICINE

## 2022-04-11 PROCEDURE — 1159F PR MEDICATION LIST DOCUMENTED IN MEDICAL RECORD: ICD-10-PCS | Mod: CPTII,S$GLB,, | Performed by: FAMILY MEDICINE

## 2022-04-11 PROCEDURE — 4010F ACE/ARB THERAPY RXD/TAKEN: CPT | Mod: CPTII,S$GLB,, | Performed by: FAMILY MEDICINE

## 2022-04-11 PROCEDURE — 3008F BODY MASS INDEX DOCD: CPT | Mod: CPTII,S$GLB,, | Performed by: FAMILY MEDICINE

## 2022-04-11 PROCEDURE — 3008F PR BODY MASS INDEX (BMI) DOCUMENTED: ICD-10-PCS | Mod: CPTII,S$GLB,, | Performed by: FAMILY MEDICINE

## 2022-04-11 PROCEDURE — 99999 PR PBB SHADOW E&M-EST. PATIENT-LVL IV: CPT | Mod: PBBFAC,,, | Performed by: FAMILY MEDICINE

## 2022-04-11 PROCEDURE — 4010F PR ACE/ARB THEARPY RXD/TAKEN: ICD-10-PCS | Mod: CPTII,S$GLB,, | Performed by: FAMILY MEDICINE

## 2022-04-11 PROCEDURE — 99214 PR OFFICE/OUTPT VISIT, EST, LEVL IV, 30-39 MIN: ICD-10-PCS | Mod: S$GLB,,, | Performed by: FAMILY MEDICINE

## 2022-04-11 PROCEDURE — 99214 OFFICE O/P EST MOD 30 MIN: CPT | Mod: S$GLB,,, | Performed by: FAMILY MEDICINE

## 2022-04-11 PROCEDURE — 99999 PR PBB SHADOW E&M-EST. PATIENT-LVL IV: ICD-10-PCS | Mod: PBBFAC,,, | Performed by: FAMILY MEDICINE

## 2022-04-11 PROCEDURE — 3078F PR MOST RECENT DIASTOLIC BLOOD PRESSURE < 80 MM HG: ICD-10-PCS | Mod: CPTII,S$GLB,, | Performed by: FAMILY MEDICINE

## 2022-04-11 PROCEDURE — 3074F SYST BP LT 130 MM HG: CPT | Mod: CPTII,S$GLB,, | Performed by: FAMILY MEDICINE

## 2022-04-11 PROCEDURE — 1159F MED LIST DOCD IN RCRD: CPT | Mod: CPTII,S$GLB,, | Performed by: FAMILY MEDICINE

## 2022-04-11 NOTE — PROGRESS NOTES
"  Chief Complaint:    Chief Complaint   Patient presents with    Follow-up       History of Present Illness:  Patient with a hx of alcohol ingestion, HLD, HTN, s/p nephrectomy, low testosterone, and idiopathic chronic gout of right foot presents today for a three month follow-up    Not sleeping well. Not under any stress.   He is on testosterone replacement therapy. Last shot was 2.5 weeks ago.  Testosterone levels are low  CBC good.  Creatinine 1.9  Anemia stable, hemoglobin is 14   Fell and hit his right knee. Hurts to walk, makes a "thomping" noise sometimes.  Has cut back on alcohol.  Has dentist appt next month.      Gallbladder polyp remained the same size as before based on the ultrasound done in June of 2021  ROS:  Review of Systems   Constitutional: Negative for activity change, chills, fatigue, fever and unexpected weight change.   HENT: Negative for congestion, ear discharge, ear pain, hearing loss, postnasal drip and rhinorrhea.    Eyes: Negative for pain and visual disturbance.   Respiratory: Negative for cough, chest tightness and shortness of breath.    Cardiovascular: Negative for chest pain and palpitations.   Gastrointestinal: Negative for abdominal pain, diarrhea and vomiting.   Endocrine: Negative for heat intolerance.   Genitourinary: Negative for dysuria, flank pain, frequency and hematuria.   Musculoskeletal: Positive for arthralgias and myalgias. Negative for back pain, gait problem and neck pain.   Skin: Negative for color change and rash.   Neurological: Negative for dizziness, tremors, seizures, numbness and headaches.   Psychiatric/Behavioral: Positive for sleep disturbance. Negative for agitation, hallucinations, self-injury and suicidal ideas. The patient is not nervous/anxious.        Past Medical History:   Diagnosis Date    Allergy     Anxiety     Cancer of kidney     Depression     Hyperlipidemia     Hypertension        Social History:  Social History     Socioeconomic " "History    Marital status:    Occupational History     Employer: [employer]   Tobacco Use    Smoking status: Never Smoker    Smokeless tobacco: Current User    Tobacco comment: dips    Substance and Sexual Activity    Alcohol use: Yes    Drug use: No    Sexual activity: Yes     Partners: Female     Birth control/protection: None       Family History:   family history includes Cancer in his maternal uncle; Hypertension in his mother; No Known Problems in his brother, maternal aunt, maternal grandfather, maternal grandmother, paternal aunt, paternal grandfather, paternal grandmother, paternal uncle, and sister.    Health Maintenance   Topic Date Due    Lipid Panel  12/23/2022    TETANUS VACCINE  10/31/2027    Hepatitis C Screening  Completed       Physical Exam:    Vital Signs  Temp: 98.6 °F (37 °C)  Pulse: 71  SpO2: 95 %  BP: 108/60  Height and Weight  Height: 5' 8" (172.7 cm)  Weight: 101.7 kg (224 lb 3.3 oz)  BSA (Calculated - sq m): 2.21 sq meters  BMI (Calculated): 34.1  Weight in (lb) to have BMI = 25: 164.1]    Body mass index is 34.09 kg/m².    Physical Exam  Constitutional:       Appearance: He is well-developed.   Eyes:      Conjunctiva/sclera: Conjunctivae normal.      Pupils: Pupils are equal, round, and reactive to light.   Cardiovascular:      Rate and Rhythm: Normal rate and regular rhythm.      Heart sounds: Normal heart sounds. No murmur heard.  Pulmonary:      Effort: Pulmonary effort is normal. No respiratory distress.      Breath sounds: Normal breath sounds. No wheezing or rales.   Chest:      Chest wall: No tenderness.   Abdominal:      General: There is no distension.      Palpations: Abdomen is soft. There is no mass.      Tenderness: There is no abdominal tenderness. There is no guarding.   Musculoskeletal:      Cervical back: Normal range of motion and neck supple.      Right knee: Normal range of motion. Tenderness present.        Legs:    Lymphadenopathy:      " Cervical: No cervical adenopathy.   Skin:     General: Skin is warm and dry.   Neurological:      Mental Status: He is alert and oriented to person, place, and time.      Deep Tendon Reflexes: Reflexes are normal and symmetric.   Psychiatric:         Behavior: Behavior normal.         Thought Content: Thought content normal.         Judgment: Judgment normal.           Assessment:      ICD-10-CM ICD-9-CM   1. Encounter for monitoring testosterone replacement therapy  Z51.81 V58.83    Z79.890 V58.69   2. Essential hypertension  I10 401.9   3. Pure hypercholesterolemia  E78.00 272.0   4. Renal mass, left  N28.89 593.9   5. Stage 3b chronic kidney disease  N18.32 585.3   6. Injury of right knee, initial encounter  S89.91XA 959.7         Plan:    Order x-ray knee right for injury of knee.   Follow up with dentist appointment for mouth cancer.  Recommend stopping chewing tobacco  Continue to cut back on alcohol use.   Keep balanced diet. Don't stress eat. Limit carb, sugar intake.   Monitor weight. Start physical activity. Get below 200 pounds.  Recheck anemia in 3 months.  Anemia improved  Chronic kidney disease due to nephrectomy  Continue current dosage of testosterone replacement therapy, complete labs one week after doing testosterone shot.  Other medical problems stable  Please work on weight loss  Labs as below    Follow-up 3 months  No orders of the defined types were placed in this encounter.      Current Outpatient Medications   Medication Sig Dispense Refill    allopurinoL (ZYLOPRIM) 100 MG tablet Take 1 tablet (100 mg total) by mouth once daily. 90 tablet 2    amLODIPine-benazepriL (LOTREL) 10-40 mg per capsule Take 1 capsule by mouth once daily. 90 capsule 3    docusate sodium (COLACE) 100 MG capsule Take 1 capsule (100 mg total) by mouth 2 (two) times daily. 60 capsule 0    fluticasone propionate (FLONASE) 50 mcg/actuation nasal spray 1 spray (50 mcg total) by Each Nostril route once daily. 3 g 5     gabapentin (NEURONTIN) 300 MG capsule Take 1 capsule (300mg) during the day and 2 capsules (600mg) QHS. Total 900mg per day. 90 day supply. 270 capsule 1    hydrALAZINE (APRESOLINE) 100 MG tablet Take 1 tablet (100 mg total) by mouth 3 (three) times daily. 270 tablet 2    hydroCHLOROthiazide (HYDRODIURIL) 25 MG tablet Take 1 tablet (25 mg total) by mouth once daily. 90 tablet 2    loratadine (CLARITIN) 10 mg tablet TAKE 1 TABLET BY MOUTH EVERY DAY 90 tablet 1    metoprolol succinate (TOPROL-XL) 25 MG 24 hr tablet Take 1 tablet (25 mg total) by mouth once daily. 90 tablet 3    pravastatin (PRAVACHOL) 40 MG tablet Take 1 tablet (40 mg total) by mouth once daily. 90 tablet 3    sertraline (ZOLOFT) 50 MG tablet Take 1 tablet (50 mg total) by mouth once daily. 90 tablet 3    testosterone cypionate (DEPOTESTOTERONE CYPIONATE) 200 mg/mL injection INJECT 1ml into THE MUSCLE EVERY 14 DAYS 2 mL 0    traZODone (DESYREL) 50 MG tablet TAKE ONE TABLET BY MOUTH EVERY DAY 90 tablet 1    ALPRAZolam (XANAX) 0.25 MG tablet Take 2 tablets (0.5 mg total) by mouth 2 (two) times daily as needed for Anxiety. 15 tablet 0    oxyCODONE-acetaminophen (PERCOCET) 5-325 mg per tablet Take 1 tablet by mouth every 4 (four) hours as needed for Pain. (Patient not taking: Reported on 4/11/2022) 35 tablet 0     No current facility-administered medications for this visit.       There are no discontinued medications.    No follow-ups on file.      Mohan Schwartz MD  Scribe Attestation:   I, Zaira Manzo, am scribing for, and in the presence of, Dr.Arif Schwartz I performed the above scribed service and the documentation accurately describes the services I performed. I attest to the accuracy of the note.    I, Dr. Mohan Schwartz, reviewed documentation as scribed above. I performed the services described in this documentation.  I agree that the record reflects my personal performance and is accurate and complete. Mohan Schwartz MD.  10/04/2021

## 2022-04-12 ENCOUNTER — HOSPITAL ENCOUNTER (OUTPATIENT)
Dept: RADIOLOGY | Facility: HOSPITAL | Age: 57
Discharge: HOME | End: 2022-04-12
Attending: FAMILY MEDICINE
Payer: COMMERCIAL

## 2022-04-12 DIAGNOSIS — S89.91XA INJURY OF RIGHT KNEE, INITIAL ENCOUNTER: ICD-10-CM

## 2022-04-12 PROCEDURE — 73562 XR KNEE 3 VIEW RIGHT: ICD-10-PCS | Mod: 26,RT,, | Performed by: RADIOLOGY

## 2022-04-12 PROCEDURE — 73562 X-RAY EXAM OF KNEE 3: CPT | Mod: 26,RT,, | Performed by: RADIOLOGY

## 2022-04-12 PROCEDURE — 73562 X-RAY EXAM OF KNEE 3: CPT | Mod: TC,FY,PO,RT

## 2022-04-13 ENCOUNTER — PATIENT MESSAGE (OUTPATIENT)
Dept: FAMILY MEDICINE | Facility: CLINIC | Age: 57
End: 2022-04-13
Payer: COMMERCIAL

## 2022-04-29 ENCOUNTER — TELEPHONE (OUTPATIENT)
Dept: UROLOGY | Facility: CLINIC | Age: 57
End: 2022-04-29
Payer: COMMERCIAL

## 2022-04-29 ENCOUNTER — OFFICE VISIT (OUTPATIENT)
Dept: UROLOGY | Facility: CLINIC | Age: 57
End: 2022-04-29
Payer: COMMERCIAL

## 2022-04-29 VITALS
WEIGHT: 227.38 LBS | BODY MASS INDEX: 34.58 KG/M2 | DIASTOLIC BLOOD PRESSURE: 70 MMHG | HEART RATE: 76 BPM | SYSTOLIC BLOOD PRESSURE: 106 MMHG | TEMPERATURE: 98 F

## 2022-04-29 DIAGNOSIS — R79.89 LOW TESTOSTERONE: ICD-10-CM

## 2022-04-29 DIAGNOSIS — C64.2 RENAL CELL CARCINOMA OF LEFT KIDNEY: Primary | ICD-10-CM

## 2022-04-29 DIAGNOSIS — N18.32 STAGE 3B CHRONIC KIDNEY DISEASE: Primary | ICD-10-CM

## 2022-04-29 PROCEDURE — 1159F MED LIST DOCD IN RCRD: CPT | Mod: CPTII,S$GLB,, | Performed by: UROLOGY

## 2022-04-29 PROCEDURE — 3078F DIAST BP <80 MM HG: CPT | Mod: CPTII,S$GLB,, | Performed by: UROLOGY

## 2022-04-29 PROCEDURE — 3074F SYST BP LT 130 MM HG: CPT | Mod: CPTII,S$GLB,, | Performed by: UROLOGY

## 2022-04-29 PROCEDURE — 99999 PR PBB SHADOW E&M-EST. PATIENT-LVL V: CPT | Mod: PBBFAC,,, | Performed by: UROLOGY

## 2022-04-29 PROCEDURE — 1160F PR REVIEW ALL MEDS BY PRESCRIBER/CLIN PHARMACIST DOCUMENTED: ICD-10-PCS | Mod: CPTII,S$GLB,, | Performed by: UROLOGY

## 2022-04-29 PROCEDURE — 1160F RVW MEDS BY RX/DR IN RCRD: CPT | Mod: CPTII,S$GLB,, | Performed by: UROLOGY

## 2022-04-29 PROCEDURE — 1159F PR MEDICATION LIST DOCUMENTED IN MEDICAL RECORD: ICD-10-PCS | Mod: CPTII,S$GLB,, | Performed by: UROLOGY

## 2022-04-29 PROCEDURE — 99999 PR PBB SHADOW E&M-EST. PATIENT-LVL V: ICD-10-PCS | Mod: PBBFAC,,, | Performed by: UROLOGY

## 2022-04-29 PROCEDURE — 4010F PR ACE/ARB THEARPY RXD/TAKEN: ICD-10-PCS | Mod: CPTII,S$GLB,, | Performed by: UROLOGY

## 2022-04-29 PROCEDURE — 3074F PR MOST RECENT SYSTOLIC BLOOD PRESSURE < 130 MM HG: ICD-10-PCS | Mod: CPTII,S$GLB,, | Performed by: UROLOGY

## 2022-04-29 PROCEDURE — 3008F PR BODY MASS INDEX (BMI) DOCUMENTED: ICD-10-PCS | Mod: CPTII,S$GLB,, | Performed by: UROLOGY

## 2022-04-29 PROCEDURE — 99213 PR OFFICE/OUTPT VISIT, EST, LEVL III, 20-29 MIN: ICD-10-PCS | Mod: S$GLB,,, | Performed by: UROLOGY

## 2022-04-29 PROCEDURE — 99213 OFFICE O/P EST LOW 20 MIN: CPT | Mod: S$GLB,,, | Performed by: UROLOGY

## 2022-04-29 PROCEDURE — 3078F PR MOST RECENT DIASTOLIC BLOOD PRESSURE < 80 MM HG: ICD-10-PCS | Mod: CPTII,S$GLB,, | Performed by: UROLOGY

## 2022-04-29 PROCEDURE — 3008F BODY MASS INDEX DOCD: CPT | Mod: CPTII,S$GLB,, | Performed by: UROLOGY

## 2022-04-29 PROCEDURE — 4010F ACE/ARB THERAPY RXD/TAKEN: CPT | Mod: CPTII,S$GLB,, | Performed by: UROLOGY

## 2022-04-29 NOTE — TELEPHONE ENCOUNTER
Dr. Manzo has referred this pt to Nephrology.  Would you all please schedule and contact pt?  Thanks so much.   No

## 2022-04-29 NOTE — PROGRESS NOTES
Chief Complaint:   Encounter Diagnosis   Name Primary?    Renal cell carcinoma of left kidney Yes       HPI:    4/29/22- currently doing well, here today for surveillance screening.  55-year-old gentleman who comes in with a suspicious left renal mass.  MRI has been completed, would not have these results.  He has significant history of a right renal trauma in between 2nd and 3rd grades, requiring partial nephrectomy of the right kidney.  This ultrasound was done due to an abnormality of the gallbladder previously seen on past ultrasound 3 years ago.  Patient has had no other urological history, no family history of urological cancers or stones.  Patient has had no gross hematuria, he does have a history of smoking, currently chews tobacco.  Patient is otherwise asymptomatic.    Allergies:  Iodine and iodide containing products    Medications:  has a current medication list which includes the following prescription(s): allopurinol, amlodipine-benazepril, docusate sodium, fluticasone propionate, gabapentin, hydralazine, hydrochlorothiazide, loratadine, metoprolol succinate, oxycodone-acetaminophen, pravastatin, sertraline, testosterone cypionate, trazodone, and alprazolam.    Review of Systems:  General: No fever, chills, fatigability, or weight loss.  Skin: No rashes, itching, or changes in color or texture of skin.  Chest: Denies BRIGGS, cyanosis, wheezing, cough, and sputum production.  Abdomen: Appetite fine. No weight loss. Denies diarrhea, abdominal pain, hematemesis, or blood in stool.  Musculoskeletal: No joint stiffness or swelling. Denies back pain.  : As above.  All other review of systems negative.    PMH:   has a past medical history of Allergy, Anxiety, Cancer of kidney, Depression, Hyperlipidemia, and Hypertension.    PSH:   has a past surgical history that includes kidney removal; Cosmetic surgery; Colonoscopy (N/A, 6/8/2018); Colonoscopy (N/A, 6/11/2018); Colonoscopy (06/2018); Laparoscopic  nephrectomy, hand assisted (Left, 9/16/2021); and Colonoscopy (N/A, 11/17/2021).    FamHx: family history includes Cancer in his maternal uncle; Hypertension in his mother; No Known Problems in his brother, maternal aunt, maternal grandfather, maternal grandmother, paternal aunt, paternal grandfather, paternal grandmother, paternal uncle, and sister.    SocHx:  reports that he has never smoked. He uses smokeless tobacco. He reports current alcohol use. He reports that he does not use drugs.      Physical Exam:  Vitals:    04/29/22 1409   BP: 106/70   Pulse: 76   Temp: 98.2 °F (36.8 °C)     General: A&Ox3, no apparent distress, no deformities  Neck: No masses, normal ROM  Lungs: normal inspiration, no use of accessory muscles  Heart: normal pulse, no arrhythmias  Abdomen: Soft, NT, ND, no masses, no hernias, no hepatosplenomegaly, incisions are intact  Skin: The skin is warm and dry. No jaundice.  Ext: No c/c/e.    Labs/Studies:   CT non-contrasted no mets 11/21  CXR clear 9/21  CT renogram 9.2 cm left renal lesion 7/21  Lasix renogram 45% right/55% left 7/21  Creatinine 1.9 4/22  Remainder of labs stable 4/22  PSA 0.57 12/21  Testosterone 247 4/22    Impression/Plan:       1. Left renal cell carcinoma-  pT3a F3 (renal sinus fat)  left EDWIN radical Nx  9/16/21    Patient is here for surveillance screening, will order a CT scan and chest x-ray.  Refer to Nephrology due to rising creatinine.  If all is stable then I will see him in 6 months with labs and a chest x-ray, continue yearly CT scans due to pathology.    2. Hypogonadism- 200 mg every 2 weeks by his PCP.

## 2022-05-02 ENCOUNTER — LAB VISIT (OUTPATIENT)
Dept: LAB | Facility: HOSPITAL | Age: 57
End: 2022-05-02
Attending: FAMILY MEDICINE
Payer: COMMERCIAL

## 2022-05-02 DIAGNOSIS — N18.32 STAGE 3B CHRONIC KIDNEY DISEASE: ICD-10-CM

## 2022-05-02 LAB
ALBUMIN SERPL BCP-MCNC: 4 G/DL (ref 3.5–5.2)
ANION GAP SERPL CALC-SCNC: 8 MMOL/L (ref 8–16)
BASOPHILS # BLD AUTO: 0.06 K/UL (ref 0–0.2)
BASOPHILS NFR BLD: 0.5 % (ref 0–1.9)
BUN SERPL-MCNC: 30 MG/DL (ref 6–20)
CALCIUM SERPL-MCNC: 9.5 MG/DL (ref 8.7–10.5)
CHLORIDE SERPL-SCNC: 103 MMOL/L (ref 95–110)
CO2 SERPL-SCNC: 22 MMOL/L (ref 23–29)
CREAT SERPL-MCNC: 2.4 MG/DL (ref 0.5–1.4)
DIFFERENTIAL METHOD: ABNORMAL
EOSINOPHIL # BLD AUTO: 0.1 K/UL (ref 0–0.5)
EOSINOPHIL NFR BLD: 0.8 % (ref 0–8)
ERYTHROCYTE [DISTWIDTH] IN BLOOD BY AUTOMATED COUNT: 12.7 % (ref 11.5–14.5)
EST. GFR  (AFRICAN AMERICAN): 33.8 ML/MIN/1.73 M^2
EST. GFR  (NON AFRICAN AMERICAN): 29.3 ML/MIN/1.73 M^2
GLUCOSE SERPL-MCNC: 115 MG/DL (ref 70–110)
HCT VFR BLD AUTO: 38.6 % (ref 40–54)
HGB BLD-MCNC: 12.2 G/DL (ref 14–18)
IMM GRANULOCYTES # BLD AUTO: 0.07 K/UL (ref 0–0.04)
IMM GRANULOCYTES NFR BLD AUTO: 0.6 % (ref 0–0.5)
LYMPHOCYTES # BLD AUTO: 2 K/UL (ref 1–4.8)
LYMPHOCYTES NFR BLD: 17.1 % (ref 18–48)
MCH RBC QN AUTO: 29.5 PG (ref 27–31)
MCHC RBC AUTO-ENTMCNC: 31.6 G/DL (ref 32–36)
MCV RBC AUTO: 94 FL (ref 82–98)
MONOCYTES # BLD AUTO: 0.7 K/UL (ref 0.3–1)
MONOCYTES NFR BLD: 5.9 % (ref 4–15)
NEUTROPHILS # BLD AUTO: 8.8 K/UL (ref 1.8–7.7)
NEUTROPHILS NFR BLD: 75.1 % (ref 38–73)
NRBC BLD-RTO: 0 /100 WBC
PHOSPHATE SERPL-MCNC: 2.1 MG/DL (ref 2.7–4.5)
PLATELET # BLD AUTO: 278 K/UL (ref 150–450)
PMV BLD AUTO: 12 FL (ref 9.2–12.9)
POTASSIUM SERPL-SCNC: 4.4 MMOL/L (ref 3.5–5.1)
RBC # BLD AUTO: 4.13 M/UL (ref 4.6–6.2)
SODIUM SERPL-SCNC: 133 MMOL/L (ref 136–145)
WBC # BLD AUTO: 11.78 K/UL (ref 3.9–12.7)

## 2022-05-02 PROCEDURE — 80069 RENAL FUNCTION PANEL: CPT | Performed by: NURSE PRACTITIONER

## 2022-05-02 PROCEDURE — 36415 COLL VENOUS BLD VENIPUNCTURE: CPT | Mod: PO | Performed by: NURSE PRACTITIONER

## 2022-05-02 PROCEDURE — 85025 COMPLETE CBC W/AUTO DIFF WBC: CPT | Performed by: NURSE PRACTITIONER

## 2022-05-02 NOTE — PROGRESS NOTES
"Subjective:       Patient ID: Dre Curiel is a 56 y.o. male.    Chief Complaint: CKD 3b, s/p nephrectomy    HPI  He presents to clinic today for follow-up.  Pt was seen and examined today in clinic.  Pt denies taking NSAIDs, no GI losses, no abx use, no recent infections, no dysuria, no cardiopulmonary sx's.  Laboratory studies and medications were reviewed.  Since his last office visit he had left nephrectomy on 9/16/21.  He has been doing well and has no specific or new complaints.  All Nephrology related questions were answered to his satisfaction      Review of Systems   Constitutional: Negative.    HENT: Negative.    Respiratory: Negative.  Negative for shortness of breath.    Cardiovascular: Negative.  Negative for leg swelling.   Gastrointestinal: Negative.    Genitourinary: Negative.    Musculoskeletal: Negative.    Skin: Negative.    Neurological: Negative.  Negative for dizziness, light-headedness and headaches.   Psychiatric/Behavioral: Negative.          /64   Pulse 80   Resp 18   Ht 5' 8" (1.727 m)   Wt 102.4 kg (225 lb 12 oz)   BMI 34.33 kg/m²     Lab Results   Component Value Date    WBC 11.78 05/02/2022    HGB 12.2 (L) 05/02/2022    HCT 38.6 (L) 05/02/2022    MCV 94 05/02/2022     05/02/2022        CMP  Sodium   Date Value Ref Range Status   05/02/2022 133 (L) 136 - 145 mmol/L Final     Potassium   Date Value Ref Range Status   05/02/2022 4.4 3.5 - 5.1 mmol/L Final     Chloride   Date Value Ref Range Status   05/02/2022 103 95 - 110 mmol/L Final     CO2   Date Value Ref Range Status   05/02/2022 22 (L) 23 - 29 mmol/L Final     Glucose   Date Value Ref Range Status   05/02/2022 115 (H) 70 - 110 mg/dL Final     BUN   Date Value Ref Range Status   05/02/2022 30 (H) 6 - 20 mg/dL Final     Creatinine   Date Value Ref Range Status   05/02/2022 2.4 (H) 0.5 - 1.4 mg/dL Final     Calcium   Date Value Ref Range Status   05/02/2022 9.5 8.7 - 10.5 mg/dL Final     Total Protein   Date Value " Ref Range Status   04/04/2022 7.7 6.0 - 8.4 g/dL Final     Albumin   Date Value Ref Range Status   05/02/2022 4.0 3.5 - 5.2 g/dL Final     Total Bilirubin   Date Value Ref Range Status   04/04/2022 0.5 0.1 - 1.0 mg/dL Final     Comment:     For infants and newborns, interpretation of results should be based  on gestational age, weight and in agreement with clinical  observations.    Premature Infant recommended reference ranges:  Up to 24 hours.............<8.0 mg/dL  Up to 48 hours............<12.0 mg/dL  3-5 days..................<15.0 mg/dL  6-29 days.................<15.0 mg/dL       Alkaline Phosphatase   Date Value Ref Range Status   04/04/2022 83 55 - 135 U/L Final     AST   Date Value Ref Range Status   04/04/2022 19 10 - 40 U/L Final     ALT   Date Value Ref Range Status   04/04/2022 19 10 - 44 U/L Final     Anion Gap   Date Value Ref Range Status   05/02/2022 8 8 - 16 mmol/L Final     eGFR if    Date Value Ref Range Status   05/02/2022 33.8 (A) >60 mL/min/1.73 m^2 Final     eGFR if non    Date Value Ref Range Status   05/02/2022 29.3 (A) >60 mL/min/1.73 m^2 Final     Comment:     Calculation used to obtain the estimated glomerular filtration  rate (eGFR) is the CKD-EPI equation.          Current Outpatient Medications on File Prior to Visit   Medication Sig Dispense Refill    allopurinoL (ZYLOPRIM) 100 MG tablet Take 1 tablet (100 mg total) by mouth once daily. 90 tablet 2    amLODIPine-benazepriL (LOTREL) 10-40 mg per capsule Take 1 capsule by mouth once daily. 90 capsule 3    docusate sodium (COLACE) 100 MG capsule Take 1 capsule (100 mg total) by mouth 2 (two) times daily. 60 capsule 0    fluticasone propionate (FLONASE) 50 mcg/actuation nasal spray 1 spray (50 mcg total) by Each Nostril route once daily. 3 g 5    gabapentin (NEURONTIN) 300 MG capsule Take 1 capsule (300mg) during the day and 2 capsules (600mg) QHS. Total 900mg per day. 90 day supply. 270 capsule 1     hydrALAZINE (APRESOLINE) 100 MG tablet Take 1 tablet (100 mg total) by mouth 3 (three) times daily. 270 tablet 2    loratadine (CLARITIN) 10 mg tablet TAKE 1 TABLET BY MOUTH EVERY DAY 90 tablet 1    metoprolol succinate (TOPROL-XL) 25 MG 24 hr tablet Take 1 tablet (25 mg total) by mouth once daily. 90 tablet 3    oxyCODONE-acetaminophen (PERCOCET) 5-325 mg per tablet Take 1 tablet by mouth every 4 (four) hours as needed for Pain. 35 tablet 0    potassium chloride (KLOR-CON) 10 MEQ TbSR Take 10 mEq by mouth once.      pravastatin (PRAVACHOL) 40 MG tablet Take 1 tablet (40 mg total) by mouth once daily. 90 tablet 3    sertraline (ZOLOFT) 50 MG tablet Take 1 tablet (50 mg total) by mouth once daily. 90 tablet 3    testosterone cypionate (DEPOTESTOTERONE CYPIONATE) 200 mg/mL injection INJECT ONE ML INTO MUSCLE Q14D 2 mL 2    traZODone (DESYREL) 50 MG tablet TAKE ONE TABLET BY MOUTH EVERY DAY 90 tablet 1    ALPRAZolam (XANAX) 0.25 MG tablet Take 2 tablets (0.5 mg total) by mouth 2 (two) times daily as needed for Anxiety. 15 tablet 0    [DISCONTINUED] hydroCHLOROthiazide (HYDRODIURIL) 25 MG tablet Take 1 tablet (25 mg total) by mouth once daily. 90 tablet 2     No current facility-administered medications on file prior to visit.         Objective:        Physical Exam  Vitals and nursing note reviewed.   Constitutional:       Appearance: Normal appearance.   HENT:      Head: Normocephalic and atraumatic.   Eyes:      Extraocular Movements: Extraocular movements intact.      Pupils: Pupils are equal, round, and reactive to light.   Cardiovascular:      Rate and Rhythm: Normal rate and regular rhythm.   Pulmonary:      Effort: Pulmonary effort is normal.   Abdominal:      General: Bowel sounds are normal.      Palpations: Abdomen is soft.   Musculoskeletal:         General: Normal range of motion.      Right lower leg: No edema.      Left lower leg: No edema.   Skin:     General: Skin is warm and dry.    Neurological:      General: No focal deficit present.      Mental Status: He is alert and oriented to person, place, and time.   Psychiatric:         Mood and Affect: Mood normal.         Behavior: Behavior normal.         Thought Content: Thought content normal.         Judgment: Judgment normal.         Assessment:       1. Stage 3b chronic kidney disease    2. S/p nephrectomy    3. Parenchymal renal hypertension, stage 1-4 or unspecified chronic kidney disease    4. Gout, unspecified cause, unspecified chronicity, unspecified site    5. Metabolic acidosis        Plan:       1. Creatinine has mild fluctuation ranging between 1.8 and 2.4.  Most recent creatinine is 2.4.  Reports recent episodes of diarrhea and denies drinking enough water daily.  Discussed importance of balanced daily hydration as tolerated.  He is on a RAAS inhibitor. Potassium is stable 4.4.    2.  S/p left nephrectomy r/t renal mass.  Denies issues at present. Continue to f/u with Urology as directed.    3. Blood pressure is 110/64 on exam today.  He reports taking BP daily and states systolic ranges from 100/120 lately.  He reports feeling fatigued frequently.  No edema noted on exam and denies shortness of breath or BRIGGS.  Sodium decreased to 133.  HCTZ decreased to 12.5mg. He is on a RAAS inhibitor. Continue all other medications as prescribed. Take BP daily and keep diary to bring on return visit.      4. He denies recent flare or tophus stating as long as he takes his medicine and watches diet. Controlled with current regimen.  Educated and discussed further on importance of diet control and med compliance. Handouts given and reviewed with pt today.    5. Serum bicarbonate has fluctuated fron 22-29 over last year; most recent lab was 22.  Started him on sodium bicarbonate replacement 650 mg p.o. b.i.d. Will recheck labs and adjust as indicated with next visit.    RTC  2 mos    45 minutes of total time spent on the encounter, which includes  face to face time and non-face to face time preparing to see the patient (eg, review of tests), obtaining and/or reviewing separately obtained history, documenting clinical information in the electronic or other health record, Independently interpreting results and communicating results to the patient/family/caregiver, or care coordination    Katie Anderson, VICTOR MP-C

## 2022-05-02 NOTE — PATIENT INSTRUCTIONS
Blood Pressure Control- monitor daily and keep diary  Increase Fruits and Vegetables in Diet  Low Sodium Diet  Low Purine Diet (gout)  Increase Water (4 bottle), flavored water/diluted juice daily as tolerated  Avoid NSAIDS: Advil, Aleve, Ibuprofen, Naproxen, Meloxicam (Aspirin ok), Tylenol is Best  Exercise as tolerated

## 2022-05-06 ENCOUNTER — OFFICE VISIT (OUTPATIENT)
Dept: NEPHROLOGY | Facility: CLINIC | Age: 57
End: 2022-05-06
Payer: COMMERCIAL

## 2022-05-06 VITALS
WEIGHT: 225.75 LBS | BODY MASS INDEX: 34.21 KG/M2 | HEIGHT: 68 IN | HEART RATE: 80 BPM | DIASTOLIC BLOOD PRESSURE: 64 MMHG | RESPIRATION RATE: 18 BRPM | SYSTOLIC BLOOD PRESSURE: 110 MMHG

## 2022-05-06 DIAGNOSIS — N18.32 STAGE 3B CHRONIC KIDNEY DISEASE: Primary | ICD-10-CM

## 2022-05-06 DIAGNOSIS — E87.20 METABOLIC ACIDOSIS: ICD-10-CM

## 2022-05-06 DIAGNOSIS — M10.9 GOUT, UNSPECIFIED CAUSE, UNSPECIFIED CHRONICITY, UNSPECIFIED SITE: ICD-10-CM

## 2022-05-06 DIAGNOSIS — Z90.5 S/P NEPHRECTOMY: ICD-10-CM

## 2022-05-06 DIAGNOSIS — I12.9 PARENCHYMAL RENAL HYPERTENSION, STAGE 1-4 OR UNSPECIFIED CHRONIC KIDNEY DISEASE: ICD-10-CM

## 2022-05-06 PROCEDURE — 3066F NEPHROPATHY DOC TX: CPT | Mod: CPTII,S$GLB,, | Performed by: NURSE PRACTITIONER

## 2022-05-06 PROCEDURE — 3074F SYST BP LT 130 MM HG: CPT | Mod: CPTII,S$GLB,, | Performed by: NURSE PRACTITIONER

## 2022-05-06 PROCEDURE — 99999 PR PBB SHADOW E&M-EST. PATIENT-LVL IV: ICD-10-PCS | Mod: PBBFAC,,, | Performed by: NURSE PRACTITIONER

## 2022-05-06 PROCEDURE — 99214 PR OFFICE/OUTPT VISIT, EST, LEVL IV, 30-39 MIN: ICD-10-PCS | Mod: S$GLB,,, | Performed by: NURSE PRACTITIONER

## 2022-05-06 PROCEDURE — 1159F PR MEDICATION LIST DOCUMENTED IN MEDICAL RECORD: ICD-10-PCS | Mod: CPTII,S$GLB,, | Performed by: NURSE PRACTITIONER

## 2022-05-06 PROCEDURE — 3008F BODY MASS INDEX DOCD: CPT | Mod: CPTII,S$GLB,, | Performed by: NURSE PRACTITIONER

## 2022-05-06 PROCEDURE — 1160F RVW MEDS BY RX/DR IN RCRD: CPT | Mod: CPTII,S$GLB,, | Performed by: NURSE PRACTITIONER

## 2022-05-06 PROCEDURE — 3078F PR MOST RECENT DIASTOLIC BLOOD PRESSURE < 80 MM HG: ICD-10-PCS | Mod: CPTII,S$GLB,, | Performed by: NURSE PRACTITIONER

## 2022-05-06 PROCEDURE — 99214 OFFICE O/P EST MOD 30 MIN: CPT | Mod: S$GLB,,, | Performed by: NURSE PRACTITIONER

## 2022-05-06 PROCEDURE — 1159F MED LIST DOCD IN RCRD: CPT | Mod: CPTII,S$GLB,, | Performed by: NURSE PRACTITIONER

## 2022-05-06 PROCEDURE — 4010F ACE/ARB THERAPY RXD/TAKEN: CPT | Mod: CPTII,S$GLB,, | Performed by: NURSE PRACTITIONER

## 2022-05-06 PROCEDURE — 1160F PR REVIEW ALL MEDS BY PRESCRIBER/CLIN PHARMACIST DOCUMENTED: ICD-10-PCS | Mod: CPTII,S$GLB,, | Performed by: NURSE PRACTITIONER

## 2022-05-06 PROCEDURE — 3066F PR DOCUMENTATION OF TREATMENT FOR NEPHROPATHY: ICD-10-PCS | Mod: CPTII,S$GLB,, | Performed by: NURSE PRACTITIONER

## 2022-05-06 PROCEDURE — 4010F PR ACE/ARB THEARPY RXD/TAKEN: ICD-10-PCS | Mod: CPTII,S$GLB,, | Performed by: NURSE PRACTITIONER

## 2022-05-06 PROCEDURE — 3074F PR MOST RECENT SYSTOLIC BLOOD PRESSURE < 130 MM HG: ICD-10-PCS | Mod: CPTII,S$GLB,, | Performed by: NURSE PRACTITIONER

## 2022-05-06 PROCEDURE — 3008F PR BODY MASS INDEX (BMI) DOCUMENTED: ICD-10-PCS | Mod: CPTII,S$GLB,, | Performed by: NURSE PRACTITIONER

## 2022-05-06 PROCEDURE — 3078F DIAST BP <80 MM HG: CPT | Mod: CPTII,S$GLB,, | Performed by: NURSE PRACTITIONER

## 2022-05-06 PROCEDURE — 99999 PR PBB SHADOW E&M-EST. PATIENT-LVL IV: CPT | Mod: PBBFAC,,, | Performed by: NURSE PRACTITIONER

## 2022-05-06 RX ORDER — HYDROCHLOROTHIAZIDE 12.5 MG/1
12.5 TABLET ORAL DAILY
Qty: 30 TABLET | Refills: 11 | Status: SHIPPED | OUTPATIENT
Start: 2022-05-06 | End: 2022-05-06

## 2022-05-06 RX ORDER — HYDROCHLOROTHIAZIDE 12.5 MG/1
12.5 TABLET ORAL DAILY
Qty: 30 TABLET | Refills: 11 | Status: SHIPPED | OUTPATIENT
Start: 2022-05-06 | End: 2023-02-14 | Stop reason: SDUPTHER

## 2022-05-06 RX ORDER — POTASSIUM CHLORIDE 750 MG/1
10 TABLET, EXTENDED RELEASE ORAL
COMMUNITY

## 2022-05-06 RX ORDER — SODIUM BICARBONATE 650 MG/1
650 TABLET ORAL 2 TIMES DAILY
Qty: 180 TABLET | Refills: 1 | Status: SHIPPED | OUTPATIENT
Start: 2022-05-06 | End: 2022-07-08 | Stop reason: SDUPTHER

## 2022-05-12 ENCOUNTER — HOSPITAL ENCOUNTER (OUTPATIENT)
Dept: RADIOLOGY | Facility: HOSPITAL | Age: 57
Discharge: HOME | End: 2022-05-12
Attending: UROLOGY
Payer: COMMERCIAL

## 2022-05-12 DIAGNOSIS — C64.2 RENAL CELL CARCINOMA OF LEFT KIDNEY: ICD-10-CM

## 2022-05-12 PROCEDURE — 74176 CT ABD & PELVIS W/O CONTRAST: CPT | Mod: TC

## 2022-05-12 PROCEDURE — 71045 X-RAY EXAM CHEST 1 VIEW: CPT | Mod: TC

## 2022-05-18 RX ORDER — GABAPENTIN 300 MG/1
CAPSULE ORAL
Qty: 270 CAPSULE | Refills: 1 | Status: SHIPPED | OUTPATIENT
Start: 2022-05-18 | End: 2022-11-16

## 2022-05-18 NOTE — TELEPHONE ENCOUNTER
Care Due:                  Date            Visit Type   Department     Provider  --------------------------------------------------------------------------------                                EP -                              [facility]  Last Visit: 04-      CARE ([facility])   MEDICINE       Mohan Schwartz                              [facility]  Next Visit: 10-      CARE ([facility])   [facility]       Mohan Schwartz                                                            Last  Test          Frequency    Reason                     Performed    Due Date  --------------------------------------------------------------------------------    Uric Acid...  12 months..  allopurinoL..............  06- 05-    Health [facility] Embedded Care Gaps. Reference number: [id number]. 5/18/2022   7:11:44 AM CDT

## 2022-06-13 ENCOUNTER — OFFICE VISIT (OUTPATIENT)
Dept: CARDIOLOGY | Facility: CLINIC | Age: 57
End: 2022-06-13
Payer: COMMERCIAL

## 2022-06-13 VITALS
HEIGHT: 68 IN | WEIGHT: 223.75 LBS | HEART RATE: 81 BPM | DIASTOLIC BLOOD PRESSURE: 72 MMHG | SYSTOLIC BLOOD PRESSURE: 120 MMHG | BODY MASS INDEX: 33.91 KG/M2

## 2022-06-13 DIAGNOSIS — N18.32 STAGE 3B CHRONIC KIDNEY DISEASE: ICD-10-CM

## 2022-06-13 DIAGNOSIS — E78.00 PURE HYPERCHOLESTEROLEMIA: Primary | ICD-10-CM

## 2022-06-13 DIAGNOSIS — I10 ESSENTIAL HYPERTENSION: ICD-10-CM

## 2022-06-13 PROCEDURE — 4010F ACE/ARB THERAPY RXD/TAKEN: CPT | Mod: CPTII,S$GLB,, | Performed by: INTERNAL MEDICINE

## 2022-06-13 PROCEDURE — 1159F MED LIST DOCD IN RCRD: CPT | Mod: CPTII,S$GLB,, | Performed by: INTERNAL MEDICINE

## 2022-06-13 PROCEDURE — 4010F PR ACE/ARB THEARPY RXD/TAKEN: ICD-10-PCS | Mod: CPTII,S$GLB,, | Performed by: INTERNAL MEDICINE

## 2022-06-13 PROCEDURE — 1159F PR MEDICATION LIST DOCUMENTED IN MEDICAL RECORD: ICD-10-PCS | Mod: CPTII,S$GLB,, | Performed by: INTERNAL MEDICINE

## 2022-06-13 PROCEDURE — 3066F NEPHROPATHY DOC TX: CPT | Mod: CPTII,S$GLB,, | Performed by: INTERNAL MEDICINE

## 2022-06-13 PROCEDURE — 99214 OFFICE O/P EST MOD 30 MIN: CPT | Mod: S$GLB,,, | Performed by: INTERNAL MEDICINE

## 2022-06-13 PROCEDURE — 3074F PR MOST RECENT SYSTOLIC BLOOD PRESSURE < 130 MM HG: ICD-10-PCS | Mod: CPTII,S$GLB,, | Performed by: INTERNAL MEDICINE

## 2022-06-13 PROCEDURE — 3074F SYST BP LT 130 MM HG: CPT | Mod: CPTII,S$GLB,, | Performed by: INTERNAL MEDICINE

## 2022-06-13 PROCEDURE — 99999 PR PBB SHADOW E&M-EST. PATIENT-LVL IV: CPT | Mod: PBBFAC,,, | Performed by: INTERNAL MEDICINE

## 2022-06-13 PROCEDURE — 3008F PR BODY MASS INDEX (BMI) DOCUMENTED: ICD-10-PCS | Mod: CPTII,S$GLB,, | Performed by: INTERNAL MEDICINE

## 2022-06-13 PROCEDURE — 99999 PR PBB SHADOW E&M-EST. PATIENT-LVL IV: ICD-10-PCS | Mod: PBBFAC,,, | Performed by: INTERNAL MEDICINE

## 2022-06-13 PROCEDURE — 99214 PR OFFICE/OUTPT VISIT, EST, LEVL IV, 30-39 MIN: ICD-10-PCS | Mod: S$GLB,,, | Performed by: INTERNAL MEDICINE

## 2022-06-13 PROCEDURE — 3078F PR MOST RECENT DIASTOLIC BLOOD PRESSURE < 80 MM HG: ICD-10-PCS | Mod: CPTII,S$GLB,, | Performed by: INTERNAL MEDICINE

## 2022-06-13 PROCEDURE — 1160F RVW MEDS BY RX/DR IN RCRD: CPT | Mod: CPTII,S$GLB,, | Performed by: INTERNAL MEDICINE

## 2022-06-13 PROCEDURE — 3066F PR DOCUMENTATION OF TREATMENT FOR NEPHROPATHY: ICD-10-PCS | Mod: CPTII,S$GLB,, | Performed by: INTERNAL MEDICINE

## 2022-06-13 PROCEDURE — 1160F PR REVIEW ALL MEDS BY PRESCRIBER/CLIN PHARMACIST DOCUMENTED: ICD-10-PCS | Mod: CPTII,S$GLB,, | Performed by: INTERNAL MEDICINE

## 2022-06-13 PROCEDURE — 3078F DIAST BP <80 MM HG: CPT | Mod: CPTII,S$GLB,, | Performed by: INTERNAL MEDICINE

## 2022-06-13 PROCEDURE — 3008F BODY MASS INDEX DOCD: CPT | Mod: CPTII,S$GLB,, | Performed by: INTERNAL MEDICINE

## 2022-06-13 NOTE — PROGRESS NOTES
Subjective:   Patient ID:  Dre Curiel is a 56 y.o. male who presents for follow-up of Hyperlipidemia and Follow-up  Pt with atypical CP at rest. Pt denies exertional sx.  Stress test nml 2016  Hypertension  This is a chronic problem. The current episode started more than 1 year ago. The problem has been gradually improving since onset. The problem is controlled. Pertinent negatives include no chest pain, palpitations or shortness of breath. Past treatments include calcium channel blockers, ACE inhibitors and direct vasodilators. The current treatment provides moderate improvement. There are no compliance problems.    Hyperlipidemia  This is a chronic problem. The current episode started more than 1 year ago. The problem is controlled. Recent lipid tests were reviewed and are variable. Pertinent negatives include no chest pain or shortness of breath. Current antihyperlipidemic treatment includes statins. The current treatment provides moderate improvement of lipids. There are no compliance problems.  Risk factors for coronary artery disease include hypertension, male sex and dyslipidemia.       Review of Systems   Constitutional: Negative. Negative for weight gain.   HENT: Negative.    Eyes: Negative.    Cardiovascular: Negative.  Negative for chest pain, leg swelling and palpitations.   Respiratory: Negative.  Negative for shortness of breath.    Endocrine: Negative.    Hematologic/Lymphatic: Negative.    Skin: Negative.    Musculoskeletal: Negative for muscle weakness.   Gastrointestinal: Negative.    Genitourinary: Negative.    Neurological: Negative.  Negative for dizziness.   Psychiatric/Behavioral: Negative.    Allergic/Immunologic: Negative.      Family History   Problem Relation Age of Onset    Hypertension Mother     No Known Problems Sister     No Known Problems Brother     No Known Problems Maternal Aunt     Cancer Maternal Uncle     No Known Problems Paternal Aunt     No Known Problems Paternal  Uncle     No Known Problems Maternal Grandmother     No Known Problems Maternal Grandfather     No Known Problems Paternal Grandmother     No Known Problems Paternal Grandfather      Past Medical History:   Diagnosis Date    Allergy     Anxiety     Cancer of kidney     Depression     Hyperlipidemia     Hypertension      Social History     Socioeconomic History    Marital status:    Occupational History     Employer: [employer]   Tobacco Use    Smoking status: Never Smoker    Smokeless tobacco: Current User    Tobacco comment: dips    Substance and Sexual Activity    Alcohol use: Yes    Drug use: No    Sexual activity: Yes     Partners: Female     Birth control/protection: None     Current Outpatient Medications on File Prior to Visit   Medication Sig Dispense Refill    allopurinoL (ZYLOPRIM) 100 MG tablet Take 1 tablet (100 mg total) by mouth once daily. 90 tablet 2    ALPRAZolam (XANAX) 0.25 MG tablet Take 2 tablets (0.5 mg total) by mouth 2 (two) times daily as needed for Anxiety. 15 tablet 0    amLODIPine-benazepriL (LOTREL) 10-40 mg per capsule Take 1 capsule by mouth once daily. 90 capsule 3    docusate sodium (COLACE) 100 MG capsule Take 1 capsule (100 mg total) by mouth 2 (two) times daily. 60 capsule 0    fluticasone propionate (FLONASE) 50 mcg/actuation nasal spray 1 spray (50 mcg total) by Each Nostril route once daily. 3 g 5    gabapentin (NEURONTIN) 300 MG capsule TAKE ONE CAPSULE BY MOUTH DURING THE DAY THEN TAKE TWO TABLETS EVERY NIGHT AT BEDTIME 270 capsule 1    hydrALAZINE (APRESOLINE) 100 MG tablet Take 1 tablet (100 mg total) by mouth 3 (three) times daily. 270 tablet 2    hydroCHLOROthiazide (HYDRODIURIL) 12.5 MG Tab Take 1 tablet (12.5 mg total) by mouth once daily. 30 tablet 11    loratadine (CLARITIN) 10 mg tablet TAKE 1 TABLET BY MOUTH EVERY DAY 90 tablet 1    metoprolol succinate (TOPROL-XL) 25 MG 24 hr tablet Take 1 tablet (25 mg total) by mouth once  daily. 90 tablet 3    oxyCODONE-acetaminophen (PERCOCET) 5-325 mg per tablet Take 1 tablet by mouth every 4 (four) hours as needed for Pain. 35 tablet 0    potassium chloride (KLOR-CON) 10 MEQ TbSR Take 10 mEq by mouth once.      pravastatin (PRAVACHOL) 40 MG tablet Take 1 tablet (40 mg total) by mouth once daily. 90 tablet 3    sertraline (ZOLOFT) 50 MG tablet Take 1 tablet (50 mg total) by mouth once daily. 90 tablet 3    sodium bicarbonate 650 MG tablet Take 1 tablet (650 mg total) by mouth 2 (two) times daily. With food 180 tablet 1    testosterone cypionate (DEPOTESTOTERONE CYPIONATE) 200 mg/mL injection INJECT ONE ML INTO MUSCLE Q14D 2 mL 2    traZODone (DESYREL) 50 MG tablet TAKE ONE TABLET BY MOUTH EVERY DAY 90 tablet 1     No current facility-administered medications on file prior to visit.     Review of patient's allergies indicates:   Allergen Reactions    Iodine and iodide containing products      Other reaction(s): Swelling  Other reaction(s): Sneezing  Other reaction(s): Shortness of breath       Objective:     Physical Exam  Vitals and nursing note reviewed.   Constitutional:       Appearance: He is well-developed.   HENT:      Head: Normocephalic and atraumatic.   Eyes:      Conjunctiva/sclera: Conjunctivae normal.      Pupils: Pupils are equal, round, and reactive to light.   Cardiovascular:      Rate and Rhythm: Normal rate and regular rhythm.      Pulses: Intact distal pulses.      Heart sounds: Normal heart sounds.   Pulmonary:      Effort: Pulmonary effort is normal.      Breath sounds: Normal breath sounds.   Abdominal:      General: Bowel sounds are normal.      Palpations: Abdomen is soft.   Musculoskeletal:      Cervical back: Normal range of motion and neck supple.   Skin:     General: Skin is warm and dry.   Neurological:      Mental Status: He is alert and oriented to person, place, and time.         Assessment:     1. Pure hypercholesterolemia    2. Essential hypertension    3.  Stage 3b chronic kidney disease        Plan:     Pure hypercholesterolemia    Essential hypertension    Stage 3b chronic kidney disease      Continue lotrel, hydralazine,-htn  Continue statin-hlp   PPI CP

## 2022-06-16 NOTE — ANESTHESIA POSTPROCEDURE EVALUATION
"Anesthesia Post Evaluation    Patient: Dre Curiel    Procedure(s) Performed: Procedure(s) (LRB):  COLONOSCOPY (N/A)    Final Anesthesia Type: MAC  Patient location during evaluation: PACU  Patient participation: Yes- Able to Participate  Level of consciousness: awake  Post-procedure vital signs: reviewed and stable  Pain management: adequate  Airway patency: patent  PONV status at discharge: No PONV  Anesthetic complications: no      Cardiovascular status: blood pressure returned to baseline and hemodynamically stable  Respiratory status: unassisted, spontaneous ventilation and room air  Hydration status: euvolemic  Follow-up not needed.        Visit Vitals  BP (!) 151/85   Pulse 66   Temp 36.8 °C (98.2 °F) (Oral)   Resp 20   Ht 5' 8" (1.727 m)   Wt 98.9 kg (218 lb)   SpO2 97%   BMI 33.15 kg/m²       Pain/Marisa Score: Pain Assessment Performed: Yes (6/8/2018 12:59 PM)  Presence of Pain: denies (6/8/2018 12:59 PM)      " ED Attending Physician Note      Patient : Kasi Zee Age: 61 year old Sex: male   MRN: [id number] Encounter Date: 6/15/2022      History         Chief Complaint   Patient presents with   • Detox Evaluation       As is customary patient seen and evaluated with resident please see their note for full history of present illness details.    HPI: 61 year old male presents with past medical history of heroin abuse presents requesting detox, uses $100 daily and checks last use was 3 PM yesterday denies any alcohol denies any SI HI auditory visual [place] denies any other illicit drug use though he is then endorses that he actually uses cocaine as well.  No chest pain shortness of breath nausea vomiting fevers chills abdominal pain numbness tingling weakness no falls or trauma.  No dizziness or lightheadedness.  Has never been admitted for detox here and does not remember the last time he would have been admitted he has previously tried to take methadone but has failed this and does not want to do this as an outpatient so does not want to do Suboxone.    No Known Allergies    There are no discharge medications for this patient.      There are no discharge medications for this patient.    Denies any recent surgical history  Denies family history    Review of Systems   All other systems reviewed and are negative.      Physical Exam     ED Triage Vitals [06/15/22 1646]   ED Triage Vitals Group      Temp 98.2 °F (36.8 °C)      Heart Rate (!) 56      Resp 18      /87      SpO2 97 %      EtCO2 mmHg       Height       Weight       Weight Scale Used       BMI (Calculated)       IBW/kg (Calculated)        Physical Exam  Vitals and nursing note reviewed.   Constitutional:       General: He is not in acute distress.     Appearance: Normal appearance. He is not ill-appearing or toxic-appearing.   HENT:      Head: Normocephalic and atraumatic.      Right Ear: Tympanic membrane normal.      Left Ear: Tympanic membrane  normal.      Nose: Nose normal.      Mouth/Throat:      Mouth: Mucous membranes are moist.      Pharynx: Oropharynx is clear.      Neck: Normal range of motion. No muscular tenderness.   Eyes:      Extraocular Movements: Extraocular movements intact.      Conjunctiva/sclera: Conjunctivae normal.      Pupils: Pupils are equal, round, and reactive to light.   Cardiovascular:      Rate and Rhythm: Normal rate and regular rhythm.      Pulses: Normal pulses.      Heart sounds: Normal heart sounds.   Pulmonary:      Effort: Pulmonary effort is normal. No respiratory distress.      Breath sounds: Normal breath sounds.   Abdominal:      General: There is no distension.      Palpations: Abdomen is soft.      Tenderness: There is no abdominal tenderness. There is no guarding or rebound.   Musculoskeletal:         General: No tenderness or deformity. Normal range of motion.   Skin:     General: Skin is warm and dry.      Capillary Refill: Capillary refill takes less than 2 seconds.   Neurological:      General: No focal deficit present.      Mental Status: He is alert and oriented to person, place, and time.   Psychiatric:         Mood and Affect: Mood normal.         ED Course     EKG: Sinus bradycardia normal intervals no STEMI per my interpretation    Lab Results     Results for orders placed or performed during the hospital encounter of 06/15/22   Drug Abuse Screen, Urine   Result Value Ref Range    Amphetamines, Urine Negative Negative    Barbiturates, Urine Negative Negative    Benzodiazepines, Urine Negative Negative    Cocaine/ Metabolite, Urine Positive (A) Negative    Opiates, Urine Positive (A) Negative    Phencyclidine, Urine Negative Negative    Cannabinoids, Urine Negative Negative   Basic Metabolic Panel   Result Value Ref Range    Fasting Status      Sodium 140 135 - 145 mmol/L    Potassium 3.8 3.4 - 5.1 mmol/L    Chloride 111 (H) 97 - 110 mmol/L    Carbon Dioxide 27 21 - 32 mmol/L    Anion Gap 6 (L) 7 - 19  mmol/L    Glucose 93 70 - 99 mg/dL    BUN 5 (L) 6 - 20 mg/dL    Creatinine 1.01 0.67 - 1.17 mg/dL    Glomerular Filtration Rate 85 >=60    BUN/ Creatinine Ratio 5 (L) 7 - 25    Calcium 9.0 8.4 - 10.2 mg/dL   Alcohol   Result Value Ref Range    Alcohol None Detected None Detected mg/dL   CBC with Automated Differential (performable only)   Result Value Ref Range    WBC 5.1 4.2 - 11.0 K/mcL    RBC 4.16 (L) 4.50 - 5.90 mil/mcL    HGB 12.5 (L) 13.0 - 17.0 g/dL    HCT 38.7 (L) 39.0 - 51.0 %    MCV 93.0 78.0 - 100.0 fl    MCH 30.0 26.0 - 34.0 pg    MCHC 32.3 32.0 - 36.5 g/dL    RDW-CV 12.7 11.0 - 15.0 %    RDW-SD 43.1 39.0 - 50.0 fL     140 - 450 K/mcL    NRBC 0 <=0 /100 WBC    Neutrophil, Percent 57 %    Lymphocytes, Percent 32 %    Mono, Percent 8 %    Eosinophils, Percent 2 %    Basophils, Percent 1 %    Immature Granulocytes 0 %    Absolute Neutrophils 2.9 1.8 - 7.7 K/mcL    Absolute Lymphocytes 1.6 1.0 - 4.0 K/mcL    Absolute Monocytes 0.4 0.3 - 0.9 K/mcL    Absolute Eosinophils  0.1 0.0 - 0.5 K/mcL    Absolute Basophils 0.0 0.0 - 0.3 K/mcL    Absolute Immmature Granulocytes 0.0 0.0 - 0.2 K/mcL   COVID/Flu/RSV panel   Result Value Ref Range    Rapid SARS-COV-2 by PCR Not Detected Not Detected / Detected / Presumptive Positive / Inhibitors present    Influenza A by PCR Not Detected Not Detected    Influenza B by PCR Not Detected Not Detected    RSV BY PCR Not Detected Not Detected    Isolation Guidelines      Procedural Comment           Radiology Results     Imaging Results    None         ED Medication Orders (From admission, onward)    None               MDM: H&P as above presents requesting heroin detox does not appear to be acutely in withdrawal at present time has failed outpatient therapy with methadone is not interested in Suboxone at this time denies SI HI auditory visual hallucination his exam is benign EKG was obtained secondary to positive cocaine on U tox also positive for opiates.  Labs otherwise  largely unremarkable.    Clinical Impression     No diagnosis found.    Disposition        Admit 6/15/2022  9:51 PM  Telemetry Bed?: No  Admitting Physician: EDWIN PAZ [[id number]]  Transferring Patient to? Only adjust for transfers between [facility]'s and [facility] ([facility] [[id number]]  Is this a telephone or verbal order?: This is a telephone order from the admitting physician      Landry Rodriguez MD   6/15/2022 9:51 PM       Patient was seen in conjunction with the resident physician.  I performed an independent evaluation including history and physical with the resident physician or after the patient was seen by the resident physician. For further details about elements of the patient encounter including PMH, Social History, Family History, and ROS, see the resident note, and I agree with his/her documentation and care except as noted.                    Landry Rodriguez MD  06/15/22 1005

## 2022-06-28 ENCOUNTER — LAB VISIT (OUTPATIENT)
Dept: LAB | Facility: HOSPITAL | Age: 57
End: 2022-06-28
Attending: NURSE PRACTITIONER
Payer: COMMERCIAL

## 2022-06-28 DIAGNOSIS — N18.32 STAGE 3B CHRONIC KIDNEY DISEASE: ICD-10-CM

## 2022-06-28 LAB
25(OH)D3+25(OH)D2 SERPL-MCNC: 28 NG/ML (ref 30–96)
ALBUMIN SERPL BCP-MCNC: 3.8 G/DL (ref 3.5–5.2)
ANION GAP SERPL CALC-SCNC: 11 MMOL/L (ref 8–16)
BASOPHILS # BLD AUTO: 0.05 K/UL (ref 0–0.2)
BASOPHILS NFR BLD: 0.6 % (ref 0–1.9)
BUN SERPL-MCNC: 28 MG/DL (ref 6–20)
CALCIUM SERPL-MCNC: 8.9 MG/DL (ref 8.7–10.5)
CHLORIDE SERPL-SCNC: 106 MMOL/L (ref 95–110)
CO2 SERPL-SCNC: 22 MMOL/L (ref 23–29)
CREAT SERPL-MCNC: 2.1 MG/DL (ref 0.5–1.4)
DIFFERENTIAL METHOD: ABNORMAL
EOSINOPHIL # BLD AUTO: 0.2 K/UL (ref 0–0.5)
EOSINOPHIL NFR BLD: 2.1 % (ref 0–8)
ERYTHROCYTE [DISTWIDTH] IN BLOOD BY AUTOMATED COUNT: 12.9 % (ref 11.5–14.5)
EST. GFR  (AFRICAN AMERICAN): 40 ML/MIN/1.73 M^2
EST. GFR  (NON AFRICAN AMERICAN): 34 ML/MIN/1.73 M^2
GLUCOSE SERPL-MCNC: 118 MG/DL (ref 70–110)
HCT VFR BLD AUTO: 39 % (ref 40–54)
HGB BLD-MCNC: 12.8 G/DL (ref 14–18)
IMM GRANULOCYTES # BLD AUTO: 0.05 K/UL (ref 0–0.04)
IMM GRANULOCYTES NFR BLD AUTO: 0.6 % (ref 0–0.5)
LYMPHOCYTES # BLD AUTO: 1.7 K/UL (ref 1–4.8)
LYMPHOCYTES NFR BLD: 18.8 % (ref 18–48)
MCH RBC QN AUTO: 29.4 PG (ref 27–31)
MCHC RBC AUTO-ENTMCNC: 32.8 G/DL (ref 32–36)
MCV RBC AUTO: 90 FL (ref 82–98)
MONOCYTES # BLD AUTO: 0.5 K/UL (ref 0.3–1)
MONOCYTES NFR BLD: 5.8 % (ref 4–15)
NEUTROPHILS # BLD AUTO: 6.4 K/UL (ref 1.8–7.7)
NEUTROPHILS NFR BLD: 72.1 % (ref 38–73)
NRBC BLD-RTO: 0 /100 WBC
PHOSPHATE SERPL-MCNC: 3.3 MG/DL (ref 2.7–4.5)
PHOSPHATE SERPL-MCNC: 3.3 MG/DL (ref 2.7–4.5)
PLATELET # BLD AUTO: 242 K/UL (ref 150–450)
PMV BLD AUTO: 10.2 FL (ref 9.2–12.9)
POTASSIUM SERPL-SCNC: 4.6 MMOL/L (ref 3.5–5.1)
PTH-INTACT SERPL-MCNC: 127.6 PG/ML (ref 9–77)
RBC # BLD AUTO: 4.35 M/UL (ref 4.6–6.2)
SODIUM SERPL-SCNC: 139 MMOL/L (ref 136–145)
WBC # BLD AUTO: 8.89 K/UL (ref 3.9–12.7)

## 2022-06-28 PROCEDURE — 36415 COLL VENOUS BLD VENIPUNCTURE: CPT | Performed by: NURSE PRACTITIONER

## 2022-06-28 PROCEDURE — 83970 ASSAY OF PARATHORMONE: CPT | Performed by: NURSE PRACTITIONER

## 2022-06-28 PROCEDURE — 80069 RENAL FUNCTION PANEL: CPT | Performed by: NURSE PRACTITIONER

## 2022-06-28 PROCEDURE — 85025 COMPLETE CBC W/AUTO DIFF WBC: CPT | Performed by: NURSE PRACTITIONER

## 2022-06-28 PROCEDURE — 82306 VITAMIN D 25 HYDROXY: CPT | Performed by: NURSE PRACTITIONER

## 2022-07-06 NOTE — PROGRESS NOTES
"Subjective:       Patient ID: Dre Curiel is a 56 y.o. male.    Chief Complaint: CKD 3b, s/p nephrectomy    HPI  He presents to clinic today for follow-up.  Pt was seen and examined today in clinic.  Pt denies taking NSAIDs, no GI losses, no abx use, no recent infections, no dysuria, no cardiopulmonary sx's.  Laboratory studies and medications were reviewed.  Since his last office visit he had left nephrectomy on 9/16/21.  He has been doing well and has no specific or new complaints.  All Nephrology related questions were answered to his satisfaction     The past medical, family and social histories were reviewed for this encounter.    Review of Systems   Constitutional: Negative.    HENT: Negative.    Respiratory: Negative for shortness of breath.    Cardiovascular: Negative.  Negative for leg swelling.   Gastrointestinal: Negative.    Genitourinary: Negative.    Musculoskeletal: Negative.    Skin: Negative.    Neurological: Negative for dizziness, light-headedness and headaches.   Psychiatric/Behavioral: Negative.           height is 5' 8.5" (1.74 m) and weight is 101.7 kg (224 lb 3.3 oz). His blood pressure is 118/64 and his pulse is 64.     Lab Results   Component Value Date    WBC 8.89 06/28/2022    HGB 12.8 (L) 06/28/2022    HCT 39.0 (L) 06/28/2022    MCV 90 06/28/2022     06/28/2022        CMP  Sodium   Date Value Ref Range Status   06/28/2022 139 136 - 145 mmol/L Final     Potassium   Date Value Ref Range Status   06/28/2022 4.6 3.5 - 5.1 mmol/L Final     Chloride   Date Value Ref Range Status   06/28/2022 106 95 - 110 mmol/L Final     CO2   Date Value Ref Range Status   06/28/2022 22 (L) 23 - 29 mmol/L Final     Glucose   Date Value Ref Range Status   06/28/2022 118 (H) 70 - 110 mg/dL Final     BUN   Date Value Ref Range Status   06/28/2022 28 (H) 6 - 20 mg/dL Final     Creatinine   Date Value Ref Range Status   06/28/2022 2.1 (H) 0.5 - 1.4 mg/dL Final     Calcium   Date Value Ref Range Status "   06/28/2022 8.9 8.7 - 10.5 mg/dL Final     Total Protein   Date Value Ref Range Status   04/04/2022 7.7 6.0 - 8.4 g/dL Final     Albumin   Date Value Ref Range Status   06/28/2022 3.8 3.5 - 5.2 g/dL Final     Total Bilirubin   Date Value Ref Range Status   04/04/2022 0.5 0.1 - 1.0 mg/dL Final     Comment:     For infants and newborns, interpretation of results should be based  on gestational age, weight and in agreement with clinical  observations.    Premature Infant recommended reference ranges:  Up to 24 hours.............<8.0 mg/dL  Up to 48 hours............<12.0 mg/dL  3-5 days..................<15.0 mg/dL  6-29 days.................<15.0 mg/dL       Alkaline Phosphatase   Date Value Ref Range Status   04/04/2022 83 55 - 135 U/L Final     AST   Date Value Ref Range Status   04/04/2022 19 10 - 40 U/L Final     ALT   Date Value Ref Range Status   04/04/2022 19 10 - 44 U/L Final     Anion Gap   Date Value Ref Range Status   06/28/2022 11 8 - 16 mmol/L Final     eGFR if    Date Value Ref Range Status   06/28/2022 40 (A) >60 mL/min/1.73 m^2 Final     eGFR if non    Date Value Ref Range Status   06/28/2022 34 (A) >60 mL/min/1.73 m^2 Final     Comment:     Calculation used to obtain the estimated glomerular filtration  rate (eGFR) is the CKD-EPI equation.          Current Outpatient Medications on File Prior to Visit   Medication Sig Dispense Refill    allopurinoL (ZYLOPRIM) 100 MG tablet Take 1 tablet (100 mg total) by mouth once daily. 90 tablet 2    ALPRAZolam (XANAX) 0.25 MG tablet Take 2 tablets (0.5 mg total) by mouth 2 (two) times daily as needed for Anxiety. 15 tablet 0    amLODIPine-benazepriL (LOTREL) 10-40 mg per capsule Take 1 capsule by mouth once daily. 90 capsule 3    docusate sodium (COLACE) 100 MG capsule Take 1 capsule (100 mg total) by mouth 2 (two) times daily. 60 capsule 0    fluticasone propionate (FLONASE) 50 mcg/actuation nasal spray 1 spray (50 mcg total)  by Each Nostril route once daily. 3 g 5    gabapentin (NEURONTIN) 300 MG capsule TAKE ONE CAPSULE BY MOUTH DURING THE DAY THEN TAKE TWO TABLETS EVERY NIGHT AT BEDTIME 270 capsule 1    hydrALAZINE (APRESOLINE) 100 MG tablet Take 1 tablet (100 mg total) by mouth 3 (three) times daily. 270 tablet 2    hydroCHLOROthiazide (HYDRODIURIL) 12.5 MG Tab Take 1 tablet (12.5 mg total) by mouth once daily. 30 tablet 11    loratadine (CLARITIN) 10 mg tablet TAKE 1 TABLET BY MOUTH EVERY DAY 90 tablet 1    metoprolol succinate (TOPROL-XL) 25 MG 24 hr tablet Take 1 tablet (25 mg total) by mouth once daily. 90 tablet 3    oxyCODONE-acetaminophen (PERCOCET) 5-325 mg per tablet Take 1 tablet by mouth every 4 (four) hours as needed for Pain. 35 tablet 0    potassium chloride (KLOR-CON) 10 MEQ TbSR Take 10 mEq by mouth once.      pravastatin (PRAVACHOL) 40 MG tablet Take 1 tablet (40 mg total) by mouth once daily. 90 tablet 3    testosterone cypionate (DEPOTESTOTERONE CYPIONATE) 200 mg/mL injection INJECT ONE ML INTO MUSCLE Q14D 2 mL 2    traZODone (DESYREL) 50 MG tablet TAKE ONE TABLET BY MOUTH EVERY DAY 90 tablet 1    [DISCONTINUED] sodium bicarbonate 650 MG tablet Take 1 tablet (650 mg total) by mouth 2 (two) times daily. With food 180 tablet 1    sertraline (ZOLOFT) 50 MG tablet Take 1 tablet (50 mg total) by mouth once daily. 90 tablet 3     No current facility-administered medications on file prior to visit.       Objective:        Physical Exam  Vitals and nursing note reviewed.   Constitutional:       Appearance: Normal appearance.   HENT:      Head: Normocephalic and atraumatic.   Eyes:      Extraocular Movements: Extraocular movements intact.      Pupils: Pupils are equal, round, and reactive to light.   Cardiovascular:      Rate and Rhythm: Normal rate and regular rhythm.   Pulmonary:      Effort: Pulmonary effort is normal.   Abdominal:      General: Bowel sounds are normal.      Palpations: Abdomen is soft.    Musculoskeletal:         General: Normal range of motion.      Right lower leg: No edema.      Left lower leg: No edema.   Skin:     General: Skin is warm and dry.   Neurological:      General: No focal deficit present.      Mental Status: He is alert and oriented to person, place, and time.   Psychiatric:         Mood and Affect: Mood normal.         Behavior: Behavior normal.         Thought Content: Thought content normal.         Judgment: Judgment normal.           Assessment:       1. Stage 3b chronic kidney disease    2. S/p nephrectomy    3. Parenchymal renal hypertension, stage 1-4 or unspecified chronic kidney disease    4. Gout, unspecified cause, unspecified chronicity, unspecified site    5. Metabolic acidosis    6. Vitamin D deficiency    7. Secondary hyperparathyroidism        Plan:       1. Creatinine has mild fluctuation ranging between 1.8 and 2.4.  Most recent creatinine is 2.1.  Reports recent episodes of diarrhea and denies drinking enough water daily.  Discussed importance of balanced daily hydration as tolerated.  He is on a RAAS inhibitor. Pt is on a daily PO potassium supplement; potassium is stable 4.6.     2.  S/p left nephrectomy r/t renal mass.  Denies issues at present. Continue to f/u with Urology as directed.     3. Blood pressure is 118/64 on exam today.  Well controlled on current regimen.  Na 139, stable with decrease in HCTZ dosing.  He is on a RAAS inhibitor. Continue all other medications as prescribed.       4. He denies recent flare or tophus stating as long as he takes his medicine and watches diet. Controlled with current regimen.  Educated and discussed further on importance of diet control and med compliance. Handouts given and reviewed with pt today.     5. Serum bicarbonate has fluctuated from 22-29 over last year; most recent lab remains around 22.  Pt states had not started medicine when was prescribed last visit.  Will call prescription in today again.  Start sodium  bicarbonate replacement 650 mg p.o. b.i.d. Will recheck labs and adjust as indicated with next visit.     6.  Vitamin D level 28.  Start Vitamin D 50,000units q week as prescribed.  Calcium 8.9 with an albumin of 3.8.       7.  Intact PTH mildly elevated 127.6. Calcium stable 8.9 as well as phosphorus 3.3.  Vit D 28.  As stated above, start Vit D supplementation daily and redraw prior to next visit.    RTC  3 mos    30 minutes of total time spent on the encounter, which includes face to face time and non-face to face time preparing to see the patient (eg, review of tests), obtaining and/or reviewing separately obtained history, documenting clinical information in the electronic or other health record, Independently interpreting results and communicating results to the patient/family/caregiver, or care coordination.    Katie Anderson, AZIZA-C

## 2022-07-08 ENCOUNTER — OFFICE VISIT (OUTPATIENT)
Dept: NEPHROLOGY | Facility: CLINIC | Age: 57
End: 2022-07-08
Payer: COMMERCIAL

## 2022-07-08 VITALS
DIASTOLIC BLOOD PRESSURE: 64 MMHG | WEIGHT: 224.19 LBS | SYSTOLIC BLOOD PRESSURE: 118 MMHG | BODY MASS INDEX: 33.2 KG/M2 | HEIGHT: 69 IN | HEART RATE: 64 BPM

## 2022-07-08 DIAGNOSIS — E87.20 METABOLIC ACIDOSIS: ICD-10-CM

## 2022-07-08 DIAGNOSIS — E55.9 VITAMIN D DEFICIENCY: ICD-10-CM

## 2022-07-08 DIAGNOSIS — M10.9 GOUT, UNSPECIFIED CAUSE, UNSPECIFIED CHRONICITY, UNSPECIFIED SITE: ICD-10-CM

## 2022-07-08 DIAGNOSIS — I12.9 PARENCHYMAL RENAL HYPERTENSION, STAGE 1-4 OR UNSPECIFIED CHRONIC KIDNEY DISEASE: ICD-10-CM

## 2022-07-08 DIAGNOSIS — Z90.5 S/P NEPHRECTOMY: ICD-10-CM

## 2022-07-08 DIAGNOSIS — N25.81 SECONDARY HYPERPARATHYROIDISM: ICD-10-CM

## 2022-07-08 DIAGNOSIS — N18.32 STAGE 3B CHRONIC KIDNEY DISEASE: Primary | ICD-10-CM

## 2022-07-08 PROCEDURE — 1160F PR REVIEW ALL MEDS BY PRESCRIBER/CLIN PHARMACIST DOCUMENTED: ICD-10-PCS | Mod: CPTII,S$GLB,, | Performed by: NURSE PRACTITIONER

## 2022-07-08 PROCEDURE — 1160F RVW MEDS BY RX/DR IN RCRD: CPT | Mod: CPTII,S$GLB,, | Performed by: NURSE PRACTITIONER

## 2022-07-08 PROCEDURE — 3066F PR DOCUMENTATION OF TREATMENT FOR NEPHROPATHY: ICD-10-PCS | Mod: CPTII,S$GLB,, | Performed by: NURSE PRACTITIONER

## 2022-07-08 PROCEDURE — 4010F ACE/ARB THERAPY RXD/TAKEN: CPT | Mod: CPTII,S$GLB,, | Performed by: NURSE PRACTITIONER

## 2022-07-08 PROCEDURE — 3066F NEPHROPATHY DOC TX: CPT | Mod: CPTII,S$GLB,, | Performed by: NURSE PRACTITIONER

## 2022-07-08 PROCEDURE — 4010F PR ACE/ARB THEARPY RXD/TAKEN: ICD-10-PCS | Mod: CPTII,S$GLB,, | Performed by: NURSE PRACTITIONER

## 2022-07-08 PROCEDURE — 1159F PR MEDICATION LIST DOCUMENTED IN MEDICAL RECORD: ICD-10-PCS | Mod: CPTII,S$GLB,, | Performed by: NURSE PRACTITIONER

## 2022-07-08 PROCEDURE — 3008F PR BODY MASS INDEX (BMI) DOCUMENTED: ICD-10-PCS | Mod: CPTII,S$GLB,, | Performed by: NURSE PRACTITIONER

## 2022-07-08 PROCEDURE — 3078F PR MOST RECENT DIASTOLIC BLOOD PRESSURE < 80 MM HG: ICD-10-PCS | Mod: CPTII,S$GLB,, | Performed by: NURSE PRACTITIONER

## 2022-07-08 PROCEDURE — 3078F DIAST BP <80 MM HG: CPT | Mod: CPTII,S$GLB,, | Performed by: NURSE PRACTITIONER

## 2022-07-08 PROCEDURE — 3074F PR MOST RECENT SYSTOLIC BLOOD PRESSURE < 130 MM HG: ICD-10-PCS | Mod: CPTII,S$GLB,, | Performed by: NURSE PRACTITIONER

## 2022-07-08 PROCEDURE — 99999 PR PBB SHADOW E&M-EST. PATIENT-LVL III: CPT | Mod: PBBFAC,,, | Performed by: NURSE PRACTITIONER

## 2022-07-08 PROCEDURE — 99999 PR PBB SHADOW E&M-EST. PATIENT-LVL III: ICD-10-PCS | Mod: PBBFAC,,, | Performed by: NURSE PRACTITIONER

## 2022-07-08 PROCEDURE — 99214 PR OFFICE/OUTPT VISIT, EST, LEVL IV, 30-39 MIN: ICD-10-PCS | Mod: S$GLB,,, | Performed by: NURSE PRACTITIONER

## 2022-07-08 PROCEDURE — 99214 OFFICE O/P EST MOD 30 MIN: CPT | Mod: S$GLB,,, | Performed by: NURSE PRACTITIONER

## 2022-07-08 PROCEDURE — 1159F MED LIST DOCD IN RCRD: CPT | Mod: CPTII,S$GLB,, | Performed by: NURSE PRACTITIONER

## 2022-07-08 PROCEDURE — 3074F SYST BP LT 130 MM HG: CPT | Mod: CPTII,S$GLB,, | Performed by: NURSE PRACTITIONER

## 2022-07-08 PROCEDURE — 3008F BODY MASS INDEX DOCD: CPT | Mod: CPTII,S$GLB,, | Performed by: NURSE PRACTITIONER

## 2022-07-08 RX ORDER — SODIUM BICARBONATE 650 MG/1
650 TABLET ORAL 2 TIMES DAILY
Qty: 180 TABLET | Refills: 1 | Status: SHIPPED | OUTPATIENT
Start: 2022-07-08 | End: 2023-02-16

## 2022-07-08 RX ORDER — ERGOCALCIFEROL 1.25 MG/1
50000 CAPSULE ORAL
Qty: 12 CAPSULE | Refills: 1 | Status: SHIPPED | OUTPATIENT
Start: 2022-07-08 | End: 2022-10-25 | Stop reason: SDUPTHER

## 2022-07-08 NOTE — PATIENT INSTRUCTIONS
Continue all medications as reviewed and prescribed today   A. Start Vitamin D once a week   B. Start sodium bicarb twice a day with food    Keep blood pressure controlled  Keep blood sugar controlled  Increase fruits and vegetables in diet  Low sodium diet: avoid/limit salt, processed foods, fried foods, fast foods  Increase Water (4 bottle), flavored water/diluted juice daily as tolerated with no swelling/shortness of breath  Avoid NSAIDS: Advil, Ibuprofen, Aleve, Naproxen, Meloxicam, etc. (Aspirin is ok), Tylenol is Best  Exercise as tolerated

## 2022-10-04 ENCOUNTER — LAB VISIT (OUTPATIENT)
Dept: LAB | Facility: HOSPITAL | Age: 57
End: 2022-10-04
Attending: FAMILY MEDICINE
Payer: COMMERCIAL

## 2022-10-04 DIAGNOSIS — E55.9 VITAMIN D DEFICIENCY: ICD-10-CM

## 2022-10-04 DIAGNOSIS — N18.32 STAGE 3B CHRONIC KIDNEY DISEASE: ICD-10-CM

## 2022-10-04 DIAGNOSIS — Z79.890 ENCOUNTER FOR MONITORING TESTOSTERONE REPLACEMENT THERAPY: ICD-10-CM

## 2022-10-04 DIAGNOSIS — Z51.81 ENCOUNTER FOR MONITORING TESTOSTERONE REPLACEMENT THERAPY: ICD-10-CM

## 2022-10-04 DIAGNOSIS — I10 ESSENTIAL HYPERTENSION: ICD-10-CM

## 2022-10-04 LAB
25(OH)D3+25(OH)D2 SERPL-MCNC: 23 NG/ML (ref 30–96)
ALBUMIN SERPL BCP-MCNC: 3.8 G/DL (ref 3.5–5.2)
ALBUMIN SERPL BCP-MCNC: 3.8 G/DL (ref 3.5–5.2)
ALP SERPL-CCNC: 96 U/L (ref 55–135)
ALT SERPL W/O P-5'-P-CCNC: 20 U/L (ref 10–44)
ANION GAP SERPL CALC-SCNC: 9 MMOL/L (ref 8–16)
ANION GAP SERPL CALC-SCNC: 9 MMOL/L (ref 8–16)
AST SERPL-CCNC: 17 U/L (ref 10–40)
BASOPHILS # BLD AUTO: 0.06 K/UL (ref 0–0.2)
BASOPHILS # BLD AUTO: 0.06 K/UL (ref 0–0.2)
BASOPHILS NFR BLD: 0.7 % (ref 0–1.9)
BASOPHILS NFR BLD: 0.7 % (ref 0–1.9)
BILIRUB SERPL-MCNC: 0.3 MG/DL (ref 0.1–1)
BUN SERPL-MCNC: 16 MG/DL (ref 6–20)
BUN SERPL-MCNC: 16 MG/DL (ref 6–20)
CALCIUM SERPL-MCNC: 9.3 MG/DL (ref 8.7–10.5)
CALCIUM SERPL-MCNC: 9.3 MG/DL (ref 8.7–10.5)
CHLORIDE SERPL-SCNC: 104 MMOL/L (ref 95–110)
CHLORIDE SERPL-SCNC: 104 MMOL/L (ref 95–110)
CHOLEST SERPL-MCNC: 164 MG/DL (ref 120–199)
CHOLEST/HDLC SERPL: 4 {RATIO} (ref 2–5)
CO2 SERPL-SCNC: 24 MMOL/L (ref 23–29)
CO2 SERPL-SCNC: 24 MMOL/L (ref 23–29)
COMPLEXED PSA SERPL-MCNC: 0.61 NG/ML (ref 0–4)
CREAT SERPL-MCNC: 1.8 MG/DL (ref 0.5–1.4)
CREAT SERPL-MCNC: 1.8 MG/DL (ref 0.5–1.4)
DIFFERENTIAL METHOD: ABNORMAL
DIFFERENTIAL METHOD: ABNORMAL
EOSINOPHIL # BLD AUTO: 0.2 K/UL (ref 0–0.5)
EOSINOPHIL # BLD AUTO: 0.2 K/UL (ref 0–0.5)
EOSINOPHIL NFR BLD: 2.6 % (ref 0–8)
EOSINOPHIL NFR BLD: 2.6 % (ref 0–8)
ERYTHROCYTE [DISTWIDTH] IN BLOOD BY AUTOMATED COUNT: 13.3 % (ref 11.5–14.5)
ERYTHROCYTE [DISTWIDTH] IN BLOOD BY AUTOMATED COUNT: 13.3 % (ref 11.5–14.5)
EST. GFR  (NO RACE VARIABLE): 43.6 ML/MIN/1.73 M^2
EST. GFR  (NO RACE VARIABLE): 43.6 ML/MIN/1.73 M^2
GLUCOSE SERPL-MCNC: 149 MG/DL (ref 70–110)
GLUCOSE SERPL-MCNC: 149 MG/DL (ref 70–110)
HCT VFR BLD AUTO: 42 % (ref 40–54)
HCT VFR BLD AUTO: 42 % (ref 40–54)
HDLC SERPL-MCNC: 41 MG/DL (ref 40–75)
HDLC SERPL: 25 % (ref 20–50)
HGB BLD-MCNC: 13.2 G/DL (ref 14–18)
HGB BLD-MCNC: 13.2 G/DL (ref 14–18)
IMM GRANULOCYTES # BLD AUTO: 0.08 K/UL (ref 0–0.04)
IMM GRANULOCYTES # BLD AUTO: 0.08 K/UL (ref 0–0.04)
IMM GRANULOCYTES NFR BLD AUTO: 1 % (ref 0–0.5)
IMM GRANULOCYTES NFR BLD AUTO: 1 % (ref 0–0.5)
LDLC SERPL CALC-MCNC: 85.2 MG/DL (ref 63–159)
LYMPHOCYTES # BLD AUTO: 1.9 K/UL (ref 1–4.8)
LYMPHOCYTES # BLD AUTO: 1.9 K/UL (ref 1–4.8)
LYMPHOCYTES NFR BLD: 23 % (ref 18–48)
LYMPHOCYTES NFR BLD: 23 % (ref 18–48)
MCH RBC QN AUTO: 29.7 PG (ref 27–31)
MCH RBC QN AUTO: 29.7 PG (ref 27–31)
MCHC RBC AUTO-ENTMCNC: 31.4 G/DL (ref 32–36)
MCHC RBC AUTO-ENTMCNC: 31.4 G/DL (ref 32–36)
MCV RBC AUTO: 94 FL (ref 82–98)
MCV RBC AUTO: 94 FL (ref 82–98)
MONOCYTES # BLD AUTO: 0.4 K/UL (ref 0.3–1)
MONOCYTES # BLD AUTO: 0.4 K/UL (ref 0.3–1)
MONOCYTES NFR BLD: 4.3 % (ref 4–15)
MONOCYTES NFR BLD: 4.3 % (ref 4–15)
NEUTROPHILS # BLD AUTO: 5.8 K/UL (ref 1.8–7.7)
NEUTROPHILS # BLD AUTO: 5.8 K/UL (ref 1.8–7.7)
NEUTROPHILS NFR BLD: 68.4 % (ref 38–73)
NEUTROPHILS NFR BLD: 68.4 % (ref 38–73)
NONHDLC SERPL-MCNC: 123 MG/DL
NRBC BLD-RTO: 0 /100 WBC
NRBC BLD-RTO: 0 /100 WBC
PHOSPHATE SERPL-MCNC: 2.5 MG/DL (ref 2.7–4.5)
PHOSPHATE SERPL-MCNC: 2.5 MG/DL (ref 2.7–4.5)
PLATELET # BLD AUTO: 244 K/UL (ref 150–450)
PLATELET # BLD AUTO: 244 K/UL (ref 150–450)
PMV BLD AUTO: 12.1 FL (ref 9.2–12.9)
PMV BLD AUTO: 12.1 FL (ref 9.2–12.9)
POTASSIUM SERPL-SCNC: 4.6 MMOL/L (ref 3.5–5.1)
POTASSIUM SERPL-SCNC: 4.6 MMOL/L (ref 3.5–5.1)
PROT SERPL-MCNC: 6.9 G/DL (ref 6–8.4)
PTH-INTACT SERPL-MCNC: 86.5 PG/ML (ref 9–77)
RBC # BLD AUTO: 4.45 M/UL (ref 4.6–6.2)
RBC # BLD AUTO: 4.45 M/UL (ref 4.6–6.2)
SODIUM SERPL-SCNC: 137 MMOL/L (ref 136–145)
SODIUM SERPL-SCNC: 137 MMOL/L (ref 136–145)
TESTOST SERPL-MCNC: 784 NG/DL (ref 304–1227)
TRIGL SERPL-MCNC: 189 MG/DL (ref 30–150)
WBC # BLD AUTO: 8.4 K/UL (ref 3.9–12.7)
WBC # BLD AUTO: 8.4 K/UL (ref 3.9–12.7)

## 2022-10-04 PROCEDURE — 85025 COMPLETE CBC W/AUTO DIFF WBC: CPT | Performed by: FAMILY MEDICINE

## 2022-10-04 PROCEDURE — 84153 ASSAY OF PSA TOTAL: CPT | Performed by: FAMILY MEDICINE

## 2022-10-04 PROCEDURE — 80061 LIPID PANEL: CPT | Performed by: FAMILY MEDICINE

## 2022-10-04 PROCEDURE — 83970 ASSAY OF PARATHORMONE: CPT | Performed by: NURSE PRACTITIONER

## 2022-10-04 PROCEDURE — 36415 COLL VENOUS BLD VENIPUNCTURE: CPT | Mod: PO | Performed by: FAMILY MEDICINE

## 2022-10-04 PROCEDURE — 80069 RENAL FUNCTION PANEL: CPT | Mod: XB | Performed by: NURSE PRACTITIONER

## 2022-10-04 PROCEDURE — 82306 VITAMIN D 25 HYDROXY: CPT | Performed by: NURSE PRACTITIONER

## 2022-10-04 PROCEDURE — 80053 COMPREHEN METABOLIC PANEL: CPT | Performed by: FAMILY MEDICINE

## 2022-10-04 PROCEDURE — 84403 ASSAY OF TOTAL TESTOSTERONE: CPT | Performed by: FAMILY MEDICINE

## 2022-10-11 ENCOUNTER — OFFICE VISIT (OUTPATIENT)
Dept: FAMILY MEDICINE | Facility: CLINIC | Age: 57
End: 2022-10-11
Payer: COMMERCIAL

## 2022-10-11 VITALS
HEART RATE: 64 BPM | RESPIRATION RATE: 20 BRPM | BODY MASS INDEX: 35.55 KG/M2 | WEIGHT: 234.56 LBS | HEIGHT: 68 IN | SYSTOLIC BLOOD PRESSURE: 106 MMHG | DIASTOLIC BLOOD PRESSURE: 70 MMHG | TEMPERATURE: 98 F | OXYGEN SATURATION: 99 %

## 2022-10-11 DIAGNOSIS — M54.16 RIGHT LUMBAR RADICULITIS: ICD-10-CM

## 2022-10-11 DIAGNOSIS — M76.891 HIP TENDINITIS, RIGHT: Primary | ICD-10-CM

## 2022-10-11 DIAGNOSIS — C64.2 RENAL CELL CARCINOMA OF LEFT KIDNEY: ICD-10-CM

## 2022-10-11 DIAGNOSIS — I10 ESSENTIAL HYPERTENSION: ICD-10-CM

## 2022-10-11 DIAGNOSIS — Z51.81 ENCOUNTER FOR MONITORING TESTOSTERONE REPLACEMENT THERAPY: ICD-10-CM

## 2022-10-11 DIAGNOSIS — N18.32 STAGE 3B CHRONIC KIDNEY DISEASE: ICD-10-CM

## 2022-10-11 DIAGNOSIS — Z79.890 ENCOUNTER FOR MONITORING TESTOSTERONE REPLACEMENT THERAPY: ICD-10-CM

## 2022-10-11 DIAGNOSIS — E66.9 OBESITY (BMI 35.0-39.9 WITHOUT COMORBIDITY): ICD-10-CM

## 2022-10-11 DIAGNOSIS — Z90.5 S/P NEPHRECTOMY: ICD-10-CM

## 2022-10-11 PROCEDURE — 3074F SYST BP LT 130 MM HG: CPT | Mod: CPTII,S$GLB,, | Performed by: FAMILY MEDICINE

## 2022-10-11 PROCEDURE — 3066F NEPHROPATHY DOC TX: CPT | Mod: CPTII,S$GLB,, | Performed by: FAMILY MEDICINE

## 2022-10-11 PROCEDURE — 3078F PR MOST RECENT DIASTOLIC BLOOD PRESSURE < 80 MM HG: ICD-10-PCS | Mod: CPTII,S$GLB,, | Performed by: FAMILY MEDICINE

## 2022-10-11 PROCEDURE — 99214 PR OFFICE/OUTPT VISIT, EST, LEVL IV, 30-39 MIN: ICD-10-PCS | Mod: S$GLB,,, | Performed by: FAMILY MEDICINE

## 2022-10-11 PROCEDURE — 4010F PR ACE/ARB THEARPY RXD/TAKEN: ICD-10-PCS | Mod: CPTII,S$GLB,, | Performed by: FAMILY MEDICINE

## 2022-10-11 PROCEDURE — 1159F MED LIST DOCD IN RCRD: CPT | Mod: CPTII,S$GLB,, | Performed by: FAMILY MEDICINE

## 2022-10-11 PROCEDURE — 4010F ACE/ARB THERAPY RXD/TAKEN: CPT | Mod: CPTII,S$GLB,, | Performed by: FAMILY MEDICINE

## 2022-10-11 PROCEDURE — 3074F PR MOST RECENT SYSTOLIC BLOOD PRESSURE < 130 MM HG: ICD-10-PCS | Mod: CPTII,S$GLB,, | Performed by: FAMILY MEDICINE

## 2022-10-11 PROCEDURE — 3078F DIAST BP <80 MM HG: CPT | Mod: CPTII,S$GLB,, | Performed by: FAMILY MEDICINE

## 2022-10-11 PROCEDURE — 99214 OFFICE O/P EST MOD 30 MIN: CPT | Mod: S$GLB,,, | Performed by: FAMILY MEDICINE

## 2022-10-11 PROCEDURE — 3008F PR BODY MASS INDEX (BMI) DOCUMENTED: ICD-10-PCS | Mod: CPTII,S$GLB,, | Performed by: FAMILY MEDICINE

## 2022-10-11 PROCEDURE — 3066F PR DOCUMENTATION OF TREATMENT FOR NEPHROPATHY: ICD-10-PCS | Mod: CPTII,S$GLB,, | Performed by: FAMILY MEDICINE

## 2022-10-11 PROCEDURE — 99999 PR PBB SHADOW E&M-EST. PATIENT-LVL V: CPT | Mod: PBBFAC,,, | Performed by: FAMILY MEDICINE

## 2022-10-11 PROCEDURE — 99999 PR PBB SHADOW E&M-EST. PATIENT-LVL V: ICD-10-PCS | Mod: PBBFAC,,, | Performed by: FAMILY MEDICINE

## 2022-10-11 PROCEDURE — 1159F PR MEDICATION LIST DOCUMENTED IN MEDICAL RECORD: ICD-10-PCS | Mod: CPTII,S$GLB,, | Performed by: FAMILY MEDICINE

## 2022-10-11 PROCEDURE — 3008F BODY MASS INDEX DOCD: CPT | Mod: CPTII,S$GLB,, | Performed by: FAMILY MEDICINE

## 2022-10-11 RX ORDER — SEMAGLUTIDE 1.34 MG/ML
0.25 INJECTION, SOLUTION SUBCUTANEOUS
Qty: 4 PEN | Refills: 4 | Status: SHIPPED | OUTPATIENT
Start: 2022-10-11 | End: 2023-02-16

## 2022-10-11 NOTE — PROGRESS NOTES
Chief Complaint:    Chief Complaint   Patient presents with    Follow-up     X 6 months       History of Present Illness:  Patient with renal carcinoma of left kidney, HLD, HTN presents today for a six month follow-up      Reports R back and hip pain that's exacerbated by walking or bending over. Onset of a couple of months that's worsening with progression. He has tingling that goes down his R leg when standing too long but was present before his back and hip pain started. Also reports R toe pain after stubbing it.     Diet consists of pasta, apples, oranges. Sometimes eat chips and fries. Drinks a Coke every morning to take his meds.     He is on testosterone replacement therapy. Testosterone levels are 784  PSA normal  CBC good.  Creatinine 1.8, which is improved following with Nephrology  Anemic, hemoglobin is 13.2        Gallbladder polyp remained the same size as before based on the ultrasound done in June of 2021      ROS:  Review of Systems   Constitutional:  Negative for activity change, chills, fatigue, fever and unexpected weight change.   HENT:  Negative for congestion, ear discharge, ear pain, hearing loss, postnasal drip and rhinorrhea.    Eyes:  Negative for pain and visual disturbance.   Respiratory:  Negative for cough, chest tightness and shortness of breath.    Cardiovascular:  Negative for chest pain and palpitations.   Gastrointestinal:  Negative for abdominal pain, diarrhea and vomiting.   Endocrine: Negative for heat intolerance.   Genitourinary:  Negative for dysuria, flank pain, frequency and hematuria.   Musculoskeletal:  Positive for arthralgias and myalgias. Negative for back pain, gait problem and neck pain.   Skin:  Negative for color change and rash.   Neurological:  Negative for dizziness, tremors, seizures, numbness and headaches.   Psychiatric/Behavioral:  Negative for agitation, hallucinations, self-injury, sleep disturbance and suicidal ideas. The patient is not nervous/anxious.  "     Past Medical History:   Diagnosis Date    Allergy     Anxiety     Cancer of kidney     Depression     Hyperlipidemia     Hypertension        Social History:  Social History     Socioeconomic History    Marital status:    Occupational History     Employer: [employer]   Tobacco Use    Smoking status: Never    Smokeless tobacco: Current    Tobacco comments:     dips    Substance and Sexual Activity    Alcohol use: Yes    Drug use: No    Sexual activity: Yes     Partners: Female     Birth control/protection: None       Family History:   family history includes Cancer in his maternal uncle; Hypertension in his mother; No Known Problems in his brother, maternal aunt, maternal grandfather, maternal grandmother, paternal aunt, paternal grandfather, paternal grandmother, paternal uncle, and sister.    Health Maintenance   Topic Date Due    Lipid Panel  10/04/2023    TETANUS VACCINE  10/31/2027    Hepatitis C Screening  Completed       Physical Exam:    Vital Signs  Temp: 97.7 °F (36.5 °C)  Temp src: Temporal  Pulse: 64  Resp: 20  SpO2: 99 %  BP: 106/70  BP Location: Left arm  Patient Position: Sitting  Pain Score:   4  Pain Loc: Back  Height and Weight  Height: 5' 8" (172.7 cm)  Weight: 106.4 kg (234 lb 9.1 oz)  BSA (Calculated - sq m): 2.26 sq meters  BMI (Calculated): 35.7  Weight in (lb) to have BMI = 25: 164.1]    Body mass index is 35.67 kg/m².    Physical Exam  Vitals and nursing note reviewed.   Constitutional:       Appearance: Normal appearance.   HENT:      Head: Normocephalic and atraumatic.      Right Ear: Tympanic membrane normal.      Left Ear: Tympanic membrane normal.   Eyes:      Extraocular Movements: Extraocular movements intact.      Pupils: Pupils are equal, round, and reactive to light.   Cardiovascular:      Rate and Rhythm: Normal rate and regular rhythm.      Pulses: Normal pulses.      Heart sounds: Normal heart sounds. No murmur heard.    No gallop.   Pulmonary:      Effort: " Pulmonary effort is normal. No respiratory distress.      Breath sounds: Normal breath sounds. No wheezing, rhonchi or rales.   Abdominal:      General: There is no distension.      Palpations: Abdomen is soft.      Tenderness: There is no abdominal tenderness.   Musculoskeletal:         General: No swelling, deformity or signs of injury.      Cervical back: Normal range of motion.      Lumbar back: Tenderness present. Positive right straight leg raise test and positive left straight leg raise test.        Back:    Skin:     General: Skin is warm and dry.      Capillary Refill: Capillary refill takes less than 2 seconds.      Coloration: Skin is not jaundiced or pale.   Neurological:      General: No focal deficit present.      Mental Status: He is alert and oriented to person, place, and time.   Psychiatric:         Mood and Affect: Mood normal.         Behavior: Behavior normal.         Assessment:      ICD-10-CM ICD-9-CM   1. Hip tendinitis, right  M76.891 727.09   2. Encounter for monitoring testosterone replacement therapy  Z51.81 V58.83    Z79.890 V58.69   3. Obesity (BMI 35.0-39.9 without comorbidity)  E66.9 278.00   4. Essential hypertension  I10 401.9   5. Right lumbar radiculitis  M54.16 724.4   6. Renal cell carcinoma of left kidney  C64.2 189.0   7. S/p nephrectomy  Z90.5 V45.73   8. Stage 3b chronic kidney disease  N18.32 585.3     Plan:  Continue current meds and plan.  Refer to physical therapy for hip tendinitis, right and right lumbar radiculitis. Order nerve conduction test.   Keep balanced diet. Cut out pasta, Coke, and fruits like apples and oranges. Okay to eat berries. Try intermittent fasting. He can eat one meal per a day a couple times per week and stop snacking.   Recommend Ozempic 0.25 mg for obesity (BMI 35.0-39.9 without comorbidity).  Monitor weight. Start physical activity. Get below 200 pounds.  Recheck anemia in 3 months.   Chronic kidney disease due to nephrectomy  Continue current  dosage of testosterone replacement therapy, complete labs one week after doing testosterone shot.  Other medical problems stable  Please work on weight loss  Labs as below    Follow-up 3 months  Orders Placed This Encounter   Procedures    Comprehensive Metabolic Panel    CBC Auto Differential    Lipid Panel    PSA, Screening    Testosterone    Ambulatory referral/consult to Physical/Occupational Therapy    Nerve conduction test     Current Outpatient Medications   Medication Sig Dispense Refill    allopurinoL (ZYLOPRIM) 100 MG tablet Take 1 tablet (100 mg total) by mouth once daily. 90 tablet 2    amLODIPine-benazepriL (LOTREL) 10-40 mg per capsule Take 1 capsule by mouth once daily. 90 capsule 3    docusate sodium (COLACE) 100 MG capsule Take 1 capsule (100 mg total) by mouth 2 (two) times daily. 60 capsule 0    ergocalciferol (ERGOCALCIFEROL) 50,000 unit Cap Take 1 capsule (50,000 Units total) by mouth every 7 days. 12 capsule 1    fluticasone propionate (FLONASE) 50 mcg/actuation nasal spray 1 spray (50 mcg total) by Each Nostril route once daily. 3 g 5    gabapentin (NEURONTIN) 300 MG capsule TAKE ONE CAPSULE BY MOUTH DURING THE DAY THEN TAKE TWO TABLETS EVERY NIGHT AT BEDTIME 270 capsule 1    hydrALAZINE (APRESOLINE) 100 MG tablet Take 1 tablet (100 mg total) by mouth 3 (three) times daily. 270 tablet 2    hydroCHLOROthiazide (HYDRODIURIL) 12.5 MG Tab Take 1 tablet (12.5 mg total) by mouth once daily. 30 tablet 11    loratadine (CLARITIN) 10 mg tablet TAKE 1 TABLET BY MOUTH EVERY DAY 90 tablet 1    metoprolol succinate (TOPROL-XL) 25 MG 24 hr tablet Take 1 tablet (25 mg total) by mouth once daily. 90 tablet 3    oxyCODONE-acetaminophen (PERCOCET) 5-325 mg per tablet Take 1 tablet by mouth every 4 (four) hours as needed for Pain. 35 tablet 0    potassium chloride (KLOR-CON) 10 MEQ TbSR Take 10 mEq by mouth once.      pravastatin (PRAVACHOL) 40 MG tablet Take 1 tablet (40 mg total) by mouth once daily. 90 tablet  3    sertraline (ZOLOFT) 50 MG tablet Take 1 tablet (50 mg total) by mouth once daily. 90 tablet 3    sodium bicarbonate 650 MG tablet Take 1 tablet (650 mg total) by mouth 2 (two) times daily. With food 180 tablet 1    testosterone cypionate (DEPOTESTOTERONE CYPIONATE) 200 mg/mL injection INJECT ONE ML INTO MUSCLE Q14D 2 mL 2    traZODone (DESYREL) 50 MG tablet TAKE ONE TABLET BY MOUTH EVERY DAY 90 tablet 1    ALPRAZolam (XANAX) 0.25 MG tablet Take 2 tablets (0.5 mg total) by mouth 2 (two) times daily as needed for Anxiety. 15 tablet 0    semaglutide (OZEMPIC) 0.25 mg or 0.5 mg(2 mg/1.5 mL) pen injector Inject 0.25 mg into the skin every 7 days. 4 pen 4     No current facility-administered medications for this visit.       There are no discontinued medications.    Follow up in about 6 months (around 4/11/2023).      Mohan Schwartz MD  Scribe Attestation:   I, Zaira Manzo, am scribing for, and in the presence of, Dr.Arif Schwartz I performed the above scribed service and the documentation accurately describes the services I performed. I attest to the accuracy of the note.    I, Dr. Mohan Schwartz, reviewed documentation as scribed above. I performed the services described in this documentation.  I agree that the record reflects my personal performance and is accurate and complete. Mohan Schwartz MD.  10/11/2022

## 2022-10-12 NOTE — PROGRESS NOTES
"Subjective:       Patient ID: Dre Curiel is a 57 y.o. male.    Chief Complaint: CKD 3b, s/p nephrectomy    HPI  He presents to clinic today for follow-up.  Pt was seen and examined today in clinic.  Pt denies taking NSAIDs, no GI losses, no abx use, no recent infections, no dysuria, no cardiopulmonary sx's.  Laboratory studies and medications were reviewed.  Since his last office visit he had left nephrectomy on 9/16/21.  He has been doing well and has no specific or new complaints.  All Nephrology related questions were answered to his satisfaction.     The past medical, family and social histories were reviewed for this encounter.    Review of Systems   Constitutional: Negative.    HENT: Negative.     Respiratory:  Negative for shortness of breath.    Cardiovascular: Negative.  Negative for leg swelling.   Gastrointestinal: Negative.    Genitourinary: Negative.    Musculoskeletal: Negative.    Skin: Negative.    Neurological:  Negative for dizziness, light-headedness and headaches.   Psychiatric/Behavioral: Negative.          height is 5' 8" (1.727 m) and weight is 107.2 kg (236 lb 5.3 oz). His blood pressure is 138/80 and his pulse is 70.     Lab Results   Component Value Date    WBC 8.40 10/04/2022    WBC 8.40 10/04/2022    HGB 13.2 (L) 10/04/2022    HGB 13.2 (L) 10/04/2022    HCT 42.0 10/04/2022    HCT 42.0 10/04/2022    MCV 94 10/04/2022    MCV 94 10/04/2022     10/04/2022     10/04/2022        CMP  Sodium   Date Value Ref Range Status   10/04/2022 137 136 - 145 mmol/L Final   10/04/2022 137 136 - 145 mmol/L Final     Potassium   Date Value Ref Range Status   10/04/2022 4.6 3.5 - 5.1 mmol/L Final   10/04/2022 4.6 3.5 - 5.1 mmol/L Final     Chloride   Date Value Ref Range Status   10/04/2022 104 95 - 110 mmol/L Final   10/04/2022 104 95 - 110 mmol/L Final     CO2   Date Value Ref Range Status   10/04/2022 24 23 - 29 mmol/L Final   10/04/2022 24 23 - 29 mmol/L Final     Glucose   Date Value Ref " Range Status   10/04/2022 149 (H) 70 - 110 mg/dL Final   10/04/2022 149 (H) 70 - 110 mg/dL Final     BUN   Date Value Ref Range Status   10/04/2022 16 6 - 20 mg/dL Final   10/04/2022 16 6 - 20 mg/dL Final     Creatinine   Date Value Ref Range Status   10/04/2022 1.8 (H) 0.5 - 1.4 mg/dL Final   10/04/2022 1.8 (H) 0.5 - 1.4 mg/dL Final     Calcium   Date Value Ref Range Status   10/04/2022 9.3 8.7 - 10.5 mg/dL Final   10/04/2022 9.3 8.7 - 10.5 mg/dL Final     Total Protein   Date Value Ref Range Status   10/04/2022 6.9 6.0 - 8.4 g/dL Final     Albumin   Date Value Ref Range Status   10/04/2022 3.8 3.5 - 5.2 g/dL Final   10/04/2022 3.8 3.5 - 5.2 g/dL Final     Total Bilirubin   Date Value Ref Range Status   10/04/2022 0.3 0.1 - 1.0 mg/dL Final     Comment:     For infants and newborns, interpretation of results should be based  on gestational age, weight and in agreement with clinical  observations.    Premature Infant recommended reference ranges:  Up to 24 hours.............<8.0 mg/dL  Up to 48 hours............<12.0 mg/dL  3-5 days..................<15.0 mg/dL  6-29 days.................<15.0 mg/dL       Alkaline Phosphatase   Date Value Ref Range Status   10/04/2022 96 55 - 135 U/L Final     AST   Date Value Ref Range Status   10/04/2022 17 10 - 40 U/L Final     ALT   Date Value Ref Range Status   10/04/2022 20 10 - 44 U/L Final     Anion Gap   Date Value Ref Range Status   10/04/2022 9 8 - 16 mmol/L Final   10/04/2022 9 8 - 16 mmol/L Final     eGFR if    Date Value Ref Range Status   06/28/2022 40 (A) >60 mL/min/1.73 m^2 Final     eGFR if non    Date Value Ref Range Status   06/28/2022 34 (A) >60 mL/min/1.73 m^2 Final     Comment:     Calculation used to obtain the estimated glomerular filtration  rate (eGFR) is the CKD-EPI equation.          Current Outpatient Medications on File Prior to Visit   Medication Sig Dispense Refill    allopurinoL (ZYLOPRIM) 100 MG tablet Take 1 tablet  (100 mg total) by mouth once daily. 90 tablet 2    ALPRAZolam (XANAX) 0.25 MG tablet Take 2 tablets (0.5 mg total) by mouth 2 (two) times daily as needed for Anxiety. 15 tablet 0    amLODIPine-benazepriL (LOTREL) 10-40 mg per capsule Take 1 capsule by mouth once daily. 90 capsule 3    docusate sodium (COLACE) 100 MG capsule Take 1 capsule (100 mg total) by mouth 2 (two) times daily. 60 capsule 0    ergocalciferol (ERGOCALCIFEROL) 50,000 unit Cap Take 1 capsule (50,000 Units total) by mouth every 7 days. 12 capsule 1    fluticasone propionate (FLONASE) 50 mcg/actuation nasal spray 1 spray (50 mcg total) by Each Nostril route once daily. 3 g 5    gabapentin (NEURONTIN) 300 MG capsule TAKE ONE CAPSULE BY MOUTH DURING THE DAY THEN TAKE TWO TABLETS EVERY NIGHT AT BEDTIME 270 capsule 1    hydrALAZINE (APRESOLINE) 100 MG tablet Take 1 tablet (100 mg total) by mouth 3 (three) times daily. 270 tablet 2    hydroCHLOROthiazide (HYDRODIURIL) 12.5 MG Tab Take 1 tablet (12.5 mg total) by mouth once daily. 30 tablet 11    loratadine (CLARITIN) 10 mg tablet TAKE 1 TABLET BY MOUTH EVERY DAY 90 tablet 1    metoprolol succinate (TOPROL-XL) 25 MG 24 hr tablet Take 1 tablet (25 mg total) by mouth once daily. 90 tablet 3    oxyCODONE-acetaminophen (PERCOCET) 5-325 mg per tablet Take 1 tablet by mouth every 4 (four) hours as needed for Pain. 35 tablet 0    potassium chloride (KLOR-CON) 10 MEQ TbSR Take 10 mEq by mouth once.      pravastatin (PRAVACHOL) 40 MG tablet Take 1 tablet (40 mg total) by mouth once daily. 90 tablet 3    semaglutide (OZEMPIC) 0.25 mg or 0.5 mg(2 mg/1.5 mL) pen injector Inject 0.25 mg into the skin every 7 days. 4 pen 4    sertraline (ZOLOFT) 50 MG tablet Take 1 tablet (50 mg total) by mouth once daily. 90 tablet 3    sodium bicarbonate 650 MG tablet Take 1 tablet (650 mg total) by mouth 2 (two) times daily. With food 180 tablet 1    testosterone cypionate (DEPOTESTOTERONE CYPIONATE) 200 mg/mL injection INJECT ONE  ML INTO MUSCLE Q14D 2 mL 2    traZODone (DESYREL) 50 MG tablet TAKE ONE TABLET BY MOUTH EVERY DAY 90 tablet 1     No current facility-administered medications on file prior to visit.       Objective:        Physical Exam  Vitals and nursing note reviewed.   Constitutional:       Appearance: Normal appearance.   HENT:      Head: Normocephalic and atraumatic.   Eyes:      Extraocular Movements: Extraocular movements intact.      Pupils: Pupils are equal, round, and reactive to light.   Cardiovascular:      Rate and Rhythm: Normal rate and regular rhythm.   Pulmonary:      Effort: Pulmonary effort is normal.   Abdominal:      General: Bowel sounds are normal.      Palpations: Abdomen is soft.   Musculoskeletal:         General: Normal range of motion.      Right lower leg: No edema.      Left lower leg: No edema.   Skin:     General: Skin is warm and dry.   Neurological:      General: No focal deficit present.      Mental Status: He is alert and oriented to person, place, and time.   Psychiatric:         Mood and Affect: Mood normal.         Behavior: Behavior normal.         Thought Content: Thought content normal.         Judgment: Judgment normal.         Assessment:       1. Stage 3b chronic kidney disease    2. S/p nephrectomy    3. Hypertension, unspecified type    4. Gout, unspecified cause, unspecified chronicity, unspecified site    5. Metabolic acidosis    6. Vitamin D deficiency    7. Secondary hyperparathyroidism          Plan:       1. Creatinine has mild fluctuation ranging between 1.8 and 2.4.  Most recent creatinine is 1.8; returned to baseline.  Discussed importance of balanced daily hydration as tolerated.  He is on a RAAS inhibitor. Pt is on a daily PO potassium supplement; potassium is stable 4.6.     2.  S/p left nephrectomy r/t renal mass.  Denies issues at present. Continue to f/u with Urology as directed.     3. Blood pressure is 138/80 on exam today.  Pt states did not take BP meds yet this am.  States well controlled on current regimen.  Na 137, continue to remain stable with decrease in HCTZ dosing.  He is on a RAAS inhibitor. Continue all other medications as prescribed.       4. He denies recent flare or tophus stating as long as he takes his medicine and watches diet. Controlled with current regimen.  Educated and discussed further on importance of diet control and med compliance. Handouts given and reviewed with pt today.     5. Serum bicarbonate has fluctuated from 22-29 over last year; most recent lab 24.  Continue sodium bicarbonate replacement 650 mg p.o. b.i.d.      6.  Vitamin D level 23.  Continue Vitamin D 50,000units q week as prescribed.  Calcium 9.3 with an albumin of 3.8.       7.  Intact PTH improved 86.5. Calcium stable 9.3; decrease in PO4 2.5.  Vit D 23.  Continue Vit D supplement as prescribed.    RTC  4 mos    40 minutes of total time spent on the encounter, which includes face to face time and non-face to face time preparing to see the patient (eg, review of tests), obtaining and/or reviewing separately obtained history, documenting clinical information in the electronic or other health record, Independently interpreting results and communicating results to the patient/family/caregiver, or care coordination.    Katie Anderson, HANSEL

## 2022-10-13 ENCOUNTER — OFFICE VISIT (OUTPATIENT)
Dept: NEPHROLOGY | Facility: CLINIC | Age: 57
End: 2022-10-13
Payer: COMMERCIAL

## 2022-10-13 ENCOUNTER — PATIENT MESSAGE (OUTPATIENT)
Dept: FAMILY MEDICINE | Facility: CLINIC | Age: 57
End: 2022-10-13
Payer: COMMERCIAL

## 2022-10-13 VITALS
HEIGHT: 68 IN | SYSTOLIC BLOOD PRESSURE: 138 MMHG | BODY MASS INDEX: 35.81 KG/M2 | DIASTOLIC BLOOD PRESSURE: 80 MMHG | HEART RATE: 70 BPM | WEIGHT: 236.31 LBS

## 2022-10-13 DIAGNOSIS — E55.9 VITAMIN D DEFICIENCY: ICD-10-CM

## 2022-10-13 DIAGNOSIS — E87.20 METABOLIC ACIDOSIS: ICD-10-CM

## 2022-10-13 DIAGNOSIS — M10.9 GOUT, UNSPECIFIED CAUSE, UNSPECIFIED CHRONICITY, UNSPECIFIED SITE: ICD-10-CM

## 2022-10-13 DIAGNOSIS — G47.10 EXCESSIVE SLEEPINESS: Primary | ICD-10-CM

## 2022-10-13 DIAGNOSIS — N18.32 STAGE 3B CHRONIC KIDNEY DISEASE: Primary | ICD-10-CM

## 2022-10-13 DIAGNOSIS — N25.81 SECONDARY HYPERPARATHYROIDISM: ICD-10-CM

## 2022-10-13 DIAGNOSIS — Z90.5 S/P NEPHRECTOMY: ICD-10-CM

## 2022-10-13 DIAGNOSIS — I10 HYPERTENSION, UNSPECIFIED TYPE: ICD-10-CM

## 2022-10-13 PROCEDURE — 4010F ACE/ARB THERAPY RXD/TAKEN: CPT | Mod: CPTII,S$GLB,, | Performed by: NURSE PRACTITIONER

## 2022-10-13 PROCEDURE — 3079F DIAST BP 80-89 MM HG: CPT | Mod: CPTII,S$GLB,, | Performed by: NURSE PRACTITIONER

## 2022-10-13 PROCEDURE — 3075F SYST BP GE 130 - 139MM HG: CPT | Mod: CPTII,S$GLB,, | Performed by: NURSE PRACTITIONER

## 2022-10-13 PROCEDURE — 1160F PR REVIEW ALL MEDS BY PRESCRIBER/CLIN PHARMACIST DOCUMENTED: ICD-10-PCS | Mod: CPTII,S$GLB,, | Performed by: NURSE PRACTITIONER

## 2022-10-13 PROCEDURE — 1159F PR MEDICATION LIST DOCUMENTED IN MEDICAL RECORD: ICD-10-PCS | Mod: CPTII,S$GLB,, | Performed by: NURSE PRACTITIONER

## 2022-10-13 PROCEDURE — 4010F PR ACE/ARB THEARPY RXD/TAKEN: ICD-10-PCS | Mod: CPTII,S$GLB,, | Performed by: NURSE PRACTITIONER

## 2022-10-13 PROCEDURE — 3079F PR MOST RECENT DIASTOLIC BLOOD PRESSURE 80-89 MM HG: ICD-10-PCS | Mod: CPTII,S$GLB,, | Performed by: NURSE PRACTITIONER

## 2022-10-13 PROCEDURE — 99999 PR PBB SHADOW E&M-EST. PATIENT-LVL IV: ICD-10-PCS | Mod: PBBFAC,,, | Performed by: NURSE PRACTITIONER

## 2022-10-13 PROCEDURE — 1159F MED LIST DOCD IN RCRD: CPT | Mod: CPTII,S$GLB,, | Performed by: NURSE PRACTITIONER

## 2022-10-13 PROCEDURE — 3066F PR DOCUMENTATION OF TREATMENT FOR NEPHROPATHY: ICD-10-PCS | Mod: CPTII,S$GLB,, | Performed by: NURSE PRACTITIONER

## 2022-10-13 PROCEDURE — 1160F RVW MEDS BY RX/DR IN RCRD: CPT | Mod: CPTII,S$GLB,, | Performed by: NURSE PRACTITIONER

## 2022-10-13 PROCEDURE — 3075F PR MOST RECENT SYSTOLIC BLOOD PRESS GE 130-139MM HG: ICD-10-PCS | Mod: CPTII,S$GLB,, | Performed by: NURSE PRACTITIONER

## 2022-10-13 PROCEDURE — 3066F NEPHROPATHY DOC TX: CPT | Mod: CPTII,S$GLB,, | Performed by: NURSE PRACTITIONER

## 2022-10-13 PROCEDURE — 99999 PR PBB SHADOW E&M-EST. PATIENT-LVL IV: CPT | Mod: PBBFAC,,, | Performed by: NURSE PRACTITIONER

## 2022-10-13 PROCEDURE — 99215 PR OFFICE/OUTPT VISIT, EST, LEVL V, 40-54 MIN: ICD-10-PCS | Mod: S$GLB,,, | Performed by: NURSE PRACTITIONER

## 2022-10-13 PROCEDURE — 99215 OFFICE O/P EST HI 40 MIN: CPT | Mod: S$GLB,,, | Performed by: NURSE PRACTITIONER

## 2022-10-13 NOTE — PATIENT INSTRUCTIONS
Continue all medications as reviewed today    Keep blood pressure controlled  Low sodium diet: avoid/limit salt, processed foods (boxed or canned), fried foods, fast foods, etc as discussed    Keep blood sugar controlled  Low carbohydrate/sugar diet: avoid/limit sugary drinks, white breads, pasta, rice, etc as discussed    Drink water, flavored water/diluted juice (drink to thirst) daily as tolerated with no swelling/shortness of breath    Avoid NSAIDS: Advil, Ibuprofen, Aleve, Naproxen, Meloxicam, etc. (Aspirin is ok), Tylenol is Best    Exercise as tolerated     Follow up in 4 months and have labs drawn 1-2 weeks prior to visit

## 2022-10-17 ENCOUNTER — TELEPHONE (OUTPATIENT)
Dept: PULMONOLOGY | Facility: HOSPITAL | Age: 57
End: 2022-10-17
Payer: COMMERCIAL

## 2022-10-28 PROCEDURE — 95800 SLP STDY UNATTENDED: CPT | Mod: 26,,, | Performed by: INTERNAL MEDICINE

## 2022-10-28 PROCEDURE — 95800 PR SLEEP STUDY, UNATTENDED, RECORD HEART RATE/O2 SAT/RESP ANAL/SLEEP TIME: ICD-10-PCS | Mod: 26,,, | Performed by: INTERNAL MEDICINE

## 2022-10-31 ENCOUNTER — HOSPITAL ENCOUNTER (OUTPATIENT)
Dept: SLEEP MEDICINE | Facility: HOSPITAL | Age: 57
Discharge: HOME | End: 2022-10-31
Attending: FAMILY MEDICINE
Payer: COMMERCIAL

## 2022-10-31 DIAGNOSIS — G47.10 EXCESSIVE SLEEPINESS: Primary | ICD-10-CM

## 2022-10-31 DIAGNOSIS — G47.33 OSA (OBSTRUCTIVE SLEEP APNEA): ICD-10-CM

## 2022-10-31 PROCEDURE — 95806 SLEEP STUDY UNATT&RESP EFFT: CPT | Performed by: INTERNAL MEDICINE

## 2022-10-31 PROCEDURE — 95800 SLP STDY UNATTENDED: CPT

## 2022-10-31 NOTE — Clinical Note
PHYSICIAN INTERPRETATION AND COMMENTS: Findings are consistent with severe, non-positional obstructive sleep apnea(KARLY). CPAP titration indicated. Please refer patient to Sleep Disorder Clinic for management of severe obstructive sleep apnea. CPAP is indicated. CLINICAL HISTORY: 57 year old male presented with: 17.75 inch neck, BMI of 36, an [place] sleepiness score of 1, history of hypertension and symptoms of nocturnal snoring, waking up choking and witnessed apneas. Based on the clinical history, the patient has a high pre-test probability of having Severe KARLY.

## 2022-11-02 ENCOUNTER — HOSPITAL ENCOUNTER (OUTPATIENT)
Dept: RADIOLOGY | Facility: HOSPITAL | Age: 57
Discharge: HOME | End: 2022-11-02
Attending: UROLOGY
Payer: COMMERCIAL

## 2022-11-02 ENCOUNTER — OFFICE VISIT (OUTPATIENT)
Dept: UROLOGY | Facility: CLINIC | Age: 57
End: 2022-11-02
Payer: COMMERCIAL

## 2022-11-02 ENCOUNTER — PATIENT MESSAGE (OUTPATIENT)
Dept: UROLOGY | Facility: CLINIC | Age: 57
End: 2022-11-02

## 2022-11-02 VITALS
RESPIRATION RATE: 18 BRPM | DIASTOLIC BLOOD PRESSURE: 81 MMHG | SYSTOLIC BLOOD PRESSURE: 127 MMHG | WEIGHT: 235 LBS | HEART RATE: 67 BPM | BODY MASS INDEX: 35.61 KG/M2 | TEMPERATURE: 98 F | HEIGHT: 68 IN

## 2022-11-02 DIAGNOSIS — N28.89 OTHER SPECIFIED DISORDERS OF KIDNEY AND URETER: ICD-10-CM

## 2022-11-02 DIAGNOSIS — N18.32 STAGE 3B CHRONIC KIDNEY DISEASE: ICD-10-CM

## 2022-11-02 DIAGNOSIS — C64.2 RENAL CELL CARCINOMA OF LEFT KIDNEY: ICD-10-CM

## 2022-11-02 DIAGNOSIS — R79.89 LOW TESTOSTERONE: ICD-10-CM

## 2022-11-02 DIAGNOSIS — C64.2 RENAL CELL CARCINOMA OF LEFT KIDNEY: Primary | ICD-10-CM

## 2022-11-02 PROCEDURE — 99213 OFFICE O/P EST LOW 20 MIN: CPT | Mod: S$GLB,,, | Performed by: UROLOGY

## 2022-11-02 PROCEDURE — 99999 PR PBB SHADOW E&M-EST. PATIENT-LVL V: CPT | Mod: PBBFAC,,, | Performed by: UROLOGY

## 2022-11-02 PROCEDURE — 3008F BODY MASS INDEX DOCD: CPT | Mod: CPTII,S$GLB,, | Performed by: UROLOGY

## 2022-11-02 PROCEDURE — 99213 PR OFFICE/OUTPT VISIT, EST, LEVL III, 20-29 MIN: ICD-10-PCS | Mod: S$GLB,,, | Performed by: UROLOGY

## 2022-11-02 PROCEDURE — 3074F PR MOST RECENT SYSTOLIC BLOOD PRESSURE < 130 MM HG: ICD-10-PCS | Mod: CPTII,S$GLB,, | Performed by: UROLOGY

## 2022-11-02 PROCEDURE — 3074F SYST BP LT 130 MM HG: CPT | Mod: CPTII,S$GLB,, | Performed by: UROLOGY

## 2022-11-02 PROCEDURE — 4010F ACE/ARB THERAPY RXD/TAKEN: CPT | Mod: CPTII,S$GLB,, | Performed by: UROLOGY

## 2022-11-02 PROCEDURE — 3008F PR BODY MASS INDEX (BMI) DOCUMENTED: ICD-10-PCS | Mod: CPTII,S$GLB,, | Performed by: UROLOGY

## 2022-11-02 PROCEDURE — 71045 XR CHEST 1 VIEW: ICD-10-PCS | Mod: 26,,, | Performed by: RADIOLOGY

## 2022-11-02 PROCEDURE — 4010F PR ACE/ARB THEARPY RXD/TAKEN: ICD-10-PCS | Mod: CPTII,S$GLB,, | Performed by: UROLOGY

## 2022-11-02 PROCEDURE — 3079F PR MOST RECENT DIASTOLIC BLOOD PRESSURE 80-89 MM HG: ICD-10-PCS | Mod: CPTII,S$GLB,, | Performed by: UROLOGY

## 2022-11-02 PROCEDURE — 3066F PR DOCUMENTATION OF TREATMENT FOR NEPHROPATHY: ICD-10-PCS | Mod: CPTII,S$GLB,, | Performed by: UROLOGY

## 2022-11-02 PROCEDURE — 71045 X-RAY EXAM CHEST 1 VIEW: CPT | Mod: TC

## 2022-11-02 PROCEDURE — 71045 X-RAY EXAM CHEST 1 VIEW: CPT | Mod: 26,,, | Performed by: RADIOLOGY

## 2022-11-02 PROCEDURE — 99999 PR PBB SHADOW E&M-EST. PATIENT-LVL V: ICD-10-PCS | Mod: PBBFAC,,, | Performed by: UROLOGY

## 2022-11-02 PROCEDURE — 3066F NEPHROPATHY DOC TX: CPT | Mod: CPTII,S$GLB,, | Performed by: UROLOGY

## 2022-11-02 PROCEDURE — 1159F PR MEDICATION LIST DOCUMENTED IN MEDICAL RECORD: ICD-10-PCS | Mod: CPTII,S$GLB,, | Performed by: UROLOGY

## 2022-11-02 PROCEDURE — 1159F MED LIST DOCD IN RCRD: CPT | Mod: CPTII,S$GLB,, | Performed by: UROLOGY

## 2022-11-02 PROCEDURE — 3079F DIAST BP 80-89 MM HG: CPT | Mod: CPTII,S$GLB,, | Performed by: UROLOGY

## 2022-11-02 NOTE — PROGRESS NOTES
Chief Complaint:   Encounter Diagnoses   Name Primary?    Renal cell carcinoma of left kidney Yes    Stage 3b chronic kidney disease     Low testosterone        HPI:    11/2/22- here for 6 month assessment, currently doing well on seeing Nephrology.  Testosterone per PCP.  Has been having some bowel issues.  55-year-old gentleman who comes in with a suspicious left renal mass.  MRI has been completed, would not have these results.  He has significant history of a right renal trauma in between 2nd and 3rd grades, requiring partial nephrectomy of the right kidney.  This ultrasound was done due to an abnormality of the gallbladder previously seen on past ultrasound 3 years ago.  Patient has had no other urological history, no family history of urological cancers or stones.  Patient has had no gross hematuria, he does have a history of smoking, currently chews tobacco.  Patient is otherwise asymptomatic.    Allergies:  Iodine and iodide containing products    Medications:  has a current medication list which includes the following prescription(s): allopurinol, alprazolam, amlodipine-benazepril, docusate sodium, fluticasone propionate, gabapentin, hydralazine, hydrochlorothiazide, loratadine, metoprolol succinate, oxycodone-acetaminophen, potassium chloride, pravastatin, ozempic, sertraline, sodium bicarbonate, testosterone cypionate, trazodone, and vitamin d2.    Review of Systems:  General: No fever, chills, fatigability, or weight loss.  Skin: No rashes, itching, or changes in color or texture of skin.  Chest: Denies BRIGGS, cyanosis, wheezing, cough, and sputum production.  Abdomen: Appetite fine. No weight loss. Denies diarrhea, abdominal pain, hematemesis, or blood in stool.  Musculoskeletal: No joint stiffness or swelling. Denies back pain.  : As above.  All other review of systems negative.    PMH:   has a past medical history of Allergy, Anxiety, Cancer of kidney, Depression, Hyperlipidemia, and  Hypertension.    PSH:   has a past surgical history that includes kidney removal; Cosmetic surgery; Colonoscopy (N/A, 6/8/2018); Colonoscopy (N/A, 6/11/2018); Colonoscopy (06/2018); Laparoscopic nephrectomy, hand assisted (Left, 9/16/2021); and Colonoscopy (N/A, 11/17/2021).    FamHx: family history includes Cancer in his maternal uncle; Hypertension in his mother; No Known Problems in his brother, maternal aunt, maternal grandfather, maternal grandmother, paternal aunt, paternal grandfather, paternal grandmother, paternal uncle, and sister.    SocHx:  reports that he has never smoked. He uses smokeless tobacco. He reports current alcohol use. He reports that he does not use drugs.      Physical Exam:  There were no vitals filed for this visit.    General: A&Ox3, no apparent distress, no deformities  Neck: No masses, normal ROM  Lungs: normal inspiration, no use of accessory muscles  Heart: normal pulse, no arrhythmias  Abdomen: Soft, NT, ND, no masses, no hernias, no hepatosplenomegaly, incisions are intact  Skin: The skin is warm and dry. No jaundice.  Ext: No c/c/e.    Labs/Studies:   CT non-contrasted no mets 5/22  CXR clear 5/22  CT renogram 9.2 cm left renal lesion 7/21  Lasix renogram 45% right/55% left 7/21  Creatinine 1.8 10/22  Remainder of labs stable 10/22  PSA 0.61 10/22  Testosterone 784 10/22  Testosterone 247 4/22    Impression/Plan:       1. Left renal cell carcinoma-  pT3a F3 (renal sinus fat)  left EDWIN radical Nx  9/16/21    Patient is here for surveillance screening, labs are stable, chest x-ray is due.  If these are normal I will see him back in 6 months with a CT, chest x-ray and labs.  We are continuing CT scans every year at this point.  In regards to bowel issues he may contact Colorectal surgery.    2. Hypogonadism- 200 mg every 2 weeks by his PCP.  Patient is following up with Nephrology.    Addendum:  Due to radical nephrectomy, patient now has solitary kidney and major organ failure.   Current renal function is reduced, please see labs.  Nephrectomy secondary to renal cell carcinoma.  5/31/23

## 2022-11-03 ENCOUNTER — PATIENT MESSAGE (OUTPATIENT)
Dept: FAMILY MEDICINE | Facility: CLINIC | Age: 57
End: 2022-11-03
Payer: COMMERCIAL

## 2022-11-03 NOTE — PROCEDURES
PHYSICIAN INTERPRETATION AND COMMENTS: Findings are consistent with severe, non-positional obstructive sleep  apnea(KARLY). CPAP titration indicated. Please refer patient to Sleep Disorder Clinic for management of severe obstructive  sleep apnea. CPAP is indicated.  CLINICAL HISTORY: 57 year old male presented with: 17.75 inch neck, BMI of 36, an [place] sleepiness score of 1, history of  hypertension and symptoms of nocturnal snoring, waking up choking and witnessed apneas. Based on the clinical history,  the patient has a high pre-test probability of having Severe KARLY.  SLEEP STUDY FINDINGS: Patient underwent a 1 night Home Sleep Test and by behavioral criteria, slept for approximately  5.95 hours, with a sleep latency of 3 minutes and a sleep efficiency of 84.5%. Severe sleep disordered breathing (AHI=46) is  noted based on a 4% hypopnea desaturation criteria. The patient slept supine 10.3% of the night based on valid recording  time of 5.95 hours and is 1.1 times as likely to have apneas/hypopneas when supine. The apneas/hypopneas are  accompanied by minimal oxygen desaturation (percent time below 90% SpO2: 3.8%, Min SpO2: 86.1%). The average  desaturation across all sleep disordered breathing events is 5%. Snoring occurs for 44.5% (30 dB) of the study, 22.3% is  very loud. The mean pulse rate is 59.7 BPM, with very frequent pulse rate variability (78 events with >= 6 BPM  increase/decrease per hour).

## 2022-11-07 ENCOUNTER — TELEPHONE (OUTPATIENT)
Dept: FAMILY MEDICINE | Facility: CLINIC | Age: 57
End: 2022-11-07
Payer: COMMERCIAL

## 2022-11-07 DIAGNOSIS — G47.30 SLEEP APNEA, UNSPECIFIED TYPE: Primary | ICD-10-CM

## 2022-11-09 ENCOUNTER — PATIENT MESSAGE (OUTPATIENT)
Dept: PULMONOLOGY | Facility: CLINIC | Age: 57
End: 2022-11-09
Payer: COMMERCIAL

## 2022-11-21 ENCOUNTER — OFFICE VISIT (OUTPATIENT)
Dept: PULMONOLOGY | Facility: CLINIC | Age: 57
End: 2022-11-21
Payer: COMMERCIAL

## 2022-11-21 VITALS
OXYGEN SATURATION: 99 % | HEIGHT: 68 IN | BODY MASS INDEX: 35.6 KG/M2 | DIASTOLIC BLOOD PRESSURE: 86 MMHG | WEIGHT: 234.88 LBS | SYSTOLIC BLOOD PRESSURE: 138 MMHG | RESPIRATION RATE: 19 BRPM | HEART RATE: 79 BPM

## 2022-11-21 DIAGNOSIS — E66.01 CLASS 2 SEVERE OBESITY WITH SERIOUS COMORBIDITY AND BODY MASS INDEX (BMI) OF 35.0 TO 35.9 IN ADULT, UNSPECIFIED OBESITY TYPE: ICD-10-CM

## 2022-11-21 DIAGNOSIS — G47.33 OBSTRUCTIVE SLEEP APNEA: Primary | ICD-10-CM

## 2022-11-21 DIAGNOSIS — G47.09 OTHER INSOMNIA: ICD-10-CM

## 2022-11-21 DIAGNOSIS — Z90.5 S/P NEPHRECTOMY: ICD-10-CM

## 2022-11-21 DIAGNOSIS — I10 ESSENTIAL HYPERTENSION: ICD-10-CM

## 2022-11-21 DIAGNOSIS — Z87.898 FORMER CONSUMPTION OF ALCOHOL: ICD-10-CM

## 2022-11-21 PROBLEM — G47.10 EXCESSIVE SLEEPINESS: Status: RESOLVED | Noted: 2022-10-31 | Resolved: 2022-11-21

## 2022-11-21 PROBLEM — E66.812 CLASS 2 SEVERE OBESITY WITH SERIOUS COMORBIDITY AND BODY MASS INDEX (BMI) OF 35.0 TO 35.9 IN ADULT: Status: ACTIVE | Noted: 2022-11-21

## 2022-11-21 PROCEDURE — 99999 PR PBB SHADOW E&M-EST. PATIENT-LVL V: CPT | Mod: PBBFAC,,, | Performed by: NURSE PRACTITIONER

## 2022-11-21 PROCEDURE — 1159F MED LIST DOCD IN RCRD: CPT | Mod: CPTII,S$GLB,, | Performed by: NURSE PRACTITIONER

## 2022-11-21 PROCEDURE — 3008F BODY MASS INDEX DOCD: CPT | Mod: CPTII,S$GLB,, | Performed by: NURSE PRACTITIONER

## 2022-11-21 PROCEDURE — 3079F DIAST BP 80-89 MM HG: CPT | Mod: CPTII,S$GLB,, | Performed by: NURSE PRACTITIONER

## 2022-11-21 PROCEDURE — 4010F PR ACE/ARB THEARPY RXD/TAKEN: ICD-10-PCS | Mod: CPTII,S$GLB,, | Performed by: NURSE PRACTITIONER

## 2022-11-21 PROCEDURE — 4010F ACE/ARB THERAPY RXD/TAKEN: CPT | Mod: CPTII,S$GLB,, | Performed by: NURSE PRACTITIONER

## 2022-11-21 PROCEDURE — 99203 PR OFFICE/OUTPT VISIT, NEW, LEVL III, 30-44 MIN: ICD-10-PCS | Mod: S$GLB,,, | Performed by: NURSE PRACTITIONER

## 2022-11-21 PROCEDURE — 3075F SYST BP GE 130 - 139MM HG: CPT | Mod: CPTII,S$GLB,, | Performed by: NURSE PRACTITIONER

## 2022-11-21 PROCEDURE — 3079F PR MOST RECENT DIASTOLIC BLOOD PRESSURE 80-89 MM HG: ICD-10-PCS | Mod: CPTII,S$GLB,, | Performed by: NURSE PRACTITIONER

## 2022-11-21 PROCEDURE — 3075F PR MOST RECENT SYSTOLIC BLOOD PRESS GE 130-139MM HG: ICD-10-PCS | Mod: CPTII,S$GLB,, | Performed by: NURSE PRACTITIONER

## 2022-11-21 PROCEDURE — 99999 PR PBB SHADOW E&M-EST. PATIENT-LVL V: ICD-10-PCS | Mod: PBBFAC,,, | Performed by: NURSE PRACTITIONER

## 2022-11-21 PROCEDURE — 3066F PR DOCUMENTATION OF TREATMENT FOR NEPHROPATHY: ICD-10-PCS | Mod: CPTII,S$GLB,, | Performed by: NURSE PRACTITIONER

## 2022-11-21 PROCEDURE — 1160F PR REVIEW ALL MEDS BY PRESCRIBER/CLIN PHARMACIST DOCUMENTED: ICD-10-PCS | Mod: CPTII,S$GLB,, | Performed by: NURSE PRACTITIONER

## 2022-11-21 PROCEDURE — 99203 OFFICE O/P NEW LOW 30 MIN: CPT | Mod: S$GLB,,, | Performed by: NURSE PRACTITIONER

## 2022-11-21 PROCEDURE — 3066F NEPHROPATHY DOC TX: CPT | Mod: CPTII,S$GLB,, | Performed by: NURSE PRACTITIONER

## 2022-11-21 PROCEDURE — 1160F RVW MEDS BY RX/DR IN RCRD: CPT | Mod: CPTII,S$GLB,, | Performed by: NURSE PRACTITIONER

## 2022-11-21 PROCEDURE — 3008F PR BODY MASS INDEX (BMI) DOCUMENTED: ICD-10-PCS | Mod: CPTII,S$GLB,, | Performed by: NURSE PRACTITIONER

## 2022-11-21 PROCEDURE — 1159F PR MEDICATION LIST DOCUMENTED IN MEDICAL RECORD: ICD-10-PCS | Mod: CPTII,S$GLB,, | Performed by: NURSE PRACTITIONER

## 2022-11-21 NOTE — ASSESSMENT & PLAN NOTE
10/28/2022 Home Sleep Study severe obstructive sleep apnea AHI 46. Mean SpO2 94.6%  11/21/2022 begin Auto CPAP 5-20 cm   Full face mask  Follow up 31-90 days from obtaining CPAP for IDL.

## 2022-11-21 NOTE — ASSESSMENT & PLAN NOTE
Encouraged calorie reduction and 30 minutes of exercise daily. Discussed impact of obesity on general health.  Wt Readings from Last 9 Encounters:   11/21/22 106.5 kg (234 lb 14.4 oz)   11/02/22 106.6 kg (235 lb 0.2 oz)   10/13/22 107.2 kg (236 lb 5.3 oz)   10/11/22 106.4 kg (234 lb 9.1 oz)   07/08/22 101.7 kg (224 lb 3.3 oz)   06/13/22 101.5 kg (223 lb 12.3 oz)   05/06/22 102.4 kg (225 lb 12 oz)   04/29/22 103.1 kg (227 lb 6.5 oz)   04/11/22 101.7 kg (224 lb 3.3 oz)   Body mass index is 35.72 kg/m².

## 2022-11-21 NOTE — PROGRESS NOTES
Subjective:      Patient ID: Dre Curiel is a 57 y.o. male.    Patient Active Problem List   Diagnosis    Idiopathic chronic gout of right foot    Insomnia    Low testosterone    Impotence due to erectile dysfunction    Premature ejaculation    Hyperlipidemia    Former consumption of alcohol    Encounter for monitoring testosterone replacement therapy    Leukocytosis    Essential hypertension    Renal cell carcinoma of left kidney    Left renal mass    Renal mass, left    Preop cardiovascular exam    S/p nephrectomy    Stage 3b chronic kidney disease    Obstructive sleep apnea    Class 2 severe obesity with serious comorbidity and body mass index (BMI) of 35.0 to 35.9 in adult       he has been referred by Mohan Schwartz MD for evaluation and management for   Chief Complaint   Patient presents with    Sleep Apnea       Chief Complaint: Sleep Apnea        HPI:  He presents for obstructive sleep apnea with review Home Sleep Study    10/28/2022 Home Sleep Study severe obstructive sleep apnea AHI 46. Mean SpO2 94.6%.     Bed time is 0800 - 0930  Wake time is 0500  Sleep onset is within 15 -30 Minutes.  Wake after sleep onset occurs three times a night.  Nocturia occurs three times a night,   Sleep aids : YES, Trazodone 50 mg as needed about twice a week  Dry mouth :  NO  Sleep walking:  NO  Sleep talking :  NO  Sleep eating: NO  Vivid Dreams :  NO  Cataplexy :  NO    [place] Sleepiness Scale   EPWORTH SLEEPINESS SCALE 11/21/2022   Sitting and reading 1   Watching TV 0   Sitting, inactive in a public place (e.g. a theatre or a meeting) 0   As a passenger in a car for an hour without a break 1   Lying down to rest in the afternoon when circumstances permit 1   Sitting and talking to someone 0   Sitting quietly after a lunch without alcohol 1   In a car, while stopped for a few minutes in traffic 0   Total score 4       Neck circumference is 17.5 inches.  Mallampati score 4    Occupational History:   Employed full  time as   for [employer] .     Previous Report Reviewed: lab reports and office notes     Past Medical History: The following portions of the patient's history were reviewed and updated as appropriate:   He  has a past surgical history that includes kidney removal; Cosmetic surgery; Colonoscopy (N/A, 6/8/2018); Colonoscopy (N/A, 6/11/2018); Colonoscopy (06/2018); Laparoscopic nephrectomy, hand assisted (Left, 9/16/2021); and Colonoscopy (N/A, 11/17/2021).  His family history includes Cancer in his maternal uncle; Hypertension in his mother; No Known Problems in his brother, maternal aunt, maternal grandfather, maternal grandmother, paternal aunt, paternal grandfather, paternal grandmother, paternal uncle, and sister.  He  reports that he has never smoked. He uses smokeless tobacco. He reports current alcohol use. He reports that he does not use drugs.  He has a current medication list which includes the following prescription(s): allopurinol, amlodipine-benazepril, docusate sodium, fluticasone propionate, gabapentin, hydralazine, hydrochlorothiazide, loratadine, metoprolol succinate, oxycodone-acetaminophen, potassium chloride, pravastatin, ozempic, sertraline, sodium bicarbonate, trazodone, vitamin d2, alprazolam, testosterone cypionate, and tramadol.  He is allergic to iodine and iodide containing products..    Review of Systems   Constitutional:  Negative for fever, chills, weight loss, weight gain, activity change, appetite change, fatigue and night sweats.   HENT:  Negative for postnasal drip, rhinorrhea, sinus pressure, voice change and congestion.    Eyes:  Negative for redness and itching.   Respiratory:  Positive for apnea, snoring and somnolence. Negative for cough, sputum production, chest tightness, shortness of breath, wheezing, orthopnea, asthma nighttime symptoms, dyspnea on extertion and use of rescue inhaler.    Cardiovascular: Negative.  Negative for chest  "pain, palpitations and leg swelling.   Genitourinary:  Negative for difficulty urinating and hematuria.   Endocrine:  Negative for cold intolerance and heat intolerance.    Musculoskeletal:  Negative for arthralgias, gait problem, joint swelling and myalgias.   Skin: Negative.    Gastrointestinal:  Negative for nausea, vomiting, abdominal pain and acid reflux.   Neurological:  Negative for dizziness, weakness, light-headedness and headaches.   Hematological:  Negative for adenopathy. No excessive bruising.   All other systems reviewed and are negative.   Objective:   /86   Pulse 79   Resp 19   Ht 5' 8" (1.727 m)   Wt 106.5 kg (234 lb 14.4 oz)   SpO2 99%   BMI 35.72 kg/m²   Physical Exam  Vitals and nursing note reviewed.   Constitutional:       General: He is not in acute distress.     Appearance: Normal appearance. He is well-developed. He is not ill-appearing or toxic-appearing.   HENT:      Head: Normocephalic and atraumatic.      Right Ear: External ear normal.      Left Ear: External ear normal.      Nose: Nose normal.      Mouth/Throat:      Mouth: Mucous membranes are moist.      Pharynx: Oropharynx is clear. No oropharyngeal exudate.   Eyes:      Conjunctiva/sclera: Conjunctivae normal.   Cardiovascular:      Rate and Rhythm: Normal rate and regular rhythm.      Heart sounds: Normal heart sounds.   Pulmonary:      Effort: Pulmonary effort is normal.      Breath sounds: Normal breath sounds.   Abdominal:      Palpations: Abdomen is soft.   Musculoskeletal:      Cervical back: Normal range of motion and neck supple.   Skin:     General: Skin is warm and dry.   Neurological:      Mental Status: He is alert and oriented to person, place, and time.   Psychiatric:         Behavior: Behavior normal. Behavior is cooperative.         Thought Content: Thought content normal.         Judgment: Judgment normal.       Personal Diagnostic Review  Review of labs, xray's, cardiology reports.     Assessment: "     1. Obstructive sleep apnea    2. Essential hypertension    3. Other insomnia    4. Former consumption of alcohol    5. S/p nephrectomy    6. Class 2 severe obesity with serious comorbidity and body mass index (BMI) of 35.0 to 35.9 in adult, unspecified obesity type      Orders Placed This Encounter   Procedures    CPAP FOR HOME USE     10/28/2022 Home Sleep Study severe obstructive sleep apnea AHI 46. Mean SpO2 94.6%.     Order Specific Question:   Length of need (1-99 months):     Answer:   99     Order Specific Question:   Type ():     Answer:   Auto CPAP     Order Specific Question:   Auto CPAP pressure setting range (cmH20):     Answer:   5-20     Order Specific Question:   Fulfillment Priority:     Answer:   Level 4:  all others     Order Specific Question:   Humidification ():     Answer:   Heated     Order Specific Question:   Choose ONE mask type and its corresponding cushions and/or pillows:     Answer:    Full Face Mask, 1 per 90 days:  Full Face Cushion, (3 per 90 days)     Order Specific Question:   Choose EITHER Heated or Non-Heated Tubjing     Answer:    Non-Heated Tubing, 1 per 90 days     Order Specific Question:   Number of Days Needed:     Answer:   99     Order Specific Question:   All other supplies as needed as listed below:     Answer:    Headgear, 1 per 180 days     Order Specific Question:   All other supplies as needed as listed below:     Answer:    Chin Strap, 1 per 180 days     Order Specific Question:   All other supplies as needed as listed below:     Answer:    Disposable Filter, 6 per 90 days     Order Specific Question:   All other supplies as needed as listed below:     Answer:    Non-Disposable Filter, 1 per 180 days     Order Specific Question:   All other supplies as needed as listed below:     Answer:    Humidifier Chamber, 1 per 180 days       Plan:   Discussed diagnosis, its evaluation, treatment and usual course. All  questions answered.  Problem List Items Addressed This Visit       S/p nephrectomy     Left nephrectomy 9/16/2021         Obstructive sleep apnea - Primary     10/28/2022 Home Sleep Study severe obstructive sleep apnea AHI 46. Mean SpO2 94.6%  11/21/2022 begin Auto CPAP 5-20 cm   Full face mask  Follow up 31-90 days from obtaining CPAP for IDL.              Relevant Orders    CPAP FOR HOME USE    Insomnia     Managed by primary care provider   Trazodone 50 mg as needed about twice a week         Former consumption of alcohol     Quit alcohol September 2021          Essential hypertension     Did not take bp medication today  Typically well controlled  Managed by primary care provider           Class 2 severe obesity with serious comorbidity and body mass index (BMI) of 35.0 to 35.9 in adult     Encouraged calorie reduction and 30 minutes of exercise daily. Discussed impact of obesity on general health.  Wt Readings from Last 9 Encounters:   11/21/22 106.5 kg (234 lb 14.4 oz)   11/02/22 106.6 kg (235 lb 0.2 oz)   10/13/22 107.2 kg (236 lb 5.3 oz)   10/11/22 106.4 kg (234 lb 9.1 oz)   07/08/22 101.7 kg (224 lb 3.3 oz)   06/13/22 101.5 kg (223 lb 12.3 oz)   05/06/22 102.4 kg (225 lb 12 oz)   04/29/22 103.1 kg (227 lb 6.5 oz)   04/11/22 101.7 kg (224 lb 3.3 oz)   Body mass index is 35.72 kg/m².                Follow up in about 10 weeks (around 1/30/2023) for CPAP compliance download after initial set up.    Thank you for the opportunity to participate in the care of this patient.

## 2022-12-12 ENCOUNTER — PATIENT MESSAGE (OUTPATIENT)
Dept: PULMONOLOGY | Facility: CLINIC | Age: 57
End: 2022-12-12
Payer: COMMERCIAL

## 2022-12-12 ENCOUNTER — PATIENT MESSAGE (OUTPATIENT)
Dept: FAMILY MEDICINE | Facility: CLINIC | Age: 57
End: 2022-12-12
Payer: COMMERCIAL

## 2022-12-14 ENCOUNTER — HOSPITAL ENCOUNTER (OUTPATIENT)
Dept: RADIOLOGY | Facility: HOSPITAL | Age: 57
Discharge: HOME | End: 2022-12-14
Attending: FAMILY MEDICINE
Payer: COMMERCIAL

## 2022-12-14 ENCOUNTER — OFFICE VISIT (OUTPATIENT)
Dept: FAMILY MEDICINE | Facility: CLINIC | Age: 57
End: 2022-12-14
Payer: COMMERCIAL

## 2022-12-14 ENCOUNTER — PATIENT MESSAGE (OUTPATIENT)
Dept: PULMONOLOGY | Facility: CLINIC | Age: 57
End: 2022-12-14
Payer: COMMERCIAL

## 2022-12-14 VITALS
WEIGHT: 238.19 LBS | SYSTOLIC BLOOD PRESSURE: 130 MMHG | DIASTOLIC BLOOD PRESSURE: 80 MMHG | HEIGHT: 68 IN | HEART RATE: 71 BPM | OXYGEN SATURATION: 96 % | TEMPERATURE: 98 F | BODY MASS INDEX: 36.1 KG/M2

## 2022-12-14 DIAGNOSIS — M25.551 HIP PAIN, CHRONIC, RIGHT: ICD-10-CM

## 2022-12-14 DIAGNOSIS — M53.3 DISORDER OF SI (SACROILIAC) JOINT: ICD-10-CM

## 2022-12-14 DIAGNOSIS — G89.29 HIP PAIN, CHRONIC, RIGHT: ICD-10-CM

## 2022-12-14 DIAGNOSIS — M53.3 DISORDER OF SI (SACROILIAC) JOINT: Primary | ICD-10-CM

## 2022-12-14 PROCEDURE — 99214 PR OFFICE/OUTPT VISIT, EST, LEVL IV, 30-39 MIN: ICD-10-PCS | Mod: S$GLB,,, | Performed by: FAMILY MEDICINE

## 2022-12-14 PROCEDURE — 72202 XR SACROILIAC JOINTS COMPLETE: ICD-10-PCS | Mod: 26,,, | Performed by: RADIOLOGY

## 2022-12-14 PROCEDURE — 3066F NEPHROPATHY DOC TX: CPT | Mod: CPTII,S$GLB,, | Performed by: FAMILY MEDICINE

## 2022-12-14 PROCEDURE — 3066F PR DOCUMENTATION OF TREATMENT FOR NEPHROPATHY: ICD-10-PCS | Mod: CPTII,S$GLB,, | Performed by: FAMILY MEDICINE

## 2022-12-14 PROCEDURE — 73521 X-RAY EXAM HIPS BI 2 VIEWS: CPT | Mod: TC,PO

## 2022-12-14 PROCEDURE — 72202 X-RAY EXAM SI JOINTS 3/> VWS: CPT | Mod: 26,,, | Performed by: RADIOLOGY

## 2022-12-14 PROCEDURE — 99999 PR PBB SHADOW E&M-EST. PATIENT-LVL V: CPT | Mod: PBBFAC,,, | Performed by: FAMILY MEDICINE

## 2022-12-14 PROCEDURE — 4010F ACE/ARB THERAPY RXD/TAKEN: CPT | Mod: CPTII,S$GLB,, | Performed by: FAMILY MEDICINE

## 2022-12-14 PROCEDURE — 73521 XR HIPS BILATERAL 2 VIEW INCL AP PELVIS: ICD-10-PCS | Mod: 26,,, | Performed by: RADIOLOGY

## 2022-12-14 PROCEDURE — 4010F PR ACE/ARB THEARPY RXD/TAKEN: ICD-10-PCS | Mod: CPTII,S$GLB,, | Performed by: FAMILY MEDICINE

## 2022-12-14 PROCEDURE — 3008F BODY MASS INDEX DOCD: CPT | Mod: CPTII,S$GLB,, | Performed by: FAMILY MEDICINE

## 2022-12-14 PROCEDURE — 3075F PR MOST RECENT SYSTOLIC BLOOD PRESS GE 130-139MM HG: ICD-10-PCS | Mod: CPTII,S$GLB,, | Performed by: FAMILY MEDICINE

## 2022-12-14 PROCEDURE — 3079F PR MOST RECENT DIASTOLIC BLOOD PRESSURE 80-89 MM HG: ICD-10-PCS | Mod: CPTII,S$GLB,, | Performed by: FAMILY MEDICINE

## 2022-12-14 PROCEDURE — 73521 X-RAY EXAM HIPS BI 2 VIEWS: CPT | Mod: 26,,, | Performed by: RADIOLOGY

## 2022-12-14 PROCEDURE — 3075F SYST BP GE 130 - 139MM HG: CPT | Mod: CPTII,S$GLB,, | Performed by: FAMILY MEDICINE

## 2022-12-14 PROCEDURE — 72202 X-RAY EXAM SI JOINTS 3/> VWS: CPT | Mod: TC,PO

## 2022-12-14 PROCEDURE — 99999 PR PBB SHADOW E&M-EST. PATIENT-LVL V: ICD-10-PCS | Mod: PBBFAC,,, | Performed by: FAMILY MEDICINE

## 2022-12-14 PROCEDURE — 99214 OFFICE O/P EST MOD 30 MIN: CPT | Mod: S$GLB,,, | Performed by: FAMILY MEDICINE

## 2022-12-14 PROCEDURE — 3079F DIAST BP 80-89 MM HG: CPT | Mod: CPTII,S$GLB,, | Performed by: FAMILY MEDICINE

## 2022-12-14 PROCEDURE — 3008F PR BODY MASS INDEX (BMI) DOCUMENTED: ICD-10-PCS | Mod: CPTII,S$GLB,, | Performed by: FAMILY MEDICINE

## 2022-12-14 RX ORDER — TRAMADOL HYDROCHLORIDE 50 MG/1
50 TABLET ORAL EVERY 6 HOURS
Qty: 21 EACH | Refills: 1 | Status: SHIPPED | OUTPATIENT
Start: 2022-12-14 | End: 2023-05-16

## 2022-12-14 NOTE — PROGRESS NOTES
Chief Complaint:    Chief Complaint   Patient presents with    Hip Pain       History of Present Illness:  Patient with renal carcinoma of left kidney, HLD, HTN presents today for chronic R hip pain      Reports R back and hip pain that's exacerbated by walking, bending over, or changing sleeping positions. Onset of a couple of months that's worsening with progression. He has tingling that goes down his R leg when standing too long but was present before his back and hip pain started. He's using a heating pad and shock waves therapy.       ROS:  Review of Systems   Constitutional:  Negative for activity change, chills, fatigue, fever and unexpected weight change.   HENT:  Negative for congestion, ear discharge, ear pain, hearing loss, postnasal drip and rhinorrhea.    Eyes:  Negative for pain and visual disturbance.   Respiratory:  Negative for cough, chest tightness and shortness of breath.    Cardiovascular:  Negative for chest pain and palpitations.   Gastrointestinal:  Negative for abdominal pain, diarrhea and vomiting.   Endocrine: Negative for heat intolerance.   Genitourinary:  Negative for dysuria, flank pain, frequency and hematuria.   Musculoskeletal:  Positive for arthralgias and myalgias. Negative for back pain, gait problem and neck pain.   Skin:  Negative for color change and rash.   Neurological:  Negative for dizziness, tremors, seizures, numbness and headaches.   Psychiatric/Behavioral:  Negative for agitation, hallucinations, self-injury, sleep disturbance and suicidal ideas. The patient is not nervous/anxious.    All other systems reviewed and are negative.    Past Medical History:   Diagnosis Date    Allergy     Anxiety     Cancer of kidney     Depression     Hyperlipidemia     Hypertension        Social History:  Social History     Socioeconomic History    Marital status:    Occupational History     Employer: [employer]   Tobacco Use    Smoking status: Never    Smokeless tobacco:  "Current    Tobacco comments:     dips    Substance and Sexual Activity    Alcohol use: Yes    Drug use: No    Sexual activity: Yes     Partners: Female     Birth control/protection: None       Family History:   family history includes Cancer in his maternal uncle; Hypertension in his mother; No Known Problems in his brother, maternal aunt, maternal grandfather, maternal grandmother, paternal aunt, paternal grandfather, paternal grandmother, paternal uncle, and sister.    Health Maintenance   Topic Date Due    Lipid Panel  10/04/2023    TETANUS VACCINE  10/31/2027    Hepatitis C Screening  Completed       Physical Exam:    Vital Signs  Temp: 98.1 °F (36.7 °C)  Temp src: Tympanic  Pulse: 71  SpO2: 96 %  BP: 130/80  BP Location: Right arm  Patient Position: Sitting  Pain Score:   2  Height and Weight  Height: 5' 8" (172.7 cm)  Weight: 108 kg (238 lb 3.3 oz)  BSA (Calculated - sq m): 2.28 sq meters  BMI (Calculated): 36.2  Weight in (lb) to have BMI = 25: 164.1]    Body mass index is 36.22 kg/m².    Physical Exam  Vitals and nursing note reviewed.   Constitutional:       Appearance: Normal appearance.   HENT:      Head: Normocephalic and atraumatic.      Right Ear: Tympanic membrane normal.      Left Ear: Tympanic membrane normal.   Eyes:      Extraocular Movements: Extraocular movements intact.      Pupils: Pupils are equal, round, and reactive to light.   Cardiovascular:      Rate and Rhythm: Normal rate and regular rhythm.      Pulses: Normal pulses.      Heart sounds: Normal heart sounds. No murmur heard.    No gallop.   Pulmonary:      Effort: Pulmonary effort is normal. No respiratory distress.      Breath sounds: Normal breath sounds. No wheezing, rhonchi or rales.   Abdominal:      General: There is no distension.      Palpations: Abdomen is soft.      Tenderness: There is no abdominal tenderness.   Musculoskeletal:         General: No swelling, deformity or signs of injury. Normal range of motion.      Cervical " back: Normal range of motion.      Lumbar back: Tenderness present.      Right hip: Tenderness present.      Right upper leg: Tenderness present.        Legs:    Skin:     General: Skin is warm and dry.      Capillary Refill: Capillary refill takes less than 2 seconds.      Coloration: Skin is not jaundiced or pale.   Neurological:      General: No focal deficit present.      Mental Status: He is alert and oriented to person, place, and time.   Psychiatric:         Mood and Affect: Mood normal.         Behavior: Behavior normal.         Assessment:      ICD-10-CM ICD-9-CM   1. Disorder of SI (sacroiliac) joint  M53.3 724.6   2. Hip pain, chronic, right  M25.551 719.45    G89.29 338.29     Plan:  Order X-Ray Hips Bilateral 2 View Incl AP Pelvis and X-Ray Sacroiliac Joints Complete for disorder of sacroiliac joint and hip pain, chronic, right. Recommend Tramadol 50 mg.   Orders Placed This Encounter   Procedures    X-Ray Hips Bilateral 2 View Incl AP Pelvis    X-Ray Sacroiliac Joints Complete     Current Outpatient Medications   Medication Sig Dispense Refill    allopurinoL (ZYLOPRIM) 100 MG tablet Take 1 tablet (100 mg total) by mouth once daily. 90 tablet 2    amLODIPine-benazepriL (LOTREL) 10-40 mg per capsule Take 1 capsule by mouth once daily. 90 capsule 3    docusate sodium (COLACE) 100 MG capsule Take 1 capsule (100 mg total) by mouth 2 (two) times daily. 60 capsule 0    fluticasone propionate (FLONASE) 50 mcg/actuation nasal spray 1 spray (50 mcg total) by Each Nostril route once daily. 3 g 5    gabapentin (NEURONTIN) 300 MG capsule TAKE ONE CAPSULE BY MOUTH DURING THE DAY THEN TWO EVERY NIGHT AT BEDTIME 270 capsule 1    hydrALAZINE (APRESOLINE) 100 MG tablet Take 1 tablet (100 mg total) by mouth 3 (three) times daily. 270 tablet 2    hydroCHLOROthiazide (HYDRODIURIL) 12.5 MG Tab Take 1 tablet (12.5 mg total) by mouth once daily. 30 tablet 11    loratadine (CLARITIN) 10 mg tablet TAKE 1 TABLET BY MOUTH EVERY  DAY 90 tablet 1    metoprolol succinate (TOPROL-XL) 25 MG 24 hr tablet Take 1 tablet (25 mg total) by mouth once daily. 90 tablet 3    oxyCODONE-acetaminophen (PERCOCET) 5-325 mg per tablet Take 1 tablet by mouth every 4 (four) hours as needed for Pain. 35 tablet 0    potassium chloride (KLOR-CON) 10 MEQ TbSR Take 10 mEq by mouth once.      pravastatin (PRAVACHOL) 40 MG tablet Take 1 tablet (40 mg total) by mouth once daily. 90 tablet 3    semaglutide (OZEMPIC) 0.25 mg or 0.5 mg(2 mg/1.5 mL) pen injector Inject 0.25 mg into the skin every 7 days. 4 pen 4    sertraline (ZOLOFT) 50 MG tablet Take 1 tablet (50 mg total) by mouth once daily. 90 tablet 3    sodium bicarbonate 650 MG tablet Take 1 tablet (650 mg total) by mouth 2 (two) times daily. With food 180 tablet 1    testosterone cypionate (DEPOTESTOTERONE CYPIONATE) 200 mg/mL injection INJECT ONE ML INTO MUSCLE EVERY 14 DAYS 2 mL 2    traZODone (DESYREL) 50 MG tablet TAKE ONE TABLET BY MOUTH EVERY DAY 90 tablet 1    VITAMIN D2 1,250 mcg (50,000 unit) capsule TAKE ONE CAPSULE BY MOUTH ONCE WEEKLY 12 capsule 1    ALPRAZolam (XANAX) 0.25 MG tablet Take 2 tablets (0.5 mg total) by mouth 2 (two) times daily as needed for Anxiety. 15 tablet 0    traMADoL (ULTRAM) 50 mg tablet Take 1 tablet (50 mg total) by mouth every 6 (six) hours. 21 each 1     No current facility-administered medications for this visit.       There are no discontinued medications.    No follow-ups on file.      Mohan Schwartz MD  Scribe Attestation:   I, Zaira Manzo, am scribing for, and in the presence of, Dr.Arif Schwartz I performed the above scribed service and the documentation accurately describes the services I performed. I attest to the accuracy of the note.    I, Dr. Mohan Schwartz, reviewed documentation as scribed above. I performed the services described in this documentation.  I agree that the record reflects my personal performance and is accurate and complete. Mohan Schwartz MD.   12/14/2022

## 2022-12-15 ENCOUNTER — PATIENT MESSAGE (OUTPATIENT)
Dept: FAMILY MEDICINE | Facility: CLINIC | Age: 57
End: 2022-12-15
Payer: COMMERCIAL

## 2022-12-19 ENCOUNTER — PATIENT MESSAGE (OUTPATIENT)
Dept: FAMILY MEDICINE | Facility: CLINIC | Age: 57
End: 2022-12-19
Payer: COMMERCIAL

## 2022-12-19 DIAGNOSIS — M25.551 HIP PAIN, CHRONIC, RIGHT: ICD-10-CM

## 2022-12-19 DIAGNOSIS — M53.3 DISORDER OF SI (SACROILIAC) JOINT: Primary | ICD-10-CM

## 2022-12-19 DIAGNOSIS — G89.29 HIP PAIN, CHRONIC, RIGHT: ICD-10-CM

## 2022-12-20 ENCOUNTER — TELEPHONE (OUTPATIENT)
Dept: ORTHOPEDICS | Facility: CLINIC | Age: 57
End: 2022-12-20
Payer: COMMERCIAL

## 2022-12-20 NOTE — TELEPHONE ENCOUNTER
Scheduled patient from referral with Dr. Galarza at [facility] location for 3 pm on Friday on 12/23

## 2022-12-23 ENCOUNTER — OFFICE VISIT (OUTPATIENT)
Dept: ORTHOPEDICS | Facility: CLINIC | Age: 57
End: 2022-12-23
Payer: COMMERCIAL

## 2022-12-23 VITALS — WEIGHT: 238 LBS | HEIGHT: 68 IN | BODY MASS INDEX: 36.07 KG/M2

## 2022-12-23 DIAGNOSIS — G89.29 HIP PAIN, CHRONIC, RIGHT: ICD-10-CM

## 2022-12-23 DIAGNOSIS — M25.551 HIP PAIN, CHRONIC, RIGHT: ICD-10-CM

## 2022-12-23 DIAGNOSIS — M53.3 DISORDER OF SI (SACROILIAC) JOINT: ICD-10-CM

## 2022-12-23 PROCEDURE — 3008F PR BODY MASS INDEX (BMI) DOCUMENTED: ICD-10-PCS | Mod: CPTII,S$GLB,, | Performed by: STUDENT IN AN ORGANIZED HEALTH CARE EDUCATION/TRAINING PROGRAM

## 2022-12-23 PROCEDURE — 99999 PR PBB SHADOW E&M-EST. PATIENT-LVL V: ICD-10-PCS | Mod: PBBFAC,,, | Performed by: STUDENT IN AN ORGANIZED HEALTH CARE EDUCATION/TRAINING PROGRAM

## 2022-12-23 PROCEDURE — 1160F PR REVIEW ALL MEDS BY PRESCRIBER/CLIN PHARMACIST DOCUMENTED: ICD-10-PCS | Mod: CPTII,S$GLB,, | Performed by: STUDENT IN AN ORGANIZED HEALTH CARE EDUCATION/TRAINING PROGRAM

## 2022-12-23 PROCEDURE — 4010F PR ACE/ARB THEARPY RXD/TAKEN: ICD-10-PCS | Mod: CPTII,S$GLB,, | Performed by: STUDENT IN AN ORGANIZED HEALTH CARE EDUCATION/TRAINING PROGRAM

## 2022-12-23 PROCEDURE — 1159F MED LIST DOCD IN RCRD: CPT | Mod: CPTII,S$GLB,, | Performed by: STUDENT IN AN ORGANIZED HEALTH CARE EDUCATION/TRAINING PROGRAM

## 2022-12-23 PROCEDURE — 99204 OFFICE O/P NEW MOD 45 MIN: CPT | Mod: S$GLB,,, | Performed by: STUDENT IN AN ORGANIZED HEALTH CARE EDUCATION/TRAINING PROGRAM

## 2022-12-23 PROCEDURE — 1159F PR MEDICATION LIST DOCUMENTED IN MEDICAL RECORD: ICD-10-PCS | Mod: CPTII,S$GLB,, | Performed by: STUDENT IN AN ORGANIZED HEALTH CARE EDUCATION/TRAINING PROGRAM

## 2022-12-23 PROCEDURE — 3066F NEPHROPATHY DOC TX: CPT | Mod: CPTII,S$GLB,, | Performed by: STUDENT IN AN ORGANIZED HEALTH CARE EDUCATION/TRAINING PROGRAM

## 2022-12-23 PROCEDURE — 99204 PR OFFICE/OUTPT VISIT, NEW, LEVL IV, 45-59 MIN: ICD-10-PCS | Mod: S$GLB,,, | Performed by: STUDENT IN AN ORGANIZED HEALTH CARE EDUCATION/TRAINING PROGRAM

## 2022-12-23 PROCEDURE — 99999 PR PBB SHADOW E&M-EST. PATIENT-LVL V: CPT | Mod: PBBFAC,,, | Performed by: STUDENT IN AN ORGANIZED HEALTH CARE EDUCATION/TRAINING PROGRAM

## 2022-12-23 PROCEDURE — 1160F RVW MEDS BY RX/DR IN RCRD: CPT | Mod: CPTII,S$GLB,, | Performed by: STUDENT IN AN ORGANIZED HEALTH CARE EDUCATION/TRAINING PROGRAM

## 2022-12-23 PROCEDURE — 3008F BODY MASS INDEX DOCD: CPT | Mod: CPTII,S$GLB,, | Performed by: STUDENT IN AN ORGANIZED HEALTH CARE EDUCATION/TRAINING PROGRAM

## 2022-12-23 PROCEDURE — 3066F PR DOCUMENTATION OF TREATMENT FOR NEPHROPATHY: ICD-10-PCS | Mod: CPTII,S$GLB,, | Performed by: STUDENT IN AN ORGANIZED HEALTH CARE EDUCATION/TRAINING PROGRAM

## 2022-12-23 PROCEDURE — 4010F ACE/ARB THERAPY RXD/TAKEN: CPT | Mod: CPTII,S$GLB,, | Performed by: STUDENT IN AN ORGANIZED HEALTH CARE EDUCATION/TRAINING PROGRAM

## 2022-12-23 RX ORDER — METHYLPREDNISOLONE 4 MG/1
TABLET ORAL
Qty: 21 EACH | Refills: 0 | Status: SHIPPED | OUTPATIENT
Start: 2022-12-23 | End: 2023-01-12

## 2022-12-23 NOTE — PATIENT INSTRUCTIONS
Assessment:  Dre Curiel is a 57 y.o. male   Chief Complaint   Patient presents with    Left Hip - Pain    Right Hip - Pain       Encounter Diagnoses   Name Primary?    Disorder of SI (sacroiliac) joint     Hip pain, chronic, right         Plan:  Prescription for Medrol Dose Pack  Patient may use over the counter lidocaine patches as needed for pain.  Apply topical diclofenac (Voltaren) up to 4 times a day to the affected area.  It can be bought over the counter at any local pharmacy.    Patient may use ice and heat as needed for pain every 2 hours for 15 minutes  Keep pain management appointment that has been previously scheduled.  Follow up as needed.    Follow-up: If you feel like you need us feel free to reach out through "[employer] or feel free to contact us at [phone] or sooner if there are any problems between now and then.    Thank you for choosing [facility] and Dr. Ozzie Galarza for your orthopedic & sports medicine care. It is our goal to provide you with exceptional care that will help keep you healthy, active, and get you back in the game.    Please do not hesitate to reach out to us via email, phone, or "[employer] with any questions, concerns, or feedback.    If you felt that you received exemplary care today, please consider leaving us feedback on [employer] at:  [email or web address]    If you are experiencing pain/discomfort ,or have questions after 5pm and would like to be connected to the [facility] on-call team, please call this number and specify which Sports Medicine provider is treating you: [phone]

## 2022-12-23 NOTE — PROGRESS NOTES
Patient ID: Dre Curiel  YOB: 1965  MRN: [id number]    Chief Complaint: Pain of the Left Hip and Pain of the Right Hip      Referred By: Mohan Schwartz MD for Bilateral Hip Pain    History of Present Illness: Dre Curiel is a right-hand dominant 57 y.o. male who presents today with bilateral hip pain, with the right being greater than the left.  Patient reports that the pain has been present off and on for the last couple of years.  He states that today the pain is a 3/10 but on a bad day the pain will be a 10/10.  He describes the pain as a constant, sharp pain that is mainly located in the SI joint region.  He states that the pain interferes with his sleep at night and is very hard to get out of bed in the morning.  He is currently in physical therapy at [facility] in [place] and doesn't believe it has provided him with any relief.  He has an at home TENS unit which does help alleviate some pain.  He uses heating pads, OTC Tylenol, Tramadol and Gabapentin.  He has an appointment scheduled with pain management on January 12, 2023.      The patient is active in none.  Occupation: Supervisor      Past Medical History:   Past Medical History:   Diagnosis Date    Allergy     Anxiety     Cancer of kidney     Depression     Hyperlipidemia     Hypertension      Past Surgical History:   Procedure Laterality Date    COLONOSCOPY N/A 6/8/2018    Procedure: COLONOSCOPY;  Surgeon: Simeon Acharya III, MD;  Location: [facility];  Service: Endoscopy;  Laterality: N/A;    COLONOSCOPY N/A 6/11/2018    Procedure: COLONOSCOPY;  Surgeon: Simeon Acharya III, MD;  Location: [facility];  Service: Endoscopy;  Laterality: N/A;    COLONOSCOPY  06/2018    COLONOSCOPY N/A 11/17/2021    Procedure: COLONOSCOPY;  Surgeon: Tomi Lewis MD;  Location: [facility];  Service: Endoscopy;  Laterality: N/A;    COSMETIC SURGERY      kidney removal      LAPAROSCOPIC NEPHRECTOMY, HAND ASSISTED Left 9/16/2021     Procedure: NEPHRECTOMY, HAND-ASSISTED, LAPAROSCOPIC;  Surgeon: Tom Manzo MD;  Location: [facility];  Service: Urology;  Laterality: Left;     Family History   Problem Relation Age of Onset    Hypertension Mother     No Known Problems Sister     No Known Problems Brother     No Known Problems Maternal Aunt     Cancer Maternal Uncle     No Known Problems Paternal Aunt     No Known Problems Paternal Uncle     No Known Problems Maternal Grandmother     No Known Problems Maternal Grandfather     No Known Problems Paternal Grandmother     No Known Problems Paternal Grandfather      Social History     Socioeconomic History    Marital status:    Occupational History     Employer: [employer]   Tobacco Use    Smoking status: Never    Smokeless tobacco: Current    Tobacco comments:     dips    Substance and Sexual Activity    Alcohol use: Yes    Drug use: No    Sexual activity: Yes     Partners: Female     Birth control/protection: None     Medication List with Changes/Refills   New Medications    METHYLPREDNISOLONE (MEDROL DOSEPACK) 4 MG TABLET    use as directed   Current Medications    ALLOPURINOL (ZYLOPRIM) 100 MG TABLET    Take 1 tablet (100 mg total) by mouth once daily.    ALPRAZOLAM (XANAX) 0.25 MG TABLET    Take 2 tablets (0.5 mg total) by mouth 2 (two) times daily as needed for Anxiety.    AMLODIPINE-BENAZEPRIL (LOTREL) 10-40 MG PER CAPSULE    Take 1 capsule by mouth once daily.    DOCUSATE SODIUM (COLACE) 100 MG CAPSULE    Take 1 capsule (100 mg total) by mouth 2 (two) times daily.    FLUTICASONE PROPIONATE (FLONASE) 50 MCG/ACTUATION NASAL SPRAY    1 spray (50 mcg total) by Each Nostril route once daily.    GABAPENTIN (NEURONTIN) 300 MG CAPSULE    TAKE ONE CAPSULE BY MOUTH DURING THE DAY THEN TWO EVERY NIGHT AT BEDTIME    HYDRALAZINE (APRESOLINE) 100 MG TABLET    Take 1 tablet (100 mg total) by mouth 3 (three) times daily.    HYDROCHLOROTHIAZIDE (HYDRODIURIL) 12.5 MG TAB    Take 1 tablet (12.5  mg total) by mouth once daily.    LORATADINE (CLARITIN) 10 MG TABLET    TAKE 1 TABLET BY MOUTH EVERY DAY    METOPROLOL SUCCINATE (TOPROL-XL) 25 MG 24 HR TABLET    Take 1 tablet (25 mg total) by mouth once daily.    OXYCODONE-ACETAMINOPHEN (PERCOCET) 5-325 MG PER TABLET    Take 1 tablet by mouth every 4 (four) hours as needed for Pain.    POTASSIUM CHLORIDE (KLOR-CON) 10 MEQ TBSR    Take 10 mEq by mouth once.    PRAVASTATIN (PRAVACHOL) 40 MG TABLET    Take 1 tablet (40 mg total) by mouth once daily.    SEMAGLUTIDE (OZEMPIC) 0.25 MG OR 0.5 MG(2 MG/1.5 ML) PEN INJECTOR    Inject 0.25 mg into the skin every 7 days.    SERTRALINE (ZOLOFT) 50 MG TABLET    Take 1 tablet (50 mg total) by mouth once daily.    SODIUM BICARBONATE 650 MG TABLET    Take 1 tablet (650 mg total) by mouth 2 (two) times daily. With food    TESTOSTERONE CYPIONATE (DEPOTESTOTERONE CYPIONATE) 200 MG/ML INJECTION    INJECT ONE ML INTO MUSCLE EVERY 14 DAYS    TRAMADOL (ULTRAM) 50 MG TABLET    Take 1 tablet (50 mg total) by mouth every 6 (six) hours.    TRAZODONE (DESYREL) 50 MG TABLET    TAKE ONE TABLET BY MOUTH EVERY DAY    VITAMIN D2 1,250 MCG (50,000 UNIT) CAPSULE    TAKE ONE CAPSULE BY MOUTH ONCE WEEKLY     Review of patient's allergies indicates:   Allergen Reactions    Iodine and iodide containing products      Other reaction(s): Swelling  Other reaction(s): Sneezing  Other reaction(s): Shortness of breath       Physical Exam:   Body mass index is 36.19 kg/m².    GENERAL: Well appearing, in no acute distress.  HEAD: Normocephalic and atraumatic.  ENT: External ears and nose grossly normal.  EYES: EOMI bilaterally  PULMONARY: Respirations are grossly even and non-labored.  NEURO: Awake, alert, and oriented x 3.  SKIN: No obvious rashes appreciated.  PSYCH: Mood & affect are appropriate.    Detailed MSK exam:     Right hip exam:  -ROM: internal rotation 30, external rotation 40  -DO negative, FADIR negative, Kiel negative  -Muscle strength 5/5  abduction, adduction, flexion, extension  -Negative log roll  -Negative straight leg raise  -TTP SI joint    Left hip exam:  -ROM: internal rotation 30, external rotation 40  -DO negative, FADIR negative, Kiel negative  -Muscle strength 5/5 abduction, adduction, flexion, extension  -Negative log roll  -Negative straight leg raise      Imaging:  X-Ray Hips Bilateral 2 View Incl AP Pelvis  Narrative: EXAMINATION:  XR HIPS BILATERAL 2 VIEW INCL AP PELVIS    CLINICAL HISTORY:  Sacrococcygeal disorders, not elsewhere classified    TECHNIQUE:  AP view of the pelvis and frogleg lateral views of both hips were performed.    COMPARISON:  01/22/2020.    FINDINGS:  No acute fracture or dislocation.  No significant soft tissue swelling.    The femoral heads are normally positioned within the native acetabuli.  Mild bilateral femoroacetabular degenerative osteoarthrosis with mild joint space narrowing and small osteophyte formation.  Chronic suspected bone island of the left femoral head.    Pubic symphysis, bilateral pubic rami and SI joints are intact.  Impression: Mild degenerative osteoarthrosis of the bilateral hips.    Electronically signed by: Jeremiah Marie  Date:    12/14/2022  Time:    15:51  X-Ray Sacroiliac Joints Complete  Narrative: EXAMINATION:  XR SACROILIAC JOINTS COMPLETE    CLINICAL HISTORY:  Sacrococcygeal disorders, not elsewhere classified    TECHNIQUE:  Three views of the SI joints    COMPARISON:  Plain films of the pelvis from 01/22/2020    FINDINGS:  No evidence for ankylosis.  No erosive changes.  No widening of the AC joints.  Impression: As above    Electronically signed by: Rubens Yang DO  Date:    12/14/2022  Time:    15:51        Relevant imaging results were reviewed and interpreted by me and per my read shows mild arthritic changes.  This was discussed with the patient and / or family today.     Assessment:  Dre Curiel is a 57 y.o. male presenting with bilateral hip pain, right greater  than left.   History, physical and radiographs are consistent with a likely diagnosis of mild OA, SI inflammation.   Plan: keep pain management appt for possible SI joint injection. Medrol dose pack. Ice/heat, voltaren gel, lidocaine patches. Consider IA hip injection if hip pain flares up in the future. Continue conservative management for pain.   Follow up as needed. All questions answered.      Disorder of SI (sacroiliac) joint  -     Ambulatory referral/consult to Orthopedics  -     methylPREDNISolone (MEDROL DOSEPACK) 4 mg tablet; use as directed  Dispense: 21 each; Refill: 0    Hip pain, chronic, right  -     Ambulatory referral/consult to Orthopedics         A copy of today's visit note has been sent to the referring provider.     Electronically signed:  Ozzie Galarza MD, MPH  12/23/2022  2:27 PM

## 2023-01-09 NOTE — PROGRESS NOTES
New Patient Chronic Pain Note (Initial Visit)    Referring Physician: Mohan Schwartz MD    PCP: Mohan Schwartz MD    Chief Complaint:   Chief Complaint   Patient presents with    Low-back Pain     Right     Leg Pain     Right     Hip Pain     Right         SUBJECTIVE:    Dre Curiel is a 57 y.o. male with past medical history significant for anxiety/depression, hypertension, hyperlipidemia, stage 3 chronic kidney disease a history of renal cell carcinoma status post left-sided nephrectomy, erectile dysfunction, obesity, obstructive sleep apnea who presents to the clinic for the evaluation of lower back and leg pain.  Patient reports pain has been present for several years without inciting accident injury or trauma.  Today patient reports pain is constant which is rated a 6/10.  Pain is described as sharp and stabbing and needles poking  in nature.  Patient reports pain in a bandlike distribution in the lower back which radiates down the lateral aspect of the right lower extremity to the calf in L4-5 distribution.  Pain is exacerbated when moving from sitting to standing, with lumbar forward flexion and lateral flexion and with ambulation.  Patient is a supervisor of product distribution, manages everything and reports he is standing on his feet all day.  Patient does report sensation of buckling in the right lower extremity associated with prolonged exertion.  Pain is improved with sitting.  Patient has trialed gabapentin 300 mg, 1-2 times daily in the past without meaningful relief.  Patient reports completing 8 sessions of conventional physical therapy on 12/2022 at [facility] without any meaningful relief in range of motion, strength or pain.    Patient reports significant motor weakness.  Patient denies night fever/night sweats, urinary incontinence, bowel incontinence, significant weight loss, and loss of sensations      Pain Disability Index Review:     Last 3 PDI Scores 1/12/2023 6/15/2018  5/18/2018   Pain Disability Index (PDI) 46 19 20       Non-Pharmacologic Treatments:  Physical Therapy/Home Exercise: yes  Ice/Heat:yes  TENS: no  Acupuncture: no  Massage: no  Chiropractic: no    Other: no      Pain Medications:  - Opioids: Percocet (Oxycodone/Acetaminophen) and Ultram (Tramadol HCL)  - Adjuvant Medications: Neurontin (Gabapentin), Trazodone (Desyrel), Xanax (Alprazolam), and Zoloft (Sertraline)    Pain Procedures:   None    Past Medical History:   Diagnosis Date    Allergy     Anxiety     Cancer of kidney     Depression     Hyperlipidemia     Hypertension      Past Surgical History:   Procedure Laterality Date    COLONOSCOPY N/A 6/8/2018    Procedure: COLONOSCOPY;  Surgeon: Simeon Acharya III, MD;  Location: [facility];  Service: Endoscopy;  Laterality: N/A;    COLONOSCOPY N/A 6/11/2018    Procedure: COLONOSCOPY;  Surgeon: Simeon Acharya III, MD;  Location: [facility] END[facility];  Service: Endoscopy;  Laterality: N/A;    COLONOSCOPY  06/2018    COLONOSCOPY N/A 11/17/2021    Procedure: COLONOSCOPY;  Surgeon: Tomi Lewis MD;  Location: [facility];  Service: Endoscopy;  Laterality: N/A;    COSMETIC SURGERY      kidney removal      LAPAROSCOPIC NEPHRECTOMY, HAND ASSISTED Left 9/16/2021    Procedure: NEPHRECTOMY, HAND-ASSISTED, LAPAROSCOPIC;  Surgeon: Tom Manzo MD;  Location: [facility];  Service: Urology;  Laterality: Left;     Review of patient's allergies indicates:   Allergen Reactions    Iodine and iodide containing products      Other reaction(s): Swelling  Other reaction(s): Sneezing  Other reaction(s): Shortness of breath       Current Outpatient Medications   Medication Sig    allopurinoL (ZYLOPRIM) 100 MG tablet Take 1 tablet (100 mg total) by mouth once daily.    amLODIPine-benazepriL (LOTREL) 10-40 mg per capsule Take 1 capsule by mouth once daily.    docusate sodium (COLACE) 100 MG capsule Take 1 capsule (100 mg total) by mouth 2 (two) times daily.    fluticasone propionate (FLONASE)  50 mcg/actuation nasal spray 1 spray (50 mcg total) by Each Nostril route once daily.    gabapentin (NEURONTIN) 300 MG capsule TAKE ONE CAPSULE BY MOUTH DURING THE DAY THEN TWO EVERY NIGHT AT BEDTIME    hydrALAZINE (APRESOLINE) 100 MG tablet Take 1 tablet (100 mg total) by mouth 3 (three) times daily.    hydroCHLOROthiazide (HYDRODIURIL) 12.5 MG Tab Take 1 tablet (12.5 mg total) by mouth once daily.    loratadine (CLARITIN) 10 mg tablet TAKE 1 TABLET BY MOUTH EVERY DAY    oxyCODONE-acetaminophen (PERCOCET) 5-325 mg per tablet Take 1 tablet by mouth every 4 (four) hours as needed for Pain.    potassium chloride (KLOR-CON) 10 MEQ TbSR Take 10 mEq by mouth once.    pravastatin (PRAVACHOL) 40 MG tablet Take 1 tablet (40 mg total) by mouth once daily.    semaglutide (OZEMPIC) 0.25 mg or 0.5 mg(2 mg/1.5 mL) pen injector Inject 0.25 mg into the skin every 7 days.    sertraline (ZOLOFT) 50 MG tablet Take 1 tablet (50 mg total) by mouth once daily.    sodium bicarbonate 650 MG tablet Take 1 tablet (650 mg total) by mouth 2 (two) times daily. With food    testosterone cypionate (DEPOTESTOTERONE CYPIONATE) 200 mg/mL injection INJECT ONE ML INTO MUSCLE EVERY 14 DAYS    traMADoL (ULTRAM) 50 mg tablet Take 1 tablet (50 mg total) by mouth every 6 (six) hours.    traZODone (DESYREL) 50 MG tablet TAKE ONE TABLET BY MOUTH EVERY DAY    VITAMIN D2 1,250 mcg (50,000 unit) capsule TAKE ONE CAPSULE BY MOUTH ONCE WEEKLY    ALPRAZolam (XANAX) 0.25 MG tablet Take 2 tablets (0.5 mg total) by mouth 2 (two) times daily as needed for Anxiety.    metoprolol succinate (TOPROL-XL) 25 MG 24 hr tablet Take 1 tablet (25 mg total) by mouth once daily.    pregabalin (LYRICA) 75 MG capsule Take 1 capsule (75 mg total) by mouth 2 (two) times daily for 10 days, THEN 2 capsules (150 mg total) 2 (two) times daily for 10 days, THEN 3 capsules (225 mg total) 2 (two) times daily for 10 days.     No current facility-administered medications for this visit.  "      Review of Systems     GENERAL:  No weight loss, malaise or fevers.  HEENT:   No recent changes in vision or hearing  NECK:  Negative for lumps, no difficulty with swallowing.  RESPIRATORY:  Negative for cough, wheezing or shortness of breath, patient denies any recent URI.  CARDIOVASCULAR:  Negative for chest pain or palpitations.  GI:  Negative for abdominal discomfort, blood in stools or black stools or change in bowel habits.  MUSCULOSKELETAL:  See HPI.  SKIN:  Negative for lesions, rash, and itching.  PSYCH:  No mood disorder or recent psychosocial stressors.   HEMATOLOGY/LYMPHOLOGY:  Negative for prolonged bleeding, bruising easily or swollen nodes.    NEURO:   No history of syncope, paralysis, seizures or tremors.  All other reviewed and negative other than HPI.    OBJECTIVE:    BP (!) 143/79   Pulse 76   Resp 17   Ht 5' 8" (1.727 m)   Wt 108 kg (238 lb)   BMI 36.19 kg/m²       Physical Exam    GENERAL: Well appearing, in no acute distress, alert and oriented x3.  PSYCH:  Mood and affect appropriate.  SKIN: Skin color, texture, turgor normal, no rashes or lesions.  HEAD/FACE:  Normocephalic, atraumatic. Cranial nerves grossly intact.    CV: RRR with palpation of the radial artery.  PULM: No evidence of respiratory difficulty, symmetric chest rise.  GI:  Soft and non-tender.    BACK: Straight leg raising in the sitting and supine positions is  + to radicular pain on R.  pain to palpation over the facet joints of the lumbar spine or spinous processes.  Reduced range of motion with pain reproduction   EXTREMITIES: Peripheral joint ROM is full and pain free without obvious instability or laxity in all four extremities. No deformities, edema, or skin discoloration. Good capillary refill.  MUSCULOSKELETAL: Able to stand on heels & toes.    hip provocative maneuvers are positive bilaterally.    SIJ testing: Bilateral  - TTP over SI joint: Present  - Carmen's/ Gunner's: Positive    - Sacroiliac Distraction " Test (anterior pressure): Positive  - Sacroiliac Compression Test (lateral pressure): Positive   - SacralThrust Test (posterior pressure): Positive      Facet loading test is positive bilaterally.   Bilateral upper and lower extremity strength is normal and symmetric.  No atrophy or tone abnormalities are noted.    RIGHT Lower extremity: Hip flexion 4+/5, Hip Abduction 4+/5, Hip Adduction 4+/5, Knee extension 5/5, Knee flexion 5/5, Ankle dorsiflexion5/5, Extensor hallucis longus 5/5, Ankle plantarflexion 5/5  LEFT Lower extremity:  Hip flexion 5/5, Hip Abduction 5/5,Hip Adduction 5/5, Knee extension 5/5, Knee flexion 5/5, Ankle dorsiflexion 5/5, Extensor hallucis longus 5/5, Ankle plantarflexion 5/5  -Normal testing knee (patellar) jerk and ankle (achilles) jerk    NEURO: Bilateral upper and lower extremity coordination and muscle stretch reflexes are physiologic and symmetric. No loss of sensation is noted.  GAIT: normal.    Imagin20  X-Ray Lumbar Spine Ap And Lateral  FINDINGS:  Vertebral body heights maintained.  No definite spondylolisthesis.  L5-S1 evaluation limited by positioning.  There is sacralization of L5.  Multilevel facet degenerative findings noted.  L4-5 and L5-S1 degenerative disc height loss.      Bilateral x-ray SI joints 2022  FINDINGS:  No evidence for ankylosis.  No erosive changes.  No widening of the AC joints.    Bilateral hip XR 22  FINDINGS:  No acute fracture or dislocation.  No significant soft tissue swelling.     The femoral heads are normally positioned within the native acetabuli.  Mild bilateral femoroacetabular degenerative osteoarthrosis with mild joint space narrowing and small osteophyte formation.  Chronic suspected bone island of the left femoral head.     Pubic symphysis, bilateral pubic rami and SI joints are intact.         ASSESSMENT: 57 y.o. year old male with lower back and leg pain, consistent with     1. Lumbar facet arthropathy        2.  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 Primary osteoarthritis of both hips  Ambulatory referral/consult to Pain Clinic    Case Request-RAD/Other Procedure Area: Bilateral SIJ Injection, Bilateral Hip injection    IR Aspiration Injection Large Joint W FL      4. DDD (degenerative disc disease), lumbar        5. Dorsalgia, unspecified  MRI Lumbar Spine Without Contrast      6. Lumbar spondylosis  IR Facet Inj Lumbar 2nd Vert Bi    IR Facet Inj Lumbar 1st Vert Bi    Case Request-RAD/Other Procedure Area: Bilateral L3-5 MBB with local            PLAN:   - Interventions:  Schedule for bilateral SI joint intra-articular hip injection to address bilateral hip pain and sacroiliitis.  Two weeks following schedule for bilateral L3-5 lumbar medial branch block to address axial back pain.. Explained the risks and benefits of the procedure in detail with the patient today in clinic along with alternative treatment options, and the patient elected to pursue the intervention at this time.      - Anticoagulation use: no no anticoagulation     report:  Reviewed and consistent with medication use as prescribed.    - Medications:  --I will start the patient on a Lyrica titration to see if this helps with neuropathic pain.  We discussed increasing the dose gradually to reach a therapeutic goal according to the following algorithm.  We discussed potential side effects of this medication which may include drowsiness,dizziness, dry mouth, constipation or peripheral edema.    -Week 1: Take 1 capsule, 75 mg BID  -Week 2: Take 2 capsules, 150 mg BID.  -Week 3: Take 3 capsules, 225 mg BID.        - Therapy:   We discussed continuing physical therapy to help manage the patient/s painful condition. The patient was counseled that muscle strengthening will improve the  long term prognosis in regards to pain and may also help increase range of motion and mobility.    - Imaging: Reviewed available imaging with patient and answered any questions they had regarding study.  Lumbar MRI to better evaluate pain and weakness    - Follow up visit: return to clinic in 4-6 weeks      The above plan and management options were discussed at length with patient. Patient is in agreement with the above and verbalized understanding.    - I discussed the goals of interventional chronic pain management with the patient on today's visit. We discussed a multimodal and systematic approach to pain.  This includes diagnostic and therapeutic injections, adjuvant pharmacologic treatment, physical therapy, and at times psychiatry.  I emphasized the importance of regular exercise, core strengthening and stretching, diet and weight loss as a cornerstone of long-term pain management.    - This condition does not require this patient to take time off of work, and the primary goal of our Pain Management services is to improve the patient's functional capacity.  - Patient Questions: Answered all of the patient's questions regarding diagnoses, therapy, treatment and next steps        Lopez Mendoza MD  Interventional Pain Management  [facility]    Disclaimer:  This note was prepared using voice recognition system and is likely to have sound alike errors that may have been overlooked even after proof reading.  Please call me with any questions

## 2023-01-09 NOTE — H&P (VIEW-ONLY)
New Patient Chronic Pain Note (Initial Visit)    Referring Physician: Mohan Schwartz MD    PCP: Mohan Schwartz MD    Chief Complaint:   Chief Complaint   Patient presents with    Low-back Pain     Right     Leg Pain     Right     Hip Pain     Right         SUBJECTIVE:    Dre Curiel is a 57 y.o. male with past medical history significant for anxiety/depression, hypertension, hyperlipidemia, stage 3 chronic kidney disease a history of renal cell carcinoma status post left-sided nephrectomy, erectile dysfunction, obesity, obstructive sleep apnea who presents to the clinic for the evaluation of lower back and leg pain.  Patient reports pain has been present for several years without inciting accident injury or trauma.  Today patient reports pain is constant which is rated a 6/10.  Pain is described as sharp and stabbing and needles poking  in nature.  Patient reports pain in a bandlike distribution in the lower back which radiates down the lateral aspect of the right lower extremity to the calf in L4-5 distribution.  Pain is exacerbated when moving from sitting to standing, with lumbar forward flexion and lateral flexion and with ambulation.  Patient is a supervisor of product distribution, manages everything and reports he is standing on his feet all day.  Patient does report sensation of buckling in the right lower extremity associated with prolonged exertion.  Pain is improved with sitting.  Patient has trialed gabapentin 300 mg, 1-2 times daily in the past without meaningful relief.  Patient reports completing 8 sessions of conventional physical therapy on 12/2022 at [facility] without any meaningful relief in range of motion, strength or pain.    Patient reports significant motor weakness.  Patient denies night fever/night sweats, urinary incontinence, bowel incontinence, significant weight loss, and loss of sensations      Pain Disability Index Review:     Last 3 PDI Scores 1/12/2023 6/15/2018  5/18/2018   Pain Disability Index (PDI) 46 19 20       Non-Pharmacologic Treatments:  Physical Therapy/Home Exercise: yes  Ice/Heat:yes  TENS: no  Acupuncture: no  Massage: no  Chiropractic: no    Other: no      Pain Medications:  - Opioids: Percocet (Oxycodone/Acetaminophen) and Ultram (Tramadol HCL)  - Adjuvant Medications: Neurontin (Gabapentin), Trazodone (Desyrel), Xanax (Alprazolam), and Zoloft (Sertraline)    Pain Procedures:   None    Past Medical History:   Diagnosis Date    Allergy     Anxiety     Cancer of kidney     Depression     Hyperlipidemia     Hypertension      Past Surgical History:   Procedure Laterality Date    COLONOSCOPY N/A 6/8/2018    Procedure: COLONOSCOPY;  Surgeon: Simeon Acharya III, MD;  Location: [facility];  Service: Endoscopy;  Laterality: N/A;    COLONOSCOPY N/A 6/11/2018    Procedure: COLONOSCOPY;  Surgeon: Simeon Acharya III, MD;  Location: [facility] ENDO;  Service: Endoscopy;  Laterality: N/A;    COLONOSCOPY  06/2018    COLONOSCOPY N/A 11/17/2021    Procedure: COLONOSCOPY;  Surgeon: Tomi Lewis MD;  Location: [facility];  Service: Endoscopy;  Laterality: N/A;    COSMETIC SURGERY      kidney removal      LAPAROSCOPIC NEPHRECTOMY, HAND ASSISTED Left 9/16/2021    Procedure: NEPHRECTOMY, HAND-ASSISTED, LAPAROSCOPIC;  Surgeon: Tom Manzo MD;  Location: [facility];  Service: Urology;  Laterality: Left;     Review of patient's allergies indicates:   Allergen Reactions    Iodine and iodide containing products      Other reaction(s): Swelling  Other reaction(s): Sneezing  Other reaction(s): Shortness of breath       Current Outpatient Medications   Medication Sig    allopurinoL (ZYLOPRIM) 100 MG tablet Take 1 tablet (100 mg total) by mouth once daily.    amLODIPine-benazepriL (LOTREL) 10-40 mg per capsule Take 1 capsule by mouth once daily.    docusate sodium (COLACE) 100 MG capsule Take 1 capsule (100 mg total) by mouth 2 (two) times daily.    fluticasone propionate (FLONASE)  50 mcg/actuation nasal spray 1 spray (50 mcg total) by Each Nostril route once daily.    gabapentin (NEURONTIN) 300 MG capsule TAKE ONE CAPSULE BY MOUTH DURING THE DAY THEN TWO EVERY NIGHT AT BEDTIME    hydrALAZINE (APRESOLINE) 100 MG tablet Take 1 tablet (100 mg total) by mouth 3 (three) times daily.    hydroCHLOROthiazide (HYDRODIURIL) 12.5 MG Tab Take 1 tablet (12.5 mg total) by mouth once daily.    loratadine (CLARITIN) 10 mg tablet TAKE 1 TABLET BY MOUTH EVERY DAY    oxyCODONE-acetaminophen (PERCOCET) 5-325 mg per tablet Take 1 tablet by mouth every 4 (four) hours as needed for Pain.    potassium chloride (KLOR-CON) 10 MEQ TbSR Take 10 mEq by mouth once.    pravastatin (PRAVACHOL) 40 MG tablet Take 1 tablet (40 mg total) by mouth once daily.    semaglutide (OZEMPIC) 0.25 mg or 0.5 mg(2 mg/1.5 mL) pen injector Inject 0.25 mg into the skin every 7 days.    sertraline (ZOLOFT) 50 MG tablet Take 1 tablet (50 mg total) by mouth once daily.    sodium bicarbonate 650 MG tablet Take 1 tablet (650 mg total) by mouth 2 (two) times daily. With food    testosterone cypionate (DEPOTESTOTERONE CYPIONATE) 200 mg/mL injection INJECT ONE ML INTO MUSCLE EVERY 14 DAYS    traMADoL (ULTRAM) 50 mg tablet Take 1 tablet (50 mg total) by mouth every 6 (six) hours.    traZODone (DESYREL) 50 MG tablet TAKE ONE TABLET BY MOUTH EVERY DAY    VITAMIN D2 1,250 mcg (50,000 unit) capsule TAKE ONE CAPSULE BY MOUTH ONCE WEEKLY    ALPRAZolam (XANAX) 0.25 MG tablet Take 2 tablets (0.5 mg total) by mouth 2 (two) times daily as needed for Anxiety.    metoprolol succinate (TOPROL-XL) 25 MG 24 hr tablet Take 1 tablet (25 mg total) by mouth once daily.    pregabalin (LYRICA) 75 MG capsule Take 1 capsule (75 mg total) by mouth 2 (two) times daily for 10 days, THEN 2 capsules (150 mg total) 2 (two) times daily for 10 days, THEN 3 capsules (225 mg total) 2 (two) times daily for 10 days.     No current facility-administered medications for this visit.  "      Review of Systems     GENERAL:  No weight loss, malaise or fevers.  HEENT:   No recent changes in vision or hearing  NECK:  Negative for lumps, no difficulty with swallowing.  RESPIRATORY:  Negative for cough, wheezing or shortness of breath, patient denies any recent URI.  CARDIOVASCULAR:  Negative for chest pain or palpitations.  GI:  Negative for abdominal discomfort, blood in stools or black stools or change in bowel habits.  MUSCULOSKELETAL:  See HPI.  SKIN:  Negative for lesions, rash, and itching.  PSYCH:  No mood disorder or recent psychosocial stressors.   HEMATOLOGY/LYMPHOLOGY:  Negative for prolonged bleeding, bruising easily or swollen nodes.    NEURO:   No history of syncope, paralysis, seizures or tremors.  All other reviewed and negative other than HPI.    OBJECTIVE:    BP (!) 143/79   Pulse 76   Resp 17   Ht 5' 8" (1.727 m)   Wt 108 kg (238 lb)   BMI 36.19 kg/m²       Physical Exam    GENERAL: Well appearing, in no acute distress, alert and oriented x3.  PSYCH:  Mood and affect appropriate.  SKIN: Skin color, texture, turgor normal, no rashes or lesions.  HEAD/FACE:  Normocephalic, atraumatic. Cranial nerves grossly intact.    CV: RRR with palpation of the radial artery.  PULM: No evidence of respiratory difficulty, symmetric chest rise.  GI:  Soft and non-tender.    BACK: Straight leg raising in the sitting and supine positions is  + to radicular pain on R.  pain to palpation over the facet joints of the lumbar spine or spinous processes.  Reduced range of motion with pain reproduction   EXTREMITIES: Peripheral joint ROM is full and pain free without obvious instability or laxity in all four extremities. No deformities, edema, or skin discoloration. Good capillary refill.  MUSCULOSKELETAL: Able to stand on heels & toes.    hip provocative maneuvers are positive bilaterally.    SIJ testing: Bilateral  - TTP over SI joint: Present  - Carmen's/ Gunner's: Positive    - Sacroiliac Distraction " Test (anterior pressure): Positive  - Sacroiliac Compression Test (lateral pressure): Positive   - SacralThrust Test (posterior pressure): Positive      Facet loading test is positive bilaterally.   Bilateral upper and lower extremity strength is normal and symmetric.  No atrophy or tone abnormalities are noted.    RIGHT Lower extremity: Hip flexion 4+/5, Hip Abduction 4+/5, Hip Adduction 4+/5, Knee extension 5/5, Knee flexion 5/5, Ankle dorsiflexion5/5, Extensor hallucis longus 5/5, Ankle plantarflexion 5/5  LEFT Lower extremity:  Hip flexion 5/5, Hip Abduction 5/5,Hip Adduction 5/5, Knee extension 5/5, Knee flexion 5/5, Ankle dorsiflexion 5/5, Extensor hallucis longus 5/5, Ankle plantarflexion 5/5  -Normal testing knee (patellar) jerk and ankle (achilles) jerk    NEURO: Bilateral upper and lower extremity coordination and muscle stretch reflexes are physiologic and symmetric. No loss of sensation is noted.  GAIT: normal.    Imagin20  X-Ray Lumbar Spine Ap And Lateral  FINDINGS:  Vertebral body heights maintained.  No definite spondylolisthesis.  L5-S1 evaluation limited by positioning.  There is sacralization of L5.  Multilevel facet degenerative findings noted.  L4-5 and L5-S1 degenerative disc height loss.      Bilateral x-ray SI joints 2022  FINDINGS:  No evidence for ankylosis.  No erosive changes.  No widening of the AC joints.    Bilateral hip XR 22  FINDINGS:  No acute fracture or dislocation.  No significant soft tissue swelling.     The femoral heads are normally positioned within the native acetabuli.  Mild bilateral femoroacetabular degenerative osteoarthrosis with mild joint space narrowing and small osteophyte formation.  Chronic suspected bone island of the left femoral head.     Pubic symphysis, bilateral pubic rami and SI joints are intact.         ASSESSMENT: 57 y.o. year old male with lower back and leg pain, consistent with     1. Lumbar facet arthropathy        2.  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 Primary osteoarthritis of both hips  Ambulatory referral/consult to Pain Clinic    Case Request-RAD/Other Procedure Area: Bilateral SIJ Injection, Bilateral Hip injection    IR Aspiration Injection Large Joint W FL      4. DDD (degenerative disc disease), lumbar        5. Dorsalgia, unspecified  MRI Lumbar Spine Without Contrast      6. Lumbar spondylosis  IR Facet Inj Lumbar 2nd Vert Bi    IR Facet Inj Lumbar 1st Vert Bi    Case Request-RAD/Other Procedure Area: Bilateral L3-5 MBB with local            PLAN:   - Interventions:  Schedule for bilateral SI joint intra-articular hip injection to address bilateral hip pain and sacroiliitis.  Two weeks following schedule for bilateral L3-5 lumbar medial branch block to address axial back pain.. Explained the risks and benefits of the procedure in detail with the patient today in clinic along with alternative treatment options, and the patient elected to pursue the intervention at this time.      - Anticoagulation use: no no anticoagulation     report:  Reviewed and consistent with medication use as prescribed.    - Medications:  --I will start the patient on a Lyrica titration to see if this helps with neuropathic pain.  We discussed increasing the dose gradually to reach a therapeutic goal according to the following algorithm.  We discussed potential side effects of this medication which may include drowsiness,dizziness, dry mouth, constipation or peripheral edema.    -Week 1: Take 1 capsule, 75 mg BID  -Week 2: Take 2 capsules, 150 mg BID.  -Week 3: Take 3 capsules, 225 mg BID.        - Therapy:   We discussed continuing physical therapy to help manage the patient/s painful condition. The patient was counseled that muscle strengthening will improve the  long term prognosis in regards to pain and may also help increase range of motion and mobility.    - Imaging: Reviewed available imaging with patient and answered any questions they had regarding study.  Lumbar MRI to better evaluate pain and weakness    - Follow up visit: return to clinic in 4-6 weeks      The above plan and management options were discussed at length with patient. Patient is in agreement with the above and verbalized understanding.    - I discussed the goals of interventional chronic pain management with the patient on today's visit. We discussed a multimodal and systematic approach to pain.  This includes diagnostic and therapeutic injections, adjuvant pharmacologic treatment, physical therapy, and at times psychiatry.  I emphasized the importance of regular exercise, core strengthening and stretching, diet and weight loss as a cornerstone of long-term pain management.    - This condition does not require this patient to take time off of work, and the primary goal of our Pain Management services is to improve the patient's functional capacity.  - Patient Questions: Answered all of the patient's questions regarding diagnoses, therapy, treatment and next steps        Lopez Mendoza MD  Interventional Pain Management  [facility]    Disclaimer:  This note was prepared using voice recognition system and is likely to have sound alike errors that may have been overlooked even after proof reading.  Please call me with any questions

## 2023-01-09 NOTE — H&P (VIEW-ONLY)
New Patient Chronic Pain Note (Initial Visit)    Referring Physician: Mohan Schwartz MD    PCP: Mohan Schwartz MD    Chief Complaint:   Chief Complaint   Patient presents with    Low-back Pain     Right     Leg Pain     Right     Hip Pain     Right         SUBJECTIVE:    Dre Curiel is a 57 y.o. male with past medical history significant for anxiety/depression, hypertension, hyperlipidemia, stage 3 chronic kidney disease a history of renal cell carcinoma status post left-sided nephrectomy, erectile dysfunction, obesity, obstructive sleep apnea who presents to the clinic for the evaluation of lower back and leg pain.  Patient reports pain has been present for several years without inciting accident injury or trauma.  Today patient reports pain is constant which is rated a 6/10.  Pain is described as sharp and stabbing and needles poking  in nature.  Patient reports pain in a bandlike distribution in the lower back which radiates down the lateral aspect of the right lower extremity to the calf in L4-5 distribution.  Pain is exacerbated when moving from sitting to standing, with lumbar forward flexion and lateral flexion and with ambulation.  Patient is a supervisor of product distribution, manages everything and reports he is standing on his feet all day.  Patient does report sensation of buckling in the right lower extremity associated with prolonged exertion.  Pain is improved with sitting.  Patient has trialed gabapentin 300 mg, 1-2 times daily in the past without meaningful relief.  Patient reports completing 8 sessions of conventional physical therapy on 12/2022 at [facility] without any meaningful relief in range of motion, strength or pain.    Patient reports significant motor weakness.  Patient denies night fever/night sweats, urinary incontinence, bowel incontinence, significant weight loss, and loss of sensations      Pain Disability Index Review:     Last 3 PDI Scores 1/12/2023 6/15/2018  5/18/2018   Pain Disability Index (PDI) 46 19 20       Non-Pharmacologic Treatments:  Physical Therapy/Home Exercise: yes  Ice/Heat:yes  TENS: no  Acupuncture: no  Massage: no  Chiropractic: no    Other: no      Pain Medications:  - Opioids: Percocet (Oxycodone/Acetaminophen) and Ultram (Tramadol HCL)  - Adjuvant Medications: Neurontin (Gabapentin), Trazodone (Desyrel), Xanax (Alprazolam), and Zoloft (Sertraline)    Pain Procedures:   None    Past Medical History:   Diagnosis Date    Allergy     Anxiety     Cancer of kidney     Depression     Hyperlipidemia     Hypertension      Past Surgical History:   Procedure Laterality Date    COLONOSCOPY N/A 6/8/2018    Procedure: COLONOSCOPY;  Surgeon: Simeon Acharya III, MD;  Location: [facility];  Service: Endoscopy;  Laterality: N/A;    COLONOSCOPY N/A 6/11/2018    Procedure: COLONOSCOPY;  Surgeon: Simeon Acharya III, MD;  Location: [facility];  Service: Endoscopy;  Laterality: N/A;    COLONOSCOPY  06/2018    COLONOSCOPY N/A 11/17/2021    Procedure: COLONOSCOPY;  Surgeon: Tomi Lweis MD;  Location: [facility];  Service: Endoscopy;  Laterality: N/A;    COSMETIC SURGERY      kidney removal      LAPAROSCOPIC NEPHRECTOMY, HAND ASSISTED Left 9/16/2021    Procedure: NEPHRECTOMY, HAND-ASSISTED, LAPAROSCOPIC;  Surgeon: Tom Manzo MD;  Location: [facility];  Service: Urology;  Laterality: Left;     Review of patient's allergies indicates:   Allergen Reactions    Iodine and iodide containing products      Other reaction(s): Swelling  Other reaction(s): Sneezing  Other reaction(s): Shortness of breath       Current Outpatient Medications   Medication Sig    allopurinoL (ZYLOPRIM) 100 MG tablet Take 1 tablet (100 mg total) by mouth once daily.    amLODIPine-benazepriL (LOTREL) 10-40 mg per capsule Take 1 capsule by mouth once daily.    docusate sodium (COLACE) 100 MG capsule Take 1 capsule (100 mg total) by mouth 2 (two) times daily.    fluticasone propionate (FLONASE)  50 mcg/actuation nasal spray 1 spray (50 mcg total) by Each Nostril route once daily.    gabapentin (NEURONTIN) 300 MG capsule TAKE ONE CAPSULE BY MOUTH DURING THE DAY THEN TWO EVERY NIGHT AT BEDTIME    hydrALAZINE (APRESOLINE) 100 MG tablet Take 1 tablet (100 mg total) by mouth 3 (three) times daily.    hydroCHLOROthiazide (HYDRODIURIL) 12.5 MG Tab Take 1 tablet (12.5 mg total) by mouth once daily.    loratadine (CLARITIN) 10 mg tablet TAKE 1 TABLET BY MOUTH EVERY DAY    oxyCODONE-acetaminophen (PERCOCET) 5-325 mg per tablet Take 1 tablet by mouth every 4 (four) hours as needed for Pain.    potassium chloride (KLOR-CON) 10 MEQ TbSR Take 10 mEq by mouth once.    pravastatin (PRAVACHOL) 40 MG tablet Take 1 tablet (40 mg total) by mouth once daily.    semaglutide (OZEMPIC) 0.25 mg or 0.5 mg(2 mg/1.5 mL) pen injector Inject 0.25 mg into the skin every 7 days.    sertraline (ZOLOFT) 50 MG tablet Take 1 tablet (50 mg total) by mouth once daily.    sodium bicarbonate 650 MG tablet Take 1 tablet (650 mg total) by mouth 2 (two) times daily. With food    testosterone cypionate (DEPOTESTOTERONE CYPIONATE) 200 mg/mL injection INJECT ONE ML INTO MUSCLE EVERY 14 DAYS    traMADoL (ULTRAM) 50 mg tablet Take 1 tablet (50 mg total) by mouth every 6 (six) hours.    traZODone (DESYREL) 50 MG tablet TAKE ONE TABLET BY MOUTH EVERY DAY    VITAMIN D2 1,250 mcg (50,000 unit) capsule TAKE ONE CAPSULE BY MOUTH ONCE WEEKLY    ALPRAZolam (XANAX) 0.25 MG tablet Take 2 tablets (0.5 mg total) by mouth 2 (two) times daily as needed for Anxiety.    metoprolol succinate (TOPROL-XL) 25 MG 24 hr tablet Take 1 tablet (25 mg total) by mouth once daily.    pregabalin (LYRICA) 75 MG capsule Take 1 capsule (75 mg total) by mouth 2 (two) times daily for 10 days, THEN 2 capsules (150 mg total) 2 (two) times daily for 10 days, THEN 3 capsules (225 mg total) 2 (two) times daily for 10 days.     No current facility-administered medications for this visit.  "      Review of Systems     GENERAL:  No weight loss, malaise or fevers.  HEENT:   No recent changes in vision or hearing  NECK:  Negative for lumps, no difficulty with swallowing.  RESPIRATORY:  Negative for cough, wheezing or shortness of breath, patient denies any recent URI.  CARDIOVASCULAR:  Negative for chest pain or palpitations.  GI:  Negative for abdominal discomfort, blood in stools or black stools or change in bowel habits.  MUSCULOSKELETAL:  See HPI.  SKIN:  Negative for lesions, rash, and itching.  PSYCH:  No mood disorder or recent psychosocial stressors.   HEMATOLOGY/LYMPHOLOGY:  Negative for prolonged bleeding, bruising easily or swollen nodes.    NEURO:   No history of syncope, paralysis, seizures or tremors.  All other reviewed and negative other than HPI.    OBJECTIVE:    BP (!) 143/79   Pulse 76   Resp 17   Ht 5' 8" (1.727 m)   Wt 108 kg (238 lb)   BMI 36.19 kg/m²       Physical Exam    GENERAL: Well appearing, in no acute distress, alert and oriented x3.  PSYCH:  Mood and affect appropriate.  SKIN: Skin color, texture, turgor normal, no rashes or lesions.  HEAD/FACE:  Normocephalic, atraumatic. Cranial nerves grossly intact.    CV: RRR with palpation of the radial artery.  PULM: No evidence of respiratory difficulty, symmetric chest rise.  GI:  Soft and non-tender.    BACK: Straight leg raising in the sitting and supine positions is  + to radicular pain on R.  pain to palpation over the facet joints of the lumbar spine or spinous processes.  Reduced range of motion with pain reproduction   EXTREMITIES: Peripheral joint ROM is full and pain free without obvious instability or laxity in all four extremities. No deformities, edema, or skin discoloration. Good capillary refill.  MUSCULOSKELETAL: Able to stand on heels & toes.    hip provocative maneuvers are positive bilaterally.    SIJ testing: Bilateral  - TTP over SI joint: Present  - Carmen's/ Gunner's: Positive    - Sacroiliac Distraction " Test (anterior pressure): Positive  - Sacroiliac Compression Test (lateral pressure): Positive   - SacralThrust Test (posterior pressure): Positive      Facet loading test is positive bilaterally.   Bilateral upper and lower extremity strength is normal and symmetric.  No atrophy or tone abnormalities are noted.    RIGHT Lower extremity: Hip flexion 4+/5, Hip Abduction 4+/5, Hip Adduction 4+/5, Knee extension 5/5, Knee flexion 5/5, Ankle dorsiflexion5/5, Extensor hallucis longus 5/5, Ankle plantarflexion 5/5  LEFT Lower extremity:  Hip flexion 5/5, Hip Abduction 5/5,Hip Adduction 5/5, Knee extension 5/5, Knee flexion 5/5, Ankle dorsiflexion 5/5, Extensor hallucis longus 5/5, Ankle plantarflexion 5/5  -Normal testing knee (patellar) jerk and ankle (achilles) jerk    NEURO: Bilateral upper and lower extremity coordination and muscle stretch reflexes are physiologic and symmetric. No loss of sensation is noted.  GAIT: normal.    Imagin20  X-Ray Lumbar Spine Ap And Lateral  FINDINGS:  Vertebral body heights maintained.  No definite spondylolisthesis.  L5-S1 evaluation limited by positioning.  There is sacralization of L5.  Multilevel facet degenerative findings noted.  L4-5 and L5-S1 degenerative disc height loss.      Bilateral x-ray SI joints 2022  FINDINGS:  No evidence for ankylosis.  No erosive changes.  No widening of the AC joints.    Bilateral hip XR 22  FINDINGS:  No acute fracture or dislocation.  No significant soft tissue swelling.     The femoral heads are normally positioned within the native acetabuli.  Mild bilateral femoroacetabular degenerative osteoarthrosis with mild joint space narrowing and small osteophyte formation.  Chronic suspected bone island of the left femoral head.     Pubic symphysis, bilateral pubic rami and SI joints are intact.         ASSESSMENT: 57 y.o. year old male with lower back and leg pain, consistent with     1. Lumbar facet arthropathy        2.  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 Primary osteoarthritis of both hips  Ambulatory referral/consult to Pain Clinic    Case Request-RAD/Other Procedure Area: Bilateral SIJ Injection, Bilateral Hip injection    IR Aspiration Injection Large Joint W FL      4. DDD (degenerative disc disease), lumbar        5. Dorsalgia, unspecified  MRI Lumbar Spine Without Contrast      6. Lumbar spondylosis  IR Facet Inj Lumbar 2nd Vert Bi    IR Facet Inj Lumbar 1st Vert Bi    Case Request-RAD/Other Procedure Area: Bilateral L3-5 MBB with local            PLAN:   - Interventions:  Schedule for bilateral SI joint intra-articular hip injection to address bilateral hip pain and sacroiliitis.  Two weeks following schedule for bilateral L3-5 lumbar medial branch block to address axial back pain.. Explained the risks and benefits of the procedure in detail with the patient today in clinic along with alternative treatment options, and the patient elected to pursue the intervention at this time.      - Anticoagulation use: no no anticoagulation     report:  Reviewed and consistent with medication use as prescribed.    - Medications:  --I will start the patient on a Lyrica titration to see if this helps with neuropathic pain.  We discussed increasing the dose gradually to reach a therapeutic goal according to the following algorithm.  We discussed potential side effects of this medication which may include drowsiness,dizziness, dry mouth, constipation or peripheral edema.    -Week 1: Take 1 capsule, 75 mg BID  -Week 2: Take 2 capsules, 150 mg BID.  -Week 3: Take 3 capsules, 225 mg BID.        - Therapy:   We discussed continuing physical therapy to help manage the patient/s painful condition. The patient was counseled that muscle strengthening will improve the  long term prognosis in regards to pain and may also help increase range of motion and mobility.    - Imaging: Reviewed available imaging with patient and answered any questions they had regarding study.  Lumbar MRI to better evaluate pain and weakness    - Follow up visit: return to clinic in 4-6 weeks      The above plan and management options were discussed at length with patient. Patient is in agreement with the above and verbalized understanding.    - I discussed the goals of interventional chronic pain management with the patient on today's visit. We discussed a multimodal and systematic approach to pain.  This includes diagnostic and therapeutic injections, adjuvant pharmacologic treatment, physical therapy, and at times psychiatry.  I emphasized the importance of regular exercise, core strengthening and stretching, diet and weight loss as a cornerstone of long-term pain management.    - This condition does not require this patient to take time off of work, and the primary goal of our Pain Management services is to improve the patient's functional capacity.  - Patient Questions: Answered all of the patient's questions regarding diagnoses, therapy, treatment and next steps        Lopez Mendoza MD  Interventional Pain Management  [facility]    Disclaimer:  This note was prepared using voice recognition system and is likely to have sound alike errors that may have been overlooked even after proof reading.  Please call me with any questions

## 2023-01-12 ENCOUNTER — OFFICE VISIT (OUTPATIENT)
Dept: PAIN MEDICINE | Facility: CLINIC | Age: 58
End: 2023-01-12
Payer: COMMERCIAL

## 2023-01-12 VITALS
SYSTOLIC BLOOD PRESSURE: 143 MMHG | BODY MASS INDEX: 36.07 KG/M2 | HEIGHT: 68 IN | WEIGHT: 238 LBS | RESPIRATION RATE: 17 BRPM | DIASTOLIC BLOOD PRESSURE: 79 MMHG | HEART RATE: 76 BPM

## 2023-01-12 DIAGNOSIS — M51.36 DDD (DEGENERATIVE DISC DISEASE), LUMBAR: ICD-10-CM

## 2023-01-12 DIAGNOSIS — M54.9 DORSALGIA, UNSPECIFIED: ICD-10-CM

## 2023-01-12 DIAGNOSIS — M16.0 PRIMARY OSTEOARTHRITIS OF BOTH HIPS: ICD-10-CM

## 2023-01-12 DIAGNOSIS — M53.3 DISORDER OF SI (SACROILIAC) JOINT: ICD-10-CM

## 2023-01-12 DIAGNOSIS — M47.816 LUMBAR SPONDYLOSIS: ICD-10-CM

## 2023-01-12 DIAGNOSIS — M47.816 LUMBAR FACET ARTHROPATHY: Primary | ICD-10-CM

## 2023-01-12 PROCEDURE — 3078F DIAST BP <80 MM HG: CPT | Mod: CPTII,S$GLB,, | Performed by: ANESTHESIOLOGY

## 2023-01-12 PROCEDURE — 99204 PR OFFICE/OUTPT VISIT, NEW, LEVL IV, 45-59 MIN: ICD-10-PCS | Mod: S$GLB,,, | Performed by: ANESTHESIOLOGY

## 2023-01-12 PROCEDURE — 99204 OFFICE O/P NEW MOD 45 MIN: CPT | Mod: S$GLB,,, | Performed by: ANESTHESIOLOGY

## 2023-01-12 PROCEDURE — 1159F MED LIST DOCD IN RCRD: CPT | Mod: CPTII,S$GLB,, | Performed by: ANESTHESIOLOGY

## 2023-01-12 PROCEDURE — 3078F PR MOST RECENT DIASTOLIC BLOOD PRESSURE < 80 MM HG: ICD-10-PCS | Mod: CPTII,S$GLB,, | Performed by: ANESTHESIOLOGY

## 2023-01-12 PROCEDURE — 3008F PR BODY MASS INDEX (BMI) DOCUMENTED: ICD-10-PCS | Mod: CPTII,S$GLB,, | Performed by: ANESTHESIOLOGY

## 2023-01-12 PROCEDURE — 3008F BODY MASS INDEX DOCD: CPT | Mod: CPTII,S$GLB,, | Performed by: ANESTHESIOLOGY

## 2023-01-12 PROCEDURE — 99999 PR PBB SHADOW E&M-EST. PATIENT-LVL V: ICD-10-PCS | Mod: PBBFAC,,, | Performed by: ANESTHESIOLOGY

## 2023-01-12 PROCEDURE — 3077F SYST BP >= 140 MM HG: CPT | Mod: CPTII,S$GLB,, | Performed by: ANESTHESIOLOGY

## 2023-01-12 PROCEDURE — 3077F PR MOST RECENT SYSTOLIC BLOOD PRESSURE >= 140 MM HG: ICD-10-PCS | Mod: CPTII,S$GLB,, | Performed by: ANESTHESIOLOGY

## 2023-01-12 PROCEDURE — 99999 PR PBB SHADOW E&M-EST. PATIENT-LVL V: CPT | Mod: PBBFAC,,, | Performed by: ANESTHESIOLOGY

## 2023-01-12 PROCEDURE — 1159F PR MEDICATION LIST DOCUMENTED IN MEDICAL RECORD: ICD-10-PCS | Mod: CPTII,S$GLB,, | Performed by: ANESTHESIOLOGY

## 2023-01-12 RX ORDER — PREGABALIN 75 MG/1
CAPSULE ORAL
Qty: 120 CAPSULE | Refills: 0 | Status: SHIPPED | OUTPATIENT
Start: 2023-01-12 | End: 2023-02-16

## 2023-01-13 RX ORDER — TRAMADOL HYDROCHLORIDE 50 MG/1
50 TABLET ORAL EVERY 6 HOURS
Qty: 21 TABLET | Refills: 1 | OUTPATIENT
Start: 2023-01-13

## 2023-01-13 NOTE — TELEPHONE ENCOUNTER
No new care gaps identified.  [facility] Embedded Care Gaps. Reference number: [id number]. 1/12/2023   6:05:08 PM CST

## 2023-01-20 ENCOUNTER — TELEPHONE (OUTPATIENT)
Dept: PAIN MEDICINE | Facility: CLINIC | Age: 58
End: 2023-01-20
Payer: COMMERCIAL

## 2023-01-20 NOTE — TELEPHONE ENCOUNTER
----- Message from [facility] sent at 1/20/2023 12:56 PM CST -----  Type:  Needs Medical Advice    Who Called: PT  Would the patient rather a call back or a response via [facility]? Callback   Best Call Back Number: [phone]  Additional Information: Pt requesting callback from office to discuss injection questions

## 2023-01-23 ENCOUNTER — HOSPITAL ENCOUNTER (OUTPATIENT)
Dept: RADIOLOGY | Facility: HOSPITAL | Age: 58
Discharge: HOME | End: 2023-01-23
Attending: ANESTHESIOLOGY
Payer: COMMERCIAL

## 2023-01-23 DIAGNOSIS — M54.9 DORSALGIA, UNSPECIFIED: ICD-10-CM

## 2023-01-23 PROCEDURE — 72148 MRI LUMBAR SPINE W/O DYE: CPT | Mod: TC

## 2023-01-23 PROCEDURE — 72148 MRI LUMBAR SPINE W/O DYE: CPT | Mod: 26,,, | Performed by: RADIOLOGY

## 2023-01-23 PROCEDURE — 72148 MRI LUMBAR SPINE WITHOUT CONTRAST: ICD-10-PCS | Mod: 26,,, | Performed by: RADIOLOGY

## 2023-01-23 NOTE — PRE-PROCEDURE INSTRUCTIONS
Attempted to PAT patient for procedure on 1.24 with Dr. GOODWIN. No answer. LVM with return phone number. No return call at this time.

## 2023-01-23 NOTE — PRE-PROCEDURE INSTRUCTIONS
Spoke with patient regarding procedure scheduled on 1.24    Arrival time 0930    Has patient been sick with fever or on antibiotics within the last 7 days? No    Does the patient have any open wounds, sores or rashes? No    Does the patient have any recent fractures? no    Has patient received a vaccination within the last 7 days? No    Received the COVID vaccination? yes    Has the patient stopped all medications as directed? NA    Does patient have a pacemaker and or defibrillator? no    Does the patient have a ride to and from procedure and someone reliable to remain with patient? Son     Is the patient diabetic? no    Does the patient have sleep apnea? Or use O2 at home? Kit on cpap     Is the patient receiving sedation? yes    Is the patient instructed to remain NPO beginning at midnight the night before their procedure? yes    Procedure location confirmed with patient? Yes    Covid- Denies signs/symptoms. Instructed to notify PAT/MD if any changes.

## 2023-01-24 ENCOUNTER — HOSPITAL ENCOUNTER (OUTPATIENT)
Facility: HOSPITAL | Age: 58
Discharge: HOME | End: 2023-01-24
Attending: ANESTHESIOLOGY | Admitting: ANESTHESIOLOGY
Payer: COMMERCIAL

## 2023-01-24 VITALS
HEART RATE: 62 BPM | HEIGHT: 68 IN | TEMPERATURE: 98 F | BODY MASS INDEX: 35.42 KG/M2 | DIASTOLIC BLOOD PRESSURE: 51 MMHG | SYSTOLIC BLOOD PRESSURE: 100 MMHG | WEIGHT: 233.69 LBS | RESPIRATION RATE: 18 BRPM | OXYGEN SATURATION: 100 %

## 2023-01-24 DIAGNOSIS — M53.3 DISORDER OF SI (SACROILIAC) JOINT: ICD-10-CM

## 2023-01-24 DIAGNOSIS — M46.1 SACROILIITIS: ICD-10-CM

## 2023-01-24 DIAGNOSIS — M16.0 PRIMARY OSTEOARTHRITIS OF BOTH HIPS: ICD-10-CM

## 2023-01-24 PROCEDURE — 20610 PR DRAIN/INJECT LARGE JOINT/BURSA: ICD-10-PCS | Mod: 59,LT,, | Performed by: ANESTHESIOLOGY

## 2023-01-24 PROCEDURE — 20610 DRAIN/INJ JOINT/BURSA W/O US: CPT | Mod: 59,LT,, | Performed by: ANESTHESIOLOGY

## 2023-01-24 PROCEDURE — 27096 INJECT SACROILIAC JOINT: CPT | Mod: LT,,, | Performed by: ANESTHESIOLOGY

## 2023-01-24 PROCEDURE — A9585 GADOBUTROL INJECTION: HCPCS | Performed by: ANESTHESIOLOGY

## 2023-01-24 PROCEDURE — 25000003 PHARM REV CODE 250: Performed by: ANESTHESIOLOGY

## 2023-01-24 PROCEDURE — 25500020 PHARM REV CODE 255: Performed by: ANESTHESIOLOGY

## 2023-01-24 PROCEDURE — 27096 PR INJECTION,SACROILIAC JOINT: ICD-10-PCS | Mod: LT,,, | Performed by: ANESTHESIOLOGY

## 2023-01-24 PROCEDURE — 27096 INJECT SACROILIAC JOINT: CPT | Mod: 50 | Performed by: ANESTHESIOLOGY

## 2023-01-24 PROCEDURE — 63600175 PHARM REV CODE 636 W HCPCS: Performed by: ANESTHESIOLOGY

## 2023-01-24 PROCEDURE — 20610 DRAIN/INJ JOINT/BURSA W/O US: CPT | Mod: 50 | Performed by: ANESTHESIOLOGY

## 2023-01-24 RX ORDER — TRIAMCINOLONE ACETONIDE 40 MG/ML
INJECTION, SUSPENSION INTRA-ARTICULAR; INTRAMUSCULAR
Status: DISCONTINUED | OUTPATIENT
Start: 2023-01-24 | End: 2023-01-24 | Stop reason: HOSPADM

## 2023-01-24 RX ORDER — BUPIVACAINE HYDROCHLORIDE 2.5 MG/ML
INJECTION, SOLUTION EPIDURAL; INFILTRATION; INTRACAUDAL
Status: DISCONTINUED | OUTPATIENT
Start: 2023-01-24 | End: 2023-01-24 | Stop reason: HOSPADM

## 2023-01-24 RX ORDER — SODIUM BICARBONATE 1 MEQ/ML
SYRINGE (ML) INTRAVENOUS
Status: DISCONTINUED | OUTPATIENT
Start: 2023-01-24 | End: 2023-01-24 | Stop reason: HOSPADM

## 2023-01-24 RX ORDER — GADOBUTROL 604.72 MG/ML
INJECTION INTRAVENOUS
Status: DISCONTINUED | OUTPATIENT
Start: 2023-01-24 | End: 2023-01-24 | Stop reason: HOSPADM

## 2023-01-24 NOTE — DISCHARGE SUMMARY
Discharge Note  Short Stay      SUMMARY     Admit Date: 1/24/2023    Attending Physician: Lopez Mendoza MD        Discharge Physician: Lopez Mendoza MD        Discharge Date: 1/24/2023 10:26 AM    Procedure(s) (LRB):  Bilateral SIJ Injection (Bilateral)  Bilateral Hip injection (Bilateral)    Final Diagnosis: Disorder of SI (sacroiliac) joint [M53.3]  Primary osteoarthritis of both hips [M16.0]    Disposition: Home or self care    Patient Instructions:   Current Discharge Medication List        CONTINUE these medications which have NOT CHANGED    Details   amLODIPine-benazepriL (LOTREL) 10-40 mg per capsule Take 1 capsule by mouth once daily.  Qty: 90 capsule, Refills: 3    Comments: .  Associated Diagnoses: Essential hypertension      hydrALAZINE (APRESOLINE) 100 MG tablet Take 1 tablet (100 mg total) by mouth 3 (three) times daily.  Qty: 270 tablet, Refills: 2    Comments: This prescription was filled on 8/17/2022. Any refills authorized will be placed on file.  Associated Diagnoses: Essential hypertension      hydroCHLOROthiazide (HYDRODIURIL) 12.5 MG Tab Take 1 tablet (12.5 mg total) by mouth once daily.  Qty: 30 tablet, Refills: 11    Comments: .  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  Qty: 90 tablet, Refills: 3    Comments: .  Associated Diagnoses: Essential hypertension      sertraline (ZOLOFT) 50 MG tablet Take 1 tablet (50 mg total) by mouth once daily.  Qty: 90 tablet, Refills: 3    Associated Diagnoses: Anxiety      traZODone (DESYREL) 50 MG tablet TAKE ONE TABLET BY MOUTH EVERY DAY  Qty: 90 tablet, Refills: 1    Comments: This prescription was filled on 2/18/2022. Any refills authorized will be placed on file.  Associated Diagnoses: Other insomnia      allopurinoL (ZYLOPRIM) 100 MG tablet Take 1 tablet (100 mg total) by mouth once  daily.  Qty: 90 tablet, Refills: 2    Associated Diagnoses: Idiopathic chronic gout of right foot without tophus      ALPRAZolam (XANAX) 0.25 MG tablet Take 2 tablets (0.5 mg total) by mouth 2 (two) times daily as needed for Anxiety.  Qty: 15 tablet, Refills: 0      docusate sodium (COLACE) 100 MG capsule Take 1 capsule (100 mg total) by mouth 2 (two) times daily.  Qty: 60 capsule, Refills: 0      fluticasone propionate (FLONASE) 50 mcg/actuation nasal spray 1 spray (50 mcg total) by Each Nostril route once daily.  Qty: 3 g, Refills: 5      gabapentin (NEURONTIN) 300 MG capsule TAKE ONE CAPSULE BY MOUTH DURING THE DAY THEN TWO EVERY NIGHT AT BEDTIME  Qty: 270 capsule, Refills: 1    Comments: This prescription was filled on 11/15/2022. Any refills authorized will be placed on file.      loratadine (CLARITIN) 10 mg tablet TAKE 1 TABLET BY MOUTH EVERY DAY  Qty: 90 tablet, Refills: 1      oxyCODONE-acetaminophen (PERCOCET) 5-325 mg per tablet Take 1 tablet by mouth every 4 (four) hours as needed for Pain.  Qty: 35 tablet, Refills: 0    Comments: Quantity prescribed more than 7 day supply? No      potassium chloride (KLOR-CON) 10 MEQ TbSR Take 10 mEq by mouth once.      pravastatin (PRAVACHOL) 40 MG tablet Take 1 tablet (40 mg total) by mouth once daily.  Qty: 90 tablet, Refills: 3    Associated Diagnoses: Mixed hyperlipidemia      pregabalin (LYRICA) 75 MG capsule Take 1 capsule (75 mg total) by mouth 2 (two) times daily for 10 days, THEN 2 capsules (150 mg total) 2 (two) times daily for 10 days, THEN 3 capsules (225 mg total) 2 (two) times daily for 10 days.  Qty: 120 capsule, Refills: 0      semaglutide (OZEMPIC) 0.25 mg or 0.5 mg(2 mg/1.5 mL) pen injector Inject 0.25 mg into the skin every 7 days.  Qty: 4 pen, Refills: 4    Associated Diagnoses: Obesity (BMI 35.0-39.9 without comorbidity)      sodium bicarbonate 650 MG tablet Take 1 tablet (650 mg total) by mouth 2 (two) times daily. With food  Qty: 180 tablet,  Refills: 1    Associated Diagnoses: Metabolic acidosis      testosterone cypionate (DEPOTESTOTERONE CYPIONATE) 200 mg/mL injection INJECT ONE ML INTO MUSCLE EVERY 14 DAYS  Qty: 2 mL, Refills: 2    Associated Diagnoses: Testosterone deficiency      traMADoL (ULTRAM) 50 mg tablet Take 1 tablet (50 mg total) by mouth every 6 (six) hours.  Qty: 21 each, Refills: 1    Comments: Quantity prescribed more than 7 day supply? Yes, quantity medically necessary      VITAMIN D2 1,250 mcg (50,000 unit) capsule TAKE ONE CAPSULE BY MOUTH ONCE WEEKLY  Qty: 12 capsule, Refills: 1    Comments: This prescription was filled on 10/24/2022. Any refills authorized will be placed on file.  Associated Diagnoses: Vitamin D deficiency                 Discharge Diagnosis: Disorder of SI (sacroiliac) joint [M53.3]  Primary osteoarthritis of both hips [M16.0]  Condition on Discharge: Stable with no complications to procedure   Diet on Discharge: Same as before.  Activity: as per instruction sheet.  Discharge to: Home with a responsible adult.  Follow up: 2-4 weeks       Please call the office at [phone] if you experience any weakness or loss of sensation, fever > 101.5, pain uncontrolled with oral medications, persistent nausea/vomiting/or diarrhea, redness or drainage from the incisions, or any other worrisome concerns. If physician on call was not reached or could not communicate with our office for any reason please go to the nearest emergency department

## 2023-01-24 NOTE — OP NOTE
Vitals:    02/23/22 0805   BP: (!) 168/93   Pulse: (!) 111   Resp: (!) 22   Temp:        Procedure Date:01/24/2023      INFORMED CONSENT: The procedure, risks, benefits and options were discussed with patient. There are no contraindications to the procedure. The patient expressed understanding and agreed to proceed. The personnel performing the procedure was discussed. I verify that I personally obtained consent prior to the start of the procedure and the signed consent can be found on the patient's chart.       Anesthesia:   local    The patient was monitored with continuous pulse oximetry, EKG, and intermittent blood pressure monitors.  The patient was hemodynamically stable throughout the entire process was responsive to voice, and breathing spontaneously.  Supplemental O2 was provided at 2L/min via nasal cannula.  Patient was comfortable for the duration of the procedure. (See nurse documentation and case log for sedation time)      Pre Procedure diagnosis: Sacroiliitis [M46.1]  Post-Procedure diagnosis: SAME      PROCEDURE:  Left sacroiliac joint injection                          Left Hip (acetabulofemoral) joint injection under fluoroscopic guidance    REASON FOR PROCEDURE:   Sacroiliitis [M46.1]  osteoarthritis of the hip (of the above noted laterality)    MEDICATIONS INJECTED: 1mL 40mg/ml Kenalog and 4mL Bupivacaine 0.25% into each site    LOCAL ANESTHETIC USED: Xylocaine 1% 6ml     ESTIMATED BLOOD LOSS: None.   COMPLICATIONS: None.     TECHNIQUE:   Sacroiliac joint injection:   Laying in the prone position, the patient was prepped and draped in the usual sterile fashion using ChloraPrep and fenestrated drape.  The area was determined under fluoroscopy.  Local Xylocaine was injected by raising a wheel and going down to the periosteum using a 27-gauge hypodermic needle.  The 3.5 inch 22-gauge spinal needle was introduce into the Left sacroiliac joint.  Negative pressure applied to confirm no intravascular  placement.  Omnipaque was injected to confirm placement and to confirm that there was no vascular runoff.  The medication was then injected slowly.  The patient tolerated the procedure well.                       Hip Intraarticular Injection:   The patient was then repositioned on the table in supine orientation.. The area over the above noted joint/s was widely prepped with ChloraPrep and drapped in usual sterile fashion.    The above noted joint/s was identified on fluoroscopy. A 27-gauge 1.5 inch needle was used to localize the site of entry with 3 mL of 1% PF Lidocaine. A 22 gauge 3.5 inch spinal needle was then introduced and advanced towards the neck of the femur. The target site was approximately 0.5 to 1.0 cm distal to the lateral border of the femoral head and 0.5 to 1.0 cm below the superior aspect of the femoral neck. The needle was advanced until osseus interface was met. Following negative aspiration, a solution containing 6 mL of 0.25% Bupivacaine and 1 mL of Triamcinolone (40 mg/mL) was then injected. There was minimal resistance encountered throughout injection. No paresthesias were noted on injection. The needle stylet was replaced and the needle was removed intact. The patient tolerated the procedure well. A bandage was applied to the site of injection.    The patient was monitored for approximately 30 minutes after the procedure. Patient was given post procedure and discharge instructions to follow at home. We will see the patient back in two weeks or the patient may call to inform of status. The patient was discharged in a stable condition

## 2023-01-24 NOTE — DISCHARGE INSTRUCTIONS

## 2023-01-30 NOTE — PRE-PROCEDURE INSTRUCTIONS
Attempted to PAT patient for procedure on 2.7 with Dr. kwok. No answer. LVM with return phone number. No return call at this time.

## 2023-02-03 NOTE — PRE-PROCEDURE INSTRUCTIONS
Spoke with patient regarding procedure scheduled on 2.7    Arrival time 0800    Has patient been sick with fever or on antibiotics within the last 7 days? No    Does the patient have any open wounds, sores or rashes? No    Does the patient have any recent fractures? no    Has patient received a vaccination within the last 7 days? No    Received the COVID vaccination? yes    Has the patient stopped all medications as directed? NA    Does patient have a pacemaker and or defibrillator? no    Does the patient have a ride to and from procedure and someone reliable to remain with patient? sister    Is the patient diabetic? no    Does the patient have sleep apnea? Or use O2 at home? Kit on cpap    Is the patient receiving sedation? yes    Is the patient instructed to remain NPO beginning at midnight the night before their procedure? yes    Procedure location confirmed with patient? Yes    Covid- Denies signs/symptoms. Instructed to notify PAT/MD if any changes.

## 2023-02-07 ENCOUNTER — HOSPITAL ENCOUNTER (OUTPATIENT)
Facility: HOSPITAL | Age: 58
Discharge: HOME | End: 2023-02-07
Attending: ANESTHESIOLOGY | Admitting: ANESTHESIOLOGY
Payer: COMMERCIAL

## 2023-02-07 VITALS
DIASTOLIC BLOOD PRESSURE: 68 MMHG | TEMPERATURE: 98 F | HEIGHT: 68 IN | HEART RATE: 78 BPM | WEIGHT: 240.31 LBS | OXYGEN SATURATION: 98 % | BODY MASS INDEX: 36.42 KG/M2 | SYSTOLIC BLOOD PRESSURE: 140 MMHG | RESPIRATION RATE: 12 BRPM

## 2023-02-07 DIAGNOSIS — M47.816 LUMBAR SPONDYLOSIS: ICD-10-CM

## 2023-02-07 PROCEDURE — 64494 INJ PARAVERT F JNT L/S 2 LEV: CPT | Mod: 50 | Performed by: ANESTHESIOLOGY

## 2023-02-07 PROCEDURE — 25000003 PHARM REV CODE 250: Performed by: ANESTHESIOLOGY

## 2023-02-07 PROCEDURE — 64494 INJ PARAVERT F JNT L/S 2 LEV: CPT | Mod: 50,,, | Performed by: ANESTHESIOLOGY

## 2023-02-07 PROCEDURE — 64493 INJ PARAVERT F JNT L/S 1 LEV: CPT | Mod: 50,,, | Performed by: ANESTHESIOLOGY

## 2023-02-07 PROCEDURE — 64494 PR INJ DX/THER AGNT PARAVERT FACET JOINT,IMG GUIDE,LUMBAR/SAC, 2ND LEVEL: ICD-10-PCS | Mod: 50,,, | Performed by: ANESTHESIOLOGY

## 2023-02-07 PROCEDURE — 63600175 PHARM REV CODE 636 W HCPCS: Performed by: ANESTHESIOLOGY

## 2023-02-07 PROCEDURE — 64493 PR INJ DX/THER AGNT PARAVERT FACET JOINT,IMG GUIDE,LUMBAR/SAC,1ST LVL: ICD-10-PCS | Mod: 50,,, | Performed by: ANESTHESIOLOGY

## 2023-02-07 PROCEDURE — 64493 INJ PARAVERT F JNT L/S 1 LEV: CPT | Mod: 50 | Performed by: ANESTHESIOLOGY

## 2023-02-07 RX ORDER — INDOMETHACIN 25 MG/1
CAPSULE ORAL
Status: DISCONTINUED | OUTPATIENT
Start: 2023-02-07 | End: 2023-02-07 | Stop reason: HOSPADM

## 2023-02-07 RX ORDER — BUPIVACAINE HYDROCHLORIDE 5 MG/ML
INJECTION, SOLUTION EPIDURAL; INTRACAUDAL
Status: DISCONTINUED | OUTPATIENT
Start: 2023-02-07 | End: 2023-02-07 | Stop reason: HOSPADM

## 2023-02-07 RX ORDER — FENTANYL CITRATE 50 UG/ML
INJECTION, SOLUTION INTRAMUSCULAR; INTRAVENOUS
Status: DISCONTINUED | OUTPATIENT
Start: 2023-02-07 | End: 2023-02-07 | Stop reason: HOSPADM

## 2023-02-07 RX ORDER — MIDAZOLAM HYDROCHLORIDE 1 MG/ML
INJECTION, SOLUTION INTRAMUSCULAR; INTRAVENOUS
Status: DISCONTINUED | OUTPATIENT
Start: 2023-02-07 | End: 2023-02-07 | Stop reason: HOSPADM

## 2023-02-07 RX ORDER — METHYLPREDNISOLONE ACETATE 40 MG/ML
INJECTION, SUSPENSION INTRA-ARTICULAR; INTRALESIONAL; INTRAMUSCULAR; SOFT TISSUE
Status: DISCONTINUED | OUTPATIENT
Start: 2023-02-07 | End: 2023-02-07 | Stop reason: HOSPADM

## 2023-02-07 NOTE — DISCHARGE SUMMARY
Discharge Note  Short Stay      SUMMARY     Admit Date: 2/7/2023    Attending Physician: Lopez Mendoza MD        Discharge Physician: Lopez Mendoza MD        Discharge Date: 2/7/2023 9:24 AM    Procedure(s) (LRB):  Bilateral L3-5 MBB with local (Bilateral)    Final Diagnosis: Lumbar spondylosis [M47.816]    Disposition: Home or self care    Patient Instructions:   Current Discharge Medication List        CONTINUE these medications which have NOT CHANGED    Details   allopurinoL (ZYLOPRIM) 100 MG tablet Take 1 tablet (100 mg total) by mouth once daily.  Qty: 90 tablet, Refills: 2    Associated Diagnoses: Idiopathic chronic gout of right foot without tophus      amLODIPine-benazepriL (LOTREL) 10-40 mg per capsule Take 1 capsule by mouth once daily.  Qty: 90 capsule, Refills: 3    Comments: .  Associated Diagnoses: Essential hypertension      gabapentin (NEURONTIN) 300 MG capsule TAKE ONE CAPSULE BY MOUTH DURING THE DAY THEN TWO EVERY NIGHT AT BEDTIME  Qty: 270 capsule, Refills: 1    Comments: This prescription was filled on 11/15/2022. Any refills authorized will be placed on file.      hydrALAZINE (APRESOLINE) 100 MG tablet Take 1 tablet (100 mg total) by mouth 3 (three) times daily.  Qty: 270 tablet, Refills: 2    Comments: This prescription was filled on 8/17/2022. Any refills authorized will be placed on file.  Associated Diagnoses: Essential hypertension      hydroCHLOROthiazide (HYDRODIURIL) 12.5 MG Tab Take 1 tablet (12.5 mg total) by mouth once daily.  Qty: 30 tablet, Refills: 11    Comments: .  Associated Diagnoses: Parenchymal renal hypertension, stage 1-4 or unspecified chronic kidney disease      potassium chloride (KLOR-CON) 10 MEQ TbSR Take 10 mEq by mouth once.      pravastatin (PRAVACHOL) 40 MG tablet Take 1 tablet (40 mg total) by mouth once daily.  Qty: 90 tablet, Refills: 3    Associated Diagnoses: Mixed hyperlipidemia      pregabalin (LYRICA) 75 MG capsule Take 1 capsule (75 mg total) by mouth 2  (two) times daily for 10 days, THEN 2 capsules (150 mg total) 2 (two) times daily for 10 days, THEN 3 capsules (225 mg total) 2 (two) times daily for 10 days.  Qty: 120 capsule, Refills: 0      sertraline (ZOLOFT) 50 MG tablet Take 1 tablet (50 mg total) by mouth once daily.  Qty: 90 tablet, Refills: 3    Associated Diagnoses: Anxiety      traMADoL (ULTRAM) 50 mg tablet Take 1 tablet (50 mg total) by mouth every 6 (six) hours.  Qty: 21 each, Refills: 1    Comments: Quantity prescribed more than 7 day supply? Yes, quantity medically necessary      traZODone (DESYREL) 50 MG tablet TAKE ONE TABLET BY MOUTH EVERY DAY  Qty: 90 tablet, Refills: 1    Comments: This prescription was filled on 2/18/2022. Any refills authorized will be placed on file.  Associated Diagnoses: Other insomnia      VITAMIN D2 1,250 mcg (50,000 unit) capsule TAKE ONE CAPSULE BY MOUTH ONCE WEEKLY  Qty: 12 capsule, Refills: 1    Comments: This prescription was filled on 10/24/2022. Any refills authorized will be placed on file.  Associated Diagnoses: Vitamin D deficiency      ALPRAZolam (XANAX) 0.25 MG tablet Take 2 tablets (0.5 mg total) by mouth 2 (two) times daily as needed for Anxiety.  Qty: 15 tablet, Refills: 0      docusate sodium (COLACE) 100 MG capsule Take 1 capsule (100 mg total) by mouth 2 (two) times daily.  Qty: 60 capsule, Refills: 0      fluticasone propionate (FLONASE) 50 mcg/actuation nasal spray 1 spray (50 mcg total) by Each Nostril route once daily.  Qty: 3 g, Refills: 5      loratadine (CLARITIN) 10 mg tablet TAKE 1 TABLET BY MOUTH EVERY DAY  Qty: 90 tablet, Refills: 1      metoprolol succinate (TOPROL-XL) 25 MG 24 hr tablet Take 1 tablet (25 mg total) by mouth once daily.  Qty: 90 tablet, Refills: 3    Comments: .  Associated Diagnoses: Essential hypertension      oxyCODONE-acetaminophen (PERCOCET) 5-325 mg per tablet Take 1 tablet by mouth every 4 (four) hours as needed for Pain.  Qty: 35 tablet, Refills: 0    Comments:  Quantity prescribed more than 7 day supply? No      semaglutide (OZEMPIC) 0.25 mg or 0.5 mg(2 mg/1.5 mL) pen injector Inject 0.25 mg into the skin every 7 days.  Qty: 4 pen, Refills: 4    Associated Diagnoses: Obesity (BMI 35.0-39.9 without comorbidity)      sodium bicarbonate 650 MG tablet Take 1 tablet (650 mg total) by mouth 2 (two) times daily. With food  Qty: 180 tablet, Refills: 1    Associated Diagnoses: Metabolic acidosis      testosterone cypionate (DEPOTESTOTERONE CYPIONATE) 200 mg/mL injection INJECT ONE ML INTO MUSCLE EVERY 14 DAYS  Qty: 2 mL, Refills: 2    Associated Diagnoses: Testosterone deficiency                 Discharge Diagnosis: Lumbar spondylosis [M47.816]  Condition on Discharge: Stable with no complications to procedure   Diet on Discharge: Same as before.  Activity: as per instruction sheet.  Discharge to: Home with a responsible adult.  Follow up: 2-4 weeks       Please call the office at [phone] if you experience any weakness or loss of sensation, fever > 101.5, pain uncontrolled with oral medications, persistent nausea/vomiting/or diarrhea, redness or drainage from the incisions, or any other worrisome concerns. If physician on call was not reached or could not communicate with our office for any reason please go to the nearest emergency department

## 2023-02-07 NOTE — OP NOTE
Dre Curiel  57 y.o. male      Vitals:    02/07/23 0919   BP: 137/82   Pulse: 80   Resp: 15   Temp:        Procedure Date: 2/7/23      INFORMED CONSENT: The procedure, risks, benefits and options were discussed with patient. There are no contraindications to the procedure. The patient expressed understanding and agreed to proceed. The personnel performing the procedure was discussed. I verify that I personally obtained consent prior to the start of the procedure and the signed consent can be found on the patient's chart.       Anesthesia:   Conscious sedation provided by M.D    The patient was monitored with continuous pulse oximetry, EKG, and intermittent blood pressure monitors.  The patient was hemodynamically stable throughout the entire process was responsive to voice, and breathing spontaneously.  Supplemental O2 was provided at 2L/min via nasal cannula.  Patient was comfortable for the duration of the procedure. (See nurse documentation and case log for sedation time)    There was a total of 2mg IV Midazolam and 50mcg Fentanyl titrated for the procedure     Pre Procedure diagnosis: Lumbar spondylosis [M47.816]  Post-Procedure diagnosis: SAME     PROCEDURE: bilateral L3,4,5 LUMBAR FACET MEDIAL BRANCH NERVE BLOCK        DESCRIPTION OF PROCEDURE:The patient was brought to the procedure room. After performing time out. IV access was obtained prior to the procedure. The patient was positioned prone on the fluoroscopy table. Continuous hemodynamic monitoring was initiated including blood pressure, EKG, and pulse oximetry. The area of the lumbar spine was prepped chlorhexidine and draped into a sterile field. Fluoroscopy was used to identify the location of the bilateral side  L3, L4, and L5 medial branch nerves at the junctions of the superior articular process and the transverse processes of L4, L5, and the sacral ala respectively. Skin anesthesia was achieved using 5 cc of Lidocaine 1% over the injection  "sites. A 22 gauge, 3 1/2" spinal needle was slowly inserted at each level using AP, lateral and oblique fluoroscopic imaging. Negative aspiration for blood or CSF was confirmed.  8 ml bupivacaine 0.25% with 1 mL Decadron was injected at all sites in divided doses. The needles were removed and bleeding was nil. A sterile dressing was applied. No specimens collected. Patient was taken back to the PACU for observation .       Blood Loss: Nill  Specimen: None    Lopez Mendoza    "

## 2023-02-07 NOTE — DISCHARGE INSTRUCTIONS

## 2023-02-10 ENCOUNTER — LAB VISIT (OUTPATIENT)
Dept: LAB | Facility: HOSPITAL | Age: 58
End: 2023-02-10
Attending: NURSE PRACTITIONER
Payer: COMMERCIAL

## 2023-02-10 DIAGNOSIS — E55.9 VITAMIN D DEFICIENCY: ICD-10-CM

## 2023-02-10 DIAGNOSIS — N25.81 SECONDARY HYPERPARATHYROIDISM: ICD-10-CM

## 2023-02-10 DIAGNOSIS — N18.32 STAGE 3B CHRONIC KIDNEY DISEASE: ICD-10-CM

## 2023-02-10 PROCEDURE — 85025 COMPLETE CBC W/AUTO DIFF WBC: CPT | Performed by: NURSE PRACTITIONER

## 2023-02-10 PROCEDURE — 80069 RENAL FUNCTION PANEL: CPT | Performed by: NURSE PRACTITIONER

## 2023-02-10 PROCEDURE — 82306 VITAMIN D 25 HYDROXY: CPT | Performed by: NURSE PRACTITIONER

## 2023-02-10 PROCEDURE — 83970 ASSAY OF PARATHORMONE: CPT | Performed by: NURSE PRACTITIONER

## 2023-02-10 PROCEDURE — 36415 COLL VENOUS BLD VENIPUNCTURE: CPT | Mod: PO | Performed by: NURSE PRACTITIONER

## 2023-02-11 LAB
25(OH)D3+25(OH)D2 SERPL-MCNC: 22 NG/ML (ref 30–96)
ALBUMIN SERPL BCP-MCNC: 3.8 G/DL (ref 3.5–5.2)
ANION GAP SERPL CALC-SCNC: 13 MMOL/L (ref 8–16)
BASOPHILS # BLD AUTO: 0.09 K/UL (ref 0–0.2)
BASOPHILS NFR BLD: 0.9 % (ref 0–1.9)
BUN SERPL-MCNC: 29 MG/DL (ref 6–20)
CALCIUM SERPL-MCNC: 8.9 MG/DL (ref 8.7–10.5)
CHLORIDE SERPL-SCNC: 109 MMOL/L (ref 95–110)
CO2 SERPL-SCNC: 19 MMOL/L (ref 23–29)
CREAT SERPL-MCNC: 2 MG/DL (ref 0.5–1.4)
DIFFERENTIAL METHOD: ABNORMAL
EOSINOPHIL # BLD AUTO: 0 K/UL (ref 0–0.5)
EOSINOPHIL NFR BLD: 0.3 % (ref 0–8)
ERYTHROCYTE [DISTWIDTH] IN BLOOD BY AUTOMATED COUNT: 13.9 % (ref 11.5–14.5)
EST. GFR  (NO RACE VARIABLE): 38.4 ML/MIN/1.73 M^2
GLUCOSE SERPL-MCNC: 171 MG/DL (ref 70–110)
HCT VFR BLD AUTO: 43.4 % (ref 40–54)
HGB BLD-MCNC: 13.1 G/DL (ref 14–18)
IMM GRANULOCYTES # BLD AUTO: 0.19 K/UL (ref 0–0.04)
IMM GRANULOCYTES NFR BLD AUTO: 1.9 % (ref 0–0.5)
LYMPHOCYTES # BLD AUTO: 2 K/UL (ref 1–4.8)
LYMPHOCYTES NFR BLD: 19.7 % (ref 18–48)
MCH RBC QN AUTO: 29.7 PG (ref 27–31)
MCHC RBC AUTO-ENTMCNC: 30.2 G/DL (ref 32–36)
MCV RBC AUTO: 98 FL (ref 82–98)
MONOCYTES # BLD AUTO: 0.5 K/UL (ref 0.3–1)
MONOCYTES NFR BLD: 5.4 % (ref 4–15)
NEUTROPHILS # BLD AUTO: 7.1 K/UL (ref 1.8–7.7)
NEUTROPHILS NFR BLD: 71.8 % (ref 38–73)
NRBC BLD-RTO: 0 /100 WBC
PHOSPHATE SERPL-MCNC: 3.5 MG/DL (ref 2.7–4.5)
PLATELET # BLD AUTO: 232 K/UL (ref 150–450)
PMV BLD AUTO: 12 FL (ref 9.2–12.9)
POTASSIUM SERPL-SCNC: 5.5 MMOL/L (ref 3.5–5.1)
PTH-INTACT SERPL-MCNC: 95.9 PG/ML (ref 9–77)
RBC # BLD AUTO: 4.41 M/UL (ref 4.6–6.2)
SODIUM SERPL-SCNC: 141 MMOL/L (ref 136–145)
WBC # BLD AUTO: 9.93 K/UL (ref 3.9–12.7)

## 2023-02-13 ENCOUNTER — OFFICE VISIT (OUTPATIENT)
Dept: PULMONOLOGY | Facility: CLINIC | Age: 58
End: 2023-02-13
Payer: COMMERCIAL

## 2023-02-13 VITALS
DIASTOLIC BLOOD PRESSURE: 80 MMHG | RESPIRATION RATE: 17 BRPM | BODY MASS INDEX: 36.8 KG/M2 | WEIGHT: 242.81 LBS | HEART RATE: 79 BPM | HEIGHT: 68 IN | OXYGEN SATURATION: 98 % | SYSTOLIC BLOOD PRESSURE: 130 MMHG

## 2023-02-13 DIAGNOSIS — E66.01 CLASS 2 SEVERE OBESITY WITH SERIOUS COMORBIDITY AND BODY MASS INDEX (BMI) OF 36.0 TO 36.9 IN ADULT, UNSPECIFIED OBESITY TYPE: ICD-10-CM

## 2023-02-13 DIAGNOSIS — I10 ESSENTIAL HYPERTENSION: ICD-10-CM

## 2023-02-13 DIAGNOSIS — G47.33 OSA ON CPAP: Primary | ICD-10-CM

## 2023-02-13 PROCEDURE — 99214 PR OFFICE/OUTPT VISIT, EST, LEVL IV, 30-39 MIN: ICD-10-PCS | Mod: S$GLB,,, | Performed by: NURSE PRACTITIONER

## 2023-02-13 PROCEDURE — 3008F PR BODY MASS INDEX (BMI) DOCUMENTED: ICD-10-PCS | Mod: CPTII,S$GLB,, | Performed by: NURSE PRACTITIONER

## 2023-02-13 PROCEDURE — 3079F PR MOST RECENT DIASTOLIC BLOOD PRESSURE 80-89 MM HG: ICD-10-PCS | Mod: CPTII,S$GLB,, | Performed by: NURSE PRACTITIONER

## 2023-02-13 PROCEDURE — 4010F ACE/ARB THERAPY RXD/TAKEN: CPT | Mod: CPTII,S$GLB,, | Performed by: NURSE PRACTITIONER

## 2023-02-13 PROCEDURE — 1159F MED LIST DOCD IN RCRD: CPT | Mod: CPTII,S$GLB,, | Performed by: NURSE PRACTITIONER

## 2023-02-13 PROCEDURE — 99999 PR PBB SHADOW E&M-EST. PATIENT-LVL V: CPT | Mod: PBBFAC,,, | Performed by: NURSE PRACTITIONER

## 2023-02-13 PROCEDURE — 3075F SYST BP GE 130 - 139MM HG: CPT | Mod: CPTII,S$GLB,, | Performed by: NURSE PRACTITIONER

## 2023-02-13 PROCEDURE — 99214 OFFICE O/P EST MOD 30 MIN: CPT | Mod: S$GLB,,, | Performed by: NURSE PRACTITIONER

## 2023-02-13 PROCEDURE — 4010F PR ACE/ARB THEARPY RXD/TAKEN: ICD-10-PCS | Mod: CPTII,S$GLB,, | Performed by: NURSE PRACTITIONER

## 2023-02-13 PROCEDURE — 1160F PR REVIEW ALL MEDS BY PRESCRIBER/CLIN PHARMACIST DOCUMENTED: ICD-10-PCS | Mod: CPTII,S$GLB,, | Performed by: NURSE PRACTITIONER

## 2023-02-13 PROCEDURE — 3079F DIAST BP 80-89 MM HG: CPT | Mod: CPTII,S$GLB,, | Performed by: NURSE PRACTITIONER

## 2023-02-13 PROCEDURE — 1160F RVW MEDS BY RX/DR IN RCRD: CPT | Mod: CPTII,S$GLB,, | Performed by: NURSE PRACTITIONER

## 2023-02-13 PROCEDURE — 1159F PR MEDICATION LIST DOCUMENTED IN MEDICAL RECORD: ICD-10-PCS | Mod: CPTII,S$GLB,, | Performed by: NURSE PRACTITIONER

## 2023-02-13 PROCEDURE — 99999 PR PBB SHADOW E&M-EST. PATIENT-LVL V: ICD-10-PCS | Mod: PBBFAC,,, | Performed by: NURSE PRACTITIONER

## 2023-02-13 PROCEDURE — 3008F BODY MASS INDEX DOCD: CPT | Mod: CPTII,S$GLB,, | Performed by: NURSE PRACTITIONER

## 2023-02-13 PROCEDURE — 3075F PR MOST RECENT SYSTOLIC BLOOD PRESS GE 130-139MM HG: ICD-10-PCS | Mod: CPTII,S$GLB,, | Performed by: NURSE PRACTITIONER

## 2023-02-13 NOTE — PROGRESS NOTES
Subjective:      Patient ID: Dre Curiel is a 57 y.o. male.    Patient Active Problem List   Diagnosis    Idiopathic chronic gout of right foot    Insomnia    Low testosterone    Impotence due to erectile dysfunction    Premature ejaculation    Hyperlipidemia    Former consumption of alcohol    Encounter for monitoring testosterone replacement therapy    Leukocytosis    Essential hypertension    Renal cell carcinoma of left kidney    Left renal mass    Renal mass, left    Preop cardiovascular exam    S/p nephrectomy    Stage 3b chronic kidney disease    KARLY on CPAP    Class 2 severe obesity with serious comorbidity and body mass index (BMI) of 36.0 to 36.9 in adult    Disorder of SI (sacroiliac) joint    Primary osteoarthritis of both hips    Lumbar spondylosis       he has been referred by No ref. provider found for evaluation and management for   Chief Complaint   Patient presents with    Sleep Apnea       Chief Complaint: Sleep Apnea        HPI:  HPI: Dre Curiel is here for follow up for KARLY and initial CPAP complaince assessment.   He is on Auto CPAP of 5-20 cmH2O pressure which is optimally controlling sleep apnea with apneic index (AHI) 4.9 events an hour.   He is compliant with CPAP use. Complaince download today reveals 97% of days with greater than 4 hours of device use.   Patient reports benefit from CPAP use and denies snoring and excessive daytime sleepiness.  Patient reports no complaints. Full face mask is used.     10/28/2022 Home Sleep Study severe obstructive sleep apnea AHI 46. Mean SpO2 94.6%.    Compliance Report  Compliance  Payor Standard  Usage 01/07/2023 - 02/05/2023  Usage days 30/30 days (100%)  >= 4 hours 29 days (97%)  < 4 hours 1 days (3%)  Usage hours 186 hours 50 minutes  Average usage (total days) 6 hours 14 minutes  Average usage (days used) 6 hours 14 minutes  Median usage (days used) 6 hours 24 minutes  Total used hours (value since last reset - 02/05/2023) 203  hours  AirSense 11 AutoSet  Serial number [id number]  Mode AutoSet  Min Pressure 5 cmH2O  Max Pressure 20 cmH2O  EPR Fulltime  EPR level 3  Response Standard  Therapy  Pressure - cmH2O Median: 8.0 95th percentile: 11.4 Maximum: 12.8  Leaks - L/min Median: 22.4 95th percentile: 44.4 Maximum: 62.6  Events per hour AI: 3.9 HI: 1.0 AHI: 4.9  Apnea Index Central: 0.1 Obstructive: 1.0 Unknown: 2.8  RERA Index 0.3    Maurice Sleepiness Scale   EPWORTH SLEEPINESS SCALE 2/13/2023 11/21/2022   Sitting and reading 1 1   Watching TV 1 0   Sitting, inactive in a public place (e.g. a theatre or a meeting) 1 0   As a passenger in a car for an hour without a break 0 1   Lying down to rest in the afternoon when circumstances permit 1 1   Sitting and talking to someone 0 0   Sitting quietly after a lunch without alcohol 2 1   In a car, while stopped for a few minutes in traffic 0 0   Total score 6 4        Occupational History:   Employed full time as   for [employer] .     Previous Report Reviewed: lab reports and office notes     Past Medical History: The following portions of the patient's history were reviewed and updated as appropriate:   He  has a past surgical history that includes kidney removal; Cosmetic surgery; Colonoscopy (N/A, 6/8/2018); Colonoscopy (N/A, 6/11/2018); Colonoscopy (06/2018); Laparoscopic nephrectomy, hand assisted (Left, 9/16/2021); Colonoscopy (N/A, 11/17/2021); Injection of anesthetic agent into sacroiliac joint (Bilateral, 1/24/2023); Injection of joint (Bilateral, 1/24/2023); and Injection of anesthetic agent around medial branch nerves innervating lumbar facet joint (Bilateral, 2/7/2023).  His family history includes Cancer in his maternal uncle; Hypertension in his mother; No Known Problems in his brother, maternal aunt, maternal grandfather, maternal grandmother, paternal aunt, paternal grandfather, paternal grandmother, paternal uncle, and sister.  He   "reports that he has never smoked. He uses smokeless tobacco. He reports current alcohol use. He reports that he does not use drugs.  He has a current medication list which includes the following prescription(s): allopurinol, alprazolam, amlodipine-benazepril, docusate sodium, fluticasone propionate, gabapentin, hydralazine, hydrochlorothiazide, loratadine, metoprolol succinate, oxycodone-acetaminophen, potassium chloride, pravastatin, pregabalin, ozempic, sertraline, sodium bicarbonate, testosterone cypionate, tramadol, trazodone, and vitamin d2.  He is allergic to iodine and iodide containing products..    Review of Systems   Constitutional:  Negative for fever, chills, weight loss, weight gain, activity change, appetite change, fatigue and night sweats.   HENT:  Negative for postnasal drip, rhinorrhea, sinus pressure, voice change and congestion.    Eyes:  Negative for redness and itching.   Respiratory:  Negative for apnea, snoring, cough, sputum production, chest tightness, shortness of breath, wheezing, orthopnea, asthma nighttime symptoms, dyspnea on extertion, use of rescue inhaler and somnolence.    Cardiovascular: Negative.  Negative for chest pain, palpitations and leg swelling.   Genitourinary:  Negative for difficulty urinating and hematuria.   Endocrine:  Negative for cold intolerance and heat intolerance.    Musculoskeletal:  Negative for arthralgias, gait problem, joint swelling and myalgias.   Skin: Negative.    Gastrointestinal:  Negative for nausea, vomiting, abdominal pain and acid reflux.   Neurological:  Negative for dizziness, weakness, light-headedness and headaches.   Hematological:  Negative for adenopathy. No excessive bruising.   All other systems reviewed and are negative.   Objective:   /80   Pulse 79   Resp 17   Ht 5' 8" (1.727 m)   Wt 110.2 kg (242 lb 13.4 oz)   SpO2 98%   BMI 36.92 kg/m²   Physical Exam  Vitals and nursing note reviewed.   Constitutional:       General: He " is not in acute distress.     Appearance: Normal appearance. He is well-developed. He is not ill-appearing or toxic-appearing.   HENT:      Head: Normocephalic and atraumatic.      Right Ear: External ear normal.      Left Ear: External ear normal.      Nose: Nose normal.      Mouth/Throat:      Mouth: Mucous membranes are moist.      Pharynx: Oropharynx is clear. No oropharyngeal exudate.   Eyes:      Conjunctiva/sclera: Conjunctivae normal.   Cardiovascular:      Rate and Rhythm: Normal rate and regular rhythm.      Heart sounds: Normal heart sounds.   Pulmonary:      Effort: Pulmonary effort is normal.      Breath sounds: Normal breath sounds.   Abdominal:      Palpations: Abdomen is soft.   Musculoskeletal:      Cervical back: Normal range of motion and neck supple.   Skin:     General: Skin is warm and dry.   Neurological:      Mental Status: He is alert and oriented to person, place, and time.   Psychiatric:         Behavior: Behavior normal. Behavior is cooperative.         Thought Content: Thought content normal.         Judgment: Judgment normal.       Personal Diagnostic Review  Review of labs, xray's, cardiology reports.     Assessment:     1. KARLY on CPAP    2. Class 2 severe obesity with serious comorbidity and body mass index (BMI) of 36.0 to 36.9 in adult, unspecified obesity type    3. Essential hypertension        No orders of the defined types were placed in this encounter.      Plan:   Discussed diagnosis, its evaluation, treatment and usual course. All questions answered.  Problem List Items Addressed This Visit       KARLY on CPAP - Primary     10/28/2022 Home Sleep Study severe obstructive sleep apnea AHI 46. Mean SpO2 94.6%  Benefits and compliant with Auto CPAP 5-20 cm with optimal control AHI 4.9  Full face mask   Follow up 6 months CPAP download            Essential hypertension     Stable and controlled. Continue current treatment plan as previously prescribed with your PCP.               Class 2 severe obesity with serious comorbidity and body mass index (BMI) of 36.0 to 36.9 in adult     Encouraged calorie reduction and 30 minutes of exercise daily. Discussed impact of obesity on general health.  Wt Readings from Last 9 Encounters:   02/13/23 110.2 kg (242 lb 13.4 oz)   02/07/23 109 kg (240 lb 4.8 oz)   01/24/23 106 kg (233 lb 11 oz)   01/12/23 108 kg (238 lb)   12/23/22 108 kg (238 lb)   12/14/22 108 kg (238 lb 3.3 oz)   11/21/22 106.5 kg (234 lb 14.4 oz)   11/02/22 106.6 kg (235 lb 0.2 oz)   10/13/22 107.2 kg (236 lb 5.3 oz)   Body mass index is 36.92 kg/m².                  Follow up in about 6 months (around 8/13/2023) for CPAP 6 mo compliance download.    Thank you for the opportunity to participate in the care of this patient.

## 2023-02-13 NOTE — ASSESSMENT & PLAN NOTE
Encouraged calorie reduction and 30 minutes of exercise daily. Discussed impact of obesity on general health.  Wt Readings from Last 9 Encounters:   02/13/23 110.2 kg (242 lb 13.4 oz)   02/07/23 109 kg (240 lb 4.8 oz)   01/24/23 106 kg (233 lb 11 oz)   01/12/23 108 kg (238 lb)   12/23/22 108 kg (238 lb)   12/14/22 108 kg (238 lb 3.3 oz)   11/21/22 106.5 kg (234 lb 14.4 oz)   11/02/22 106.6 kg (235 lb 0.2 oz)   10/13/22 107.2 kg (236 lb 5.3 oz)   Body mass index is 36.92 kg/m².

## 2023-02-13 NOTE — ASSESSMENT & PLAN NOTE
10/28/2022 Home Sleep Study severe obstructive sleep apnea AHI 46. Mean SpO2 94.6%  Benefits and compliant with Auto CPAP 5-20 cm with optimal control AHI 4.9  Full face mask   Follow up 6 months CPAP download

## 2023-02-15 NOTE — PROGRESS NOTES
"Subjective:       Patient ID: Dre Curiel is a 57 y.o. male.    Chief Complaint: CKD 3b, s/p nephrectomy    HPI  He presents to clinic today for follow-up.  Pt was seen and examined today in clinic.  Pt denies taking NSAIDs, no GI losses, no abx use, no recent infections, no dysuria, no cardiopulmonary sx's.  Laboratory studies and medications were reviewed.  S/p left nephrectomy on 9/16/21.  He has been doing well and has no specific or new complaints.  However, All Nephrology related questions were answered to his satisfaction.     The past medical, family and social histories were reviewed for this encounter.    Review of Systems   Constitutional: Negative.    HENT: Negative.     Respiratory:  Negative for shortness of breath.    Cardiovascular: Negative.  Negative for leg swelling.   Gastrointestinal: Negative.    Genitourinary: Negative.    Musculoskeletal: Negative.    Skin: Negative.    Neurological:  Negative for dizziness, light-headedness and headaches.   Psychiatric/Behavioral: Negative.          height is 5' 8" (1.727 m) and weight is 112.5 kg (248 lb 0.3 oz). His blood pressure is 128/70 and his pulse is 74.     Lab Results   Component Value Date    WBC 9.93 02/10/2023    HGB 13.1 (L) 02/10/2023    HCT 43.4 02/10/2023    MCV 98 02/10/2023     02/10/2023        CMP  Sodium   Date Value Ref Range Status   02/10/2023 141 136 - 145 mmol/L Final     Potassium   Date Value Ref Range Status   02/10/2023 5.5 (H) 3.5 - 5.1 mmol/L Final     Comment:     *No Visible Hemolysis     Chloride   Date Value Ref Range Status   02/10/2023 109 95 - 110 mmol/L Final     CO2   Date Value Ref Range Status   02/10/2023 19 (L) 23 - 29 mmol/L Final     Glucose   Date Value Ref Range Status   02/10/2023 171 (H) 70 - 110 mg/dL Final     BUN   Date Value Ref Range Status   02/10/2023 29 (H) 6 - 20 mg/dL Final     Creatinine   Date Value Ref Range Status   02/10/2023 2.0 (H) 0.5 - 1.4 mg/dL Final     Calcium   Date Value " Ref Range Status   02/10/2023 8.9 8.7 - 10.5 mg/dL Final     Total Protein   Date Value Ref Range Status   10/04/2022 6.9 6.0 - 8.4 g/dL Final     Albumin   Date Value Ref Range Status   02/10/2023 3.8 3.5 - 5.2 g/dL Final     Total Bilirubin   Date Value Ref Range Status   10/04/2022 0.3 0.1 - 1.0 mg/dL Final     Comment:     For infants and newborns, interpretation of results should be based  on gestational age, weight and in agreement with clinical  observations.    Premature Infant recommended reference ranges:  Up to 24 hours.............<8.0 mg/dL  Up to 48 hours............<12.0 mg/dL  3-5 days..................<15.0 mg/dL  6-29 days.................<15.0 mg/dL       Alkaline Phosphatase   Date Value Ref Range Status   10/04/2022 96 55 - 135 U/L Final     AST   Date Value Ref Range Status   10/04/2022 17 10 - 40 U/L Final     ALT   Date Value Ref Range Status   10/04/2022 20 10 - 44 U/L Final     Anion Gap   Date Value Ref Range Status   02/10/2023 13 8 - 16 mmol/L Final     eGFR if    Date Value Ref Range Status   06/28/2022 40 (A) >60 mL/min/1.73 m^2 Final     eGFR if non    Date Value Ref Range Status   06/28/2022 34 (A) >60 mL/min/1.73 m^2 Final     Comment:     Calculation used to obtain the estimated glomerular filtration  rate (eGFR) is the CKD-EPI equation.          Current Outpatient Medications on File Prior to Visit   Medication Sig Dispense Refill    allopurinoL (ZYLOPRIM) 100 MG tablet Take 1 tablet (100 mg total) by mouth once daily. 90 tablet 2    ALPRAZolam (XANAX) 0.25 MG tablet Take 2 tablets (0.5 mg total) by mouth 2 (two) times daily as needed for Anxiety. 15 tablet 0    amLODIPine-benazepriL (LOTREL) 10-40 mg per capsule Take 1 capsule by mouth once daily. 90 capsule 3    docusate sodium (COLACE) 100 MG capsule Take 1 capsule (100 mg total) by mouth 2 (two) times daily. 60 capsule 0    fluticasone propionate (FLONASE) 50 mcg/actuation nasal spray 1 spray  (50 mcg total) by Each Nostril route once daily. 3 g 5    gabapentin (NEURONTIN) 300 MG capsule TAKE ONE CAPSULE BY MOUTH DURING THE DAY THEN TWO EVERY NIGHT AT BEDTIME 270 capsule 1    hydrALAZINE (APRESOLINE) 100 MG tablet Take 1 tablet (100 mg total) by mouth 3 (three) times daily. 270 tablet 2    hydroCHLOROthiazide (HYDRODIURIL) 12.5 MG Tab Take 1 tablet (12.5 mg total) by mouth once daily. 30 tablet 11    loratadine (CLARITIN) 10 mg tablet TAKE 1 TABLET BY MOUTH EVERY DAY 90 tablet 1    metoprolol succinate (TOPROL-XL) 25 MG 24 hr tablet Take 1 tablet (25 mg total) by mouth once daily. 90 tablet 3    oxyCODONE-acetaminophen (PERCOCET) 5-325 mg per tablet Take 1 tablet by mouth every 4 (four) hours as needed for Pain. 35 tablet 0    potassium chloride (KLOR-CON) 10 MEQ TbSR Take 10 mEq by mouth every Mon, Wed, Fri, Sun.      pravastatin (PRAVACHOL) 40 MG tablet Take 1 tablet (40 mg total) by mouth once daily. 90 tablet 3    pregabalin (LYRICA) 75 MG capsule Take 75 mg by mouth 2 (two) times daily. Pt takes 3 capsules (225 mg) twice a day      sertraline (ZOLOFT) 50 MG tablet Take 1 tablet (50 mg total) by mouth once daily. 90 tablet 3    testosterone cypionate (DEPOTESTOTERONE CYPIONATE) 200 mg/mL injection INJECT ONE ML INTO MUSCLE EVERY 14 DAYS 2 mL 2    traMADoL (ULTRAM) 50 mg tablet Take 1 tablet (50 mg total) by mouth every 6 (six) hours. 21 each 1    traZODone (DESYREL) 50 MG tablet Take 1 tablet (50 mg total) by mouth once daily. 90 tablet 3    VITAMIN D2 1,250 mcg (50,000 unit) capsule TAKE ONE CAPSULE BY MOUTH ONCE WEEKLY 12 capsule 1    [DISCONTINUED] semaglutide (OZEMPIC) 0.25 mg or 0.5 mg(2 mg/1.5 mL) pen injector Inject 0.25 mg into the skin every 7 days. 4 pen 4    [DISCONTINUED] sodium bicarbonate 650 MG tablet Take 1 tablet (650 mg total) by mouth 2 (two) times daily. With food 180 tablet 1    [DISCONTINUED] pregabalin (LYRICA) 75 MG capsule Take 1 capsule (75 mg total) by mouth 2 (two) times  daily for 10 days, THEN 2 capsules (150 mg total) 2 (two) times daily for 10 days, THEN 3 capsules (225 mg total) 2 (two) times daily for 10 days. 120 capsule 0     No current facility-administered medications on file prior to visit.       Objective:        Physical Exam  Vitals and nursing note reviewed.   Constitutional:       Appearance: Normal appearance.   HENT:      Head: Normocephalic and atraumatic.   Eyes:      Extraocular Movements: Extraocular movements intact.      Pupils: Pupils are equal, round, and reactive to light.   Cardiovascular:      Rate and Rhythm: Normal rate and regular rhythm.   Pulmonary:      Effort: Pulmonary effort is normal.   Abdominal:      General: Bowel sounds are normal.      Palpations: Abdomen is soft.   Musculoskeletal:         General: Normal range of motion.      Right lower leg: No edema.      Left lower leg: No edema.   Skin:     General: Skin is warm and dry.   Neurological:      General: No focal deficit present.      Mental Status: He is alert and oriented to person, place, and time.   Psychiatric:         Mood and Affect: Mood normal.         Behavior: Behavior normal.         Thought Content: Thought content normal.         Judgment: Judgment normal.         Assessment:       1. Stage 3b chronic kidney disease    2. S/p nephrectomy    3. Hypertension, unspecified type    4. Gout, unspecified cause, unspecified chronicity, unspecified site    5. Metabolic acidosis    6. Vitamin D deficiency    7. Secondary hyperparathyroidism    8. Hyperkalemia            Plan:       1. Creatinine has mild fluctuation ranging between 1.8 and 2.4.  Most recent creatinine is 2.0; returned to baseline.  Discussed importance of balanced daily hydration as tolerated.  He is on a RAAS inhibitor. Pt is on a daily PO potassium supplement; potassium noted increase to 5.5.  He denies increased high K foods in diet; reviewed list with pt today. Will decrease dose to M/W/F/Mosley and redraw in one week  to evaluate further adjustment.     2.  S/p left nephrectomy r/t renal mass.  Denies issues at present. Continue to f/u with Urology as directed.     3. Blood pressure is 128/70 on exam today.  States well controlled on current regimen.  Na 141, continue to remain stable with decrease in HCTZ dosing.  He is on a RAAS inhibitor. Continue medications as prescribed.       4. He denies recent flare or tophus stating as long as he takes his medicine and watches diet. Controlled with current regimen.  Educated and discussed further on importance of diet control and med compliance. Handouts given and reviewed with pt today.     5. Serum bicarbonate has fluctuated from 22-29 over last year; most recent lab 19.  Pt reports has not been taking sodium bicarbonate; reports hydrating well daily.  Will start sodium bicarb q day with food and redraw prior to next visit.     6.  Vitamin D level 22.  Continue Vitamin D 50,000units q week as prescribed and will continue to monitor and change to Calcitriol on next visit with no change or continued decline in Ca and Vit D..  Calcium 8.9 with an albumin of 3.8.       7.  Intact PTH improved 95.9. Calcium 8.9; PO4 3.5.  Vit D 22.  Continue weekly Vit D supplement as prescribed; see above.     8. Pt is on a daily PO potassium supplement; potassium noted increase to 5.5.  He is on a RAAS inhibitor; BP and proteinuria well controlled at this time.  He denies increased high K foods in diet; reviewed list with pt today. Will decrease dose to M/W/F/Mosley and redraw in one week to evaluate further adjustment of K supplementation and/or ARB.    RTC  5 mos    40 minutes of total time spent on the encounter, which includes face to face time and non-face to face time preparing to see the patient (eg, review of tests), obtaining and/or reviewing separately obtained history, documenting clinical information in the electronic or other health record, Independently interpreting results and communicating  results to the patient/family/caregiver, or care coordination.    Katie Anderson, [email or web address]-C

## 2023-02-16 ENCOUNTER — OFFICE VISIT (OUTPATIENT)
Dept: NEPHROLOGY | Facility: CLINIC | Age: 58
End: 2023-02-16
Payer: COMMERCIAL

## 2023-02-16 VITALS
DIASTOLIC BLOOD PRESSURE: 70 MMHG | HEART RATE: 74 BPM | WEIGHT: 248 LBS | SYSTOLIC BLOOD PRESSURE: 128 MMHG | BODY MASS INDEX: 37.59 KG/M2 | HEIGHT: 68 IN

## 2023-02-16 DIAGNOSIS — N25.81 SECONDARY HYPERPARATHYROIDISM: ICD-10-CM

## 2023-02-16 DIAGNOSIS — E87.20 METABOLIC ACIDOSIS: ICD-10-CM

## 2023-02-16 DIAGNOSIS — I10 HYPERTENSION, UNSPECIFIED TYPE: ICD-10-CM

## 2023-02-16 DIAGNOSIS — N18.32 STAGE 3B CHRONIC KIDNEY DISEASE: Primary | ICD-10-CM

## 2023-02-16 DIAGNOSIS — Z90.5 S/P NEPHRECTOMY: ICD-10-CM

## 2023-02-16 DIAGNOSIS — E87.5 HYPERKALEMIA: ICD-10-CM

## 2023-02-16 DIAGNOSIS — E55.9 VITAMIN D DEFICIENCY: ICD-10-CM

## 2023-02-16 DIAGNOSIS — M10.9 GOUT, UNSPECIFIED CAUSE, UNSPECIFIED CHRONICITY, UNSPECIFIED SITE: ICD-10-CM

## 2023-02-16 PROCEDURE — 3078F PR MOST RECENT DIASTOLIC BLOOD PRESSURE < 80 MM HG: ICD-10-PCS | Mod: CPTII,S$GLB,, | Performed by: NURSE PRACTITIONER

## 2023-02-16 PROCEDURE — 4010F ACE/ARB THERAPY RXD/TAKEN: CPT | Mod: CPTII,S$GLB,, | Performed by: NURSE PRACTITIONER

## 2023-02-16 PROCEDURE — 3074F PR MOST RECENT SYSTOLIC BLOOD PRESSURE < 130 MM HG: ICD-10-PCS | Mod: CPTII,S$GLB,, | Performed by: NURSE PRACTITIONER

## 2023-02-16 PROCEDURE — 99215 PR OFFICE/OUTPT VISIT, EST, LEVL V, 40-54 MIN: ICD-10-PCS | Mod: S$GLB,,, | Performed by: NURSE PRACTITIONER

## 2023-02-16 PROCEDURE — 4010F PR ACE/ARB THEARPY RXD/TAKEN: ICD-10-PCS | Mod: CPTII,S$GLB,, | Performed by: NURSE PRACTITIONER

## 2023-02-16 PROCEDURE — 99215 OFFICE O/P EST HI 40 MIN: CPT | Mod: S$GLB,,, | Performed by: NURSE PRACTITIONER

## 2023-02-16 PROCEDURE — 3066F PR DOCUMENTATION OF TREATMENT FOR NEPHROPATHY: ICD-10-PCS | Mod: CPTII,S$GLB,, | Performed by: NURSE PRACTITIONER

## 2023-02-16 PROCEDURE — 1160F PR REVIEW ALL MEDS BY PRESCRIBER/CLIN PHARMACIST DOCUMENTED: ICD-10-PCS | Mod: CPTII,S$GLB,, | Performed by: NURSE PRACTITIONER

## 2023-02-16 PROCEDURE — 1159F MED LIST DOCD IN RCRD: CPT | Mod: CPTII,S$GLB,, | Performed by: NURSE PRACTITIONER

## 2023-02-16 PROCEDURE — 99999 PR PBB SHADOW E&M-EST. PATIENT-LVL IV: ICD-10-PCS | Mod: PBBFAC,,, | Performed by: NURSE PRACTITIONER

## 2023-02-16 PROCEDURE — 3074F SYST BP LT 130 MM HG: CPT | Mod: CPTII,S$GLB,, | Performed by: NURSE PRACTITIONER

## 2023-02-16 PROCEDURE — 1160F RVW MEDS BY RX/DR IN RCRD: CPT | Mod: CPTII,S$GLB,, | Performed by: NURSE PRACTITIONER

## 2023-02-16 PROCEDURE — 99999 PR PBB SHADOW E&M-EST. PATIENT-LVL IV: CPT | Mod: PBBFAC,,, | Performed by: NURSE PRACTITIONER

## 2023-02-16 PROCEDURE — 3008F BODY MASS INDEX DOCD: CPT | Mod: CPTII,S$GLB,, | Performed by: NURSE PRACTITIONER

## 2023-02-16 PROCEDURE — 3078F DIAST BP <80 MM HG: CPT | Mod: CPTII,S$GLB,, | Performed by: NURSE PRACTITIONER

## 2023-02-16 PROCEDURE — 1159F PR MEDICATION LIST DOCUMENTED IN MEDICAL RECORD: ICD-10-PCS | Mod: CPTII,S$GLB,, | Performed by: NURSE PRACTITIONER

## 2023-02-16 PROCEDURE — 3008F PR BODY MASS INDEX (BMI) DOCUMENTED: ICD-10-PCS | Mod: CPTII,S$GLB,, | Performed by: NURSE PRACTITIONER

## 2023-02-16 PROCEDURE — 3066F NEPHROPATHY DOC TX: CPT | Mod: CPTII,S$GLB,, | Performed by: NURSE PRACTITIONER

## 2023-02-16 RX ORDER — PREGABALIN 75 MG/1
75 CAPSULE ORAL 2 TIMES DAILY
COMMUNITY
End: 2023-03-07 | Stop reason: DRUGHIGH

## 2023-02-16 RX ORDER — SODIUM BICARBONATE 650 MG/1
650 TABLET ORAL
Qty: 180 TABLET | Refills: 1 | Status: SHIPPED | OUTPATIENT
Start: 2023-02-16 | End: 2024-02-16

## 2023-02-16 NOTE — PATIENT INSTRUCTIONS
Continue all medications as reviewed today:  Continue weekly Vit D  P/u Metoprolol refill from pharmacy  Decrease potassium dose; M/W/F/Mosley  Redraw potassium level in one week on new dose  Start sodium bicarbonate once a day with food    Keep blood pressure controlled  Low sodium diet: avoid/limit salt, processed foods (boxed or canned), fried foods, fast foods, etc as discussed    Keep blood sugar controlled  Low carbohydrate/sugar diet: avoid/limit sugary drinks, white breads, pasta, rice, etc as discussed    Drink water, flavored water/diluted juice (drink to thirst) daily as tolerated with no swelling/shortness of breath    Avoid NSAIDS: Advil, Ibuprofen, Aleve, Naproxen, Meloxicam, etc. (Aspirin is ok), Tylenol is Best    Exercise as tolerated     Follow up in 5 months and have labs drawn 1-2 weeks prior to visit

## 2023-02-24 ENCOUNTER — LAB VISIT (OUTPATIENT)
Dept: LAB | Facility: HOSPITAL | Age: 58
End: 2023-02-24
Attending: NURSE PRACTITIONER
Payer: COMMERCIAL

## 2023-02-24 DIAGNOSIS — Z90.5 S/P NEPHRECTOMY: ICD-10-CM

## 2023-02-24 DIAGNOSIS — E87.5 HYPERKALEMIA: ICD-10-CM

## 2023-02-24 DIAGNOSIS — I10 HYPERTENSION, UNSPECIFIED TYPE: ICD-10-CM

## 2023-02-24 DIAGNOSIS — N18.32 STAGE 3B CHRONIC KIDNEY DISEASE: ICD-10-CM

## 2023-02-24 DIAGNOSIS — E55.9 VITAMIN D DEFICIENCY: ICD-10-CM

## 2023-02-24 DIAGNOSIS — N25.81 SECONDARY HYPERPARATHYROIDISM: ICD-10-CM

## 2023-02-24 LAB
CREAT UR-MCNC: 140 MG/DL (ref 23–375)
PROT UR-MCNC: 13 MG/DL (ref 0–15)
PROT/CREAT UR: 0.09 MG/G{CREAT} (ref 0–0.2)
PTH-INTACT SERPL-MCNC: 87 PG/ML (ref 9–77)

## 2023-02-24 PROCEDURE — 36415 COLL VENOUS BLD VENIPUNCTURE: CPT | Mod: PO | Performed by: NURSE PRACTITIONER

## 2023-02-24 PROCEDURE — 80069 RENAL FUNCTION PANEL: CPT | Performed by: NURSE PRACTITIONER

## 2023-02-24 PROCEDURE — 84156 ASSAY OF PROTEIN URINE: CPT | Performed by: NURSE PRACTITIONER

## 2023-02-24 PROCEDURE — 83970 ASSAY OF PARATHORMONE: CPT | Performed by: NURSE PRACTITIONER

## 2023-02-25 LAB
ALBUMIN SERPL BCP-MCNC: 4.1 G/DL (ref 3.5–5.2)
ANION GAP SERPL CALC-SCNC: 10 MMOL/L (ref 8–16)
BUN SERPL-MCNC: 28 MG/DL (ref 6–20)
CALCIUM SERPL-MCNC: 9.6 MG/DL (ref 8.7–10.5)
CHLORIDE SERPL-SCNC: 104 MMOL/L (ref 95–110)
CO2 SERPL-SCNC: 23 MMOL/L (ref 23–29)
CREAT SERPL-MCNC: 2.1 MG/DL (ref 0.5–1.4)
EST. GFR  (NO RACE VARIABLE): 36.3 ML/MIN/1.73 M^2
GLUCOSE SERPL-MCNC: 139 MG/DL (ref 70–110)
PHOSPHATE SERPL-MCNC: 2.9 MG/DL (ref 2.7–4.5)
POTASSIUM SERPL-SCNC: 5 MMOL/L (ref 3.5–5.1)
POTASSIUM SERPL-SCNC: 5 MMOL/L (ref 3.5–5.1)
SODIUM SERPL-SCNC: 137 MMOL/L (ref 136–145)

## 2023-03-07 ENCOUNTER — OFFICE VISIT (OUTPATIENT)
Dept: PAIN MEDICINE | Facility: CLINIC | Age: 58
End: 2023-03-07
Payer: COMMERCIAL

## 2023-03-07 VITALS
WEIGHT: 242.81 LBS | DIASTOLIC BLOOD PRESSURE: 73 MMHG | BODY MASS INDEX: 36.8 KG/M2 | HEIGHT: 68 IN | SYSTOLIC BLOOD PRESSURE: 126 MMHG | HEART RATE: 84 BPM

## 2023-03-07 DIAGNOSIS — M47.816 LUMBAR SPONDYLOSIS: Primary | ICD-10-CM

## 2023-03-07 DIAGNOSIS — M54.16 LUMBAR RADICULOPATHY: ICD-10-CM

## 2023-03-07 DIAGNOSIS — M16.0 PRIMARY OSTEOARTHRITIS OF BOTH HIPS: ICD-10-CM

## 2023-03-07 PROCEDURE — 4010F PR ACE/ARB THEARPY RXD/TAKEN: ICD-10-PCS | Mod: CPTII,S$GLB,, | Performed by: PHYSICIAN ASSISTANT

## 2023-03-07 PROCEDURE — 3078F DIAST BP <80 MM HG: CPT | Mod: CPTII,S$GLB,, | Performed by: PHYSICIAN ASSISTANT

## 2023-03-07 PROCEDURE — 4010F ACE/ARB THERAPY RXD/TAKEN: CPT | Mod: CPTII,S$GLB,, | Performed by: PHYSICIAN ASSISTANT

## 2023-03-07 PROCEDURE — 99999 PR PBB SHADOW E&M-EST. PATIENT-LVL IV: CPT | Mod: PBBFAC,,, | Performed by: PHYSICIAN ASSISTANT

## 2023-03-07 PROCEDURE — 3008F BODY MASS INDEX DOCD: CPT | Mod: CPTII,S$GLB,, | Performed by: PHYSICIAN ASSISTANT

## 2023-03-07 PROCEDURE — 1159F PR MEDICATION LIST DOCUMENTED IN MEDICAL RECORD: ICD-10-PCS | Mod: CPTII,S$GLB,, | Performed by: PHYSICIAN ASSISTANT

## 2023-03-07 PROCEDURE — 3066F PR DOCUMENTATION OF TREATMENT FOR NEPHROPATHY: ICD-10-PCS | Mod: CPTII,S$GLB,, | Performed by: PHYSICIAN ASSISTANT

## 2023-03-07 PROCEDURE — 3008F PR BODY MASS INDEX (BMI) DOCUMENTED: ICD-10-PCS | Mod: CPTII,S$GLB,, | Performed by: PHYSICIAN ASSISTANT

## 2023-03-07 PROCEDURE — 1159F MED LIST DOCD IN RCRD: CPT | Mod: CPTII,S$GLB,, | Performed by: PHYSICIAN ASSISTANT

## 2023-03-07 PROCEDURE — 99999 PR PBB SHADOW E&M-EST. PATIENT-LVL IV: ICD-10-PCS | Mod: PBBFAC,,, | Performed by: PHYSICIAN ASSISTANT

## 2023-03-07 PROCEDURE — 3074F SYST BP LT 130 MM HG: CPT | Mod: CPTII,S$GLB,, | Performed by: PHYSICIAN ASSISTANT

## 2023-03-07 PROCEDURE — 1160F PR REVIEW ALL MEDS BY PRESCRIBER/CLIN PHARMACIST DOCUMENTED: ICD-10-PCS | Mod: CPTII,S$GLB,, | Performed by: PHYSICIAN ASSISTANT

## 2023-03-07 PROCEDURE — 3074F PR MOST RECENT SYSTOLIC BLOOD PRESSURE < 130 MM HG: ICD-10-PCS | Mod: CPTII,S$GLB,, | Performed by: PHYSICIAN ASSISTANT

## 2023-03-07 PROCEDURE — 1160F RVW MEDS BY RX/DR IN RCRD: CPT | Mod: CPTII,S$GLB,, | Performed by: PHYSICIAN ASSISTANT

## 2023-03-07 PROCEDURE — 99214 PR OFFICE/OUTPT VISIT, EST, LEVL IV, 30-39 MIN: ICD-10-PCS | Mod: S$GLB,,, | Performed by: PHYSICIAN ASSISTANT

## 2023-03-07 PROCEDURE — 3078F PR MOST RECENT DIASTOLIC BLOOD PRESSURE < 80 MM HG: ICD-10-PCS | Mod: CPTII,S$GLB,, | Performed by: PHYSICIAN ASSISTANT

## 2023-03-07 PROCEDURE — 3066F NEPHROPATHY DOC TX: CPT | Mod: CPTII,S$GLB,, | Performed by: PHYSICIAN ASSISTANT

## 2023-03-07 PROCEDURE — 99214 OFFICE O/P EST MOD 30 MIN: CPT | Mod: S$GLB,,, | Performed by: PHYSICIAN ASSISTANT

## 2023-03-07 RX ORDER — PREGABALIN 225 MG/1
225 CAPSULE ORAL 2 TIMES DAILY
Qty: 60 CAPSULE | Refills: 2 | Status: SHIPPED | OUTPATIENT
Start: 2023-03-07 | End: 2023-05-16 | Stop reason: SDUPTHER

## 2023-03-07 NOTE — PROGRESS NOTES
Est Patient Chronic Pain Note (Follow up Visit)    Referring Physician: No ref. provider found    PCP: Mohan Schwartz MD    Chief Complaint:   Chief Complaint   Patient presents with    Hip Pain     right        SUBJECTIVE:  Interval History (3/7/2023): Dre Curiel presents today for follow-up visit.  he underwent bilateral SIJ + bilateral IA hip injections on 01/24/2023 followed by bilateral L3-5 MBB on on 2/7/23.  The patient reports that he is/was better following the procedure.  he reports 80% pain relief from each injection.  The changes lasted 4 weeks so far.  The changes have continued through this visit.  Patient reports pain as 5/10 today. He reports he has good days and bad days, but has more good days since injection. Pain is exacerbated with bending. Has some residual right sided lateral hip pain and burning and needles sensation his right thigh/leg, but this has been somewhat improved with Lyrica 225 mg BID. Also had MRI of lumbar spine with noted compression of L5 spinal nerves.    MRI lumbar spine 01/23/2023  FINDINGS:  Transitional anatomy with partial sacralization of L5 peripherally.  There are bilateral L5 pars defects with grade 1 spondylolisthesis at L5-S1.  Vertebral body height is normal.  Marrow signal is within normal limits. The conus medullaris terminates at the level of L1-L2.  No abnormal signal within the conus. Intervertebral disc levels are as follows:     T12-L1 disc: Tiny chronic Schmorl's nodes.  Normal facet joints.  No significant stenosis.  The dural canal measures 13 mm.     L1-L2 disc : Normal.     L2-L3 disc: Normal disc space height with anterior osteophytes.  Minimal facet arthropathy.  No significant spinal or foraminal stenosis.  The dural canal measures 11 mm.  No foraminal stenosis.     L3-L4 disc: Mild disc bulge.  Anterior osteophytes.  Normal facet joints.  The dural canal measures 14 mm.  No foraminal stenosis.     L4-L5 disc: Normal disc space height with mild  facet arthropathy.  No significant spinal or foraminal stenosis.  The dural canal measures 15 mm.     L5-S1 disc: Bilateral L5 pars defects with grade 1 spondylolisthesis.  Severe disc space height loss most pronounced toward the right with edematous Modic endplate change.  Disc and osteophyte encroach into the exit foramina, right greater than left.  There is severe right and moderate/severe left foraminal stenosis with compression of the exiting L5 spinal nerves.  Degenerative hypertrophic change toward the right of the disc space as well as degenerative change of the pseudoarticulation of L5 and S1 narrows the exit pathway of the right L5 spinal nerve as demonstrated on axial T2 series 6, image 28.  Mild facet arthropathy.  The dural canal measures 14 mm.     Impression:     1. Isthmic grade 1 spondylolisthesis at L5-S1 with severe bilateral foraminal stenosis and possible compression of the exiting L5 spinal nerves.  2. Degenerative hypertrophic change toward the right of the L5-S1 disc space causes severe narrowing of the exit pathway of the right L5 spinal nerve as demonstrated on axial T2 image 28.  3. Edematous Modic endplate change at L5-S1 toward the right at L5-S1 may correlate to focal symptoms.       Initial HPI  Dre Curiel is a 57 y.o. male with past medical history significant for anxiety/depression, hypertension, hyperlipidemia, stage 3 chronic kidney disease a history of renal cell carcinoma status post left-sided nephrectomy, erectile dysfunction, obesity, obstructive sleep apnea who presents to the clinic for the evaluation of lower back and leg pain.  Patient reports pain has been present for several years without inciting accident injury or trauma.  Today patient reports pain is constant which is rated a 6/10.  Pain is described as sharp and stabbing and needles poking  in nature.  Patient reports pain in a bandlike distribution in the lower back which radiates down the lateral aspect of  the right lower extremity to the calf in L4-5 distribution.  Pain is exacerbated when moving from sitting to standing, with lumbar forward flexion and lateral flexion and with ambulation.  Patient is a supervisor of product distribution, manages everything and reports he is standing on his feet all day.  Patient does report sensation of buckling in the right lower extremity associated with prolonged exertion.  Pain is improved with sitting.  Patient has trialed gabapentin 300 mg, 1-2 times daily in the past without meaningful relief.  Patient reports completing 8 sessions of conventional physical therapy on 12/2022 at [facility] without any meaningful relief in range of motion, strength or pain.    Patient reports significant motor weakness.  Patient denies night fever/night sweats, urinary incontinence, bowel incontinence, significant weight loss, and loss of sensations      Pain Disability Index Review:     Last 3 PDI Scores 1/12/2023 6/15/2018 5/18/2018   Pain Disability Index (PDI) 46 19 20       Non-Pharmacologic Treatments:  Physical Therapy/Home Exercise: yes  Ice/Heat:yes  TENS: no  Acupuncture: no  Massage: no  Chiropractic: no    Other: no      Pain Medications:  - Opioids: Percocet (Oxycodone/Acetaminophen) and Ultram (Tramadol HCL)  - Adjuvant Medications: Neurontin (Gabapentin), Trazodone (Desyrel), Xanax (Alprazolam), and Zoloft (Sertraline)    Pain Procedures:   bilateral SIJ + bilateral IA hip injections on 01/24/2023 with 80% relief  bilateral L3-5 MBB on on 2/7/23 with 80% relief    Past Medical History:   Diagnosis Date    Allergy     Anxiety     Cancer of kidney     Depression     Hyperlipidemia     Hypertension      Past Surgical History:   Procedure Laterality Date    COLONOSCOPY N/A 6/8/2018    Procedure: COLONOSCOPY;  Surgeon: Simeon Acharya III, MD;  Location: [facility];  Service: Endoscopy;  Laterality: N/A;    COLONOSCOPY N/A 6/11/2018    Procedure: COLONOSCOPY;  Surgeon:  Simeon Acharya III, MD;  Location: [facility] ENDO;  Service: Endoscopy;  Laterality: N/A;    COLONOSCOPY  06/2018    COLONOSCOPY N/A 11/17/2021    Procedure: COLONOSCOPY;  Surgeon: Tomi Leiws MD;  Location: [facility] ENDO;  Service: Endoscopy;  Laterality: N/A;    COSMETIC SURGERY      INJECTION OF ANESTHETIC AGENT AROUND MEDIAL BRANCH NERVES INNERVATING LUMBAR FACET JOINT Bilateral 2/7/2023    Procedure: Bilateral L3-5 MBB with local;  Surgeon: Lopez Mendoza MD;  Location: [facility] PAIN MGT;  Service: Pain Management;  Laterality: Bilateral;    INJECTION OF ANESTHETIC AGENT INTO SACROILIAC JOINT Bilateral 1/24/2023    Procedure: Bilateral SIJ Injection;  Surgeon: Lopez Mendoza MD;  Location: [facility] PAIN [facility]GT;  Service: Pain Management;  Laterality: Bilateral;    INJECTION OF JOINT Bilateral 1/24/2023    Procedure: Bilateral Hip injection;  Surgeon: Lopez Mendoza MD;  Location: [facility] PAIN [facility]GT;  Service: Pain Management;  Laterality: Bilateral;    kidney removal      LAPAROSCOPIC NEPHRECTOMY, HAND ASSISTED Left 9/16/2021    Procedure: NEPHRECTOMY, HAND-ASSISTED, LAPAROSCOPIC;  Surgeon: Tom Manzo MD;  Location: [facility] OR;  Service: Urology;  Laterality: Left;     Review of patient's allergies indicates:   Allergen Reactions    Iodine and iodide containing products      Other reaction(s): Swelling  Other reaction(s): Sneezing  Other reaction(s): Shortness of breath       Current Outpatient Medications   Medication Sig    allopurinoL (ZYLOPRIM) 100 MG tablet Take 1 tablet (100 mg total) by mouth once daily.    ALPRAZolam (XANAX) 0.25 MG tablet Take 2 tablets (0.5 mg total) by mouth 2 (two) times daily as needed for Anxiety.    amLODIPine-benazepriL (LOTREL) 10-40 mg per capsule Take 1 capsule by mouth once daily.    docusate sodium (COLACE) 100 MG capsule Take 1 capsule (100 mg total) by mouth 2 (two) times daily.    fluticasone propionate (FLONASE) 50 mcg/actuation nasal spray 1 spray (50 mcg total) by Each Nostril  route once daily.    hydrALAZINE (APRESOLINE) 100 MG tablet Take 1 tablet (100 mg total) by mouth 3 (three) times daily.    hydroCHLOROthiazide (HYDRODIURIL) 12.5 MG Tab Take 1 tablet (12.5 mg total) by mouth once daily.    loratadine (CLARITIN) 10 mg tablet TAKE 1 TABLET BY MOUTH EVERY DAY    metoprolol succinate (TOPROL-XL) 25 MG 24 hr tablet Take 1 tablet (25 mg total) by mouth once daily.    oxyCODONE-acetaminophen (PERCOCET) 5-325 mg per tablet Take 1 tablet by mouth every 4 (four) hours as needed for Pain.    potassium chloride (KLOR-CON) 10 MEQ TbSR Take 10 mEq by mouth every Mon, Wed, Fri, Sun.    pravastatin (PRAVACHOL) 40 MG tablet Take 1 tablet (40 mg total) by mouth once daily.    sertraline (ZOLOFT) 50 MG tablet Take 1 tablet (50 mg total) by mouth once daily.    sodium bicarbonate 650 MG tablet Take 1 tablet (650 mg total) by mouth before breakfast. With food    testosterone cypionate (DEPOTESTOTERONE CYPIONATE) 200 mg/mL injection INJECT ONE ML INTO MUSCLE EVERY 14 DAYS    traMADoL (ULTRAM) 50 mg tablet Take 1 tablet (50 mg total) by mouth every 6 (six) hours.    traZODone (DESYREL) 50 MG tablet Take 1 tablet (50 mg total) by mouth once daily.    VITAMIN D2 1,250 mcg (50,000 unit) capsule TAKE ONE CAPSULE BY MOUTH ONCE WEEKLY    pregabalin (LYRICA) 225 MG Cap Take 1 capsule (225 mg total) by mouth 2 (two) times daily.     No current facility-administered medications for this visit.       Review of Systems     GENERAL:  No weight loss, malaise or fevers.  HEENT:   No recent changes in vision or hearing  NECK:  Negative for lumps, no difficulty with swallowing.  RESPIRATORY:  Negative for cough, wheezing or shortness of breath, patient denies any recent URI.  CARDIOVASCULAR:  Negative for chest pain or palpitations.  GI:  Negative for abdominal discomfort, blood in stools or black stools or change in bowel habits.  MUSCULOSKELETAL:  See HPI.  SKIN:  Negative for lesions, rash, and itching.  PSYCH:  No  "mood disorder or recent psychosocial stressors.   HEMATOLOGY/LYMPHOLOGY:  Negative for prolonged bleeding, bruising easily or swollen nodes.    NEURO:   No history of syncope, paralysis, seizures or tremors.  All other reviewed and negative other than HPI.    OBJECTIVE:    /73   Pulse 84   Ht 5' 8" (1.727 m)   Wt 110.2 kg (242 lb 13.4 oz)   BMI 36.92 kg/m²       Physical Exam    GENERAL: Well appearing, in no acute distress, alert and oriented x3.  PSYCH:  Mood and affect appropriate.  SKIN: Skin color, texture, turgor normal, no rashes or lesions.  HEAD/FACE:  Normocephalic, atraumatic. Cranial nerves grossly intact.    CV: RRR with palpation of the radial artery.  PULM: No evidence of respiratory difficulty, symmetric chest rise.  GI:  Soft and non-tender.    BACK: Straight leg raising in the sitting and supine positions is  + to radicular pain on R.  pain to palpation over the facet joints of the lumbar spine or spinous processes.  Reduced range of motion with pain reproduction   EXTREMITIES: Peripheral joint ROM is full and pain free without obvious instability or laxity in all four extremities. No deformities, edema, or skin discoloration. Good capillary refill.  MUSCULOSKELETAL: Able to stand on heels & toes.    hip provocative maneuvers are positive bilaterally but pain is improved    SIJ testing: Bilateral  - TTP over SI joint: Present, improved  - Carmen's/ Gunner's: Positive    - Sacroiliac Distraction Test (anterior pressure): Positive  - Sacroiliac Compression Test (lateral pressure): Positive   - SacralThrust Test (posterior pressure): Positive      Facet loading test is positive bilaterally.   Bilateral upper and lower extremity strength is normal and symmetric.  No atrophy or tone abnormalities are noted.    RIGHT Lower extremity: Hip flexion 4+/5, Hip Abduction 4+/5, Hip Adduction 4+/5, Knee extension 5/5, Knee flexion 5/5, Ankle dorsiflexion5/5, Extensor hallucis longus 5/5, Ankle " plantarflexion 5/5  LEFT Lower extremity:  Hip flexion 5/5, Hip Abduction 5/5,Hip Adduction 5/5, Knee extension 5/5, Knee flexion 5/5, Ankle dorsiflexion 5/5, Extensor hallucis longus 5/5, Ankle plantarflexion 5/5  -Normal testing knee (patellar) jerk and ankle (achilles) jerk    NEURO: Bilateral upper and lower extremity coordination and muscle stretch reflexes are physiologic and symmetric. No loss of sensation is noted.  GAIT: normal.    Imagin20  X-Ray Lumbar Spine Ap And Lateral  FINDINGS:  Vertebral body heights maintained.  No definite spondylolisthesis.  L5-S1 evaluation limited by positioning.  There is sacralization of L5.  Multilevel facet degenerative findings noted.  L4-5 and L5-S1 degenerative disc height loss.      Bilateral x-ray SI joints 2022  FINDINGS:  No evidence for ankylosis.  No erosive changes.  No widening of the AC joints.    Bilateral hip XR 22  FINDINGS:  No acute fracture or dislocation.  No significant soft tissue swelling.     The femoral heads are normally positioned within the native acetabuli.  Mild bilateral femoroacetabular degenerative osteoarthrosis with mild joint space narrowing and small osteophyte formation.  Chronic suspected bone island of the left femoral head.     Pubic symphysis, bilateral pubic rami and SI joints are intact.         ASSESSMENT: 57 y.o. year old male with lower back and leg pain, consistent with     1. Lumbar spondylosis        2. Primary osteoarthritis of both hips        3. Lumbar radiculopathy                PLAN:   - Interventions: None at this time, consider repeat MBB, SIJ, and/or hip injections should symptoms exacerbate  Consider lumbar UVALDO to address radicular symptoms should symptoms worsen or exacerbate, patient is currently receiving adequate relief from Lyrica    bilateral SIJ + bilateral IA hip injections on 2023 with 80% relief  bilateral L3-5 MBB on on 23 with 80% relief    - Anticoagulation use: no no  anticoagulation     report:  Reviewed and consistent with medication use as prescribed.    - Medications:  --I will continue the patient on Lyrica 225 mg b.i.d..  We discussed potential side effects of this medication which may include drowsiness,dizziness, dry mouth, constipation or peripheral edema.      - Therapy:   We discussed continuing physical therapy to help manage the patient/s painful condition. The patient was counseled that muscle strengthening will improve the long term prognosis in regards to pain and may also help increase range of motion and mobility.    - Imaging: Reviewed MRI of lumbar spine with patient and answered any questions they had regarding study.      - Follow up visit: return to clinic in 10 weeks      The above plan and management options were discussed at length with patient. Patient is in agreement with the above and verbalized understanding.    - I discussed the goals of interventional chronic pain management with the patient on today's visit. We discussed a multimodal and systematic approach to pain.  This includes diagnostic and therapeutic injections, adjuvant pharmacologic treatment, physical therapy, and at times psychiatry.  I emphasized the importance of regular exercise, core strengthening and stretching, diet and weight loss as a cornerstone of long-term pain management.    - This condition does not require this patient to take time off of work, and the primary goal of our Pain Management services is to improve the patient's functional capacity.  - Patient Questions: Answered all of the patient's questions regarding diagnoses, therapy, treatment and next steps        Tasha Durbin PA-C  Interventional Pain Management  [facility]    Disclaimer:  This note was prepared using voice recognition system and is likely to have sound alike errors that may have been overlooked even after proof reading.  Please call me with any questions

## 2023-03-14 ENCOUNTER — OFFICE VISIT (OUTPATIENT)
Dept: FAMILY MEDICINE | Facility: CLINIC | Age: 58
End: 2023-03-14
Payer: COMMERCIAL

## 2023-03-14 VITALS
HEART RATE: 74 BPM | HEIGHT: 68 IN | WEIGHT: 235.25 LBS | DIASTOLIC BLOOD PRESSURE: 76 MMHG | TEMPERATURE: 99 F | SYSTOLIC BLOOD PRESSURE: 108 MMHG | BODY MASS INDEX: 35.65 KG/M2 | RESPIRATION RATE: 20 BRPM | OXYGEN SATURATION: 97 %

## 2023-03-14 DIAGNOSIS — B96.89 ACUTE BACTERIAL SINUSITIS: Primary | ICD-10-CM

## 2023-03-14 DIAGNOSIS — J01.90 ACUTE BACTERIAL SINUSITIS: Primary | ICD-10-CM

## 2023-03-14 PROCEDURE — 99213 OFFICE O/P EST LOW 20 MIN: CPT | Mod: 25,S$GLB,, | Performed by: FAMILY MEDICINE

## 2023-03-14 PROCEDURE — 99999 PR PBB SHADOW E&M-EST. PATIENT-LVL V: CPT | Mod: PBBFAC,,, | Performed by: FAMILY MEDICINE

## 2023-03-14 PROCEDURE — 96372 PR INJECTION,THERAP/PROPH/DIAG2ST, IM OR SUBCUT: ICD-10-PCS | Mod: S$GLB,,, | Performed by: FAMILY MEDICINE

## 2023-03-14 PROCEDURE — 3078F PR MOST RECENT DIASTOLIC BLOOD PRESSURE < 80 MM HG: ICD-10-PCS | Mod: CPTII,S$GLB,, | Performed by: FAMILY MEDICINE

## 2023-03-14 PROCEDURE — 3066F NEPHROPATHY DOC TX: CPT | Mod: CPTII,S$GLB,, | Performed by: FAMILY MEDICINE

## 2023-03-14 PROCEDURE — 96372 THER/PROPH/DIAG INJ SC/IM: CPT | Mod: S$GLB,,, | Performed by: FAMILY MEDICINE

## 2023-03-14 PROCEDURE — 4010F PR ACE/ARB THEARPY RXD/TAKEN: ICD-10-PCS | Mod: CPTII,S$GLB,, | Performed by: FAMILY MEDICINE

## 2023-03-14 PROCEDURE — 3008F PR BODY MASS INDEX (BMI) DOCUMENTED: ICD-10-PCS | Mod: CPTII,S$GLB,, | Performed by: FAMILY MEDICINE

## 2023-03-14 PROCEDURE — 3074F PR MOST RECENT SYSTOLIC BLOOD PRESSURE < 130 MM HG: ICD-10-PCS | Mod: CPTII,S$GLB,, | Performed by: FAMILY MEDICINE

## 2023-03-14 PROCEDURE — 3008F BODY MASS INDEX DOCD: CPT | Mod: CPTII,S$GLB,, | Performed by: FAMILY MEDICINE

## 2023-03-14 PROCEDURE — 4010F ACE/ARB THERAPY RXD/TAKEN: CPT | Mod: CPTII,S$GLB,, | Performed by: FAMILY MEDICINE

## 2023-03-14 PROCEDURE — 3074F SYST BP LT 130 MM HG: CPT | Mod: CPTII,S$GLB,, | Performed by: FAMILY MEDICINE

## 2023-03-14 PROCEDURE — 1159F PR MEDICATION LIST DOCUMENTED IN MEDICAL RECORD: ICD-10-PCS | Mod: CPTII,S$GLB,, | Performed by: FAMILY MEDICINE

## 2023-03-14 PROCEDURE — 99999 PR PBB SHADOW E&M-EST. PATIENT-LVL V: ICD-10-PCS | Mod: PBBFAC,,, | Performed by: FAMILY MEDICINE

## 2023-03-14 PROCEDURE — 3066F PR DOCUMENTATION OF TREATMENT FOR NEPHROPATHY: ICD-10-PCS | Mod: CPTII,S$GLB,, | Performed by: FAMILY MEDICINE

## 2023-03-14 PROCEDURE — 99213 PR OFFICE/OUTPT VISIT, EST, LEVL III, 20-29 MIN: ICD-10-PCS | Mod: 25,S$GLB,, | Performed by: FAMILY MEDICINE

## 2023-03-14 PROCEDURE — 1159F MED LIST DOCD IN RCRD: CPT | Mod: CPTII,S$GLB,, | Performed by: FAMILY MEDICINE

## 2023-03-14 PROCEDURE — 3078F DIAST BP <80 MM HG: CPT | Mod: CPTII,S$GLB,, | Performed by: FAMILY MEDICINE

## 2023-03-14 RX ORDER — FLUTICASONE PROPIONATE 50 MCG
2 SPRAY, SUSPENSION (ML) NASAL DAILY
Qty: 16 G | Refills: 11 | Status: SHIPPED | OUTPATIENT
Start: 2023-03-14

## 2023-03-14 RX ORDER — LEVOCETIRIZINE DIHYDROCHLORIDE 5 MG/1
5 TABLET, FILM COATED ORAL NIGHTLY
Qty: 30 TABLET | Refills: 11 | Status: SHIPPED | OUTPATIENT
Start: 2023-03-14 | End: 2024-03-13

## 2023-03-14 RX ORDER — AMOXICILLIN 875 MG/1
875 TABLET, FILM COATED ORAL EVERY 12 HOURS
Qty: 14 TABLET | Refills: 0 | Status: SHIPPED | OUTPATIENT
Start: 2023-03-14 | End: 2023-03-21

## 2023-03-14 RX ORDER — METHYLPREDNISOLONE ACETATE 40 MG/ML
40 INJECTION, SUSPENSION INTRA-ARTICULAR; INTRALESIONAL; INTRAMUSCULAR; SOFT TISSUE
Status: COMPLETED | OUTPATIENT
Start: 2023-03-14 | End: 2023-03-14

## 2023-03-14 RX ADMIN — METHYLPREDNISOLONE ACETATE 40 MG: 40 INJECTION, SUSPENSION INTRA-ARTICULAR; INTRALESIONAL; INTRAMUSCULAR; SOFT TISSUE at 02:03

## 2023-03-14 NOTE — PROGRESS NOTES
Depo Medrol 40 mg given IM    left     Ventrogluteal, patient tolerated well, 15 minute wait recommended to watch for adverse reactions.

## 2023-03-14 NOTE — PROGRESS NOTES
"  Chief Complaint:    Chief Complaint   Patient presents with    Sinus Problem       History of Present Illness:    Presents today complaining of congestion sinus pressure cough denies any fever no trouble breathing wheezing    ROS:  Review of Systems    Past Medical History:   Diagnosis Date    Allergy     Anxiety     Cancer of kidney     Depression     Hyperlipidemia     Hypertension        Social History:  Social History     Socioeconomic History    Marital status:    Occupational History     Employer: [employer]   Tobacco Use    Smoking status: Never    Smokeless tobacco: Never    Tobacco comments:     dips    Substance and Sexual Activity    Alcohol use: Yes    Drug use: No    Sexual activity: Yes     Partners: Female     Birth control/protection: None       Family History:   family history includes Cancer in his maternal uncle; Hypertension in his mother; No Known Problems in his brother, maternal aunt, maternal grandfather, maternal grandmother, paternal aunt, paternal grandfather, paternal grandmother, paternal uncle, and sister.    Health Maintenance   Topic Date Due    Lipid Panel  10/04/2023    TETANUS VACCINE  10/31/2027    Hepatitis C Screening  Completed       Physical Exam:    Vital Signs  Temp: 99 °F (37.2 °C)  Temp Source: Tympanic  Pulse: 74  Resp: 20  SpO2: 97 %  BP: 108/76  BP Location: Left arm  Patient Position: Sitting  Pain Score: 0-No pain  Height and Weight  Height: 5' 8" (172.7 cm)  Weight: 106.7 kg (235 lb 3.7 oz)  BSA (Calculated - sq m): 2.26 sq meters  BMI (Calculated): 35.8  Weight in (lb) to have BMI = 25: 164.1]    Body mass index is 35.77 kg/m².    Physical Exam  Constitutional:       Appearance: He is well-developed.   HENT:      Head: Normocephalic and atraumatic.      Right Ear: Tympanic membrane, ear canal and external ear normal. Tympanic membrane is not bulging.      Left Ear: Tympanic membrane and ear canal normal. Tympanic membrane is not bulging.      Nose: No " rhinorrhea.      Right Sinus: No maxillary sinus tenderness or frontal sinus tenderness.      Left Sinus: No maxillary sinus tenderness or frontal sinus tenderness.      Mouth/Throat:      Pharynx: No posterior oropharyngeal erythema.      Tonsils: No tonsillar abscesses.   Eyes:      Conjunctiva/sclera: Conjunctivae normal.      Pupils: Pupils are equal, round, and reactive to light.   Cardiovascular:      Rate and Rhythm: Normal rate.      Heart sounds: Normal heart sounds.   Pulmonary:      Effort: Pulmonary effort is normal. No respiratory distress.      Breath sounds: Normal breath sounds. No stridor. No wheezing or rales.   Chest:      Chest wall: No tenderness.   Abdominal:      Palpations: Abdomen is soft.      Tenderness: There is no abdominal tenderness.   Musculoskeletal:      Cervical back: Normal range of motion.   Lymphadenopathy:      Cervical: No cervical adenopathy.         Assessment:      ICD-10-CM ICD-9-CM   1. Acute bacterial sinusitis  J01.90 461.9    B96.89          Plan:         Dre was seen today for sinus problem.    Diagnoses and all orders for this visit:    Acute bacterial sinusitis    Other orders  -     amoxicillin (AMOXIL) 875 MG tablet; Take 1 tablet (875 mg total) by mouth every 12 (twelve) hours. for 7 days  -     fluticasone propionate (FLONASE) 50 mcg/actuation nasal spray; 2 sprays (100 mcg total) by Each Nostril route once daily.  -     levocetirizine (XYZAL) 5 MG tablet; Take 1 tablet (5 mg total) by mouth every evening.  -     methylPREDNISolone acetate injection 40 mg            No orders of the defined types were placed in this encounter.      Current Outpatient Medications   Medication Sig Dispense Refill    allopurinoL (ZYLOPRIM) 100 MG tablet Take 1 tablet (100 mg total) by mouth once daily. 90 tablet 2    amLODIPine-benazepriL (LOTREL) 10-40 mg per capsule Take 1 capsule by mouth once daily. 90 capsule 3    docusate sodium (COLACE) 100 MG capsule Take 1 capsule (100  mg total) by mouth 2 (two) times daily. 60 capsule 0    fluticasone propionate (FLONASE) 50 mcg/actuation nasal spray 1 spray (50 mcg total) by Each Nostril route once daily. 3 g 5    hydrALAZINE (APRESOLINE) 100 MG tablet Take 1 tablet (100 mg total) by mouth 3 (three) times daily. 270 tablet 2    hydroCHLOROthiazide (HYDRODIURIL) 12.5 MG Tab Take 1 tablet (12.5 mg total) by mouth once daily. 30 tablet 11    loratadine (CLARITIN) 10 mg tablet TAKE 1 TABLET BY MOUTH EVERY DAY 90 tablet 1    metoprolol succinate (TOPROL-XL) 25 MG 24 hr tablet Take 1 tablet (25 mg total) by mouth once daily. 90 tablet 3    oxyCODONE-acetaminophen (PERCOCET) 5-325 mg per tablet Take 1 tablet by mouth every 4 (four) hours as needed for Pain. 35 tablet 0    potassium chloride (KLOR-CON) 10 MEQ TbSR Take 10 mEq by mouth every Mon, Wed, Fri, Sun.      pravastatin (PRAVACHOL) 40 MG tablet Take 1 tablet (40 mg total) by mouth once daily. 90 tablet 3    pregabalin (LYRICA) 225 MG Cap Take 1 capsule (225 mg total) by mouth 2 (two) times daily. 60 capsule 2    sertraline (ZOLOFT) 50 MG tablet Take 1 tablet (50 mg total) by mouth once daily. 90 tablet 3    sodium bicarbonate 650 MG tablet Take 1 tablet (650 mg total) by mouth before breakfast. With food 180 tablet 1    testosterone cypionate (DEPOTESTOTERONE CYPIONATE) 200 mg/mL injection INJECT ONE ML INTO MUSCLE EVERY 14 DAYS 2 mL 2    traMADoL (ULTRAM) 50 mg tablet Take 1 tablet (50 mg total) by mouth every 6 (six) hours. 21 each 1    traZODone (DESYREL) 50 MG tablet Take 1 tablet (50 mg total) by mouth once daily. 90 tablet 3    VITAMIN D2 1,250 mcg (50,000 unit) capsule TAKE ONE CAPSULE BY MOUTH ONCE WEEKLY 12 capsule 1    ALPRAZolam (XANAX) 0.25 MG tablet Take 2 tablets (0.5 mg total) by mouth 2 (two) times daily as needed for Anxiety. 15 tablet 0    amoxicillin (AMOXIL) 875 MG tablet Take 1 tablet (875 mg total) by mouth every 12 (twelve) hours. for 7 days 14 tablet 0    fluticasone  propionate (FLONASE) 50 mcg/actuation nasal spray 2 sprays (100 mcg total) by Each Nostril route once daily. 16 g 11    levocetirizine (XYZAL) 5 MG tablet Take 1 tablet (5 mg total) by mouth every evening. 30 tablet 11     No current facility-administered medications for this visit.       There are no discontinued medications.    No follow-ups on file.      Mohan Schwartz MD

## 2023-04-11 ENCOUNTER — LAB VISIT (OUTPATIENT)
Dept: LAB | Facility: HOSPITAL | Age: 58
End: 2023-04-11
Attending: FAMILY MEDICINE
Payer: COMMERCIAL

## 2023-04-11 DIAGNOSIS — Z51.81 ENCOUNTER FOR MONITORING TESTOSTERONE REPLACEMENT THERAPY: ICD-10-CM

## 2023-04-11 DIAGNOSIS — Z79.890 ENCOUNTER FOR MONITORING TESTOSTERONE REPLACEMENT THERAPY: ICD-10-CM

## 2023-04-11 LAB
ALBUMIN SERPL BCP-MCNC: 3.9 G/DL (ref 3.5–5.2)
ALP SERPL-CCNC: 95 U/L (ref 55–135)
ALT SERPL W/O P-5'-P-CCNC: 17 U/L (ref 10–44)
ANION GAP SERPL CALC-SCNC: 7 MMOL/L (ref 8–16)
AST SERPL-CCNC: 17 U/L (ref 10–40)
BASOPHILS # BLD AUTO: 0.07 K/UL (ref 0–0.2)
BASOPHILS NFR BLD: 0.9 % (ref 0–1.9)
BILIRUB SERPL-MCNC: 0.3 MG/DL (ref 0.1–1)
BUN SERPL-MCNC: 24 MG/DL (ref 6–20)
CALCIUM SERPL-MCNC: 9 MG/DL (ref 8.7–10.5)
CHLORIDE SERPL-SCNC: 108 MMOL/L (ref 95–110)
CHOLEST SERPL-MCNC: 151 MG/DL (ref 120–199)
CHOLEST/HDLC SERPL: 3.5 {RATIO} (ref 2–5)
CO2 SERPL-SCNC: 25 MMOL/L (ref 23–29)
COMPLEXED PSA SERPL-MCNC: 0.42 NG/ML (ref 0–4)
CREAT SERPL-MCNC: 2.1 MG/DL (ref 0.5–1.4)
DIFFERENTIAL METHOD: ABNORMAL
EOSINOPHIL # BLD AUTO: 0.2 K/UL (ref 0–0.5)
EOSINOPHIL NFR BLD: 2 % (ref 0–8)
ERYTHROCYTE [DISTWIDTH] IN BLOOD BY AUTOMATED COUNT: 13.2 % (ref 11.5–14.5)
EST. GFR  (NO RACE VARIABLE): 36.3 ML/MIN/1.73 M^2
GLUCOSE SERPL-MCNC: 83 MG/DL (ref 70–110)
HCT VFR BLD AUTO: 41.8 % (ref 40–54)
HDLC SERPL-MCNC: 43 MG/DL (ref 40–75)
HDLC SERPL: 28.5 % (ref 20–50)
HGB BLD-MCNC: 13.4 G/DL (ref 14–18)
IMM GRANULOCYTES # BLD AUTO: 0.07 K/UL (ref 0–0.04)
IMM GRANULOCYTES NFR BLD AUTO: 0.9 % (ref 0–0.5)
LDLC SERPL CALC-MCNC: 80.2 MG/DL (ref 63–159)
LYMPHOCYTES # BLD AUTO: 1.7 K/UL (ref 1–4.8)
LYMPHOCYTES NFR BLD: 21.1 % (ref 18–48)
MCH RBC QN AUTO: 30 PG (ref 27–31)
MCHC RBC AUTO-ENTMCNC: 32.1 G/DL (ref 32–36)
MCV RBC AUTO: 94 FL (ref 82–98)
MONOCYTES # BLD AUTO: 0.7 K/UL (ref 0.3–1)
MONOCYTES NFR BLD: 8.8 % (ref 4–15)
NEUTROPHILS # BLD AUTO: 5.3 K/UL (ref 1.8–7.7)
NEUTROPHILS NFR BLD: 66.3 % (ref 38–73)
NONHDLC SERPL-MCNC: 108 MG/DL
NRBC BLD-RTO: 0 /100 WBC
PLATELET # BLD AUTO: 252 K/UL (ref 150–450)
PMV BLD AUTO: 12 FL (ref 9.2–12.9)
POTASSIUM SERPL-SCNC: 5 MMOL/L (ref 3.5–5.1)
PROT SERPL-MCNC: 6.9 G/DL (ref 6–8.4)
RBC # BLD AUTO: 4.46 M/UL (ref 4.6–6.2)
SODIUM SERPL-SCNC: 140 MMOL/L (ref 136–145)
TESTOST SERPL-MCNC: 229 NG/DL (ref 304–1227)
TRIGL SERPL-MCNC: 139 MG/DL (ref 30–150)
WBC # BLD AUTO: 7.98 K/UL (ref 3.9–12.7)

## 2023-04-11 PROCEDURE — 80053 COMPREHEN METABOLIC PANEL: CPT | Performed by: FAMILY MEDICINE

## 2023-04-11 PROCEDURE — 36415 COLL VENOUS BLD VENIPUNCTURE: CPT | Mod: PO | Performed by: FAMILY MEDICINE

## 2023-04-11 PROCEDURE — 80061 LIPID PANEL: CPT | Performed by: FAMILY MEDICINE

## 2023-04-11 PROCEDURE — 85025 COMPLETE CBC W/AUTO DIFF WBC: CPT | Performed by: FAMILY MEDICINE

## 2023-04-11 PROCEDURE — 84403 ASSAY OF TOTAL TESTOSTERONE: CPT | Performed by: FAMILY MEDICINE

## 2023-04-11 PROCEDURE — 84153 ASSAY OF PSA TOTAL: CPT | Performed by: FAMILY MEDICINE

## 2023-04-18 ENCOUNTER — OFFICE VISIT (OUTPATIENT)
Dept: FAMILY MEDICINE | Facility: CLINIC | Age: 58
End: 2023-04-18
Payer: COMMERCIAL

## 2023-04-18 VITALS
RESPIRATION RATE: 18 BRPM | DIASTOLIC BLOOD PRESSURE: 79 MMHG | TEMPERATURE: 99 F | HEIGHT: 68 IN | SYSTOLIC BLOOD PRESSURE: 125 MMHG | BODY MASS INDEX: 37.46 KG/M2 | WEIGHT: 247.13 LBS | HEART RATE: 79 BPM | OXYGEN SATURATION: 98 %

## 2023-04-18 DIAGNOSIS — R42 VERTIGO: ICD-10-CM

## 2023-04-18 DIAGNOSIS — I10 ESSENTIAL HYPERTENSION: ICD-10-CM

## 2023-04-18 DIAGNOSIS — Z51.81 ENCOUNTER FOR MONITORING TESTOSTERONE REPLACEMENT THERAPY: ICD-10-CM

## 2023-04-18 DIAGNOSIS — Z90.5 S/P NEPHRECTOMY: ICD-10-CM

## 2023-04-18 DIAGNOSIS — R79.89 LOW TESTOSTERONE: Primary | ICD-10-CM

## 2023-04-18 DIAGNOSIS — N18.32 STAGE 3B CHRONIC KIDNEY DISEASE: ICD-10-CM

## 2023-04-18 DIAGNOSIS — Z79.890 ENCOUNTER FOR MONITORING TESTOSTERONE REPLACEMENT THERAPY: ICD-10-CM

## 2023-04-18 PROBLEM — C64.2 RENAL CELL CARCINOMA OF LEFT KIDNEY: Status: RESOLVED | Noted: 2021-07-13 | Resolved: 2023-04-18

## 2023-04-18 PROCEDURE — 4010F ACE/ARB THERAPY RXD/TAKEN: CPT | Mod: CPTII,S$GLB,, | Performed by: FAMILY MEDICINE

## 2023-04-18 PROCEDURE — 3078F PR MOST RECENT DIASTOLIC BLOOD PRESSURE < 80 MM HG: ICD-10-PCS | Mod: CPTII,S$GLB,, | Performed by: FAMILY MEDICINE

## 2023-04-18 PROCEDURE — 3008F BODY MASS INDEX DOCD: CPT | Mod: CPTII,S$GLB,, | Performed by: FAMILY MEDICINE

## 2023-04-18 PROCEDURE — 4010F PR ACE/ARB THEARPY RXD/TAKEN: ICD-10-PCS | Mod: CPTII,S$GLB,, | Performed by: FAMILY MEDICINE

## 2023-04-18 PROCEDURE — 99999 PR PBB SHADOW E&M-EST. PATIENT-LVL V: ICD-10-PCS | Mod: PBBFAC,,, | Performed by: FAMILY MEDICINE

## 2023-04-18 PROCEDURE — 1159F PR MEDICATION LIST DOCUMENTED IN MEDICAL RECORD: ICD-10-PCS | Mod: CPTII,S$GLB,, | Performed by: FAMILY MEDICINE

## 2023-04-18 PROCEDURE — 3078F DIAST BP <80 MM HG: CPT | Mod: CPTII,S$GLB,, | Performed by: FAMILY MEDICINE

## 2023-04-18 PROCEDURE — 1159F MED LIST DOCD IN RCRD: CPT | Mod: CPTII,S$GLB,, | Performed by: FAMILY MEDICINE

## 2023-04-18 PROCEDURE — 99214 OFFICE O/P EST MOD 30 MIN: CPT | Mod: S$GLB,,, | Performed by: FAMILY MEDICINE

## 2023-04-18 PROCEDURE — 3074F PR MOST RECENT SYSTOLIC BLOOD PRESSURE < 130 MM HG: ICD-10-PCS | Mod: CPTII,S$GLB,, | Performed by: FAMILY MEDICINE

## 2023-04-18 PROCEDURE — 1160F PR REVIEW ALL MEDS BY PRESCRIBER/CLIN PHARMACIST DOCUMENTED: ICD-10-PCS | Mod: CPTII,S$GLB,, | Performed by: FAMILY MEDICINE

## 2023-04-18 PROCEDURE — 3066F NEPHROPATHY DOC TX: CPT | Mod: CPTII,S$GLB,, | Performed by: FAMILY MEDICINE

## 2023-04-18 PROCEDURE — 3066F PR DOCUMENTATION OF TREATMENT FOR NEPHROPATHY: ICD-10-PCS | Mod: CPTII,S$GLB,, | Performed by: FAMILY MEDICINE

## 2023-04-18 PROCEDURE — 1160F RVW MEDS BY RX/DR IN RCRD: CPT | Mod: CPTII,S$GLB,, | Performed by: FAMILY MEDICINE

## 2023-04-18 PROCEDURE — 3074F SYST BP LT 130 MM HG: CPT | Mod: CPTII,S$GLB,, | Performed by: FAMILY MEDICINE

## 2023-04-18 PROCEDURE — 3008F PR BODY MASS INDEX (BMI) DOCUMENTED: ICD-10-PCS | Mod: CPTII,S$GLB,, | Performed by: FAMILY MEDICINE

## 2023-04-18 PROCEDURE — 99214 PR OFFICE/OUTPT VISIT, EST, LEVL IV, 30-39 MIN: ICD-10-PCS | Mod: S$GLB,,, | Performed by: FAMILY MEDICINE

## 2023-04-18 PROCEDURE — 99999 PR PBB SHADOW E&M-EST. PATIENT-LVL V: CPT | Mod: PBBFAC,,, | Performed by: FAMILY MEDICINE

## 2023-04-18 NOTE — PROGRESS NOTES
Chief Complaint:    Chief Complaint   Patient presents with    Follow-up       History of Present Illness:    Patient presents today for six-month follow-up,   He is underlying hypertension hyperlipidemia, depression anxiety, chronic back pain, chronic kidney disease and history of nephrectomy secondary to renal cell carcinoma.    He is also on testosterone replacement therapy doing well continue to gain weight he has a habit of snacking during the day and also snacks in the middle of the night because he can not sleep sometimes.    Occasional complains of dizziness when he turns his head a certain way.    ROS:  Review of Systems   Constitutional:  Negative for activity change, chills, fatigue, fever and unexpected weight change.   HENT:  Negative for congestion, ear discharge, ear pain, hearing loss, postnasal drip and rhinorrhea.    Eyes:  Negative for pain and visual disturbance.   Respiratory:  Negative for cough, chest tightness and shortness of breath.    Cardiovascular:  Negative for chest pain and palpitations.   Gastrointestinal:  Negative for abdominal pain, diarrhea and vomiting.   Endocrine: Negative for heat intolerance.   Genitourinary:  Negative for dysuria, flank pain, frequency and hematuria.   Musculoskeletal:  Negative for back pain, gait problem and neck pain.   Skin:  Negative for color change and rash.   Neurological:  Negative for dizziness, tremors, seizures, numbness and headaches.   Psychiatric/Behavioral:  Negative for agitation, hallucinations, self-injury, sleep disturbance and suicidal ideas. The patient is not nervous/anxious.      Past Medical History:   Diagnosis Date    Allergy     Anxiety     Cancer of kidney     Depression     Hyperlipidemia     Hypertension        Social History:  Social History     Socioeconomic History    Marital status:    Occupational History     Employer: [employer]   Tobacco Use    Smoking status: Never    Smokeless tobacco: Never    Tobacco  "comments:     dips    Substance and Sexual Activity    Alcohol use: Yes    Drug use: No    Sexual activity: Yes     Partners: Female     Birth control/protection: None       Family History:   family history includes Cancer in his maternal uncle; Hypertension in his mother; No Known Problems in his brother, maternal aunt, maternal grandfather, maternal grandmother, paternal aunt, paternal grandfather, paternal grandmother, paternal uncle, and sister.    Health Maintenance   Topic Date Due    Lipid Panel  04/11/2024    TETANUS VACCINE  10/31/2027    Hepatitis C Screening  Completed       Exam:Physical     Vital Signs  Temp: 98.6 °F (37 °C)  Temp Source: Tympanic  Pulse: 79  Resp: 18  SpO2: 98 %  BP: 125/79  BP Location: Left arm  Patient Position: Sitting  Pain Score: 0-No pain  Height and Weight  Height: 5' 8" (172.7 cm)  Weight: 112.1 kg (247 lb 2.2 oz)  BSA (Calculated - sq m): 2.32 sq meters  BMI (Calculated): 37.6  Weight in (lb) to have BMI = 25: 164.1]    Body mass index is 37.58 kg/m².    Physical Exam  Constitutional:       Appearance: He is well-developed.   Eyes:      Conjunctiva/sclera: Conjunctivae normal.      Pupils: Pupils are equal, round, and reactive to light.   Cardiovascular:      Rate and Rhythm: Normal rate and regular rhythm.      Heart sounds: Normal heart sounds. No murmur heard.  Pulmonary:      Effort: Pulmonary effort is normal. No respiratory distress.      Breath sounds: Normal breath sounds. No wheezing or rales.   Chest:      Chest wall: No tenderness.   Abdominal:      General: There is no distension.      Palpations: Abdomen is soft. There is no mass.      Tenderness: There is no abdominal tenderness. There is no guarding.   Musculoskeletal:         General: No tenderness.      Cervical back: Normal range of motion and neck supple.   Lymphadenopathy:      Cervical: No cervical adenopathy.   Skin:     General: Skin is warm and dry.   Neurological:      Mental Status: He is alert and " oriented to person, place, and time.      Deep Tendon Reflexes: Reflexes are normal and symmetric.   Psychiatric:         Behavior: Behavior normal.         Thought Content: Thought content normal.         Judgment: Judgment normal.         Assessment:      ICD-10-CM ICD-9-CM   1. Low testosterone  R79.89 790.99   2. S/p nephrectomy  Z90.5 V45.73   3. Stage 3b chronic kidney disease  N18.32 585.3   4. Essential hypertension  I10 401.9   5. Encounter for monitoring testosterone replacement therapy  Z51.81 V58.83    Z79.890 V58.69   6. Vertigo  R42 780.4         Plan:    Labs reviewed continue current medication and plan   Recommend that he avoid nighttime snacking  Referred to audiology for vestibular testing  Continue follow Nephrology        Follow up in about 6 months (around 10/18/2023).      Mohan Schawrtz MD

## 2023-04-21 ENCOUNTER — CLINICAL SUPPORT (OUTPATIENT)
Dept: AUDIOLOGY | Facility: CLINIC | Age: 58
End: 2023-04-21
Payer: COMMERCIAL

## 2023-04-21 DIAGNOSIS — R42 VERTIGO: ICD-10-CM

## 2023-04-21 PROCEDURE — 99999 PR PBB SHADOW E&M-EST. PATIENT-LVL I: CPT | Mod: PBBFAC,,, | Performed by: AUDIOLOGIST-HEARING AID FITTER

## 2023-04-21 PROCEDURE — 99999 PR PBB SHADOW E&M-EST. PATIENT-LVL I: ICD-10-PCS | Mod: PBBFAC,,, | Performed by: AUDIOLOGIST-HEARING AID FITTER

## 2023-04-21 PROCEDURE — 92540 PR VESTIBULAR EVAL NYSTAG FOVL&PERPH STIM OSCIL TRACKING: ICD-10-PCS | Mod: S$GLB,,, | Performed by: AUDIOLOGIST-HEARING AID FITTER

## 2023-04-21 PROCEDURE — 92540 BASIC VESTIBULAR EVALUATION: CPT | Mod: S$GLB,,, | Performed by: AUDIOLOGIST-HEARING AID FITTER

## 2023-04-21 NOTE — PROGRESS NOTES
Referring provider: Dr. Lana PADILLA Veena was seen 04/21/2023 for Videonystagmography (VNG) screen. Patient complains of dizziness that has been recurring off and on for the past one year. He describes dazed feeling when walking or lightheadedness upon standing or arising from bed. Symptoms occur a few times per week. He reports history of vertigo over fifteen years ago. He denies hearing loss or tinnitus.       Oculomotor function tests (sinusoidal tracking, saccade and OPKs) were normal and symmetric.  Gaze test was absent for nystagmus.  Spontaneous nystagmus was absent.  Head-shake test was absent for after head shake nystagmus.  [place]-Hallpike Left was negative for BPPV.  [place]-Hallpike Right was negative for BPPV.  Static Positional nystagmus was absent.    Impressions: Normal vestibular screen. Negative for BPPV.     Recommendations:  1. PCP review    Tracings are scanned into computer.

## 2023-05-02 ENCOUNTER — HOSPITAL ENCOUNTER (OUTPATIENT)
Dept: RADIOLOGY | Facility: HOSPITAL | Age: 58
Discharge: HOME | End: 2023-05-02
Attending: UROLOGY
Payer: COMMERCIAL

## 2023-05-02 DIAGNOSIS — C64.2 RENAL CELL CARCINOMA OF LEFT KIDNEY: ICD-10-CM

## 2023-05-02 PROCEDURE — 71045 XR CHEST 1 VIEW: ICD-10-PCS | Mod: 26,,, | Performed by: RADIOLOGY

## 2023-05-02 PROCEDURE — 71045 X-RAY EXAM CHEST 1 VIEW: CPT | Mod: TC,FY,PO

## 2023-05-02 PROCEDURE — 71045 X-RAY EXAM CHEST 1 VIEW: CPT | Mod: 26,,, | Performed by: RADIOLOGY

## 2023-05-05 ENCOUNTER — TELEPHONE (OUTPATIENT)
Dept: UROLOGY | Facility: CLINIC | Age: 58
End: 2023-05-05
Payer: COMMERCIAL

## 2023-05-05 ENCOUNTER — PATIENT MESSAGE (OUTPATIENT)
Dept: UROLOGY | Facility: CLINIC | Age: 58
End: 2023-05-05
Payer: COMMERCIAL

## 2023-05-05 DIAGNOSIS — C64.2 RENAL CELL CARCINOMA OF LEFT KIDNEY: Primary | ICD-10-CM

## 2023-05-05 NOTE — TELEPHONE ENCOUNTER
Appointments already schedule, patient notified trough the bernarda.     Loraine Milanes RN     ----- Message from Tom Manzo MD sent at 5/4/2023  7:48 AM CDT -----  Should have a 6 mo appt coming up with me soon, can you make sure he has an appt

## 2023-05-12 ENCOUNTER — TELEPHONE (OUTPATIENT)
Dept: PAIN MEDICINE | Facility: CLINIC | Age: 58
End: 2023-05-12
Payer: COMMERCIAL

## 2023-05-15 DIAGNOSIS — I10 ESSENTIAL HYPERTENSION: ICD-10-CM

## 2023-05-15 DIAGNOSIS — M1A.0710 IDIOPATHIC CHRONIC GOUT OF RIGHT FOOT WITHOUT TOPHUS: ICD-10-CM

## 2023-05-15 DIAGNOSIS — E55.9 VITAMIN D DEFICIENCY: ICD-10-CM

## 2023-05-15 NOTE — TELEPHONE ENCOUNTER
Care Due:                  Date            Visit Type   Department     Provider  --------------------------------------------------------------------------------                                EP -                              [facility] FAMILY  Last Visit: 04-      CARE ([facility])   MEDICINE       Mohan Schwartz                              [facility]  Next Visit: 10-      CARE ([facility])   MEDICINE       Mohan Schwartz                                                            Last  Test          Frequency    Reason                     Performed    Due Date  --------------------------------------------------------------------------------    Uric Acid...  12 months..  allopurinoL..............  Not Found    Overdue    [employer] Embedded Care Due Messages. Reference number: [id number].   5/15/2023 3:09:43 PM CDT

## 2023-05-16 ENCOUNTER — HOSPITAL ENCOUNTER (EMERGENCY)
Facility: HOSPITAL | Age: 58
Discharge: HOME | End: 2023-05-16
Attending: EMERGENCY MEDICINE
Payer: COMMERCIAL

## 2023-05-16 ENCOUNTER — TELEPHONE (OUTPATIENT)
Dept: FAMILY MEDICINE | Facility: CLINIC | Age: 58
End: 2023-05-16
Payer: COMMERCIAL

## 2023-05-16 ENCOUNTER — OFFICE VISIT (OUTPATIENT)
Dept: PAIN MEDICINE | Facility: CLINIC | Age: 58
End: 2023-05-16
Payer: COMMERCIAL

## 2023-05-16 VITALS
RESPIRATION RATE: 14 BRPM | OXYGEN SATURATION: 98 % | DIASTOLIC BLOOD PRESSURE: 67 MMHG | TEMPERATURE: 98 F | WEIGHT: 240.94 LBS | SYSTOLIC BLOOD PRESSURE: 154 MMHG | BODY MASS INDEX: 36.64 KG/M2 | HEART RATE: 70 BPM

## 2023-05-16 VITALS
WEIGHT: 240.94 LBS | HEART RATE: 98 BPM | DIASTOLIC BLOOD PRESSURE: 75 MMHG | BODY MASS INDEX: 36.52 KG/M2 | HEIGHT: 68 IN | SYSTOLIC BLOOD PRESSURE: 131 MMHG

## 2023-05-16 DIAGNOSIS — N18.9 CHRONIC RENAL FAILURE, UNSPECIFIED CKD STAGE: ICD-10-CM

## 2023-05-16 DIAGNOSIS — M47.816 LUMBAR SPONDYLOSIS: Primary | ICD-10-CM

## 2023-05-16 DIAGNOSIS — R55 POSTURAL DIZZINESS WITH PRESYNCOPE: Primary | ICD-10-CM

## 2023-05-16 DIAGNOSIS — R42 POSTURAL DIZZINESS WITH PRESYNCOPE: Primary | ICD-10-CM

## 2023-05-16 LAB
ALBUMIN SERPL BCP-MCNC: 4.1 G/DL (ref 3.5–5.2)
ALP SERPL-CCNC: 70 U/L (ref 55–135)
ALT SERPL W/O P-5'-P-CCNC: 32 U/L (ref 10–44)
ANION GAP SERPL CALC-SCNC: 12 MMOL/L (ref 8–16)
AST SERPL-CCNC: 25 U/L (ref 10–40)
BASOPHILS # BLD AUTO: 0.05 K/UL (ref 0–0.2)
BASOPHILS NFR BLD: 0.4 % (ref 0–1.9)
BILIRUB SERPL-MCNC: 0.4 MG/DL (ref 0.1–1)
BILIRUB UR QL STRIP: NEGATIVE
BNP SERPL-MCNC: 17 PG/ML (ref 0–99)
BUN SERPL-MCNC: 23 MG/DL (ref 6–20)
CALCIUM SERPL-MCNC: 8.7 MG/DL (ref 8.7–10.5)
CHLORIDE SERPL-SCNC: 104 MMOL/L (ref 95–110)
CLARITY UR: CLEAR
CO2 SERPL-SCNC: 22 MMOL/L (ref 23–29)
COLOR UR: YELLOW
CREAT SERPL-MCNC: 2.4 MG/DL (ref 0.5–1.4)
DIFFERENTIAL METHOD: ABNORMAL
EOSINOPHIL # BLD AUTO: 0 K/UL (ref 0–0.5)
EOSINOPHIL NFR BLD: 0.3 % (ref 0–8)
ERYTHROCYTE [DISTWIDTH] IN BLOOD BY AUTOMATED COUNT: 12.8 % (ref 11.5–14.5)
EST. GFR  (NO RACE VARIABLE): 31 ML/MIN/1.73 M^2
GLUCOSE SERPL-MCNC: 98 MG/DL (ref 70–110)
GLUCOSE UR QL STRIP: NEGATIVE
HCT VFR BLD AUTO: 42.2 % (ref 40–54)
HCV AB SERPL QL IA: NEGATIVE
HEP C VIRUS HOLD SPECIMEN: NORMAL
HGB BLD-MCNC: 13.3 G/DL (ref 14–18)
HGB UR QL STRIP: NEGATIVE
HIV 1+2 AB+HIV1 P24 AG SERPL QL IA: NEGATIVE
IMM GRANULOCYTES # BLD AUTO: 0.09 K/UL (ref 0–0.04)
IMM GRANULOCYTES NFR BLD AUTO: 0.6 % (ref 0–0.5)
KETONES UR QL STRIP: NEGATIVE
LEUKOCYTE ESTERASE UR QL STRIP: NEGATIVE
LYMPHOCYTES # BLD AUTO: 0.8 K/UL (ref 1–4.8)
LYMPHOCYTES NFR BLD: 5.8 % (ref 18–48)
MCH RBC QN AUTO: 29.2 PG (ref 27–31)
MCHC RBC AUTO-ENTMCNC: 31.5 G/DL (ref 32–36)
MCV RBC AUTO: 93 FL (ref 82–98)
MONOCYTES # BLD AUTO: 0.7 K/UL (ref 0.3–1)
MONOCYTES NFR BLD: 5.3 % (ref 4–15)
NEUTROPHILS # BLD AUTO: 12.2 K/UL (ref 1.8–7.7)
NEUTROPHILS NFR BLD: 87.6 % (ref 38–73)
NITRITE UR QL STRIP: NEGATIVE
NRBC BLD-RTO: 0 /100 WBC
PH UR STRIP: 6 [PH] (ref 5–8)
PLATELET # BLD AUTO: 212 K/UL (ref 150–450)
PMV BLD AUTO: 10.7 FL (ref 9.2–12.9)
POTASSIUM SERPL-SCNC: 4 MMOL/L (ref 3.5–5.1)
PROT SERPL-MCNC: 7.4 G/DL (ref 6–8.4)
PROT UR QL STRIP: ABNORMAL
RBC # BLD AUTO: 4.56 M/UL (ref 4.6–6.2)
SODIUM SERPL-SCNC: 138 MMOL/L (ref 136–145)
SP GR UR STRIP: 1.01 (ref 1–1.03)
TROPONIN I SERPL DL<=0.01 NG/ML-MCNC: <0.006 NG/ML (ref 0–0.03)
TROPONIN I SERPL DL<=0.01 NG/ML-MCNC: <0.006 NG/ML (ref 0–0.03)
URN SPEC COLLECT METH UR: ABNORMAL
UROBILINOGEN UR STRIP-ACNC: NEGATIVE EU/DL
WBC # BLD AUTO: 13.96 K/UL (ref 3.9–12.7)

## 2023-05-16 PROCEDURE — 99999 PR PBB SHADOW E&M-EST. PATIENT-LVL IV: ICD-10-PCS | Mod: PBBFAC,,, | Performed by: PHYSICIAN ASSISTANT

## 2023-05-16 PROCEDURE — 3066F PR DOCUMENTATION OF TREATMENT FOR NEPHROPATHY: ICD-10-PCS | Mod: CPTII,S$GLB,, | Performed by: PHYSICIAN ASSISTANT

## 2023-05-16 PROCEDURE — 3078F DIAST BP <80 MM HG: CPT | Mod: CPTII,S$GLB,, | Performed by: PHYSICIAN ASSISTANT

## 2023-05-16 PROCEDURE — 87389 HIV-1 AG W/HIV-1&-2 AB AG IA: CPT | Performed by: EMERGENCY MEDICINE

## 2023-05-16 PROCEDURE — 81003 URINALYSIS AUTO W/O SCOPE: CPT | Performed by: EMERGENCY MEDICINE

## 2023-05-16 PROCEDURE — 84484 ASSAY OF TROPONIN QUANT: CPT | Mod: 91 | Performed by: EMERGENCY MEDICINE

## 2023-05-16 PROCEDURE — 93005 ELECTROCARDIOGRAM TRACING: CPT

## 2023-05-16 PROCEDURE — 96360 HYDRATION IV INFUSION INIT: CPT

## 2023-05-16 PROCEDURE — 99999 PR PBB SHADOW E&M-EST. PATIENT-LVL IV: CPT | Mod: PBBFAC,,, | Performed by: PHYSICIAN ASSISTANT

## 2023-05-16 PROCEDURE — 99214 OFFICE O/P EST MOD 30 MIN: CPT | Mod: S$GLB,,, | Performed by: PHYSICIAN ASSISTANT

## 2023-05-16 PROCEDURE — 4010F PR ACE/ARB THEARPY RXD/TAKEN: ICD-10-PCS | Mod: CPTII,S$GLB,, | Performed by: PHYSICIAN ASSISTANT

## 2023-05-16 PROCEDURE — 3008F PR BODY MASS INDEX (BMI) DOCUMENTED: ICD-10-PCS | Mod: CPTII,S$GLB,, | Performed by: PHYSICIAN ASSISTANT

## 2023-05-16 PROCEDURE — 99285 EMERGENCY DEPT VISIT HI MDM: CPT | Mod: 25

## 2023-05-16 PROCEDURE — 83880 ASSAY OF NATRIURETIC PEPTIDE: CPT | Performed by: EMERGENCY MEDICINE

## 2023-05-16 PROCEDURE — 25000003 PHARM REV CODE 250: Performed by: EMERGENCY MEDICINE

## 2023-05-16 PROCEDURE — 93010 EKG 12-LEAD: ICD-10-PCS | Mod: ,,, | Performed by: STUDENT IN AN ORGANIZED HEALTH CARE EDUCATION/TRAINING PROGRAM

## 2023-05-16 PROCEDURE — 1159F PR MEDICATION LIST DOCUMENTED IN MEDICAL RECORD: ICD-10-PCS | Mod: CPTII,S$GLB,, | Performed by: PHYSICIAN ASSISTANT

## 2023-05-16 PROCEDURE — 93010 ELECTROCARDIOGRAM REPORT: CPT | Mod: ,,, | Performed by: STUDENT IN AN ORGANIZED HEALTH CARE EDUCATION/TRAINING PROGRAM

## 2023-05-16 PROCEDURE — 4010F ACE/ARB THERAPY RXD/TAKEN: CPT | Mod: CPTII,S$GLB,, | Performed by: PHYSICIAN ASSISTANT

## 2023-05-16 PROCEDURE — 80053 COMPREHEN METABOLIC PANEL: CPT | Performed by: EMERGENCY MEDICINE

## 2023-05-16 PROCEDURE — 3075F SYST BP GE 130 - 139MM HG: CPT | Mod: CPTII,S$GLB,, | Performed by: PHYSICIAN ASSISTANT

## 2023-05-16 PROCEDURE — 1159F MED LIST DOCD IN RCRD: CPT | Mod: CPTII,S$GLB,, | Performed by: PHYSICIAN ASSISTANT

## 2023-05-16 PROCEDURE — 3075F PR MOST RECENT SYSTOLIC BLOOD PRESS GE 130-139MM HG: ICD-10-PCS | Mod: CPTII,S$GLB,, | Performed by: PHYSICIAN ASSISTANT

## 2023-05-16 PROCEDURE — 86803 HEPATITIS C AB TEST: CPT | Performed by: EMERGENCY MEDICINE

## 2023-05-16 PROCEDURE — 99214 PR OFFICE/OUTPT VISIT, EST, LEVL IV, 30-39 MIN: ICD-10-PCS | Mod: S$GLB,,, | Performed by: PHYSICIAN ASSISTANT

## 2023-05-16 PROCEDURE — 3066F NEPHROPATHY DOC TX: CPT | Mod: CPTII,S$GLB,, | Performed by: PHYSICIAN ASSISTANT

## 2023-05-16 PROCEDURE — 1160F PR REVIEW ALL MEDS BY PRESCRIBER/CLIN PHARMACIST DOCUMENTED: ICD-10-PCS | Mod: CPTII,S$GLB,, | Performed by: PHYSICIAN ASSISTANT

## 2023-05-16 PROCEDURE — 96361 HYDRATE IV INFUSION ADD-ON: CPT

## 2023-05-16 PROCEDURE — 1160F RVW MEDS BY RX/DR IN RCRD: CPT | Mod: CPTII,S$GLB,, | Performed by: PHYSICIAN ASSISTANT

## 2023-05-16 PROCEDURE — 85025 COMPLETE CBC W/AUTO DIFF WBC: CPT | Performed by: EMERGENCY MEDICINE

## 2023-05-16 PROCEDURE — 3078F PR MOST RECENT DIASTOLIC BLOOD PRESSURE < 80 MM HG: ICD-10-PCS | Mod: CPTII,S$GLB,, | Performed by: PHYSICIAN ASSISTANT

## 2023-05-16 PROCEDURE — 3008F BODY MASS INDEX DOCD: CPT | Mod: CPTII,S$GLB,, | Performed by: PHYSICIAN ASSISTANT

## 2023-05-16 RX ORDER — PREGABALIN 225 MG/1
225 CAPSULE ORAL 2 TIMES DAILY
Qty: 60 CAPSULE | Refills: 5 | Status: SHIPPED | OUTPATIENT
Start: 2023-05-16 | End: 2023-12-04 | Stop reason: SDUPTHER

## 2023-05-16 RX ORDER — HYDRALAZINE HYDROCHLORIDE 100 MG/1
100 TABLET, FILM COATED ORAL 3 TIMES DAILY
Qty: 270 TABLET | Refills: 2 | Status: SHIPPED | OUTPATIENT
Start: 2023-05-16 | End: 2023-05-17

## 2023-05-16 RX ORDER — ALLOPURINOL 100 MG/1
TABLET ORAL
Qty: 90 TABLET | Refills: 2 | Status: SHIPPED | OUTPATIENT
Start: 2023-05-16

## 2023-05-16 RX ORDER — GABAPENTIN 300 MG/1
CAPSULE ORAL
Qty: 270 CAPSULE | Refills: 1 | Status: SHIPPED | OUTPATIENT
Start: 2023-05-16 | End: 2023-12-04 | Stop reason: ALTCHOICE

## 2023-05-16 RX ORDER — ERGOCALCIFEROL 1.25 MG/1
CAPSULE ORAL
Qty: 12 CAPSULE | Refills: 1 | Status: SHIPPED | OUTPATIENT
Start: 2023-05-16 | End: 2023-10-26

## 2023-05-16 RX ADMIN — SODIUM CHLORIDE 1000 ML: 9 INJECTION, SOLUTION INTRAVENOUS at 01:05

## 2023-05-16 NOTE — TELEPHONE ENCOUNTER
Admission nurse calling to get pt scheduled for hospital follow up sooner than Monday. Nothing available until then, please review and advise.

## 2023-05-16 NOTE — DISCHARGE INSTRUCTIONS
Drink plenty of fluids.  Continue home medications.  Follow up with her doctor in 1-2 days for re-evaluation.  Return as needed for any worsening symptoms, problems, questions or concerns.

## 2023-05-16 NOTE — TELEPHONE ENCOUNTER
Refill Routing Note   Medication(s) are not appropriate for processing by [facility] for the following reason(s):      Medication outside of protocol  Required labs outdated  Required labs abnormal    [facility] action(s):  Defer  Route Labs due     Medication Therapy Plan: (Gabapentin and Hydralazine- non delegated)      Appointments  past 12m or future 3m with PCP    Date Provider   Last Visit   4/18/2023 Mohan Schwartz MD   Next Visit   10/25/2023 Mohan Schwartz MD   ED visits in past 90 days: 0        Note composed:8:38 AM 05/16/2023

## 2023-05-16 NOTE — ED NOTES
ORTHOSTATIC VITAL SIGNS       05/16/23 1300 05/16/23 1303 05/16/23 1306   Vital Signs   Pulse 62 66 81   Heart Rate Source Monitor Monitor Monitor   BP (!) 87/48 (!) 99/57 (!) 97/55   BP Location Left arm Left arm Left arm   BP Method Automatic Automatic Automatic   Patient Position Lying Sitting Standing

## 2023-05-16 NOTE — TELEPHONE ENCOUNTER
----- Message from Milton Galloway MA sent at 5/16/2023  2:06 PM CDT -----  Contact: [phone]  Andree( ED Nurse) called            In regards to needing to speak with staff to get a more sooner appointment for hospital follow up if possible and home medication for discharge.              Call back [phone]

## 2023-05-16 NOTE — PHARMACY MED REC
"Admission Medication History     The home medication history was taken by Lobo Lira.    You may go to "Admission" then "Reconcile Home Medications" tabs to review and/or act upon these items.     The home medication list has been updated by the Pharmacy department.   Please read ALL comments highlighted in yellow.   Please address this information as you see fit.    Feel free to contact us if you have any questions or require assistance.      The medications listed below were removed from the home medication list. Please reorder if appropriate:  Patient reports no longer taking the following medication(s):  XANAX 0.25MG  LORATADINE 10MG  PERCOCET 5-325MG  ZOLOFT 50MG  TRAMADOL 50MG    Medications listed below were obtained from: Patient/family and Analytic software- [employer]  (Not in a hospital admission)      Lobo Lira  [id number]    Current Outpatient Medications on File Prior to Encounter   Medication Sig Dispense Refill Last Dose    allopurinoL (ZYLOPRIM) 100 MG tablet TAKE ONE TABLET BY MOUTH ONCE DAILY 90 tablet 2 5/15/2023    amLODIPine-benazepriL (LOTREL) 10-40 mg per capsule Take 1 capsule by mouth once daily. 90 capsule 3 5/16/2023    fluticasone propionate (FLONASE) 50 mcg/actuation nasal spray 2 sprays (100 mcg total) by Each Nostril route once daily. 16 g 11 Past Week    gabapentin (NEURONTIN) 300 MG capsule TAKE ONE CAPSULE BY MOUTH DURING THE DAY THEN TWO EVERY NIGHT AT BEDTIME 270 capsule 1 5/15/2023    hydrALAZINE (APRESOLINE) 100 MG tablet Take 1 tablet (100 mg total) by mouth 3 (three) times daily. 270 tablet 2 5/16/2023    hydroCHLOROthiazide (HYDRODIURIL) 12.5 MG Tab Take 1 tablet (12.5 mg total) by mouth once daily. 30 tablet 11 5/15/2023    levocetirizine (XYZAL) 5 MG tablet Take 1 tablet (5 mg total) by mouth every evening. 30 tablet 11 5/15/2023    metoprolol succinate (TOPROL-XL) 25 MG 24 hr tablet Take 1 tablet (25 mg total) by mouth once daily. 90 tablet 3 5/16/2023    " potassium chloride (KLOR-CON) 10 MEQ TbSR Take 10 mEq by mouth every Mon, Wed, Fri, Sun.   Past Month    pravastatin (PRAVACHOL) 40 MG tablet Take 1 tablet (40 mg total) by mouth once daily. 90 tablet 3 Past Month    pregabalin (LYRICA) 225 MG Cap Take 1 capsule (225 mg total) by mouth 2 (two) times daily. 60 capsule 5 5/15/2023    sodium bicarbonate 650 MG tablet Take 1 tablet (650 mg total) by mouth before breakfast. With food 180 tablet 1 Past Month    testosterone cypionate (DEPOTESTOTERONE CYPIONATE) 200 mg/mL injection INJECT ONE ML INTO MUSCLE EVERY 14 DAYS 2 mL 2 Past Week    traZODone (DESYREL) 50 MG tablet Take 1 tablet (50 mg total) by mouth once daily. 90 tablet 3 5/15/2023    VITAMIN D2 1,250 mcg (50,000 unit) capsule TAKE ONE CAPSULE BY MOUTH ONCE WEEKLY (Patient taking differently: (Sundays)) 12 capsule 1 5/14/2023                           .

## 2023-05-16 NOTE — PROGRESS NOTES
Est Patient Chronic Pain Note (Follow up Visit)    Referring Physician: No ref. provider found    PCP: Mohan Schwartz MD    Chief Complaint:   Chief Complaint   Patient presents with    Low-back Pain        SUBJECTIVE:  Interval History (5/16/2023):  Dre Curiel presents today for follow-up visit.  Patient was last seen on 3/7/2023. Last injection bilateral SIJ + bilateral IA hip injections on 01/24/2023 followed by bilateral L3-5 MBB on on 2/7/23 with 80% relief. Reports sustained relief from these injections. Needs refill of Lyrica 225 mg BID. Patient reports pain as 2/10 today.      Interval History (3/7/2023): Dre Curiel presents today for follow-up visit.  he underwent bilateral SIJ + bilateral IA hip injections on 01/24/2023 followed by bilateral L3-5 MBB on on 2/7/23.  The patient reports that he is/was better following the procedure.  he reports 80% pain relief from each injection.  The changes lasted 4 weeks so far.  The changes have continued through this visit.  Patient reports pain as 5/10 today. He reports he has good days and bad days, but has more good days since injection. Pain is exacerbated with bending. Has some residual right sided lateral hip pain and burning and needles sensation his right thigh/leg, but this has been somewhat improved with Lyrica 225 mg BID. Also had MRI of lumbar spine with noted compression of L5 spinal nerves.    MRI lumbar spine 01/23/2023  FINDINGS:  Transitional anatomy with partial sacralization of L5 peripherally.  There are bilateral L5 pars defects with grade 1 spondylolisthesis at L5-S1.  Vertebral body height is normal.  Marrow signal is within normal limits. The conus medullaris terminates at the level of L1-L2.  No abnormal signal within the conus. Intervertebral disc levels are as follows:     T12-L1 disc: Tiny chronic Schmorl's nodes.  Normal facet joints.  No significant stenosis.  The dural canal measures 13 mm.     L1-L2 disc : Normal.     L2-L3 disc:  Normal disc space height with anterior osteophytes.  Minimal facet arthropathy.  No significant spinal or foraminal stenosis.  The dural canal measures 11 mm.  No foraminal stenosis.     L3-L4 disc: Mild disc bulge.  Anterior osteophytes.  Normal facet joints.  The dural canal measures 14 mm.  No foraminal stenosis.     L4-L5 disc: Normal disc space height with mild facet arthropathy.  No significant spinal or foraminal stenosis.  The dural canal measures 15 mm.     L5-S1 disc: Bilateral L5 pars defects with grade 1 spondylolisthesis.  Severe disc space height loss most pronounced toward the right with edematous Modic endplate change.  Disc and osteophyte encroach into the exit foramina, right greater than left.  There is severe right and moderate/severe left foraminal stenosis with compression of the exiting L5 spinal nerves.  Degenerative hypertrophic change toward the right of the disc space as well as degenerative change of the pseudoarticulation of L5 and S1 narrows the exit pathway of the right L5 spinal nerve as demonstrated on axial T2 series 6, image 28.  Mild facet arthropathy.  The dural canal measures 14 mm.     Impression:     1. Isthmic grade 1 spondylolisthesis at L5-S1 with severe bilateral foraminal stenosis and possible compression of the exiting L5 spinal nerves.  2. Degenerative hypertrophic change toward the right of the L5-S1 disc space causes severe narrowing of the exit pathway of the right L5 spinal nerve as demonstrated on axial T2 image 28.  3. Edematous Modic endplate change at L5-S1 toward the right at L5-S1 may correlate to focal symptoms.       Initial HPI  Dre Curiel is a 57 y.o. male with past medical history significant for anxiety/depression, hypertension, hyperlipidemia, stage 3 chronic kidney disease a history of renal cell carcinoma status post left-sided nephrectomy, erectile dysfunction, obesity, obstructive sleep apnea who presents to the clinic for the evaluation of  lower back and leg pain.  Patient reports pain has been present for several years without inciting accident injury or trauma.  Today patient reports pain is constant which is rated a 6/10.  Pain is described as sharp and stabbing and needles poking  in nature.  Patient reports pain in a bandlike distribution in the lower back which radiates down the lateral aspect of the right lower extremity to the calf in L4-5 distribution.  Pain is exacerbated when moving from sitting to standing, with lumbar forward flexion and lateral flexion and with ambulation.  Patient is a supervisor of product distribution, manages everything and reports he is standing on his feet all day.  Patient does report sensation of buckling in the right lower extremity associated with prolonged exertion.  Pain is improved with sitting.  Patient has trialed gabapentin 300 mg, 1-2 times daily in the past without meaningful relief.  Patient reports completing 8 sessions of conventional physical therapy on 12/2022 at [facility] without any meaningful relief in range of motion, strength or pain.    Patient reports significant motor weakness.  Patient denies night fever/night sweats, urinary incontinence, bowel incontinence, significant weight loss, and loss of sensations      Pain Disability Index Review:     Last 3 PDI Scores 1/12/2023 6/15/2018 5/18/2018   Pain Disability Index (PDI) 46 19 20       Non-Pharmacologic Treatments:  Physical Therapy/Home Exercise: yes  Ice/Heat:yes  TENS: no  Acupuncture: no  Massage: no  Chiropractic: no    Other: no      Pain Medications:  - Opioids: Percocet (Oxycodone/Acetaminophen) and Ultram (Tramadol HCL)  - Adjuvant Medications: Neurontin (Gabapentin), Trazodone (Desyrel), Xanax (Alprazolam), and Zoloft (Sertraline)    Pain Procedures:   bilateral SIJ + bilateral IA hip injections on 01/24/2023 with 80% relief  bilateral L3-5 MBB on on 2/7/23 with 80% relief    Past Medical History:   Diagnosis  Date    Allergy     Anxiety     Cancer of kidney     Depression     Hyperlipidemia     Hypertension      Past Surgical History:   Procedure Laterality Date    COLONOSCOPY N/A 6/8/2018    Procedure: COLONOSCOPY;  Surgeon: Simeon Acharya III, MD;  Location: [facility];  Service: Endoscopy;  Laterality: N/A;    COLONOSCOPY N/A 6/11/2018    Procedure: COLONOSCOPY;  Surgeon: Simeon Acharya III, MD;  Location: [facility];  Service: Endoscopy;  Laterality: N/A;    COLONOSCOPY  06/2018    COLONOSCOPY N/A 11/17/2021    Procedure: COLONOSCOPY;  Surgeon: Tomi Lewis MD;  Location: [facility];  Service: Endoscopy;  Laterality: N/A;    COSMETIC SURGERY      INJECTION OF ANESTHETIC AGENT AROUND MEDIAL BRANCH NERVES INNERVATING LUMBAR FACET JOINT Bilateral 2/7/2023    Procedure: Bilateral L3-5 MBB with local;  Surgeon: Lopez Mendoza MD;  Location: [facility];  Service: Pain Management;  Laterality: Bilateral;    INJECTION OF ANESTHETIC AGENT INTO SACROILIAC JOINT Bilateral 1/24/2023    Procedure: Bilateral SIJ Injection;  Surgeon: Lopez Mendoza MD;  Location: [facility];  Service: Pain Management;  Laterality: Bilateral;    INJECTION OF JOINT Bilateral 1/24/2023    Procedure: Bilateral Hip injection;  Surgeon: Lopez Mendoza MD;  Location: [facility];  Service: Pain Management;  Laterality: Bilateral;    kidney removal      LAPAROSCOPIC NEPHRECTOMY, HAND ASSISTED Left 9/16/2021    Procedure: NEPHRECTOMY, HAND-ASSISTED, LAPAROSCOPIC;  Surgeon: Tom Manzo MD;  Location: [facility];  Service: Urology;  Laterality: Left;     Review of patient's allergies indicates:   Allergen Reactions    Iodine and iodide containing products      Other reaction(s): Swelling  Other reaction(s): Sneezing  Other reaction(s): Shortness of breath       Current Outpatient Medications   Medication Sig    allopurinoL (ZYLOPRIM) 100 MG tablet Take 1 tablet (100 mg total) by mouth once daily.    amLODIPine-benazepriL (LOTREL) 10-40 mg  per capsule Take 1 capsule by mouth once daily.    docusate sodium (COLACE) 100 MG capsule Take 1 capsule (100 mg total) by mouth 2 (two) times daily.    fluticasone propionate (FLONASE) 50 mcg/actuation nasal spray 1 spray (50 mcg total) by Each Nostril route once daily.    fluticasone propionate (FLONASE) 50 mcg/actuation nasal spray 2 sprays (100 mcg total) by Each Nostril route once daily.    hydrALAZINE (APRESOLINE) 100 MG tablet Take 1 tablet (100 mg total) by mouth 3 (three) times daily.    hydroCHLOROthiazide (HYDRODIURIL) 12.5 MG Tab Take 1 tablet (12.5 mg total) by mouth once daily.    levocetirizine (XYZAL) 5 MG tablet Take 1 tablet (5 mg total) by mouth every evening.    loratadine (CLARITIN) 10 mg tablet TAKE 1 TABLET BY MOUTH EVERY DAY    metoprolol succinate (TOPROL-XL) 25 MG 24 hr tablet Take 1 tablet (25 mg total) by mouth once daily.    potassium chloride (KLOR-CON) 10 MEQ TbSR Take 10 mEq by mouth every Mon, Wed, Fri, Sun.    pravastatin (PRAVACHOL) 40 MG tablet Take 1 tablet (40 mg total) by mouth once daily.    sertraline (ZOLOFT) 50 MG tablet Take 1 tablet (50 mg total) by mouth once daily.    sodium bicarbonate 650 MG tablet Take 1 tablet (650 mg total) by mouth before breakfast. With food    testosterone cypionate (DEPOTESTOTERONE CYPIONATE) 200 mg/mL injection INJECT ONE ML INTO MUSCLE EVERY 14 DAYS    traZODone (DESYREL) 50 MG tablet Take 1 tablet (50 mg total) by mouth once daily.    VITAMIN D2 1,250 mcg (50,000 unit) capsule TAKE ONE CAPSULE BY MOUTH ONCE WEEKLY    ALPRAZolam (XANAX) 0.25 MG tablet Take 2 tablets (0.5 mg total) by mouth 2 (two) times daily as needed for Anxiety.    oxyCODONE-acetaminophen (PERCOCET) 5-325 mg per tablet Take 1 tablet by mouth every 4 (four) hours as needed for Pain. (Patient not taking: Reported on 5/16/2023)    pregabalin (LYRICA) 225 MG Cap Take 1 capsule (225 mg total) by mouth 2 (two) times daily.    traMADoL (ULTRAM) 50 mg tablet Take 1 tablet (50 mg  "total) by mouth every 6 (six) hours. (Patient not taking: Reported on 5/16/2023)     No current facility-administered medications for this visit.       Review of Systems     GENERAL:  No weight loss, malaise or fevers.  HEENT:   No recent changes in vision or hearing  NECK:  Negative for lumps, no difficulty with swallowing.  RESPIRATORY:  Negative for cough, wheezing or shortness of breath, patient denies any recent URI.  CARDIOVASCULAR:  Negative for chest pain or palpitations.  GI:  Negative for abdominal discomfort, blood in stools or black stools or change in bowel habits.  MUSCULOSKELETAL:  See HPI.  SKIN:  Negative for lesions, rash, and itching.  PSYCH:  No mood disorder or recent psychosocial stressors.   HEMATOLOGY/LYMPHOLOGY:  Negative for prolonged bleeding, bruising easily or swollen nodes.    NEURO:   No history of syncope, paralysis, seizures or tremors.  All other reviewed and negative other than HPI.    OBJECTIVE:    /75   Pulse 98   Ht 5' 8" (1.727 m)   Wt 109.3 kg (240 lb 15.4 oz)   BMI 36.64 kg/m²       Physical Exam    GENERAL: Well appearing, in no acute distress, alert and oriented x3.  PSYCH:  Mood and affect appropriate.  SKIN: Skin color, texture, turgor normal, no rashes or lesions.  HEAD/FACE:  Normocephalic, atraumatic. Cranial nerves grossly intact.    CV: RRR with palpation of the radial artery.  PULM: No evidence of respiratory difficulty, symmetric chest rise.  GI:  Soft and non-tender.    BACK: Straight leg raising in the sitting and supine positions is  + to radicular pain on R.  pain to palpation over the facet joints of the lumbar spine or spinous processes.  Reduced range of motion with pain reproduction   EXTREMITIES: Peripheral joint ROM is full and pain free without obvious instability or laxity in all four extremities. No deformities, edema, or skin discoloration. Good capillary refill.  MUSCULOSKELETAL: Able to stand on heels & toes.    hip provocative maneuvers " are positive bilaterally but pain is improved    SIJ testing: Bilateral  - TTP over SI joint: Present, improved  - Carmen's/ Gunner's: Positive    - Sacroiliac Distraction Test (anterior pressure): Positive  - Sacroiliac Compression Test (lateral pressure): Positive   - SacralThrust Test (posterior pressure): Positive      Facet loading test is positive bilaterally.   Bilateral upper and lower extremity strength is normal and symmetric.  No atrophy or tone abnormalities are noted.    RIGHT Lower extremity: Hip flexion 4+/5, Hip Abduction 4+/5, Hip Adduction 4+/5, Knee extension 5/5, Knee flexion 5/5, Ankle dorsiflexion5/5, Extensor hallucis longus 5/5, Ankle plantarflexion 5/5  LEFT Lower extremity:  Hip flexion 5/5, Hip Abduction 5/5,Hip Adduction 5/5, Knee extension 5/5, Knee flexion 5/5, Ankle dorsiflexion 5/5, Extensor hallucis longus 5/5, Ankle plantarflexion 5/5  -Normal testing knee (patellar) jerk and ankle (achilles) jerk    NEURO: Bilateral upper and lower extremity coordination and muscle stretch reflexes are physiologic and symmetric. No loss of sensation is noted.  GAIT: normal.    Imagin20  X-Ray Lumbar Spine Ap And Lateral  FINDINGS:  Vertebral body heights maintained.  No definite spondylolisthesis.  L5-S1 evaluation limited by positioning.  There is sacralization of L5.  Multilevel facet degenerative findings noted.  L4-5 and L5-S1 degenerative disc height loss.      Bilateral x-ray SI joints 2022  FINDINGS:  No evidence for ankylosis.  No erosive changes.  No widening of the AC joints.    Bilateral hip XR 22  FINDINGS:  No acute fracture or dislocation.  No significant soft tissue swelling.     The femoral heads are normally positioned within the native acetabuli.  Mild bilateral femoroacetabular degenerative osteoarthrosis with mild joint space narrowing and small osteophyte formation.  Chronic suspected bone island of the left femoral head.     Pubic symphysis, bilateral  pubic rami and SI joints are intact.         ASSESSMENT: 57 y.o. year old male with lower back and leg pain, consistent with     1. Lumbar spondylosis                  PLAN:   - Interventions: None at this time, consider repeat MBB, SIJ, and/or hip injections should symptoms exacerbate  Consider lumbar UVALDO to address radicular symptoms should symptoms worsen or exacerbate, patient is currently receiving adequate relief from Lyrica    bilateral SIJ + bilateral IA hip injections on 01/24/2023 with 80% relief  bilateral L3-5 MBB on on 2/7/23 with 80% relief    - Anticoagulation use: no no anticoagulation     report:  Reviewed and consistent with medication use as prescribed.    - Medications:  --I will continue the patient on Lyrica 225 mg b.i.d..  We discussed potential side effects of this medication which may include drowsiness,dizziness, dry mouth, constipation or peripheral edema.      - Therapy:   We discussed continuing physical therapy to help manage the patient/s painful condition. The patient was counseled that muscle strengthening will improve the long term prognosis in regards to pain and may also help increase range of motion and mobility.    - Imaging: Reviewed MRI of lumbar spine with patient and answered any questions they had regarding study.      - Follow up visit: prn      The above plan and management options were discussed at length with patient. Patient is in agreement with the above and verbalized understanding.    - I discussed the goals of interventional chronic pain management with the patient on today's visit. We discussed a multimodal and systematic approach to pain.  This includes diagnostic and therapeutic injections, adjuvant pharmacologic treatment, physical therapy, and at times psychiatry.  I emphasized the importance of regular exercise, core strengthening and stretching, diet and weight loss as a cornerstone of long-term pain management.    - This condition does not require this  patient to take time off of work, and the primary goal of our Pain Management services is to improve the patient's functional capacity.  - Patient Questions: Answered all of the patient's questions regarding diagnoses, therapy, treatment and next steps        Tasha Durbin PA-C  Interventional Pain Management  [facility]    Disclaimer:  This note was prepared using voice recognition system and is likely to have sound alike errors that may have been overlooked even after proof reading.  Please call me with any questions

## 2023-05-16 NOTE — ED PROVIDER NOTES
SCRIBE #1 NOTE: I, Dorcas Roe and Cara Pardo, am scribing for, and in the presence of, Shahram Arriaga Jr., MD. I have scribed the entire note.       History     Chief Complaint   Patient presents with    Loss of Consciousness     Pt came from work, syncopal episode, witnessed by EMS, states patient became unresponsive and cyanotic during syncopal episode, BP was 60/40 on scene, patient AAOX4 on arrival, c/o dizziness     Review of patient's allergies indicates:   Allergen Reactions    Iodine and iodide containing products      Other reaction(s): Swelling  Other reaction(s): Sneezing  Other reaction(s): Shortness of breath         History of Present Illness     HPI    5/16/2023, 12:36 PM  History obtained from the patient      History of Present Illness: Dre Curiel is a 57 y.o. male patient with a PMHx of renal cell carcinoma s/p nephrectomy, Hyperlipidemia, and HTN who presents to the Emergency Department for evaluation of a syncopal episode which occurred prior to arrival. The Pt was at work standing up, when the he started experiencing dizziness like the room was spinning, light-headedness, and dry lips. Then, he had a syncopal episode. EMS witnessed the syncopal episode and noted the pt to be unresponsive, cyanotic, and hypotensive (60/40). En route to the ER, EMS administered a 1L NS bolus. Pt arrives to the ER AAOx4. Symptoms are episodic and moderate in severity. No mitigating or exacerbating factors reported. Patient denies any fever, chills, palpitations, SOB, n/v/d, weakness, CP, back pain and all other sxs at this time. Pt is on blood pressure medications and Lasix. He denies having a history of cardiac stents, MI, CHF, or DM. No further complaints or concerns at this time.       Arrival mode: Ambulance Service    PCP: Mohan Schwartz MD        Past Medical History:  Past Medical History:   Diagnosis Date    Allergy     Anxiety     Cancer of kidney     Depression     Hyperlipidemia      Hypertension        Past Surgical History:  Past Surgical History:   Procedure Laterality Date    COLONOSCOPY N/A 6/8/2018    Procedure: COLONOSCOPY;  Surgeon: Simeon Acharya III, MD;  Location: [facility] ENDO;  Service: Endoscopy;  Laterality: N/A;    COLONOSCOPY N/A 6/11/2018    Procedure: COLONOSCOPY;  Surgeon: Simeon Acharya III, MD;  Location: [facility] ENDO;  Service: Endoscopy;  Laterality: N/A;    COLONOSCOPY  06/2018    COLONOSCOPY N/A 11/17/2021    Procedure: COLONOSCOPY;  Surgeon: Tomi Lewis MD;  Location: [facility] ENDO;  Service: Endoscopy;  Laterality: N/A;    COSMETIC SURGERY      INJECTION OF ANESTHETIC AGENT AROUND MEDIAL BRANCH NERVES INNERVATING LUMBAR FACET JOINT Bilateral 2/7/2023    Procedure: Bilateral L3-5 MBB with local;  Surgeon: Lopez Mendoza MD;  Location: [facility] PAIN MGT;  Service: Pain Management;  Laterality: Bilateral;    INJECTION OF ANESTHETIC AGENT INTO SACROILIAC JOINT Bilateral 1/24/2023    Procedure: Bilateral SIJ Injection;  Surgeon: Lopez Mendoza MD;  Location: [facility] PAIN MGT;  Service: Pain Management;  Laterality: Bilateral;    INJECTION OF JOINT Bilateral 1/24/2023    Procedure: Bilateral Hip injection;  Surgeon: Lopez Mendoza MD;  Location: [facility] PAIN MGT;  Service: Pain Management;  Laterality: Bilateral;    kidney removal      LAPAROSCOPIC NEPHRECTOMY, HAND ASSISTED Left 9/16/2021    Procedure: NEPHRECTOMY, HAND-ASSISTED, LAPAROSCOPIC;  Surgeon: Tom Manzo MD;  Location: [facility];  Service: Urology;  Laterality: Left;         Family History:  Family History   Problem Relation Age of Onset    Hypertension Mother     No Known Problems Sister     No Known Problems Brother     No Known Problems Maternal Aunt     Cancer Maternal Uncle     No Known Problems Paternal Aunt     No Known Problems Paternal Uncle     No Known Problems Maternal Grandmother     No Known Problems Maternal Grandfather     No Known Problems Paternal Grandmother     No Known Problems Paternal  Grandfather        Social History:  Social History     Tobacco Use    Smoking status: Never    Smokeless tobacco: Never    Tobacco comments:     dips    Substance and Sexual Activity    Alcohol use: Yes    Drug use: No    Sexual activity: Yes     Partners: Female     Birth control/protection: None        Review of Systems     Review of Systems   Constitutional:  Negative for chills and fever.   HENT:  Negative for sore throat.         (+) dry lips   Respiratory:  Negative for shortness of breath.    Cardiovascular:  Negative for chest pain and palpitations.   Gastrointestinal:  Negative for diarrhea, nausea and vomiting.   Genitourinary:  Negative for dysuria.   Musculoskeletal:  Negative for back pain.   Skin:  Negative for rash.   Neurological:  Positive for dizziness, syncope and light-headedness. Negative for weakness.   Hematological:  Does not bruise/bleed easily.   All other systems reviewed and are negative.     Physical Exam     Initial Vitals   BP Pulse Resp Temp SpO2   05/16/23 1222 05/16/23 1222 05/16/23 1222 05/16/23 1233 05/16/23 1222   (!) 87/55 74 (!) 22 97.6 °F (36.4 °C) (!) 93 %      MAP       --                 Physical Exam  Nursing Notes and Vital Signs Reviewed.  Constitutional: Patient is in no acute distress. Well-developed and well-nourished.  Head: Atraumatic. Normocephalic.  Eyes:  EOM intact.  No scleral icterus.  ENT: Mucous membranes are moist.  Nares clear   Neck:  Full ROM. No JVD.  Cardiovascular: Regular rate. Regular rhythm No murmurs, rubs, or gallops. Distal pulses are 2+ and symmetric  Pulmonary/Chest: No respiratory distress. Clear to auscultation bilaterally. No wheezing or rales.  Equal chest wall rise bilaterally  Abdominal: Soft and non-distended.  There is no tenderness.  No rebound, guarding, or rigidity. Good bowel sounds.  Genitourinary: No CVA tenderness.  No suprapubic tenderness  Musculoskeletal: Moves all extremities. No obvious deformities.  5 x 5 strength in all  extremities   Skin: Warm and dry.  Neurological:  Alert, awake, and appropriate.  Normal speech.  No acute focal neurological deficits are appreciated.  Two through 12 intact bilaterally.  Psychiatric: Normal affect. Good eye contact. Appropriate in content.      ED Course   Critical Care    Date/Time: 5/16/2023 12:47 PM  Performed by: Shahram Arriaga Jr., MD  Authorized by: Shahram Arriaga Jr., MD   Direct patient critical care time: 15 minutes  Additional history critical care time: 7 minutes  Ordering / reviewing critical care time: 8 minutes  Documentation critical care time: 7 minutes  Total critical care time (exclusive of procedural time) : 37 minutes  Critical care time was exclusive of separately billable procedures and treating other patients and teaching time.  Critical care was necessary to treat or prevent imminent or life-threatening deterioration of the following conditions: hypotension.  Critical care was time spent personally by me on the following activities: blood draw for specimens, development of treatment plan with patient or surrogate, interpretation of cardiac output measurements, evaluation of patient's response to treatment, examination of patient, obtaining history from patient or surrogate, ordering and performing treatments and interventions, ordering and review of laboratory studies, ordering and review of radiographic studies, pulse oximetry, re-evaluation of patient's condition and review of old charts.      ED Vital Signs:  Vitals:    05/16/23 1237 05/16/23 1240 05/16/23 1241 05/16/23 1300   BP: (!) 85/49  (!) 86/53 (!) 87/48   Pulse: 75 73  62   Resp: (!) 24 16     Temp:       TempSrc:       SpO2:  97%     Weight:        05/16/23 1303 05/16/23 1306 05/16/23 1312 05/16/23 1346   BP: (!) 99/57 (!) 97/55 100/62 120/71   Pulse: 66 81 71 64   Resp:   20    Temp:       TempSrc:       SpO2:   99% 99%   Weight:        05/16/23 1347 05/16/23 1422 05/16/23 1423 05/16/23 1501   BP:  114/71   127/76   Pulse: 66 (!) 59 64 68   Resp: 20 14 19    Temp:       TempSrc:       SpO2: 99% 100% 100% 100%   Weight:        05/16/23 1502 05/16/23 1541 05/16/23 1623   BP:  123/72 (!) 154/67   Pulse: 66 60 70   Resp: 20 14    Temp:      TempSrc:      SpO2: 99% 98% 98%   Weight:          Abnormal Lab Results:  Labs Reviewed   CBC W/ AUTO DIFFERENTIAL - Abnormal; Notable for the following components:       Result Value    WBC 13.96 (*)     RBC 4.56 (*)     Hemoglobin 13.3 (*)     MCHC 31.5 (*)     Immature Granulocytes 0.6 (*)     Gran # (ANC) 12.2 (*)     Immature Grans (Abs) 0.09 (*)     Lymph # 0.8 (*)     Gran % 87.6 (*)     Lymph % 5.8 (*)     All other components within normal limits    Narrative:     Release to patient->Immediate   COMPREHENSIVE METABOLIC PANEL - Abnormal; Notable for the following components:    CO2 22 (*)     BUN 23 (*)     Creatinine 2.4 (*)     eGFR 31 (*)     All other components within normal limits    Narrative:     Release to patient->Immediate   URINALYSIS, REFLEX TO URINE CULTURE - Abnormal; Notable for the following components:    Protein, UA Trace (*)     All other components within normal limits    Narrative:     Specimen Source->Urine   HIV 1 / 2 ANTIBODY    Narrative:     Release to patient->Immediate   HEPATITIS C ANTIBODY    Narrative:     Release to patient->Immediate   HEP C VIRUS HOLD SPECIMEN    Narrative:     Release to patient->Immediate   B-TYPE NATRIURETIC PEPTIDE    Narrative:     Release to patient->Immediate   TROPONIN I    Narrative:     Release to patient->Immediate   TROPONIN I        All Lab Results:  Results for orders placed or performed during the hospital encounter of 05/16/23   HIV 1/2 Ag/Ab (4th Gen)   Result Value Ref Range    HIV 1/2 Ag/Ab Negative Negative   Hepatitis C Antibody   Result Value Ref Range    Hepatitis C Ab Negative Negative   HCV Virus Hold Specimen   Result Value Ref Range    HEP C Virus Hold Specimen Hold for HCV sendout    CBC auto  differential   Result Value Ref Range    WBC 13.96 (H) 3.90 - 12.70 K/uL    RBC 4.56 (L) 4.60 - 6.20 M/uL    Hemoglobin 13.3 (L) 14.0 - 18.0 g/dL    Hematocrit 42.2 40.0 - 54.0 %    MCV 93 82 - 98 fL    MCH 29.2 27.0 - 31.0 pg    MCHC 31.5 (L) 32.0 - 36.0 g/dL    RDW 12.8 11.5 - 14.5 %    Platelets 212 150 - 450 K/uL    MPV 10.7 9.2 - 12.9 fL    Immature Granulocytes 0.6 (H) 0.0 - 0.5 %    Gran # (ANC) 12.2 (H) 1.8 - 7.7 K/uL    Immature Grans (Abs) 0.09 (H) 0.00 - 0.04 K/uL    Lymph # 0.8 (L) 1.0 - 4.8 K/uL    Mono # 0.7 0.3 - 1.0 K/uL    Eos # 0.0 0.0 - 0.5 K/uL    Baso # 0.05 0.00 - 0.20 K/uL    nRBC 0 0 /100 WBC    Gran % 87.6 (H) 38.0 - 73.0 %    Lymph % 5.8 (L) 18.0 - 48.0 %    Mono % 5.3 4.0 - 15.0 %    Eosinophil % 0.3 0.0 - 8.0 %    Basophil % 0.4 0.0 - 1.9 %    Differential Method Automated    Comprehensive metabolic panel   Result Value Ref Range    Sodium 138 136 - 145 mmol/L    Potassium 4.0 3.5 - 5.1 mmol/L    Chloride 104 95 - 110 mmol/L    CO2 22 (L) 23 - 29 mmol/L    Glucose 98 70 - 110 mg/dL    BUN 23 (H) 6 - 20 mg/dL    Creatinine 2.4 (H) 0.5 - 1.4 mg/dL    Calcium 8.7 8.7 - 10.5 mg/dL    Total Protein 7.4 6.0 - 8.4 g/dL    Albumin 4.1 3.5 - 5.2 g/dL    Total Bilirubin 0.4 0.1 - 1.0 mg/dL    Alkaline Phosphatase 70 55 - 135 U/L    AST 25 10 - 40 U/L    ALT 32 10 - 44 U/L    Anion Gap 12 8 - 16 mmol/L    eGFR 31 (A) >60 mL/min/1.73 m^2   Brain natriuretic peptide   Result Value Ref Range    BNP 17 0 - 99 pg/mL   Troponin I   Result Value Ref Range    Troponin I <0.006 0.000 - 0.026 ng/mL   Urinalysis, Reflex to Urine Culture Urine, Clean Catch    Specimen: Urine   Result Value Ref Range    Specimen UA Urine, Clean Catch     Color, UA Yellow Yellow, Straw, Yoko    Appearance, UA Clear Clear    pH, UA 6.0 5.0 - 8.0    Specific Gravity, UA 1.010 1.005 - 1.030    Protein, UA Trace (A) Negative    Glucose, UA Negative Negative    Ketones, UA Negative Negative    Bilirubin (UA) Negative Negative     Occult Blood UA Negative Negative    Nitrite, UA Negative Negative    Urobilinogen, UA Negative <2.0 EU/dL    Leukocytes, UA Negative Negative   Troponin I   Result Value Ref Range    Troponin I <0.006 0.000 - 0.026 ng/mL        Imaging Results:  Imaging Results              X-Ray Chest AP Portable (Final result)  Result time 05/16/23 12:58:02      Final result by Joshua Rosales MD (Timothy) (05/16/23 12:58:02)                   Impression:      No infiltrates.      Electronically signed by: Joshua Rosales MD  Date:    05/16/2023  Time:    12:58               Narrative:    EXAMINATION:  XR CHEST AP PORTABLE    CLINICAL HISTORY:  , Presyncope;    COMPARISON:  05/02/2023    FINDINGS:  Stable heart size.  Lungs appear clear.                                       The EKG was ordered, reviewed, and independently interpreted by the ED provider.  Interpretation time: 12:47  Rate: 69 BPM  Rhythm: normal sinus rhythm  Interpretation: Low voltage QRS. No STEMI.           The Emergency Provider reviewed the vital signs and test results, which are outlined above.     ED Discussion       4:20 PM: Reassessed pt at this time. Discussed with pt all pertinent ED information and results. Discussed pt dx and plan of tx. Gave pt all f/u and return to the ED instructions. All questions and concerns were addressed at this time. Pt expresses understanding of information and instructions, and is comfortable with plan to discharge. Pt is stable for discharge.    I discussed with patient and/or family/caretaker that evaluation in the ED does not suggest any emergent or life threatening medical conditions requiring immediate intervention beyond what was provided in the ED, and I believe patient is safe for discharge.  Regardless, an unremarkable evaluation in the ED does not preclude the development or presence of a serious of life threatening condition. As such, patient was instructed to return immediately for any worsening or change in  current symptoms.        Medical Decision Making:   Initial Assessment:   Patient presents with a syncopal episode prior to arrival.  This was worse with standing.  He is hypotensive on arrival which was the indication for critical care time.  Physical exam is benign.  His blood pressure is improving well with fluids.  Patient does not meet SIRS criteria on arrival medications have been reviewed  Differential Diagnosis:   Orthostatic hypotension, syncope, presyncope, dehydration, cardiac event, MI, NSTEMI, ASCVD, cardiac ischemia, UTI, sepsis, pneumonia  Clinical Tests:   Lab Tests: Ordered and Reviewed  Radiological Study: Ordered and Reviewed  Medical Tests: Ordered and Reviewed  ED Management:  Patient was evaluated with history and physical examination.  Orthostatics were obtained emergently as the patient was hypotensive.  These were strongly positive.  EKG was obtained independently interpreted by myself as normal sinus rhythm.  Chest x-ray was obtained to rule out any infiltrate.  This was negative.  I ordered blood work as well.  His initial CMP showed a mild elevation in his BUN his creatinine was elevated at 2.4.  At 13,000 white count with a normal H&H.  Patient's BNP was normal.  UA was obtained that was normal.  He had 2 troponins both were undetectable.  Clinically the patient had orthostatic hypotension secondary to dehydration.  I have treated him with 2 L of fluid with return of normal blood pressure and return of normal urine output.  Patient is feeling much better.  Chart review shows that the patient's baseline creatinine is around 2.  This is minimally worse than usual.  He does have a solitary kidney.  Received 2 L fluid feels much better.  He also has scheduled follow-up already arranged.  Patient is safe for discharge in my opinion.  He is requesting this in his agreement with it.  Advised close follow-up.  Patient verbalized understanding agreement with the plan of care and seems reliable.   Safe for discharge in my opinion.         ED Medication(s):  Medications   sodium chloride 0.9% bolus 1,000 mL 1,000 mL (0 mLs Intravenous Stopped 5/16/23 1354)   sodium chloride 0.9% bolus 1,000 mL 1,000 mL (0 mLs Intravenous Stopped 5/16/23 1444)       New Prescriptions    No medications on file        Follow-up Information       Mohan Schwartz MD.    Specialty: Family Medicine  Contact information:  [address] [address] [id number] [phone]                                 Scribe Attestation:   Scribe #1: I performed the above scribed service and the documentation accurately describes the services I performed. I attest to the accuracy of the note.     Attending:   Physician Attestation Statement for Scribe #1: I, Shahram Arriaga Jr., MD, personally performed the services described in this documentation, as scribed by Dorcas Roe and Cara Pardo, in my presence, and it is both accurate and complete.           Clinical Impression       ICD-10-CM ICD-9-CM   1. Postural dizziness with presyncope  R42 780.4    R55 780.2   2. Chronic renal failure, unspecified CKD stage  N18.9 585.9       Disposition:   Disposition: Discharged  Condition: Stable      Shahram Arriaga Jr., MD  05/16/23 5074

## 2023-05-17 ENCOUNTER — OFFICE VISIT (OUTPATIENT)
Dept: FAMILY MEDICINE | Facility: CLINIC | Age: 58
End: 2023-05-17
Payer: COMMERCIAL

## 2023-05-17 VITALS
HEART RATE: 71 BPM | RESPIRATION RATE: 20 BRPM | TEMPERATURE: 98 F | DIASTOLIC BLOOD PRESSURE: 78 MMHG | OXYGEN SATURATION: 95 % | WEIGHT: 254.5 LBS | SYSTOLIC BLOOD PRESSURE: 112 MMHG | HEIGHT: 68 IN | BODY MASS INDEX: 38.57 KG/M2

## 2023-05-17 DIAGNOSIS — R55 SYNCOPE AND COLLAPSE: Primary | ICD-10-CM

## 2023-05-17 DIAGNOSIS — E86.0 DEHYDRATION: ICD-10-CM

## 2023-05-17 DIAGNOSIS — I95.9 HYPOTENSION, UNSPECIFIED HYPOTENSION TYPE: ICD-10-CM

## 2023-05-17 PROCEDURE — 99214 PR OFFICE/OUTPT VISIT, EST, LEVL IV, 30-39 MIN: ICD-10-PCS | Mod: S$GLB,,, | Performed by: FAMILY MEDICINE

## 2023-05-17 PROCEDURE — 99214 OFFICE O/P EST MOD 30 MIN: CPT | Mod: S$GLB,,, | Performed by: FAMILY MEDICINE

## 2023-05-17 PROCEDURE — 3078F DIAST BP <80 MM HG: CPT | Mod: CPTII,S$GLB,, | Performed by: FAMILY MEDICINE

## 2023-05-17 PROCEDURE — 3066F PR DOCUMENTATION OF TREATMENT FOR NEPHROPATHY: ICD-10-PCS | Mod: CPTII,S$GLB,, | Performed by: FAMILY MEDICINE

## 2023-05-17 PROCEDURE — 1159F PR MEDICATION LIST DOCUMENTED IN MEDICAL RECORD: ICD-10-PCS | Mod: CPTII,S$GLB,, | Performed by: FAMILY MEDICINE

## 2023-05-17 PROCEDURE — 3078F PR MOST RECENT DIASTOLIC BLOOD PRESSURE < 80 MM HG: ICD-10-PCS | Mod: CPTII,S$GLB,, | Performed by: FAMILY MEDICINE

## 2023-05-17 PROCEDURE — 3066F NEPHROPATHY DOC TX: CPT | Mod: CPTII,S$GLB,, | Performed by: FAMILY MEDICINE

## 2023-05-17 PROCEDURE — 3008F BODY MASS INDEX DOCD: CPT | Mod: CPTII,S$GLB,, | Performed by: FAMILY MEDICINE

## 2023-05-17 PROCEDURE — 3008F PR BODY MASS INDEX (BMI) DOCUMENTED: ICD-10-PCS | Mod: CPTII,S$GLB,, | Performed by: FAMILY MEDICINE

## 2023-05-17 PROCEDURE — 99999 PR PBB SHADOW E&M-EST. PATIENT-LVL IV: ICD-10-PCS | Mod: PBBFAC,,, | Performed by: FAMILY MEDICINE

## 2023-05-17 PROCEDURE — 3074F SYST BP LT 130 MM HG: CPT | Mod: CPTII,S$GLB,, | Performed by: FAMILY MEDICINE

## 2023-05-17 PROCEDURE — 99999 PR PBB SHADOW E&M-EST. PATIENT-LVL IV: CPT | Mod: PBBFAC,,, | Performed by: FAMILY MEDICINE

## 2023-05-17 PROCEDURE — 4010F ACE/ARB THERAPY RXD/TAKEN: CPT | Mod: CPTII,S$GLB,, | Performed by: FAMILY MEDICINE

## 2023-05-17 PROCEDURE — 3074F PR MOST RECENT SYSTOLIC BLOOD PRESSURE < 130 MM HG: ICD-10-PCS | Mod: CPTII,S$GLB,, | Performed by: FAMILY MEDICINE

## 2023-05-17 PROCEDURE — 4010F PR ACE/ARB THEARPY RXD/TAKEN: ICD-10-PCS | Mod: CPTII,S$GLB,, | Performed by: FAMILY MEDICINE

## 2023-05-17 PROCEDURE — 1159F MED LIST DOCD IN RCRD: CPT | Mod: CPTII,S$GLB,, | Performed by: FAMILY MEDICINE

## 2023-05-17 NOTE — PROGRESS NOTES
Chief Complaint:    Chief Complaint   Patient presents with    Follow-up       History of Present Illness:    Patient says the other day he felt dizzy and passed out and then woke up with 1 child branch of people surrounding him.    Paramedics came in his blood pressure was really low.  It did not have any chest pain or palpitation.  He says his blood pressure does run low and has been low for awhile he was found to be dehydrated he says he limits water intake because he only has 1 kidney.  His creatinine done recently was 2.4.        ROS:  Review of Systems   Constitutional:  Negative for activity change, chills, fatigue, fever and unexpected weight change.   HENT:  Negative for congestion, ear discharge, ear pain, hearing loss, postnasal drip and rhinorrhea.    Eyes:  Negative for pain and visual disturbance.   Respiratory:  Negative for cough, chest tightness and shortness of breath.    Cardiovascular:  Negative for chest pain and palpitations.   Gastrointestinal:  Negative for abdominal pain, diarrhea and vomiting.   Endocrine: Negative for heat intolerance.   Genitourinary:  Negative for dysuria, flank pain, frequency and hematuria.   Musculoskeletal:  Negative for back pain, gait problem and neck pain.   Skin:  Negative for color change and rash.   Neurological:  Negative for dizziness, tremors, seizures, numbness and headaches.   Psychiatric/Behavioral:  Negative for agitation, hallucinations, self-injury, sleep disturbance and suicidal ideas. The patient is not nervous/anxious.      Past Medical History:   Diagnosis Date    Allergy     Anxiety     Cancer of kidney     Depression     Hyperlipidemia     Hypertension        Social History:  Social History     Socioeconomic History    Marital status:    Occupational History     Employer: [employer]   Tobacco Use    Smoking status: Never    Smokeless tobacco: Never    Tobacco comments:     dips    Substance and Sexual Activity    Alcohol use: Yes  "   Drug use: No    Sexual activity: Yes     Partners: Female     Birth control/protection: None       Family History:   family history includes Cancer in his maternal uncle; Hypertension in his mother; No Known Problems in his brother, maternal aunt, maternal grandfather, maternal grandmother, paternal aunt, paternal grandfather, paternal grandmother, paternal uncle, and sister.    Health Maintenance   Topic Date Due    Lipid Panel  04/11/2024    TETANUS VACCINE  10/31/2027    Hepatitis C Screening  Completed       Exam:Physical     Vital Signs  Temp: 98.4 °F (36.9 °C)  Pulse: 71  Resp: 20  SpO2: 95 %  BP: 112/78  Pain Score: 0-No pain  Height and Weight  Height: 5' 8" (172.7 cm)  Weight: 115.5 kg (254 lb 8.3 oz)  BSA (Calculated - sq m): 2.35 sq meters  BMI (Calculated): 38.7  Weight in (lb) to have BMI = 25: 164.1]    Body mass index is 38.7 kg/m².    Physical Exam  Constitutional:       Appearance: He is well-developed.   Eyes:      Conjunctiva/sclera: Conjunctivae normal.      Pupils: Pupils are equal, round, and reactive to light.   Cardiovascular:      Rate and Rhythm: Normal rate and regular rhythm.      Heart sounds: Normal heart sounds. No murmur heard.  Pulmonary:      Effort: Pulmonary effort is normal. No respiratory distress.      Breath sounds: Normal breath sounds. No wheezing or rales.   Chest:      Chest wall: No tenderness.   Abdominal:      General: There is no distension.      Palpations: Abdomen is soft. There is no mass.      Tenderness: There is no abdominal tenderness. There is no guarding.   Musculoskeletal:         General: No tenderness.      Cervical back: Normal range of motion and neck supple.   Lymphadenopathy:      Cervical: No cervical adenopathy.   Skin:     General: Skin is warm and dry.   Neurological:      Mental Status: He is alert and oriented to person, place, and time.      Deep Tendon Reflexes: Reflexes are normal and symmetric.   Psychiatric:         Behavior: Behavior " normal.         Thought Content: Thought content normal.         Judgment: Judgment normal.         Assessment:      ICD-10-CM ICD-9-CM   1. Syncope and collapse  R55 780.2   2. Hypotension, unspecified hypotension type  I95.9 458.9   3. Dehydration  E86.0 276.51         Plan:    Will discontinue hydralazine since he is only been taking it once a day, please monitor blood pressure carefully need to keep it slightly below 140/90 but not too low  Increase fluid intake at least 8 glasses per day possibly more if it is hot outside  Check labs in a couple days      No follow-ups on file.      Mohan Schwartz MD

## 2023-05-19 ENCOUNTER — HOSPITAL ENCOUNTER (OUTPATIENT)
Dept: RADIOLOGY | Facility: HOSPITAL | Age: 58
Discharge: HOME | End: 2023-05-19
Attending: UROLOGY
Payer: COMMERCIAL

## 2023-05-19 DIAGNOSIS — C64.2 RENAL CELL CARCINOMA OF LEFT KIDNEY: ICD-10-CM

## 2023-05-19 PROCEDURE — 74176 CT ABDOMEN PELVIS WITHOUT CONTRAST: ICD-10-PCS | Mod: 26,,, | Performed by: RADIOLOGY

## 2023-05-19 PROCEDURE — 74176 CT ABD & PELVIS W/O CONTRAST: CPT | Mod: 26,,, | Performed by: RADIOLOGY

## 2023-05-19 PROCEDURE — 74176 CT ABD & PELVIS W/O CONTRAST: CPT | Mod: TC

## 2023-05-30 ENCOUNTER — TELEPHONE (OUTPATIENT)
Dept: UROLOGY | Facility: CLINIC | Age: 58
End: 2023-05-30
Payer: COMMERCIAL

## 2023-05-30 NOTE — TELEPHONE ENCOUNTER
"Spoke with patient regarding [employer] paperwork, states needs to read "major organ failure". Instructed patient we would discuss with Dr. Manzo and contact him tomorrow with update.   "

## 2023-05-31 ENCOUNTER — TELEPHONE (OUTPATIENT)
Dept: UROLOGY | Facility: CLINIC | Age: 58
End: 2023-05-31
Payer: COMMERCIAL

## 2023-05-31 NOTE — TELEPHONE ENCOUNTER
"Spoke with patient regarding [employer] paperwork, informed him that Dr. Manzo addended 11-2-22 note to add "major organ failure". [employer] forms and 11-2-22 clinic note faxed to [phone] with confirmation. Pt VU.   [employer] form scanned into media.   "

## 2023-07-07 ENCOUNTER — PATIENT MESSAGE (OUTPATIENT)
Dept: INFECTIOUS DISEASES | Facility: CLINIC | Age: 58
End: 2023-07-07
Payer: COMMERCIAL

## 2023-07-29 DIAGNOSIS — E34.9 TESTOSTERONE DEFICIENCY: ICD-10-CM

## 2023-07-29 NOTE — TELEPHONE ENCOUNTER
Care Due:                  Date            Visit Type   Department     Provider  --------------------------------------------------------------------------------                                EP -                              [facility]  Last Visit: 05-      CARE ([facility])   MEDICINE       Mohan Schwartz                              [facility]  Next Visit: 10-      CARE ([facility])   [facility]       Mohan Schwartz                                                            Last  Test          Frequency    Reason                     Performed    Due Date  --------------------------------------------------------------------------------    Uric Acid...  12 months..  allopurinoL..............  Not Found    Overdue    [employer] Embedded Care Due Messages. Reference number: [id number].   7/29/2023 12:35:26 PM CDT

## 2023-07-31 RX ORDER — TESTOSTERONE CYPIONATE 200 MG/ML
INJECTION, SOLUTION INTRAMUSCULAR
Qty: 2 ML | Refills: 2 | Status: SHIPPED | OUTPATIENT
Start: 2023-07-31

## 2023-08-09 ENCOUNTER — OFFICE VISIT (OUTPATIENT)
Dept: PULMONOLOGY | Facility: CLINIC | Age: 58
End: 2023-08-09
Payer: COMMERCIAL

## 2023-08-09 VITALS
DIASTOLIC BLOOD PRESSURE: 62 MMHG | OXYGEN SATURATION: 95 % | WEIGHT: 246.13 LBS | SYSTOLIC BLOOD PRESSURE: 112 MMHG | RESPIRATION RATE: 18 BRPM | BODY MASS INDEX: 37.3 KG/M2 | HEIGHT: 68 IN | HEART RATE: 72 BPM

## 2023-08-09 DIAGNOSIS — I10 ESSENTIAL HYPERTENSION: ICD-10-CM

## 2023-08-09 DIAGNOSIS — G47.09 OTHER INSOMNIA: ICD-10-CM

## 2023-08-09 DIAGNOSIS — N18.32 STAGE 3B CHRONIC KIDNEY DISEASE: ICD-10-CM

## 2023-08-09 DIAGNOSIS — E66.01 CLASS 2 SEVERE OBESITY WITH SERIOUS COMORBIDITY AND BODY MASS INDEX (BMI) OF 37.0 TO 37.9 IN ADULT, UNSPECIFIED OBESITY TYPE: ICD-10-CM

## 2023-08-09 DIAGNOSIS — G47.33 OSA ON CPAP: Primary | ICD-10-CM

## 2023-08-09 DIAGNOSIS — Z90.5 S/P NEPHRECTOMY: ICD-10-CM

## 2023-08-09 PROCEDURE — 99999 PR PBB SHADOW E&M-EST. PATIENT-LVL IV: CPT | Mod: PBBFAC,,, | Performed by: NURSE PRACTITIONER

## 2023-08-09 PROCEDURE — 3008F BODY MASS INDEX DOCD: CPT | Mod: CPTII,S$GLB,, | Performed by: NURSE PRACTITIONER

## 2023-08-09 PROCEDURE — 3074F SYST BP LT 130 MM HG: CPT | Mod: CPTII,S$GLB,, | Performed by: NURSE PRACTITIONER

## 2023-08-09 PROCEDURE — 3078F PR MOST RECENT DIASTOLIC BLOOD PRESSURE < 80 MM HG: ICD-10-PCS | Mod: CPTII,S$GLB,, | Performed by: NURSE PRACTITIONER

## 2023-08-09 PROCEDURE — 99999 PR PBB SHADOW E&M-EST. PATIENT-LVL IV: ICD-10-PCS | Mod: PBBFAC,,, | Performed by: NURSE PRACTITIONER

## 2023-08-09 PROCEDURE — 3074F PR MOST RECENT SYSTOLIC BLOOD PRESSURE < 130 MM HG: ICD-10-PCS | Mod: CPTII,S$GLB,, | Performed by: NURSE PRACTITIONER

## 2023-08-09 PROCEDURE — 99214 PR OFFICE/OUTPT VISIT, EST, LEVL IV, 30-39 MIN: ICD-10-PCS | Mod: S$GLB,,, | Performed by: NURSE PRACTITIONER

## 2023-08-09 PROCEDURE — 3066F PR DOCUMENTATION OF TREATMENT FOR NEPHROPATHY: ICD-10-PCS | Mod: CPTII,S$GLB,, | Performed by: NURSE PRACTITIONER

## 2023-08-09 PROCEDURE — 1160F PR REVIEW ALL MEDS BY PRESCRIBER/CLIN PHARMACIST DOCUMENTED: ICD-10-PCS | Mod: CPTII,S$GLB,, | Performed by: NURSE PRACTITIONER

## 2023-08-09 PROCEDURE — 1159F MED LIST DOCD IN RCRD: CPT | Mod: CPTII,S$GLB,, | Performed by: NURSE PRACTITIONER

## 2023-08-09 PROCEDURE — 99214 OFFICE O/P EST MOD 30 MIN: CPT | Mod: S$GLB,,, | Performed by: NURSE PRACTITIONER

## 2023-08-09 PROCEDURE — 1159F PR MEDICATION LIST DOCUMENTED IN MEDICAL RECORD: ICD-10-PCS | Mod: CPTII,S$GLB,, | Performed by: NURSE PRACTITIONER

## 2023-08-09 PROCEDURE — 3066F NEPHROPATHY DOC TX: CPT | Mod: CPTII,S$GLB,, | Performed by: NURSE PRACTITIONER

## 2023-08-09 PROCEDURE — 4010F PR ACE/ARB THEARPY RXD/TAKEN: ICD-10-PCS | Mod: CPTII,S$GLB,, | Performed by: NURSE PRACTITIONER

## 2023-08-09 PROCEDURE — 3008F PR BODY MASS INDEX (BMI) DOCUMENTED: ICD-10-PCS | Mod: CPTII,S$GLB,, | Performed by: NURSE PRACTITIONER

## 2023-08-09 PROCEDURE — 3078F DIAST BP <80 MM HG: CPT | Mod: CPTII,S$GLB,, | Performed by: NURSE PRACTITIONER

## 2023-08-09 PROCEDURE — 4010F ACE/ARB THERAPY RXD/TAKEN: CPT | Mod: CPTII,S$GLB,, | Performed by: NURSE PRACTITIONER

## 2023-08-09 PROCEDURE — 1160F RVW MEDS BY RX/DR IN RCRD: CPT | Mod: CPTII,S$GLB,, | Performed by: NURSE PRACTITIONER

## 2023-08-09 NOTE — ASSESSMENT & PLAN NOTE
10/28/2022 Home Sleep Study severe obstructive sleep apnea AHI 46. Mean SpO2 94.6%  Benefits and compliant with Auto CPAP 5-20 cm with optimal control AHI 2.8  Full face mask   Follow up anually CPAP download

## 2023-08-09 NOTE — PROGRESS NOTES
Subjective:      Patient ID: Dre Curiel is a 58 y.o. male.    Patient Active Problem List   Diagnosis    Idiopathic chronic gout of right foot    Insomnia    Low testosterone    Impotence due to erectile dysfunction    Premature ejaculation    Hyperlipidemia    Former consumption of alcohol    Encounter for monitoring testosterone replacement therapy    Leukocytosis    Essential hypertension    Left renal mass    Renal mass, left    Preop cardiovascular exam    S/p nephrectomy    Stage 3b chronic kidney disease    KARLY on CPAP    Class 2 severe obesity with serious comorbidity and body mass index (BMI) of 37.0 to 37.9 in adult    Disorder of SI (sacroiliac) joint    Primary osteoarthritis of both hips    Lumbar spondylosis       Chief Complaint   Patient presents with    Sleep Apnea       Chief Complaint: Sleep Apnea      HPI:  HPI: Dre Curiel is here for follow up for KARLY and CPAP complaince assessment.   He is on Auto CPAP of 5-20 cmH2O pressure which is optimally controlling sleep apnea with apneic index (AHI) 2.8 events an hour.   He is compliant with CPAP use. Complaince download today reveals 93% of days with greater than 4 hours of device use.   Patient reports benefit from CPAP use and denies snoring and excessive daytime sleepiness.  Patient reports no complaints. Full face mask is used.     10/28/2022 Home Sleep Study severe obstructive sleep apnea AHI 46. Mean SpO2 94.6%.    Compliance  Payor Standard  Usage 06/29/2023 - 07/28/2023  Usage days 29/30 days (97%)  >= 4 hours 28 days (93%)  < 4 hours 1 days (3%)  Usage hours 225 hours 5 minutes  Average usage (total days) 7 hours 30 minutes  Average usage (days used) 7 hours 46 minutes  Median usage (days used) 7 hours 44 minutes  Total used hours (value since last reset - 07/28/2023) 1,325 hours  AirSense 11 AutoSet  Serial number [id number]  Mode AutoSet  Min Pressure 5 cmH2O  Max Pressure 20 cmH2O  EPR Fulltime  EPR level 3  Response  Standard  Therapy  Pressure - cmH2O Median: 6.5 95th percentile: 9.4 Maximum: 10.9  Leaks - L/min Median: 28.0 95th percentile: 51.1 Maximum: 67.1  Events per hour AI: 1.6 HI: 1.2 AHI: 2.8  Apnea Index Central: 0.0 Obstructive: 0.2 Unknown: 1.2  RERA Index 0.5    [place] Sleepiness Scale   EPWORTH SLEEPINESS SCALE 8/9/2023 2/13/2023 11/21/2022   Sitting and reading 1 1 1   Watching TV 0 1 0   Sitting, inactive in a public place (e.g. a theatre or a meeting) 0 1 0   As a passenger in a car for an hour without a break 0 0 1   Lying down to rest in the afternoon when circumstances permit 3 1 1   Sitting and talking to someone 0 0 0   Sitting quietly after a lunch without alcohol 0 2 1   In a car, while stopped for a few minutes in traffic 0 0 0   Total score 4 6 4        Occupational History:   Employed full time as   for [employer] .     Previous Report Reviewed: lab reports and office notes     Past Medical History: The following portions of the patient's history were reviewed and updated as appropriate:   He  has a past surgical history that includes kidney removal; Cosmetic surgery; Colonoscopy (N/A, 6/8/2018); Colonoscopy (N/A, 6/11/2018); Colonoscopy (06/2018); Laparoscopic nephrectomy, hand assisted (Left, 9/16/2021); Colonoscopy (N/A, 11/17/2021); Injection of anesthetic agent into sacroiliac joint (Bilateral, 1/24/2023); Injection of joint (Bilateral, 1/24/2023); and Injection of anesthetic agent around medial branch nerves innervating lumbar facet joint (Bilateral, 2/7/2023).  His family history includes Cancer in his maternal uncle; Hypertension in his mother; No Known Problems in his brother, maternal aunt, maternal grandfather, maternal grandmother, paternal aunt, paternal grandfather, paternal grandmother, paternal uncle, and sister.  He  reports that he has never smoked. He has never used smokeless tobacco. He reports current alcohol use. He reports that he does not  "use drugs.  He has a current medication list which includes the following prescription(s): allopurinol, amlodipine-benazepril, fluticasone propionate, gabapentin, hydrochlorothiazide, levocetirizine, metoprolol succinate, potassium chloride, pravastatin, pregabalin, sodium bicarbonate, testosterone cypionate, trazodone, and vitamin d2.  He is allergic to iodine and iodide containing products..    Review of Systems   Constitutional:  Negative for fever, chills, weight loss, weight gain, activity change, appetite change, fatigue and night sweats.   HENT:  Negative for postnasal drip, rhinorrhea, sinus pressure, voice change and congestion.    Eyes:  Negative for redness and itching.   Respiratory:  Negative for apnea, snoring, cough, sputum production, chest tightness, shortness of breath, wheezing, orthopnea, asthma nighttime symptoms, dyspnea on extertion, use of rescue inhaler and somnolence.    Cardiovascular: Negative.  Negative for chest pain, palpitations and leg swelling.   Genitourinary:  Negative for difficulty urinating and hematuria.   Endocrine:  Negative for cold intolerance and heat intolerance.    Musculoskeletal:  Negative for arthralgias, gait problem, joint swelling and myalgias.   Skin: Negative.    Gastrointestinal:  Negative for nausea, vomiting, abdominal pain and acid reflux.   Neurological:  Negative for dizziness, weakness, light-headedness and headaches.   Hematological:  Negative for adenopathy. No excessive bruising.   All other systems reviewed and are negative.     Objective:   /62   Pulse 72   Resp 18   Ht 5' 8" (1.727 m)   Wt 111.7 kg (246 lb 2.3 oz)   SpO2 95%   BMI 37.43 kg/m²   Physical Exam  Vitals and nursing note reviewed.   Constitutional:       General: He is not in acute distress.     Appearance: Normal appearance. He is well-developed. He is not ill-appearing or toxic-appearing.   HENT:      Head: Normocephalic and atraumatic.      Right Ear: External ear normal. "      Left Ear: External ear normal.      Nose: Nose normal.      Mouth/Throat:      Mouth: Mucous membranes are moist.      Pharynx: Oropharynx is clear. No oropharyngeal exudate.   Eyes:      Conjunctiva/sclera: Conjunctivae normal.   Cardiovascular:      Rate and Rhythm: Normal rate and regular rhythm.      Heart sounds: Normal heart sounds.   Pulmonary:      Effort: Pulmonary effort is normal.      Breath sounds: Normal breath sounds.   Abdominal:      Palpations: Abdomen is soft.   Musculoskeletal:      Cervical back: Normal range of motion and neck supple.   Skin:     General: Skin is warm and dry.   Neurological:      Mental Status: He is alert and oriented to person, place, and time.   Psychiatric:         Behavior: Behavior normal. Behavior is cooperative.         Thought Content: Thought content normal.         Judgment: Judgment normal.         Personal Diagnostic Review  Review of labs, xray's, cardiology reports.     Assessment:     1. KARLY on CPAP    2. Class 2 severe obesity with serious comorbidity and body mass index (BMI) of 37.0 to 37.9 in adult, unspecified obesity type    3. Other insomnia    4. Stage 3b chronic kidney disease    5. S/p nephrectomy    6. Essential hypertension          Orders Placed This Encounter   Procedures    CPAP/BIPAP SUPPLIES     Benefits and compliant  90 day supply. 4 refills  HME: [facility]     Order Specific Question:   Length of need (1-99 months):     Answer:   99     Order Specific Question:   Choose ONE mask type and its corresponding cushions and/or pillows:     Answer:    Full Face Mask, 1 per 90 days:  Full Face Cushion, (3 per 90 days)     Order Specific Question:   Choose EITHER Heated or Non-Heated Tubjing     Answer:    Non-Heated Tubing, 1 per 90 days     Order Specific Question:   Number of Days Needed:     Answer:   99     Order Specific Question:   All other supplies as needed as listed below:     Answer:    Headgear, 1 per 180 days      Order Specific Question:   All other supplies as needed as listed below:     Answer:    Chin Strap, 1 per 180 days     Order Specific Question:   All other supplies as needed as listed below:     Answer:    Disposable Filter, 6 per 90 days     Order Specific Question:   All other supplies as needed as listed below:     Answer:    Humidifier Chamber, 1 per 180 days     Order Specific Question:   All other supplies as needed as listed below:     Answer:    Non-Disposable Filter, 1 per 180 days       Plan:   Discussed diagnosis, its evaluation, treatment and usual course. All questions answered.  Problem List Items Addressed This Visit       Stage 3b chronic kidney disease     Stable, one right kidney. Continued management with [facility].           S/p nephrectomy     Left nephrectomy 9/16/2021         KARLY on CPAP - Primary     10/28/2022 Home Sleep Study severe obstructive sleep apnea AHI 46. Mean SpO2 94.6%  Benefits and compliant with Auto CPAP 5-20 cm with optimal control AHI 2.8  Full face mask   Follow up anually CPAP download          Relevant Orders    CPAP/BIPAP SUPPLIES    Insomnia     Managed by primary care provider   Trazodone 50 mg 2 tabs as needed about twice a week         Essential hypertension     Stable and controlled. Continue current treatment plan as previously prescribed with your PCP.              Class 2 severe obesity with serious comorbidity and body mass index (BMI) of 37.0 to 37.9 in adult     Encouraged calorie reduction and 30 minutes of exercise daily. Discussed impact of obesity on general health.  Wt Readings from Last 9 Encounters:   08/09/23 111.7 kg (246 lb 2.3 oz)   05/17/23 115.5 kg (254 lb 8.3 oz)   05/16/23 109.3 kg (240 lb 15.4 oz)   05/16/23 109.3 kg (240 lb 15.4 oz)   04/18/23 112.1 kg (247 lb 2.2 oz)   03/14/23 106.7 kg (235 lb 3.7 oz)   03/07/23 110.2 kg (242 lb 13.4 oz)   02/16/23 112.5 kg (248 lb 0.3 oz)   02/13/23 110.2 kg (242 lb 13.4 oz)    Body mass index is 37.43 kg/m².                  Follow up in about 1 year (around 8/9/2024) for CPAP compliance download.    Thank you for the opportunity to participate in the care of this patient.

## 2023-08-09 NOTE — ASSESSMENT & PLAN NOTE
Encouraged calorie reduction and 30 minutes of exercise daily. Discussed impact of obesity on general health.  Wt Readings from Last 9 Encounters:   08/09/23 111.7 kg (246 lb 2.3 oz)   05/17/23 115.5 kg (254 lb 8.3 oz)   05/16/23 109.3 kg (240 lb 15.4 oz)   05/16/23 109.3 kg (240 lb 15.4 oz)   04/18/23 112.1 kg (247 lb 2.2 oz)   03/14/23 106.7 kg (235 lb 3.7 oz)   03/07/23 110.2 kg (242 lb 13.4 oz)   02/16/23 112.5 kg (248 lb 0.3 oz)   02/13/23 110.2 kg (242 lb 13.4 oz)   Body mass index is 37.43 kg/m².

## 2023-08-25 ENCOUNTER — OFFICE VISIT (OUTPATIENT)
Dept: UROLOGY | Facility: CLINIC | Age: 58
End: 2023-08-25
Payer: COMMERCIAL

## 2023-08-25 VITALS — DIASTOLIC BLOOD PRESSURE: 80 MMHG | SYSTOLIC BLOOD PRESSURE: 113 MMHG | HEART RATE: 71 BPM

## 2023-08-25 DIAGNOSIS — N28.89 OTHER SPECIFIED DISORDERS OF KIDNEY AND URETER: ICD-10-CM

## 2023-08-25 DIAGNOSIS — C64.2 RENAL CELL CARCINOMA OF LEFT KIDNEY: Primary | ICD-10-CM

## 2023-08-25 DIAGNOSIS — R79.89 LOW TESTOSTERONE: ICD-10-CM

## 2023-08-25 DIAGNOSIS — N18.32 STAGE 3B CHRONIC KIDNEY DISEASE: ICD-10-CM

## 2023-08-25 PROCEDURE — 99213 PR OFFICE/OUTPT VISIT, EST, LEVL III, 20-29 MIN: ICD-10-PCS | Mod: S$GLB,,, | Performed by: UROLOGY

## 2023-08-25 PROCEDURE — 99999 PR PBB SHADOW E&M-EST. PATIENT-LVL III: CPT | Mod: PBBFAC,,, | Performed by: UROLOGY

## 2023-08-25 PROCEDURE — 3079F PR MOST RECENT DIASTOLIC BLOOD PRESSURE 80-89 MM HG: ICD-10-PCS | Mod: CPTII,S$GLB,, | Performed by: UROLOGY

## 2023-08-25 PROCEDURE — 4010F ACE/ARB THERAPY RXD/TAKEN: CPT | Mod: CPTII,S$GLB,, | Performed by: UROLOGY

## 2023-08-25 PROCEDURE — 3079F DIAST BP 80-89 MM HG: CPT | Mod: CPTII,S$GLB,, | Performed by: UROLOGY

## 2023-08-25 PROCEDURE — 1159F MED LIST DOCD IN RCRD: CPT | Mod: CPTII,S$GLB,, | Performed by: UROLOGY

## 2023-08-25 PROCEDURE — 99213 OFFICE O/P EST LOW 20 MIN: CPT | Mod: S$GLB,,, | Performed by: UROLOGY

## 2023-08-25 PROCEDURE — 3066F PR DOCUMENTATION OF TREATMENT FOR NEPHROPATHY: ICD-10-PCS | Mod: CPTII,S$GLB,, | Performed by: UROLOGY

## 2023-08-25 PROCEDURE — 81002 POCT URINE DIPSTICK WITHOUT MICROSCOPE: ICD-10-PCS | Mod: S$GLB,,, | Performed by: UROLOGY

## 2023-08-25 PROCEDURE — 1159F PR MEDICATION LIST DOCUMENTED IN MEDICAL RECORD: ICD-10-PCS | Mod: CPTII,S$GLB,, | Performed by: UROLOGY

## 2023-08-25 PROCEDURE — 1160F RVW MEDS BY RX/DR IN RCRD: CPT | Mod: CPTII,S$GLB,, | Performed by: UROLOGY

## 2023-08-25 PROCEDURE — 3074F PR MOST RECENT SYSTOLIC BLOOD PRESSURE < 130 MM HG: ICD-10-PCS | Mod: CPTII,S$GLB,, | Performed by: UROLOGY

## 2023-08-25 PROCEDURE — 81002 URINALYSIS NONAUTO W/O SCOPE: CPT | Mod: S$GLB,,, | Performed by: UROLOGY

## 2023-08-25 PROCEDURE — 4010F PR ACE/ARB THEARPY RXD/TAKEN: ICD-10-PCS | Mod: CPTII,S$GLB,, | Performed by: UROLOGY

## 2023-08-25 PROCEDURE — 3074F SYST BP LT 130 MM HG: CPT | Mod: CPTII,S$GLB,, | Performed by: UROLOGY

## 2023-08-25 PROCEDURE — 3066F NEPHROPATHY DOC TX: CPT | Mod: CPTII,S$GLB,, | Performed by: UROLOGY

## 2023-08-25 PROCEDURE — 1160F PR REVIEW ALL MEDS BY PRESCRIBER/CLIN PHARMACIST DOCUMENTED: ICD-10-PCS | Mod: CPTII,S$GLB,, | Performed by: UROLOGY

## 2023-08-25 PROCEDURE — 99999 PR PBB SHADOW E&M-EST. PATIENT-LVL III: ICD-10-PCS | Mod: PBBFAC,,, | Performed by: UROLOGY

## 2023-08-25 NOTE — PROGRESS NOTES
Chief Complaint:   Encounter Diagnoses   Name Primary?    Renal cell carcinoma of left kidney Yes    Stage 3b chronic kidney disease     Low testosterone        HPI:    8/25/23- patient is currently doing well, here today to discuss imaging and labs.  PCP is giving him his testosterone.    55-year-old gentleman who comes in with a suspicious left renal mass.  MRI has been completed, would not have these results.  He has significant history of a right renal trauma in between 2nd and 3rd grades, requiring partial nephrectomy of the right kidney.  This ultrasound was done due to an abnormality of the gallbladder previously seen on past ultrasound 3 years ago.  Patient has had no other urological history, no family history of urological cancers or stones.  Patient has had no gross hematuria, he does have a history of smoking, currently chews tobacco.  Patient is otherwise asymptomatic.    Allergies:  Iodine and iodide containing products    Medications:  has a current medication list which includes the following prescription(s): allopurinol, amlodipine-benazepril, fluticasone propionate, gabapentin, hydrochlorothiazide, levocetirizine, metoprolol succinate, potassium chloride, pravastatin, pregabalin, sodium bicarbonate, testosterone cypionate, trazodone, and vitamin d2.    Review of Systems:  General: No fever, chills, fatigability, or weight loss.  Skin: No rashes, itching, or changes in color or texture of skin.  Chest: Denies BRIGGS, cyanosis, wheezing, cough, and sputum production.  Abdomen: Appetite fine. No weight loss. Denies diarrhea, abdominal pain, hematemesis, or blood in stool.  Musculoskeletal: No joint stiffness or swelling. Denies back pain.  : As above.  All other review of systems negative.    PMH:   has a past medical history of Allergy, Anxiety, Cancer of kidney, Depression, Hyperlipidemia, and Hypertension.    PSH:   has a past surgical history that includes kidney removal; Cosmetic surgery;  Colonoscopy (N/A, 6/8/2018); Colonoscopy (N/A, 6/11/2018); Colonoscopy (06/2018); Laparoscopic nephrectomy, hand assisted (Left, 9/16/2021); Colonoscopy (N/A, 11/17/2021); Injection of anesthetic agent into sacroiliac joint (Bilateral, 1/24/2023); Injection of joint (Bilateral, 1/24/2023); and Injection of anesthetic agent around medial branch nerves innervating lumbar facet joint (Bilateral, 2/7/2023).    FamHx: family history includes Cancer in his maternal uncle; Hypertension in his mother; No Known Problems in his brother, maternal aunt, maternal grandfather, maternal grandmother, paternal aunt, paternal grandfather, paternal grandmother, paternal uncle, and sister.    SocHx:  reports that he has never smoked. He has never used smokeless tobacco. He reports current alcohol use. He reports that he does not use drugs.      Physical Exam:  Vitals:    08/25/23 1524   BP: 113/80   Pulse: 71       General: A&Ox3, no apparent distress, no deformities  Neck: No masses, normal ROM  Lungs: normal inspiration, no use of accessory muscles  Heart: normal pulse, no arrhythmias  Abdomen: Soft, NT, ND, no masses, no hernias, no hepatosplenomegaly, incisions are intact  Skin: The skin is warm and dry. No jaundice.  Ext: No c/c/e.    Labs/Studies:   UA trace blood 8/23  CT non-contrasted no mets 5/23  CXR clear 5/23  CT renogram 9.2 cm left renal lesion 7/21  Lasix renogram 45% right/55% left 7/21  Creatinine 1.7 5/23  PSA 0.42 4/23  Testosterone 784 10/22  Testosterone 247 4/22    Impression/Plan:       1. Left renal cell carcinoma-  pT3a F3 (renal sinus fat)  left EDWIN radical Nx  9/16/21    Patient is here for surveillance screening.  Labs, chest x-ray and CT are stable.  At this point will go ahead and pursue chest x-ray, labs and CT on a yearly basis as he is now 2 years out with no sign of recurrence.  Due to radical nephrectomy, patient now has solitary kidney and major organ failure.  Current renal function is reduced,  please see labs.  Nephrectomy secondary to renal cell carcinoma.  Trace blood on UA today, but no real history of smoking, no evidence of gross hematuria, he will monitor closely.  Of note no voiding complaints.    2. Hypogonadism- 200 mg every 2 weeks by his PCP.  Patient is being followed by Nephrology.

## 2023-08-28 LAB
BILIRUB SERPL-MCNC: NEGATIVE MG/DL
BLOOD URINE, POC: NORMAL
CLARITY, POC UA: CLEAR
COLOR, POC UA: YELLOW
GLUCOSE UR QL STRIP: NEGATIVE
KETONES UR QL STRIP: NEGATIVE
LEUKOCYTE ESTERASE URINE, POC: NEGATIVE
NITRITE, POC UA: NEGATIVE
PH, POC UA: 6.5
PROTEIN, POC: NEGATIVE
SPECIFIC GRAVITY, POC UA: 1.03
UROBILINOGEN, POC UA: 0.2

## 2023-10-17 ENCOUNTER — LAB VISIT (OUTPATIENT)
Dept: LAB | Facility: HOSPITAL | Age: 58
End: 2023-10-17
Attending: FAMILY MEDICINE
Payer: COMMERCIAL

## 2023-10-17 DIAGNOSIS — I10 ESSENTIAL HYPERTENSION: ICD-10-CM

## 2023-10-17 DIAGNOSIS — Z51.81 ENCOUNTER FOR MONITORING TESTOSTERONE REPLACEMENT THERAPY: ICD-10-CM

## 2023-10-17 DIAGNOSIS — N18.32 STAGE 3B CHRONIC KIDNEY DISEASE: ICD-10-CM

## 2023-10-17 DIAGNOSIS — R79.89 LOW TESTOSTERONE: ICD-10-CM

## 2023-10-17 DIAGNOSIS — Z90.5 S/P NEPHRECTOMY: ICD-10-CM

## 2023-10-17 DIAGNOSIS — Z79.890 ENCOUNTER FOR MONITORING TESTOSTERONE REPLACEMENT THERAPY: ICD-10-CM

## 2023-10-17 LAB
ALBUMIN SERPL BCP-MCNC: 3.8 G/DL (ref 3.5–5.2)
ALP SERPL-CCNC: 92 U/L (ref 55–135)
ALT SERPL W/O P-5'-P-CCNC: 28 U/L (ref 10–44)
ANION GAP SERPL CALC-SCNC: 12 MMOL/L (ref 8–16)
AST SERPL-CCNC: 25 U/L (ref 10–40)
BASOPHILS # BLD AUTO: 0.06 K/UL (ref 0–0.2)
BASOPHILS NFR BLD: 0.8 % (ref 0–1.9)
BILIRUB SERPL-MCNC: 0.3 MG/DL (ref 0.1–1)
BUN SERPL-MCNC: 19 MG/DL (ref 6–20)
CALCIUM SERPL-MCNC: 9.3 MG/DL (ref 8.7–10.5)
CHLORIDE SERPL-SCNC: 105 MMOL/L (ref 95–110)
CHOLEST SERPL-MCNC: 170 MG/DL (ref 120–199)
CHOLEST/HDLC SERPL: 4.9 {RATIO} (ref 2–5)
CO2 SERPL-SCNC: 22 MMOL/L (ref 23–29)
COMPLEXED PSA SERPL-MCNC: 0.53 NG/ML (ref 0–4)
CREAT SERPL-MCNC: 1.7 MG/DL (ref 0.5–1.4)
DIFFERENTIAL METHOD: ABNORMAL
EOSINOPHIL # BLD AUTO: 0.2 K/UL (ref 0–0.5)
EOSINOPHIL NFR BLD: 2.7 % (ref 0–8)
ERYTHROCYTE [DISTWIDTH] IN BLOOD BY AUTOMATED COUNT: 13.4 % (ref 11.5–14.5)
EST. GFR  (NO RACE VARIABLE): 46.4 ML/MIN/1.73 M^2
ESTIMATED AVG GLUCOSE: 108 MG/DL (ref 68–131)
GLUCOSE SERPL-MCNC: 107 MG/DL (ref 70–110)
HBA1C MFR BLD: 5.4 % (ref 4–5.6)
HCT VFR BLD AUTO: 43.6 % (ref 40–54)
HDLC SERPL-MCNC: 35 MG/DL (ref 40–75)
HDLC SERPL: 20.6 % (ref 20–50)
HGB BLD-MCNC: 14.1 G/DL (ref 14–18)
IMM GRANULOCYTES # BLD AUTO: 0.05 K/UL (ref 0–0.04)
IMM GRANULOCYTES NFR BLD AUTO: 0.6 % (ref 0–0.5)
LDLC SERPL CALC-MCNC: 65.8 MG/DL (ref 63–159)
LYMPHOCYTES # BLD AUTO: 1.7 K/UL (ref 1–4.8)
LYMPHOCYTES NFR BLD: 21.4 % (ref 18–48)
MCH RBC QN AUTO: 29.7 PG (ref 27–31)
MCHC RBC AUTO-ENTMCNC: 32.3 G/DL (ref 32–36)
MCV RBC AUTO: 92 FL (ref 82–98)
MONOCYTES # BLD AUTO: 0.7 K/UL (ref 0.3–1)
MONOCYTES NFR BLD: 8.4 % (ref 4–15)
NEUTROPHILS # BLD AUTO: 5.2 K/UL (ref 1.8–7.7)
NEUTROPHILS NFR BLD: 66.1 % (ref 38–73)
NONHDLC SERPL-MCNC: 135 MG/DL
NRBC BLD-RTO: 0 /100 WBC
PLATELET # BLD AUTO: 224 K/UL (ref 150–450)
PMV BLD AUTO: 11.4 FL (ref 9.2–12.9)
POTASSIUM SERPL-SCNC: 4.2 MMOL/L (ref 3.5–5.1)
PROT SERPL-MCNC: 7.4 G/DL (ref 6–8.4)
RBC # BLD AUTO: 4.74 M/UL (ref 4.6–6.2)
SODIUM SERPL-SCNC: 139 MMOL/L (ref 136–145)
TESTOST SERPL-MCNC: 633 NG/DL (ref 304–1227)
TRIGL SERPL-MCNC: 346 MG/DL (ref 30–150)
WBC # BLD AUTO: 7.86 K/UL (ref 3.9–12.7)

## 2023-10-17 PROCEDURE — 36415 COLL VENOUS BLD VENIPUNCTURE: CPT | Mod: PO | Performed by: FAMILY MEDICINE

## 2023-10-17 PROCEDURE — 85025 COMPLETE CBC W/AUTO DIFF WBC: CPT | Performed by: FAMILY MEDICINE

## 2023-10-17 PROCEDURE — 80061 LIPID PANEL: CPT | Performed by: FAMILY MEDICINE

## 2023-10-17 PROCEDURE — 84403 ASSAY OF TOTAL TESTOSTERONE: CPT | Performed by: FAMILY MEDICINE

## 2023-10-17 PROCEDURE — 80053 COMPREHEN METABOLIC PANEL: CPT | Performed by: FAMILY MEDICINE

## 2023-10-17 PROCEDURE — 84153 ASSAY OF PSA TOTAL: CPT | Performed by: FAMILY MEDICINE

## 2023-10-17 PROCEDURE — 83036 HEMOGLOBIN GLYCOSYLATED A1C: CPT | Performed by: FAMILY MEDICINE

## 2023-10-25 ENCOUNTER — OFFICE VISIT (OUTPATIENT)
Dept: FAMILY MEDICINE | Facility: CLINIC | Age: 58
End: 2023-10-25
Payer: COMMERCIAL

## 2023-10-25 VITALS
SYSTOLIC BLOOD PRESSURE: 110 MMHG | BODY MASS INDEX: 37.79 KG/M2 | HEART RATE: 68 BPM | DIASTOLIC BLOOD PRESSURE: 78 MMHG | WEIGHT: 248.56 LBS | OXYGEN SATURATION: 97 %

## 2023-10-25 DIAGNOSIS — Z85.528 HISTORY OF RENAL CELL CARCINOMA: ICD-10-CM

## 2023-10-25 DIAGNOSIS — G47.33 OSA ON CPAP: ICD-10-CM

## 2023-10-25 DIAGNOSIS — Z79.890 ENCOUNTER FOR MONITORING TESTOSTERONE REPLACEMENT THERAPY: Primary | ICD-10-CM

## 2023-10-25 DIAGNOSIS — N18.32 STAGE 3B CHRONIC KIDNEY DISEASE: ICD-10-CM

## 2023-10-25 DIAGNOSIS — I10 ESSENTIAL HYPERTENSION: ICD-10-CM

## 2023-10-25 DIAGNOSIS — Z51.81 ENCOUNTER FOR MONITORING TESTOSTERONE REPLACEMENT THERAPY: Primary | ICD-10-CM

## 2023-10-25 PROCEDURE — 99214 PR OFFICE/OUTPT VISIT, EST, LEVL IV, 30-39 MIN: ICD-10-PCS | Mod: S$GLB,,, | Performed by: FAMILY MEDICINE

## 2023-10-25 PROCEDURE — 3078F PR MOST RECENT DIASTOLIC BLOOD PRESSURE < 80 MM HG: ICD-10-PCS | Mod: CPTII,S$GLB,, | Performed by: FAMILY MEDICINE

## 2023-10-25 PROCEDURE — 3008F PR BODY MASS INDEX (BMI) DOCUMENTED: ICD-10-PCS | Mod: CPTII,S$GLB,, | Performed by: FAMILY MEDICINE

## 2023-10-25 PROCEDURE — 3044F HG A1C LEVEL LT 7.0%: CPT | Mod: CPTII,S$GLB,, | Performed by: FAMILY MEDICINE

## 2023-10-25 PROCEDURE — 99999 PR PBB SHADOW E&M-EST. PATIENT-LVL IV: CPT | Mod: PBBFAC,,, | Performed by: FAMILY MEDICINE

## 2023-10-25 PROCEDURE — 99214 OFFICE O/P EST MOD 30 MIN: CPT | Mod: S$GLB,,, | Performed by: FAMILY MEDICINE

## 2023-10-25 PROCEDURE — 3044F PR MOST RECENT HEMOGLOBIN A1C LEVEL <7.0%: ICD-10-PCS | Mod: CPTII,S$GLB,, | Performed by: FAMILY MEDICINE

## 2023-10-25 PROCEDURE — 1159F PR MEDICATION LIST DOCUMENTED IN MEDICAL RECORD: ICD-10-PCS | Mod: CPTII,S$GLB,, | Performed by: FAMILY MEDICINE

## 2023-10-25 PROCEDURE — 3078F DIAST BP <80 MM HG: CPT | Mod: CPTII,S$GLB,, | Performed by: FAMILY MEDICINE

## 2023-10-25 PROCEDURE — 1159F MED LIST DOCD IN RCRD: CPT | Mod: CPTII,S$GLB,, | Performed by: FAMILY MEDICINE

## 2023-10-25 PROCEDURE — 3066F PR DOCUMENTATION OF TREATMENT FOR NEPHROPATHY: ICD-10-PCS | Mod: CPTII,S$GLB,, | Performed by: FAMILY MEDICINE

## 2023-10-25 PROCEDURE — 4010F ACE/ARB THERAPY RXD/TAKEN: CPT | Mod: CPTII,S$GLB,, | Performed by: FAMILY MEDICINE

## 2023-10-25 PROCEDURE — 99999 PR PBB SHADOW E&M-EST. PATIENT-LVL IV: ICD-10-PCS | Mod: PBBFAC,,, | Performed by: FAMILY MEDICINE

## 2023-10-25 PROCEDURE — 3074F SYST BP LT 130 MM HG: CPT | Mod: CPTII,S$GLB,, | Performed by: FAMILY MEDICINE

## 2023-10-25 PROCEDURE — 3074F PR MOST RECENT SYSTOLIC BLOOD PRESSURE < 130 MM HG: ICD-10-PCS | Mod: CPTII,S$GLB,, | Performed by: FAMILY MEDICINE

## 2023-10-25 PROCEDURE — 4010F PR ACE/ARB THEARPY RXD/TAKEN: ICD-10-PCS | Mod: CPTII,S$GLB,, | Performed by: FAMILY MEDICINE

## 2023-10-25 PROCEDURE — 3008F BODY MASS INDEX DOCD: CPT | Mod: CPTII,S$GLB,, | Performed by: FAMILY MEDICINE

## 2023-10-25 PROCEDURE — 3066F NEPHROPATHY DOC TX: CPT | Mod: CPTII,S$GLB,, | Performed by: FAMILY MEDICINE

## 2023-10-25 NOTE — PROGRESS NOTES
Chief Complaint:    Chief Complaint   Patient presents with    Follow-up     F/u       History of Present Illness:    Patient presents today for six-month follow-up,   He is underlying hypertension hyperlipidemia, depression anxiety, chronic back pain, chronic kidney disease and history of nephrectomy secondary to renal cell carcinoma.      A1C stable, chemistries stable, CBC normal. Liver function good. Kidney function stable, creatinine 1.7. PSA stable.    KARLY on CPAP. Takes Trazodone as needed. he can not sleep sometimes.    He is also on testosterone replacement therapy doing well. Is snacking while at work, will usually only eat 2 meals a day and drink a coke in the morning.    Has not had anymore dizzy spells.    Mentions some memory issues recently due to stress from work.     ROS:  Review of Systems   Constitutional:  Negative for activity change, chills, fatigue, fever and unexpected weight change.   HENT:  Negative for congestion, ear discharge, ear pain, hearing loss, postnasal drip and rhinorrhea.    Eyes:  Negative for pain and visual disturbance.   Respiratory:  Negative for cough, chest tightness and shortness of breath.    Cardiovascular:  Negative for chest pain and palpitations.   Gastrointestinal:  Negative for abdominal pain, diarrhea and vomiting.   Endocrine: Negative for heat intolerance.   Genitourinary:  Negative for dysuria, flank pain, frequency and hematuria.   Musculoskeletal:  Negative for back pain, gait problem and neck pain.   Skin:  Negative for color change and rash.   Neurological:  Negative for dizziness, tremors, seizures, numbness and headaches.   Psychiatric/Behavioral:  Positive for sleep disturbance. Negative for agitation, hallucinations, self-injury and suicidal ideas. The patient is not nervous/anxious.        Past Medical History:   Diagnosis Date    Allergy     Anxiety     Cancer of kidney     Depression     Hyperlipidemia     Hypertension        Social  History:  Social History     Socioeconomic History    Marital status:    Occupational History     Employer: [employer]   Tobacco Use    Smoking status: Never    Smokeless tobacco: Never    Tobacco comments:     dips    Substance and Sexual Activity    Alcohol use: Yes    Drug use: No    Sexual activity: Yes     Partners: Female     Birth control/protection: None       Family History:   family history includes Cancer in his maternal uncle; Hypertension in his mother; No Known Problems in his brother, maternal aunt, maternal grandfather, maternal grandmother, paternal aunt, paternal grandfather, paternal grandmother, paternal uncle, and sister.    Health Maintenance   Topic Date Due    Lipid Panel  10/17/2024    Colorectal Cancer Screening  11/17/2024    TETANUS VACCINE  10/31/2027    Hepatitis C Screening  Completed    Shingles Vaccine  Completed       Exam:Physical     Vital Signs  Pulse: 68  SpO2: 97 %  BP: 110/78  BP Location: Left arm  Patient Position: Sitting  Pain Score: 0-No pain  Height and Weight  Weight: 112.8 kg (248 lb 9.1 oz)]    Body mass index is 37.79 kg/m².    Physical Exam  Constitutional:       Appearance: He is well-developed.   Eyes:      Conjunctiva/sclera: Conjunctivae normal.      Pupils: Pupils are equal, round, and reactive to light.   Cardiovascular:      Rate and Rhythm: Normal rate and regular rhythm.      Heart sounds: Normal heart sounds. No murmur heard.  Pulmonary:      Effort: Pulmonary effort is normal. No respiratory distress.      Breath sounds: Normal breath sounds. No wheezing or rales.   Chest:      Chest wall: No tenderness.   Abdominal:      General: There is no distension.      Palpations: Abdomen is soft. There is no mass.      Tenderness: There is no abdominal tenderness. There is no guarding.   Musculoskeletal:         General: No tenderness.      Cervical back: Normal range of motion and neck supple.   Lymphadenopathy:      Cervical: No cervical adenopathy.    Skin:     General: Skin is warm and dry.   Neurological:      Mental Status: He is alert and oriented to person, place, and time.      Deep Tendon Reflexes: Reflexes are normal and symmetric.   Psychiatric:         Behavior: Behavior normal.         Thought Content: Thought content normal.         Judgment: Judgment normal.           Assessment:      ICD-10-CM ICD-9-CM   1. Encounter for monitoring testosterone replacement therapy  Z51.81 V58.83    Z79.890 V58.69   2. Essential hypertension  I10 401.9   3. Stage 3b chronic kidney disease  N18.32 585.3   4. KARLY on CPAP  G47.33 327.23   5. History of renal cell carcinoma  Z85.528 V10.52           Plan:  Labs reviewed continue current medication and plan   Recommend starting a nightly journal to write into before bed to help clear thoughts.   Intermittent fasting combined with exercise will help with weight loss.    Continue follow Nephrology for stage 3b CKD and renal cell carcinoma.  Other medical problems as above stable.  See labs below.  Follow-up 6 months.    Orders Placed This Encounter   Procedures    Comprehensive Metabolic Panel    CBC Auto Differential    Lipid Panel    PSA, Screening    Testosterone      Current Outpatient Medications   Medication Sig Dispense Refill    allopurinoL (ZYLOPRIM) 100 MG tablet TAKE ONE TABLET BY MOUTH ONCE DAILY 90 tablet 2    amLODIPine-benazepriL (LOTREL) 10-40 mg per capsule Take 1 capsule by mouth once daily. 90 capsule 3    fluticasone propionate (FLONASE) 50 mcg/actuation nasal spray 2 sprays (100 mcg total) by Each Nostril route once daily. 16 g 11    gabapentin (NEURONTIN) 300 MG capsule TAKE ONE CAPSULE BY MOUTH DURING THE DAY THEN TWO EVERY NIGHT AT BEDTIME 270 capsule 1    hydroCHLOROthiazide (HYDRODIURIL) 12.5 MG Tab Take 1 tablet (12.5 mg total) by mouth once daily. 30 tablet 11    levocetirizine (XYZAL) 5 MG tablet Take 1 tablet (5 mg total) by mouth every evening. 30 tablet 11    metoprolol succinate  (TOPROL-XL) 25 MG 24 hr tablet Take 1 tablet (25 mg total) by mouth once daily. 90 tablet 3    potassium chloride (KLOR-CON) 10 MEQ TbSR Take 10 mEq by mouth every Mon, Wed, Fri, Sun.      pravastatin (PRAVACHOL) 40 MG tablet Take 1 tablet (40 mg total) by mouth once daily. 90 tablet 3    pregabalin (LYRICA) 225 MG Cap Take 1 capsule (225 mg total) by mouth 2 (two) times daily. 60 capsule 5    sodium bicarbonate 650 MG tablet Take 1 tablet (650 mg total) by mouth before breakfast. With food 180 tablet 1    testosterone cypionate (DEPOTESTOTERONE CYPIONATE) 200 mg/mL injection INJECT ONE ML INTO MUSCLE EVERY 14 DAYS 2 mL 2    traZODone (DESYREL) 50 MG tablet Take 1 tablet (50 mg total) by mouth once daily. 90 tablet 3    VITAMIN D2 1,250 mcg (50,000 unit) capsule TAKE ONE CAPSULE BY MOUTH ONCE WEEKLY 12 capsule 1     No current facility-administered medications for this visit.     There are no discontinued medications.      Follow up in about 6 months (around 4/25/2024).      Mohan Schwartz MD    Scribe Attestation:   I, Francesco Aleman, am scribing for, and in the presence of, Dr.Arif Schwartz I performed the above scribed service and the documentation accurately describes the services I performed. I attest to the accuracy of the note.    I, Dr. Mohan Schwartz, reviewed documentation as scribed above. I performed the services described in this documentation.  I agree that the record reflects my personal performance and is accurate and complete. Mohan Schwartz MD.  10/25/2023

## 2023-11-02 DIAGNOSIS — N28.89 OTHER SPECIFIED DISORDERS OF KIDNEY AND URETER: ICD-10-CM

## 2023-11-02 RX ORDER — POTASSIUM CHLORIDE 750 MG/1
TABLET, EXTENDED RELEASE ORAL
Qty: 90 TABLET | Refills: 3 | Status: SHIPPED | OUTPATIENT
Start: 2023-11-02

## 2023-11-02 NOTE — TELEPHONE ENCOUNTER
Care Due:                  Date            Visit Type   Department     Provider  --------------------------------------------------------------------------------                                EP -                              [facility] FAMILY  Last Visit: 10-      CARE ([facility])   MEDICINE       Mohan Schwartz                              [facility]  Next Visit: 05-      CARE ([facility])   [facility]       Mohan Schwartz                                                            Last  Test          Frequency    Reason                     Performed    Due Date  --------------------------------------------------------------------------------    Uric Acid...  12 months..  allopurinoL..............  Not Found    Overdue    [employer] Embedded Care Due Messages. Reference number: [id number].   11/02/2023 8:01:46 AM CDT

## 2023-11-02 NOTE — TELEPHONE ENCOUNTER
Refill Routing Note   Medication(s) are not appropriate for processing by [facility] for the following reason(s):      Required labs abnormal: Cr  Clarification of medication (Rx) details  No active prescription written by provider    [facility] action(s):  Defer Care Due:  Labs due     Medication Therapy Plan: Directions on pharmacy request state Klor-Con 10 mEq 1 tab daily, directions on pt-reported med state Klor-Con Take 10 mEq by mouth every Mon, Wed, Fri, Sun.        Appointments  past 12m or future 3m with PCP    Date Provider   Last Visit   10/25/2023 Mohan Schwartz MD   Next Visit   Visit date not found Mohan Schwartz MD   ED visits in past 90 days: 0        Note composed:2:30 PM 11/02/2023

## 2023-11-06 ENCOUNTER — PATIENT MESSAGE (OUTPATIENT)
Dept: FAMILY MEDICINE | Facility: CLINIC | Age: 58
End: 2023-11-06

## 2023-11-06 ENCOUNTER — OFFICE VISIT (OUTPATIENT)
Dept: FAMILY MEDICINE | Facility: CLINIC | Age: 58
End: 2023-11-06
Payer: COMMERCIAL

## 2023-11-06 VITALS
DIASTOLIC BLOOD PRESSURE: 82 MMHG | TEMPERATURE: 97 F | HEART RATE: 66 BPM | HEIGHT: 68 IN | BODY MASS INDEX: 36.79 KG/M2 | WEIGHT: 242.75 LBS | SYSTOLIC BLOOD PRESSURE: 112 MMHG | OXYGEN SATURATION: 96 %

## 2023-11-06 DIAGNOSIS — J06.9 UPPER RESPIRATORY TRACT INFECTION, UNSPECIFIED TYPE: Primary | ICD-10-CM

## 2023-11-06 DIAGNOSIS — R05.9 COUGH, UNSPECIFIED TYPE: ICD-10-CM

## 2023-11-06 PROCEDURE — 3066F PR DOCUMENTATION OF TREATMENT FOR NEPHROPATHY: ICD-10-PCS | Mod: CPTII,S$GLB,, | Performed by: STUDENT IN AN ORGANIZED HEALTH CARE EDUCATION/TRAINING PROGRAM

## 2023-11-06 PROCEDURE — 4010F PR ACE/ARB THEARPY RXD/TAKEN: ICD-10-PCS | Mod: CPTII,S$GLB,, | Performed by: STUDENT IN AN ORGANIZED HEALTH CARE EDUCATION/TRAINING PROGRAM

## 2023-11-06 PROCEDURE — 4010F ACE/ARB THERAPY RXD/TAKEN: CPT | Mod: CPTII,S$GLB,, | Performed by: STUDENT IN AN ORGANIZED HEALTH CARE EDUCATION/TRAINING PROGRAM

## 2023-11-06 PROCEDURE — 1159F PR MEDICATION LIST DOCUMENTED IN MEDICAL RECORD: ICD-10-PCS | Mod: CPTII,S$GLB,, | Performed by: STUDENT IN AN ORGANIZED HEALTH CARE EDUCATION/TRAINING PROGRAM

## 2023-11-06 PROCEDURE — 3008F BODY MASS INDEX DOCD: CPT | Mod: CPTII,S$GLB,, | Performed by: STUDENT IN AN ORGANIZED HEALTH CARE EDUCATION/TRAINING PROGRAM

## 2023-11-06 PROCEDURE — 3044F PR MOST RECENT HEMOGLOBIN A1C LEVEL <7.0%: ICD-10-PCS | Mod: CPTII,S$GLB,, | Performed by: STUDENT IN AN ORGANIZED HEALTH CARE EDUCATION/TRAINING PROGRAM

## 2023-11-06 PROCEDURE — 3066F NEPHROPATHY DOC TX: CPT | Mod: CPTII,S$GLB,, | Performed by: STUDENT IN AN ORGANIZED HEALTH CARE EDUCATION/TRAINING PROGRAM

## 2023-11-06 PROCEDURE — 3008F PR BODY MASS INDEX (BMI) DOCUMENTED: ICD-10-PCS | Mod: CPTII,S$GLB,, | Performed by: STUDENT IN AN ORGANIZED HEALTH CARE EDUCATION/TRAINING PROGRAM

## 2023-11-06 PROCEDURE — 3074F SYST BP LT 130 MM HG: CPT | Mod: CPTII,S$GLB,, | Performed by: STUDENT IN AN ORGANIZED HEALTH CARE EDUCATION/TRAINING PROGRAM

## 2023-11-06 PROCEDURE — 3079F PR MOST RECENT DIASTOLIC BLOOD PRESSURE 80-89 MM HG: ICD-10-PCS | Mod: CPTII,S$GLB,, | Performed by: STUDENT IN AN ORGANIZED HEALTH CARE EDUCATION/TRAINING PROGRAM

## 2023-11-06 PROCEDURE — 99999 PR PBB SHADOW E&M-EST. PATIENT-LVL IV: ICD-10-PCS | Mod: PBBFAC,,, | Performed by: STUDENT IN AN ORGANIZED HEALTH CARE EDUCATION/TRAINING PROGRAM

## 2023-11-06 PROCEDURE — 1159F MED LIST DOCD IN RCRD: CPT | Mod: CPTII,S$GLB,, | Performed by: STUDENT IN AN ORGANIZED HEALTH CARE EDUCATION/TRAINING PROGRAM

## 2023-11-06 PROCEDURE — 99213 PR OFFICE/OUTPT VISIT, EST, LEVL III, 20-29 MIN: ICD-10-PCS | Mod: S$GLB,,, | Performed by: STUDENT IN AN ORGANIZED HEALTH CARE EDUCATION/TRAINING PROGRAM

## 2023-11-06 PROCEDURE — 99999 PR PBB SHADOW E&M-EST. PATIENT-LVL IV: CPT | Mod: PBBFAC,,, | Performed by: STUDENT IN AN ORGANIZED HEALTH CARE EDUCATION/TRAINING PROGRAM

## 2023-11-06 PROCEDURE — 99213 OFFICE O/P EST LOW 20 MIN: CPT | Mod: S$GLB,,, | Performed by: STUDENT IN AN ORGANIZED HEALTH CARE EDUCATION/TRAINING PROGRAM

## 2023-11-06 PROCEDURE — 3044F HG A1C LEVEL LT 7.0%: CPT | Mod: CPTII,S$GLB,, | Performed by: STUDENT IN AN ORGANIZED HEALTH CARE EDUCATION/TRAINING PROGRAM

## 2023-11-06 PROCEDURE — 3074F PR MOST RECENT SYSTOLIC BLOOD PRESSURE < 130 MM HG: ICD-10-PCS | Mod: CPTII,S$GLB,, | Performed by: STUDENT IN AN ORGANIZED HEALTH CARE EDUCATION/TRAINING PROGRAM

## 2023-11-06 PROCEDURE — 3079F DIAST BP 80-89 MM HG: CPT | Mod: CPTII,S$GLB,, | Performed by: STUDENT IN AN ORGANIZED HEALTH CARE EDUCATION/TRAINING PROGRAM

## 2023-11-06 RX ORDER — GUAIFENESIN 100 MG/5ML
200 SOLUTION ORAL 3 TIMES DAILY PRN
Qty: 118 ML | Refills: 0 | Status: SHIPPED | OUTPATIENT
Start: 2023-11-06 | End: 2023-11-16

## 2023-11-06 NOTE — PROGRESS NOTES
"  Same day clinic    Dre Curiel is a 58 y.o. year old White male  has a past medical history of Allergy, Anxiety, Cancer of kidney, Depression, Hyperlipidemia, and Hypertension.. Pt presented to the clinic for cough x 2 days  Cough: Patient complains of nasal congestion and nonproductive cough.  Symptoms began 2 days ago.  The cough is non-productive, without wheezing, dyspnea or hemoptysis and is aggravated by nothing No :chills, fever, heartburn, hemoptysis, night sweats, postnasal drip, and wheezing. Patient does not have new pets. Patient does not have a history of asthma. Patient does not have a history of environmental allergens. Patient does not have recent travel. Patient does not have a history of smoking.       ROS: Negative except above    Vitals:    11/06/23 1354   BP: 112/82   BP Location: Right arm   Patient Position: Sitting   BP Method: Large (Manual)   Pulse: 66   Temp: 97 °F (36.1 °C)   TempSrc: Oral   SpO2: 96%   Weight: 110.1 kg (242 lb 11.6 oz)   Height: 5' 8" (1.727 m)       Body mass index is 36.91 kg/m².     PE:  General - Well developed, alert and oriented in NAD  HEENT - normocephalic, no evidence of trauma, sclera white, EOMI, nasal congestion +  Neck - full range of motion  COR - regular rate and rhythm without murmurs or gallops  Lungs - Clear  Abdomen - soft, non-tender  Ext - no cyanosis or edema    Lab Results   Component Value Date    WBC 7.86 10/17/2023    HGB 14.1 10/17/2023    HCT 43.6 10/17/2023    MCV 92 10/17/2023    MCH 29.7 10/17/2023    MCHC 32.3 10/17/2023    RDW 13.4 10/17/2023     10/17/2023    MPV 11.4 10/17/2023       Lab Results   Component Value Date     10/17/2023    K 4.2 10/17/2023     10/17/2023    CO2 22 (L) 10/17/2023     10/17/2023    BUN 19 10/17/2023    CALCIUM 9.3 10/17/2023    PROT 7.4 10/17/2023    ALBUMIN 3.8 10/17/2023    BILITOT 0.3 10/17/2023    AST 25 10/17/2023    ALKPHOS 92 10/17/2023    ALT 28 10/17/2023       Lab " "Results   Component Value Date    TRIG 346 (H) 10/17/2023    CHOL 170 10/17/2023    HDL 35 (L) 10/17/2023    LDLCALC 65.8 10/17/2023    CHOLHDL 20.6 10/17/2023           Lab Results   Component Value Date    HGBA1C 5.4 10/17/2023       No results found for: "MICROALBUR", "[id number]"      Impression:  1. Upper respiratory tract infection, unspecified type  guaiFENesin 100 mg/5 ml (ROBITUSSIN) 100 mg/5 mL syrup      2. Cough, unspecified type  guaiFENesin 100 mg/5 ml (ROBITUSSIN) 100 mg/5 mL syrup           Plan: Lack of antibiotic effectiveness discussed with him. Symptomatic therapy suggested: gargle for sore throat, use mist at bedside for congestion.  Apply facial warm packs for sinus pain.  May use antihistamine-decongestant of choice prn.    Patient most likely has upper respiratory infection which is caused by a virus, explained to patient that most viral infection Will resolve uneventfully, and virus they do not respond to antibiotics.  If however the symptoms persist beyond 10 days and or If they get worse or she develops sinus pain on one side of the head, and green discharge, fever or trouble breathing that would be an indication for the use of antibiotics and the patient need to contact us at that time should that happen.  At this time it's okay to treat the symptoms.  Patient understood and acknowledged.    Upper respiratory tract infection, unspecified type  -     guaiFENesin 100 mg/5 ml (ROBITUSSIN) 100 mg/5 mL syrup; Take 10 mLs (200 mg total) by mouth 3 (three) times daily as needed for Cough.  Dispense: 118 mL; Refill: 0    Cough, unspecified type  -     guaiFENesin 100 mg/5 ml (ROBITUSSIN) 100 mg/5 mL syrup; Take 10 mLs (200 mg total) by mouth 3 (three) times daily as needed for Cough.  Dispense: 118 mL; Refill: 0            No orders of the defined types were placed in this encounter.      No follow-ups on file.     Rosie Jessica MD         "

## 2023-12-04 ENCOUNTER — PATIENT MESSAGE (OUTPATIENT)
Dept: FAMILY MEDICINE | Facility: CLINIC | Age: 58
End: 2023-12-04
Payer: COMMERCIAL

## 2023-12-04 ENCOUNTER — OFFICE VISIT (OUTPATIENT)
Dept: PAIN MEDICINE | Facility: CLINIC | Age: 58
End: 2023-12-04
Payer: COMMERCIAL

## 2023-12-04 VITALS
BODY MASS INDEX: 37.39 KG/M2 | HEIGHT: 68 IN | DIASTOLIC BLOOD PRESSURE: 79 MMHG | SYSTOLIC BLOOD PRESSURE: 138 MMHG | HEART RATE: 67 BPM | WEIGHT: 246.69 LBS

## 2023-12-04 DIAGNOSIS — R29.898 WEAKNESS OF RIGHT LOWER EXTREMITY: ICD-10-CM

## 2023-12-04 DIAGNOSIS — M54.16 LUMBAR RADICULOPATHY: Primary | ICD-10-CM

## 2023-12-04 DIAGNOSIS — M51.36 DDD (DEGENERATIVE DISC DISEASE), LUMBAR: ICD-10-CM

## 2023-12-04 PROCEDURE — 3044F HG A1C LEVEL LT 7.0%: CPT | Mod: CPTII,S$GLB,, | Performed by: PHYSICIAN ASSISTANT

## 2023-12-04 PROCEDURE — 1159F MED LIST DOCD IN RCRD: CPT | Mod: CPTII,S$GLB,, | Performed by: PHYSICIAN ASSISTANT

## 2023-12-04 PROCEDURE — 4010F ACE/ARB THERAPY RXD/TAKEN: CPT | Mod: CPTII,S$GLB,, | Performed by: PHYSICIAN ASSISTANT

## 2023-12-04 PROCEDURE — 3066F PR DOCUMENTATION OF TREATMENT FOR NEPHROPATHY: ICD-10-PCS | Mod: CPTII,S$GLB,, | Performed by: PHYSICIAN ASSISTANT

## 2023-12-04 PROCEDURE — 99214 OFFICE O/P EST MOD 30 MIN: CPT | Mod: S$GLB,,, | Performed by: PHYSICIAN ASSISTANT

## 2023-12-04 PROCEDURE — 99999 PR PBB SHADOW E&M-EST. PATIENT-LVL IV: ICD-10-PCS | Mod: PBBFAC,,, | Performed by: PHYSICIAN ASSISTANT

## 2023-12-04 PROCEDURE — 3008F PR BODY MASS INDEX (BMI) DOCUMENTED: ICD-10-PCS | Mod: CPTII,S$GLB,, | Performed by: PHYSICIAN ASSISTANT

## 2023-12-04 PROCEDURE — 3075F PR MOST RECENT SYSTOLIC BLOOD PRESS GE 130-139MM HG: ICD-10-PCS | Mod: CPTII,S$GLB,, | Performed by: PHYSICIAN ASSISTANT

## 2023-12-04 PROCEDURE — 3078F PR MOST RECENT DIASTOLIC BLOOD PRESSURE < 80 MM HG: ICD-10-PCS | Mod: CPTII,S$GLB,, | Performed by: PHYSICIAN ASSISTANT

## 2023-12-04 PROCEDURE — 3008F BODY MASS INDEX DOCD: CPT | Mod: CPTII,S$GLB,, | Performed by: PHYSICIAN ASSISTANT

## 2023-12-04 PROCEDURE — 3075F SYST BP GE 130 - 139MM HG: CPT | Mod: CPTII,S$GLB,, | Performed by: PHYSICIAN ASSISTANT

## 2023-12-04 PROCEDURE — 3078F DIAST BP <80 MM HG: CPT | Mod: CPTII,S$GLB,, | Performed by: PHYSICIAN ASSISTANT

## 2023-12-04 PROCEDURE — 99999 PR PBB SHADOW E&M-EST. PATIENT-LVL IV: CPT | Mod: PBBFAC,,, | Performed by: PHYSICIAN ASSISTANT

## 2023-12-04 PROCEDURE — 99214 PR OFFICE/OUTPT VISIT, EST, LEVL IV, 30-39 MIN: ICD-10-PCS | Mod: S$GLB,,, | Performed by: PHYSICIAN ASSISTANT

## 2023-12-04 PROCEDURE — 1159F PR MEDICATION LIST DOCUMENTED IN MEDICAL RECORD: ICD-10-PCS | Mod: CPTII,S$GLB,, | Performed by: PHYSICIAN ASSISTANT

## 2023-12-04 PROCEDURE — 3044F PR MOST RECENT HEMOGLOBIN A1C LEVEL <7.0%: ICD-10-PCS | Mod: CPTII,S$GLB,, | Performed by: PHYSICIAN ASSISTANT

## 2023-12-04 PROCEDURE — 4010F PR ACE/ARB THEARPY RXD/TAKEN: ICD-10-PCS | Mod: CPTII,S$GLB,, | Performed by: PHYSICIAN ASSISTANT

## 2023-12-04 PROCEDURE — 3066F NEPHROPATHY DOC TX: CPT | Mod: CPTII,S$GLB,, | Performed by: PHYSICIAN ASSISTANT

## 2023-12-04 RX ORDER — PREGABALIN 225 MG/1
225 CAPSULE ORAL 2 TIMES DAILY
Qty: 60 CAPSULE | Refills: 3 | Status: SHIPPED | OUTPATIENT
Start: 2023-12-04

## 2023-12-04 RX ORDER — BENZONATATE 100 MG/1
100 CAPSULE ORAL 3 TIMES DAILY PRN
Qty: 30 CAPSULE | Refills: 0 | Status: SHIPPED | OUTPATIENT
Start: 2023-12-04 | End: 2023-12-14

## 2023-12-04 NOTE — H&P (VIEW-ONLY)
Est Patient Chronic Pain Note (Follow up Visit)    Referring Physician: No ref. provider found    PCP: Mohan Schwartz MD    Chief Complaint:   Chief Complaint   Patient presents with    Low-back Pain    Leg Pain    Hip Pain        SUBJECTIVE:    Interval History (12/4/2023):   Dre Curiel presents today for follow-up visit.  Patient was last seen on 5/16/23. At that visit, the plan was to continue conservative treatment and follow up as needed. Patient reports pain as 6/10 today.   The patient states since his last visit in May his pain has gradually increased in lower back and down his legs, worse with RLE. He describes pain as stabbing and also experiences numbness.   Denies recent falls, bladder/bowel changes.      Interval History (5/16/2023):  Dre Curiel presents today for follow-up visit.  Patient was last seen on 3/7/2023. Last injection bilateral SIJ + bilateral IA hip injections on 01/24/2023 followed by bilateral L3-5 MBB on on 2/7/23 with 80% relief. Reports sustained relief from these injections. Needs refill of Lyrica 225 mg BID. Patient reports pain as 2/10 today.      Interval History (3/7/2023): Dre Curiel presents today for follow-up visit.  he underwent bilateral SIJ + bilateral IA hip injections on 01/24/2023 followed by bilateral L3-5 MBB on on 2/7/23.  The patient reports that he is/was better following the procedure.  he reports 80% pain relief from each injection.  The changes lasted 4 weeks so far.  The changes have continued through this visit.  Patient reports pain as 5/10 today. He reports he has good days and bad days, but has more good days since injection. Pain is exacerbated with bending. Has some residual right sided lateral hip pain and burning and needles sensation his right thigh/leg, but this has been somewhat improved with Lyrica 225 mg BID. Also had MRI of lumbar spine with noted compression of L5 spinal nerves.    Initial HPI  Dre Curiel is a 58 y.o. male with  past medical history significant for anxiety/depression, hypertension, hyperlipidemia, stage 3 chronic kidney disease a history of renal cell carcinoma status post left-sided nephrectomy, erectile dysfunction, obesity, obstructive sleep apnea who presents to the clinic for the evaluation of lower back and leg pain.  Patient reports pain has been present for several years without inciting accident injury or trauma.  Today patient reports pain is constant which is rated a 6/10.  Pain is described as sharp and stabbing and needles poking  in nature.  Patient reports pain in a bandlike distribution in the lower back which radiates down the lateral aspect of the right lower extremity to the calf in L4-5 distribution.  Pain is exacerbated when moving from sitting to standing, with lumbar forward flexion and lateral flexion and with ambulation.  Patient is a supervisor of product distribution, manages everything and reports he is standing on his feet all day.  Patient does report sensation of buckling in the right lower extremity associated with prolonged exertion.  Pain is improved with sitting.  Patient has trialed gabapentin 300 mg, 1-2 times daily in the past without meaningful relief.  Patient reports completing 8 sessions of conventional physical therapy on 12/2022 at [facility] without any meaningful relief in range of motion, strength or pain.    Patient reports significant motor weakness.  Patient denies night fever/night sweats, urinary incontinence, bowel incontinence, significant weight loss, and loss of sensations    Pain Disability Index Review:         12/4/2023     9:01 AM 1/12/2023     8:17 AM 6/15/2018     9:00 AM   Last 3 PDI Scores   Pain Disability Index (PDI) 30 46 19       Non-Pharmacologic Treatments:  Physical Therapy/Home Exercise: yes  Ice/Heat:yes  TENS: no  Acupuncture: no  Massage: no  Chiropractic: no    Other: no      Pain Medications:  - Opioids: Percocet  (Oxycodone/Acetaminophen) and Ultram (Tramadol HCL)  - Adjuvant Medications: Neurontin (Gabapentin), Trazodone (Desyrel), Xanax (Alprazolam), and Zoloft (Sertraline)    Pain Procedures:   bilateral SIJ + bilateral IA hip injections on 01/24/2023 with 80% relief  bilateral L3-5 MBB on on 2/7/23 with 80% relief    Past Medical History:   Diagnosis Date    Allergy     Anxiety     Cancer of kidney     Depression     Hyperlipidemia     Hypertension      Past Surgical History:   Procedure Laterality Date    COLONOSCOPY N/A 6/8/2018    Procedure: COLONOSCOPY;  Surgeon: Simeon Acharya III, MD;  Location: [facility] ENDO;  Service: Endoscopy;  Laterality: N/A;    COLONOSCOPY N/A 6/11/2018    Procedure: COLONOSCOPY;  Surgeon: Simeon Acharya III, MD;  Location: [facility] ENDO;  Service: Endoscopy;  Laterality: N/A;    COLONOSCOPY  06/2018    COLONOSCOPY N/A 11/17/2021    Procedure: COLONOSCOPY;  Surgeon: Tomi Lewis MD;  Location: [facility] ENDO;  Service: Endoscopy;  Laterality: N/A;    COSMETIC SURGERY      INJECTION OF ANESTHETIC AGENT AROUND MEDIAL BRANCH NERVES INNERVATING LUMBAR FACET JOINT Bilateral 2/7/2023    Procedure: Bilateral L3-5 MBB with local;  Surgeon: Lopez Mendoza MD;  Location: [facility] PAIN MGT;  Service: Pain Management;  Laterality: Bilateral;    INJECTION OF ANESTHETIC AGENT INTO SACROILIAC JOINT Bilateral 1/24/2023    Procedure: Bilateral SIJ Injection;  Surgeon: Lopez Mendoza MD;  Location: [facility] PAIN MGT;  Service: Pain Management;  Laterality: Bilateral;    INJECTION OF JOINT Bilateral 1/24/2023    Procedure: Bilateral Hip injection;  Surgeon: Lopez Mendoza MD;  Location: [facility] PAIN MGT;  Service: Pain Management;  Laterality: Bilateral;    kidney removal      LAPAROSCOPIC NEPHRECTOMY, HAND ASSISTED Left 9/16/2021    Procedure: NEPHRECTOMY, HAND-ASSISTED, LAPAROSCOPIC;  Surgeon: Tom Manzo MD;  Location: [facility];  Service: Urology;  Laterality: Left;     Review of patient's allergies indicates:    Allergen Reactions    Iodine and iodide containing products      Other reaction(s): Swelling  Other reaction(s): Sneezing  Other reaction(s): Shortness of breath       Current Outpatient Medications   Medication Sig    allopurinoL (ZYLOPRIM) 100 MG tablet TAKE ONE TABLET BY MOUTH ONCE DAILY    amLODIPine-benazepriL (LOTREL) 10-40 mg per capsule Take 1 capsule by mouth once daily.    fluticasone propionate (FLONASE) 50 mcg/actuation nasal spray 2 sprays (100 mcg total) by Each Nostril route once daily.    gabapentin (NEURONTIN) 300 MG capsule TAKE ONE CAPSULE BY MOUTH DURING THE DAY THEN TWO EVERY NIGHT AT BEDTIME    hydroCHLOROthiazide (HYDRODIURIL) 12.5 MG Tab Take 1 tablet (12.5 mg total) by mouth once daily.    levocetirizine (XYZAL) 5 MG tablet Take 1 tablet (5 mg total) by mouth every evening.    metoprolol succinate (TOPROL-XL) 25 MG 24 hr tablet Take 1 tablet (25 mg total) by mouth once daily.    potassium chloride (KLOR-CON) 10 MEQ TbSR Take 10 mEq by mouth every Mon, Wed, Fri, Sun.    potassium chloride SA (K-DUR,KLOR-CON M) 10 MEQ tablet Take 1 tablet (10 mEq) BY MOUTH ONCE DAILY.    pravastatin (PRAVACHOL) 40 MG tablet Take 1 tablet (40 mg total) by mouth once daily.    pregabalin (LYRICA) 225 MG Cap Take 1 capsule (225 mg total) by mouth 2 (two) times daily.    sodium bicarbonate 650 MG tablet Take 1 tablet (650 mg total) by mouth before breakfast. With food    testosterone cypionate (DEPOTESTOTERONE CYPIONATE) 200 mg/mL injection INJECT ONE ML INTO MUSCLE EVERY 14 DAYS    traZODone (DESYREL) 50 MG tablet Take 1 tablet (50 mg total) by mouth once daily.    VITAMIN D2 1,250 mcg (50,000 unit) capsule TAKE ONE CAPSULE BY MOUTH ONCE WEEKLY     No current facility-administered medications for this visit.       Review of Systems     GENERAL:  No weight loss, malaise or fevers.  HEENT:   No recent changes in vision or hearing  NECK:  Negative for lumps, no difficulty with swallowing.  RESPIRATORY:  Negative  "for cough, wheezing or shortness of breath, patient denies any recent URI.  CARDIOVASCULAR:  Negative for chest pain or palpitations.  GI:  Negative for abdominal discomfort, blood in stools or black stools or change in bowel habits.  MUSCULOSKELETAL:  See HPI.  SKIN:  Negative for lesions, rash, and itching.  PSYCH:  No mood disorder or recent psychosocial stressors.   HEMATOLOGY/LYMPHOLOGY:  Negative for prolonged bleeding, bruising easily or swollen nodes.    NEURO:   No history of syncope, paralysis, seizures or tremors.  All other reviewed and negative other than HPI.    OBJECTIVE:    /79   Pulse 67   Ht 5' 8" (1.727 m)   Wt 111.9 kg (246 lb 11.1 oz)   BMI 37.51 kg/m²       Physical Exam    GENERAL: Well appearing, in no acute distress, alert and oriented x3.  PSYCH:  Mood and affect appropriate.  SKIN: Skin color, texture, turgor normal, no rashes or lesions.  HEAD/FACE:  Normocephalic, atraumatic. Cranial nerves grossly intact.  PULM: No evidence of respiratory difficulty, symmetric chest rise.  GI:  Soft and non-tender.    BACK: Straight leg raising in the sitting and supine positions is  + to radicular pain on R>L.  Minimal pain to palpation over the facet joints of the lumbar spine or spinous processes.  No pain with range of motion.     EXTREMITIES: Peripheral joint ROM is full and pain free without obvious instability or laxity in all four extremities. No deformities, edema, or skin discoloration. Good capillary refill.  MUSCULOSKELETAL: Able to stand on heels & toes.    hip provocative maneuvers are positive bilaterally but pain is improved    SIJ testing: Bilateral  - TTP over SI joint: Present, improved  - Carmen's/ Gunner's: Positive          Facet loading test is positive bilaterally.   Bilateral upper and lower extremity strength is normal and symmetric.  No atrophy or tone abnormalities are noted.    RIGHT Lower extremity: Hip flexion 4+/5, Hip Abduction 4+/5, Hip Adduction 4+/5, Knee " extension 4+/5, Knee flexion 4+/5, Ankle dorsiflexion5/5, Extensor hallucis longus 5/5, Ankle plantarflexion 5/5  LEFT Lower extremity:  Hip flexion 5/5, Hip Abduction 5/5,Hip Adduction 5/5, Knee extension 5/5, Knee flexion 5/5, Ankle dorsiflexion 5/5, Extensor hallucis longus 5/5, Ankle plantarflexion 5/5  -Normal testing knee (patellar) jerk and ankle (achilles) jerk    NEURO: Bilateral upper and lower extremity coordination and muscle stretch reflexes are physiologic and symmetric. No loss of sensation is noted.  GAIT: normal.    Imaging (Reviewed on 12/4/2023):      01/22/20  X-Ray Lumbar Spine Ap And Lateral  FINDINGS:  Vertebral body heights maintained.  No definite spondylolisthesis.  L5-S1 evaluation limited by positioning.  There is sacralization of L5.  Multilevel facet degenerative findings noted.  L4-5 and L5-S1 degenerative disc height loss.      Bilateral x-ray SI joints 12/14/2022  FINDINGS:  No evidence for ankylosis.  No erosive changes.  No widening of the AC joints.    Bilateral hip XR 12/14/22  FINDINGS:  No acute fracture or dislocation.  No significant soft tissue swelling.   The femoral heads are normally positioned within the native acetabuli.  Mild bilateral femoroacetabular degenerative osteoarthrosis with mild joint space narrowing and small osteophyte formation.  Chronic suspected bone island of the left femoral head.     Pubic symphysis, bilateral pubic rami and SI joints are intact.       MRI lumbar spine 01/23/2023  FINDINGS:  Transitional anatomy with partial sacralization of L5 peripherally.  There are bilateral L5 pars defects with grade 1 spondylolisthesis at L5-S1.  Vertebral body height is normal.  Marrow signal is within normal limits. The conus medullaris terminates at the level of L1-L2.  No abnormal signal within the conus. Intervertebral disc levels are as follows:     T12-L1 disc: Tiny chronic Schmorl's nodes.  Normal facet joints.  No significant stenosis.  The dural canal  measures 13 mm.     L1-L2 disc : Normal.     L2-L3 disc: Normal disc space height with anterior osteophytes.  Minimal facet arthropathy.  No significant spinal or foraminal stenosis.  The dural canal measures 11 mm.  No foraminal stenosis.     L3-L4 disc: Mild disc bulge.  Anterior osteophytes.  Normal facet joints.  The dural canal measures 14 mm.  No foraminal stenosis.     L4-L5 disc: Normal disc space height with mild facet arthropathy.  No significant spinal or foraminal stenosis.  The dural canal measures 15 mm.     L5-S1 disc: Bilateral L5 pars defects with grade 1 spondylolisthesis.  Severe disc space height loss most pronounced toward the right with edematous Modic endplate change.  Disc and osteophyte encroach into the exit foramina, right greater than left.  There is severe right and moderate/severe left foraminal stenosis with compression of the exiting L5 spinal nerves.  Degenerative hypertrophic change toward the right of the disc space as well as degenerative change of the pseudoarticulation of L5 and S1 narrows the exit pathway of the right L5 spinal nerve as demonstrated on axial T2 series 6, image 28.  Mild facet arthropathy.  The dural canal measures 14 mm.     Impression:     1. Isthmic grade 1 spondylolisthesis at L5-S1 with severe bilateral foraminal stenosis and possible compression of the exiting L5 spinal nerves.  2. Degenerative hypertrophic change toward the right of the L5-S1 disc space causes severe narrowing of the exit pathway of the right L5 spinal nerve as demonstrated on axial T2 image 28.  3. Edematous Modic endplate change at L5-S1 toward the right at L5-S1 may correlate to focal symptoms.       ASSESSMENT: 58 y.o. year old male with lower back and leg pain, consistent with     1. Lumbar radiculopathy  Case Request-RAD/Other Procedure Area: Bilateral Lumbar L5/S1 TF UVALDO with RN IV sedation      2. DDD (degenerative disc disease), lumbar  Case Request-RAD/Other Procedure Area:  Bilateral Lumbar L5/S1 TF UVALDO with RN IV sedation      3. Weakness of right lower extremity  Case Request-RAD/Other Procedure Area: Bilateral Lumbar L5/S1 TF UVALDO with RN IV sedation                  PLAN:   - Interventions: Schedule Lumbar TF Epidural steroid injection at L5/S1 (bilateral) to help with radicular symptoms.   Explained the risks and benefits of the procedure in detail with the patient today in clinic along with alternative treatment options, and the patient elected to pursue the intervention.      bilateral SIJ + bilateral IA hip injections on 01/24/2023 with 80% relief  bilateral L3-5 MBB on on 2/7/23 with 80% relief    - Anticoagulation use: no anticoagulation     report:  Reviewed and consistent with medication use as prescribed.      - Medications:  --Continue Lyrica 225 mg b.i.d. Prescription renewed today with refills.  We discussed potential side effects of this medication which may include drowsiness,dizziness, dry mouth, constipation or peripheral edema.  - Gabapentin discontinued. Patient is not taking this medication (it was Rx'd by PCP).    - Therapy:   We discussed continuing physical therapy to help manage the patient/s painful condition. The patient was counseled that muscle strengthening will improve the long term prognosis in regards to pain and may also help increase range of motion and mobility.    - Imaging: Reviewed MRI of lumbar spine with patient and answered any questions they had regarding study.      - Follow up visit: 4-6 weeks s/p injections.      The above plan and management options were discussed at length with patient. Patient is in agreement with the above and verbalized understanding.    - I discussed the goals of interventional chronic pain management with the patient on today's visit. We discussed a multimodal and systematic approach to pain.  This includes diagnostic and therapeutic injections, adjuvant pharmacologic treatment, physical therapy, and at times  psychiatry.  I emphasized the importance of regular exercise, core strengthening and stretching, diet and weight loss as a cornerstone of long-term pain management.    - This condition does not require this patient to take time off of work, and the primary goal of our Pain Management services is to improve the patient's functional capacity.  - Patient Questions: Answered all of the patient's questions regarding diagnoses, therapy, treatment and next steps        Devora Thompson PA-C  Interventional Pain Management  [facility]    Disclaimer:  This note was prepared using voice recognition system and is likely to have sound alike errors that may have been overlooked even after proof reading.  Please call me with any questions

## 2023-12-04 NOTE — PROGRESS NOTES
Est Patient Chronic Pain Note (Follow up Visit)    Referring Physician: No ref. provider found    PCP: Mohan Schwartz MD    Chief Complaint:   Chief Complaint   Patient presents with    Low-back Pain    Leg Pain    Hip Pain        SUBJECTIVE:    Interval History (12/4/2023):   Dre Curiel presents today for follow-up visit.  Patient was last seen on 5/16/23. At that visit, the plan was to continue conservative treatment and follow up as needed. Patient reports pain as 6/10 today.   The patient states since his last visit in May his pain has gradually increased in lower back and down his legs, worse with RLE. He describes pain as stabbing and also experiences numbness.   Denies recent falls, bladder/bowel changes.      Interval History (5/16/2023):  Dre Curiel presents today for follow-up visit.  Patient was last seen on 3/7/2023. Last injection bilateral SIJ + bilateral IA hip injections on 01/24/2023 followed by bilateral L3-5 MBB on on 2/7/23 with 80% relief. Reports sustained relief from these injections. Needs refill of Lyrica 225 mg BID. Patient reports pain as 2/10 today.      Interval History (3/7/2023): Dre Curiel presents today for follow-up visit.  he underwent bilateral SIJ + bilateral IA hip injections on 01/24/2023 followed by bilateral L3-5 MBB on on 2/7/23.  The patient reports that he is/was better following the procedure.  he reports 80% pain relief from each injection.  The changes lasted 4 weeks so far.  The changes have continued through this visit.  Patient reports pain as 5/10 today. He reports he has good days and bad days, but has more good days since injection. Pain is exacerbated with bending. Has some residual right sided lateral hip pain and burning and needles sensation his right thigh/leg, but this has been somewhat improved with Lyrica 225 mg BID. Also had MRI of lumbar spine with noted compression of L5 spinal nerves.    Initial HPI  Dre Curiel is a 58 y.o. male with  past medical history significant for anxiety/depression, hypertension, hyperlipidemia, stage 3 chronic kidney disease a history of renal cell carcinoma status post left-sided nephrectomy, erectile dysfunction, obesity, obstructive sleep apnea who presents to the clinic for the evaluation of lower back and leg pain.  Patient reports pain has been present for several years without inciting accident injury or trauma.  Today patient reports pain is constant which is rated a 6/10.  Pain is described as sharp and stabbing and needles poking  in nature.  Patient reports pain in a bandlike distribution in the lower back which radiates down the lateral aspect of the right lower extremity to the calf in L4-5 distribution.  Pain is exacerbated when moving from sitting to standing, with lumbar forward flexion and lateral flexion and with ambulation.  Patient is a supervisor of product distribution, manages everything and reports he is standing on his feet all day.  Patient does report sensation of buckling in the right lower extremity associated with prolonged exertion.  Pain is improved with sitting.  Patient has trialed gabapentin 300 mg, 1-2 times daily in the past without meaningful relief.  Patient reports completing 8 sessions of conventional physical therapy on 12/2022 at [facility] without any meaningful relief in range of motion, strength or pain.    Patient reports significant motor weakness.  Patient denies night fever/night sweats, urinary incontinence, bowel incontinence, significant weight loss, and loss of sensations    Pain Disability Index Review:         12/4/2023     9:01 AM 1/12/2023     8:17 AM 6/15/2018     9:00 AM   Last 3 PDI Scores   Pain Disability Index (PDI) 30 46 19       Non-Pharmacologic Treatments:  Physical Therapy/Home Exercise: yes  Ice/Heat:yes  TENS: no  Acupuncture: no  Massage: no  Chiropractic: no    Other: no      Pain Medications:  - Opioids: Percocet  (Oxycodone/Acetaminophen) and Ultram (Tramadol HCL)  - Adjuvant Medications: Neurontin (Gabapentin), Trazodone (Desyrel), Xanax (Alprazolam), and Zoloft (Sertraline)    Pain Procedures:   bilateral SIJ + bilateral IA hip injections on 01/24/2023 with 80% relief  bilateral L3-5 MBB on on 2/7/23 with 80% relief    Past Medical History:   Diagnosis Date    Allergy     Anxiety     Cancer of kidney     Depression     Hyperlipidemia     Hypertension      Past Surgical History:   Procedure Laterality Date    COLONOSCOPY N/A 6/8/2018    Procedure: COLONOSCOPY;  Surgeon: Simeon Acharya III, MD;  Location: [facility] ENDO;  Service: Endoscopy;  Laterality: N/A;    COLONOSCOPY N/A 6/11/2018    Procedure: COLONOSCOPY;  Surgeon: Simeon Acharya III, MD;  Location: [facility] ENDO;  Service: Endoscopy;  Laterality: N/A;    COLONOSCOPY  06/2018    COLONOSCOPY N/A 11/17/2021    Procedure: COLONOSCOPY;  Surgeon: Tomi Lewis MD;  Location: [facility] ENDO;  Service: Endoscopy;  Laterality: N/A;    COSMETIC SURGERY      INJECTION OF ANESTHETIC AGENT AROUND MEDIAL BRANCH NERVES INNERVATING LUMBAR FACET JOINT Bilateral 2/7/2023    Procedure: Bilateral L3-5 MBB with local;  Surgeon: Lopez Mendoza MD;  Location: [facility] PAIN MGT;  Service: Pain Management;  Laterality: Bilateral;    INJECTION OF ANESTHETIC AGENT INTO SACROILIAC JOINT Bilateral 1/24/2023    Procedure: Bilateral SIJ Injection;  Surgeon: Lopez Mendoza MD;  Location: [facility] PAIN MGT;  Service: Pain Management;  Laterality: Bilateral;    INJECTION OF JOINT Bilateral 1/24/2023    Procedure: Bilateral Hip injection;  Surgeon: Lopez Mendoza MD;  Location: [facility] PAIN MGT;  Service: Pain Management;  Laterality: Bilateral;    kidney removal      LAPAROSCOPIC NEPHRECTOMY, HAND ASSISTED Left 9/16/2021    Procedure: NEPHRECTOMY, HAND-ASSISTED, LAPAROSCOPIC;  Surgeon: Tom Manzo MD;  Location: [facility];  Service: Urology;  Laterality: Left;     Review of patient's allergies indicates:    Allergen Reactions    Iodine and iodide containing products      Other reaction(s): Swelling  Other reaction(s): Sneezing  Other reaction(s): Shortness of breath       Current Outpatient Medications   Medication Sig    allopurinoL (ZYLOPRIM) 100 MG tablet TAKE ONE TABLET BY MOUTH ONCE DAILY    amLODIPine-benazepriL (LOTREL) 10-40 mg per capsule Take 1 capsule by mouth once daily.    fluticasone propionate (FLONASE) 50 mcg/actuation nasal spray 2 sprays (100 mcg total) by Each Nostril route once daily.    gabapentin (NEURONTIN) 300 MG capsule TAKE ONE CAPSULE BY MOUTH DURING THE DAY THEN TWO EVERY NIGHT AT BEDTIME    hydroCHLOROthiazide (HYDRODIURIL) 12.5 MG Tab Take 1 tablet (12.5 mg total) by mouth once daily.    levocetirizine (XYZAL) 5 MG tablet Take 1 tablet (5 mg total) by mouth every evening.    metoprolol succinate (TOPROL-XL) 25 MG 24 hr tablet Take 1 tablet (25 mg total) by mouth once daily.    potassium chloride (KLOR-CON) 10 MEQ TbSR Take 10 mEq by mouth every Mon, Wed, Fri, Sun.    potassium chloride SA (K-DUR,KLOR-CON M) 10 MEQ tablet Take 1 tablet (10 mEq) BY MOUTH ONCE DAILY.    pravastatin (PRAVACHOL) 40 MG tablet Take 1 tablet (40 mg total) by mouth once daily.    pregabalin (LYRICA) 225 MG Cap Take 1 capsule (225 mg total) by mouth 2 (two) times daily.    sodium bicarbonate 650 MG tablet Take 1 tablet (650 mg total) by mouth before breakfast. With food    testosterone cypionate (DEPOTESTOTERONE CYPIONATE) 200 mg/mL injection INJECT ONE ML INTO MUSCLE EVERY 14 DAYS    traZODone (DESYREL) 50 MG tablet Take 1 tablet (50 mg total) by mouth once daily.    VITAMIN D2 1,250 mcg (50,000 unit) capsule TAKE ONE CAPSULE BY MOUTH ONCE WEEKLY     No current facility-administered medications for this visit.       Review of Systems     GENERAL:  No weight loss, malaise or fevers.  HEENT:   No recent changes in vision or hearing  NECK:  Negative for lumps, no difficulty with swallowing.  RESPIRATORY:  Negative  "for cough, wheezing or shortness of breath, patient denies any recent URI.  CARDIOVASCULAR:  Negative for chest pain or palpitations.  GI:  Negative for abdominal discomfort, blood in stools or black stools or change in bowel habits.  MUSCULOSKELETAL:  See HPI.  SKIN:  Negative for lesions, rash, and itching.  PSYCH:  No mood disorder or recent psychosocial stressors.   HEMATOLOGY/LYMPHOLOGY:  Negative for prolonged bleeding, bruising easily or swollen nodes.    NEURO:   No history of syncope, paralysis, seizures or tremors.  All other reviewed and negative other than HPI.    OBJECTIVE:    /79   Pulse 67   Ht 5' 8" (1.727 m)   Wt 111.9 kg (246 lb 11.1 oz)   BMI 37.51 kg/m²       Physical Exam    GENERAL: Well appearing, in no acute distress, alert and oriented x3.  PSYCH:  Mood and affect appropriate.  SKIN: Skin color, texture, turgor normal, no rashes or lesions.  HEAD/FACE:  Normocephalic, atraumatic. Cranial nerves grossly intact.  PULM: No evidence of respiratory difficulty, symmetric chest rise.  GI:  Soft and non-tender.    BACK: Straight leg raising in the sitting and supine positions is  + to radicular pain on R>L.  Minimal pain to palpation over the facet joints of the lumbar spine or spinous processes.  No pain with range of motion.     EXTREMITIES: Peripheral joint ROM is full and pain free without obvious instability or laxity in all four extremities. No deformities, edema, or skin discoloration. Good capillary refill.  MUSCULOSKELETAL: Able to stand on heels & toes.    hip provocative maneuvers are positive bilaterally but pain is improved    SIJ testing: Bilateral  - TTP over SI joint: Present, improved  - Carmen's/ Gunner's: Positive          Facet loading test is positive bilaterally.   Bilateral upper and lower extremity strength is normal and symmetric.  No atrophy or tone abnormalities are noted.    RIGHT Lower extremity: Hip flexion 4+/5, Hip Abduction 4+/5, Hip Adduction 4+/5, Knee " extension 4+/5, Knee flexion 4+/5, Ankle dorsiflexion5/5, Extensor hallucis longus 5/5, Ankle plantarflexion 5/5  LEFT Lower extremity:  Hip flexion 5/5, Hip Abduction 5/5,Hip Adduction 5/5, Knee extension 5/5, Knee flexion 5/5, Ankle dorsiflexion 5/5, Extensor hallucis longus 5/5, Ankle plantarflexion 5/5  -Normal testing knee (patellar) jerk and ankle (achilles) jerk    NEURO: Bilateral upper and lower extremity coordination and muscle stretch reflexes are physiologic and symmetric. No loss of sensation is noted.  GAIT: normal.    Imaging (Reviewed on 12/4/2023):      01/22/20  X-Ray Lumbar Spine Ap And Lateral  FINDINGS:  Vertebral body heights maintained.  No definite spondylolisthesis.  L5-S1 evaluation limited by positioning.  There is sacralization of L5.  Multilevel facet degenerative findings noted.  L4-5 and L5-S1 degenerative disc height loss.      Bilateral x-ray SI joints 12/14/2022  FINDINGS:  No evidence for ankylosis.  No erosive changes.  No widening of the AC joints.    Bilateral hip XR 12/14/22  FINDINGS:  No acute fracture or dislocation.  No significant soft tissue swelling.   The femoral heads are normally positioned within the native acetabuli.  Mild bilateral femoroacetabular degenerative osteoarthrosis with mild joint space narrowing and small osteophyte formation.  Chronic suspected bone island of the left femoral head.     Pubic symphysis, bilateral pubic rami and SI joints are intact.       MRI lumbar spine 01/23/2023  FINDINGS:  Transitional anatomy with partial sacralization of L5 peripherally.  There are bilateral L5 pars defects with grade 1 spondylolisthesis at L5-S1.  Vertebral body height is normal.  Marrow signal is within normal limits. The conus medullaris terminates at the level of L1-L2.  No abnormal signal within the conus. Intervertebral disc levels are as follows:     T12-L1 disc: Tiny chronic Schmorl's nodes.  Normal facet joints.  No significant stenosis.  The dural canal  measures 13 mm.     L1-L2 disc : Normal.     L2-L3 disc: Normal disc space height with anterior osteophytes.  Minimal facet arthropathy.  No significant spinal or foraminal stenosis.  The dural canal measures 11 mm.  No foraminal stenosis.     L3-L4 disc: Mild disc bulge.  Anterior osteophytes.  Normal facet joints.  The dural canal measures 14 mm.  No foraminal stenosis.     L4-L5 disc: Normal disc space height with mild facet arthropathy.  No significant spinal or foraminal stenosis.  The dural canal measures 15 mm.     L5-S1 disc: Bilateral L5 pars defects with grade 1 spondylolisthesis.  Severe disc space height loss most pronounced toward the right with edematous Modic endplate change.  Disc and osteophyte encroach into the exit foramina, right greater than left.  There is severe right and moderate/severe left foraminal stenosis with compression of the exiting L5 spinal nerves.  Degenerative hypertrophic change toward the right of the disc space as well as degenerative change of the pseudoarticulation of L5 and S1 narrows the exit pathway of the right L5 spinal nerve as demonstrated on axial T2 series 6, image 28.  Mild facet arthropathy.  The dural canal measures 14 mm.     Impression:     1. Isthmic grade 1 spondylolisthesis at L5-S1 with severe bilateral foraminal stenosis and possible compression of the exiting L5 spinal nerves.  2. Degenerative hypertrophic change toward the right of the L5-S1 disc space causes severe narrowing of the exit pathway of the right L5 spinal nerve as demonstrated on axial T2 image 28.  3. Edematous Modic endplate change at L5-S1 toward the right at L5-S1 may correlate to focal symptoms.       ASSESSMENT: 58 y.o. year old male with lower back and leg pain, consistent with     1. Lumbar radiculopathy  Case Request-RAD/Other Procedure Area: Bilateral Lumbar L5/S1 TF UVALDO with RN IV sedation      2. DDD (degenerative disc disease), lumbar  Case Request-RAD/Other Procedure Area:  Bilateral Lumbar L5/S1 TF UVALDO with RN IV sedation      3. Weakness of right lower extremity  Case Request-RAD/Other Procedure Area: Bilateral Lumbar L5/S1 TF UVALDO with RN IV sedation                  PLAN:   - Interventions: Schedule Lumbar TF Epidural steroid injection at L5/S1 (bilateral) to help with radicular symptoms.   Explained the risks and benefits of the procedure in detail with the patient today in clinic along with alternative treatment options, and the patient elected to pursue the intervention.      bilateral SIJ + bilateral IA hip injections on 01/24/2023 with 80% relief  bilateral L3-5 MBB on on 2/7/23 with 80% relief    - Anticoagulation use: no anticoagulation     report:  Reviewed and consistent with medication use as prescribed.      - Medications:  --Continue Lyrica 225 mg b.i.d. Prescription renewed today with refills.  We discussed potential side effects of this medication which may include drowsiness,dizziness, dry mouth, constipation or peripheral edema.  - Gabapentin discontinued. Patient is not taking this medication (it was Rx'd by PCP).    - Therapy:   We discussed continuing physical therapy to help manage the patient/s painful condition. The patient was counseled that muscle strengthening will improve the long term prognosis in regards to pain and may also help increase range of motion and mobility.    - Imaging: Reviewed MRI of lumbar spine with patient and answered any questions they had regarding study.      - Follow up visit: 4-6 weeks s/p injections.      The above plan and management options were discussed at length with patient. Patient is in agreement with the above and verbalized understanding.    - I discussed the goals of interventional chronic pain management with the patient on today's visit. We discussed a multimodal and systematic approach to pain.  This includes diagnostic and therapeutic injections, adjuvant pharmacologic treatment, physical therapy, and at times  psychiatry.  I emphasized the importance of regular exercise, core strengthening and stretching, diet and weight loss as a cornerstone of long-term pain management.    - This condition does not require this patient to take time off of work, and the primary goal of our Pain Management services is to improve the patient's functional capacity.  - Patient Questions: Answered all of the patient's questions regarding diagnoses, therapy, treatment and next steps        Devora Thompson PA-C  Interventional Pain Management  [facility]    Disclaimer:  This note was prepared using voice recognition system and is likely to have sound alike errors that may have been overlooked even after proof reading.  Please call me with any questions

## 2023-12-05 ENCOUNTER — PATIENT MESSAGE (OUTPATIENT)
Dept: FAMILY MEDICINE | Facility: CLINIC | Age: 58
End: 2023-12-05
Payer: COMMERCIAL

## 2023-12-13 ENCOUNTER — PATIENT MESSAGE (OUTPATIENT)
Dept: FAMILY MEDICINE | Facility: CLINIC | Age: 58
End: 2023-12-13
Payer: COMMERCIAL

## 2023-12-21 NOTE — PRE-PROCEDURE INSTRUCTIONS
Spoke with patient regarding procedure scheduled on 12.26     Arrival time 0945     Has patient been sick with fever or on antibiotics within the last 7 days? No     Does the patient have any open wounds, sores or rashes? No     Does the patient have any recent fractures? no     Has patient received a vaccination within the last 7 days? No     Received the COVID vaccination? yes     Has the patient stopped all medications as directed? na     Does patient have a pacemaker, defibrillator, or implantable stimulator? No     Does the patient have a ride to and from procedure and someone reliable to remain with patient?  son     Is the patient diabetic? no     Does the patient have sleep apnea? Or use O2 at home? jerad on cpap      Is the patient receiving sedation? yes     Is the patient instructed to remain NPO beginning at midnight the night before their procedure? yes     Procedure location confirmed with patient? Yes     Covid- Denies signs/symptoms. Instructed to notify PAT/MD if any changes.

## 2023-12-26 ENCOUNTER — HOSPITAL ENCOUNTER (OUTPATIENT)
Facility: HOSPITAL | Age: 58
Discharge: HOME | End: 2023-12-26
Attending: ANESTHESIOLOGY | Admitting: ANESTHESIOLOGY
Payer: COMMERCIAL

## 2023-12-26 VITALS
BODY MASS INDEX: 37.1 KG/M2 | TEMPERATURE: 97 F | HEIGHT: 68 IN | HEART RATE: 74 BPM | SYSTOLIC BLOOD PRESSURE: 129 MMHG | DIASTOLIC BLOOD PRESSURE: 87 MMHG | OXYGEN SATURATION: 98 % | RESPIRATION RATE: 16 BRPM | WEIGHT: 244.81 LBS

## 2023-12-26 DIAGNOSIS — M54.16 LUMBAR RADICULOPATHY, CHRONIC: ICD-10-CM

## 2023-12-26 PROCEDURE — 64483 NJX AA&/STRD TFRM EPI L/S 1: CPT | Mod: 50,,, | Performed by: ANESTHESIOLOGY

## 2023-12-26 PROCEDURE — 25000003 PHARM REV CODE 250: Performed by: ANESTHESIOLOGY

## 2023-12-26 PROCEDURE — 25500020 PHARM REV CODE 255: Performed by: ANESTHESIOLOGY

## 2023-12-26 PROCEDURE — 64483 PR EPIDURAL INJ, ANES/STEROID, TRANSFORAMINAL, LUMB/SACR, SNGL LEVL: ICD-10-PCS | Mod: 50,,, | Performed by: ANESTHESIOLOGY

## 2023-12-26 PROCEDURE — 64483 NJX AA&/STRD TFRM EPI L/S 1: CPT | Mod: 50 | Performed by: ANESTHESIOLOGY

## 2023-12-26 PROCEDURE — A9585 GADOBUTROL INJECTION: HCPCS | Performed by: ANESTHESIOLOGY

## 2023-12-26 PROCEDURE — 63600175 PHARM REV CODE 636 W HCPCS: Performed by: ANESTHESIOLOGY

## 2023-12-26 RX ORDER — DEXAMETHASONE SODIUM PHOSPHATE 10 MG/ML
INJECTION INTRAMUSCULAR; INTRAVENOUS
Status: DISCONTINUED | OUTPATIENT
Start: 2023-12-26 | End: 2023-12-26 | Stop reason: HOSPADM

## 2023-12-26 RX ORDER — GADOBUTROL 604.72 MG/ML
INJECTION INTRAVENOUS
Status: DISCONTINUED | OUTPATIENT
Start: 2023-12-26 | End: 2023-12-26 | Stop reason: HOSPADM

## 2023-12-26 RX ORDER — INDOMETHACIN 25 MG/1
CAPSULE ORAL
Status: DISCONTINUED | OUTPATIENT
Start: 2023-12-26 | End: 2023-12-26 | Stop reason: HOSPADM

## 2023-12-26 RX ORDER — BUPIVACAINE HYDROCHLORIDE 2.5 MG/ML
INJECTION, SOLUTION EPIDURAL; INFILTRATION; INTRACAUDAL
Status: DISCONTINUED | OUTPATIENT
Start: 2023-12-26 | End: 2023-12-26 | Stop reason: HOSPADM

## 2023-12-26 NOTE — OP NOTE
Dre Curiel  58 y.o. male      Vitals:    12/26/23 1021   BP: 120/70   Pulse: 80   Resp: (!) 21   Temp:      Procedure Date: 12/26/2023        INFORMED CONSENT: The procedure, risks, benefits and options were discussed with patient. There are no contraindications to the procedure. The patient expressed understanding and agreed to proceed. The personnel performing the procedure was discussed. I verify that I personally obtained consent prior to the start of the procedure and the signed consent can be found on the patient's chart.       Anesthesia:   local    The patient was monitored with continuous pulse oximetry, EKG, and intermittent blood pressure monitors.  The patient was hemodynamically stable throughout the entire process was responsive to voice, and breathing spontaneously.  Supplemental O2 was provided at 2L/min via nasal cannula.  Patient was comfortable for the duration of the procedure. (See nurse documentation and case log for sedation time)        Pre Procedure diagnosis: Lumbar radiculopathy [M54.16]  DDD (degenerative disc disease), lumbar [M51.36]  Weakness of right lower extremity [R29.898]  Post-Procedure diagnosis: SAME     Complications: None    Specimens: None      DESCRIPTION OF PROCEDURE: The patient was brought to the procedure room. IV access was obtained prior to the procedure. The patient was positioned prone on the fluoroscopy table. Continuous hemodynamic monitoring was initiated including blood pressure, EKG, and pulse oximetry. . The skin was prepped with chlorhexidine and draped in a sterile fashion.      The   L5/S1 transforaminal spaces were identified with fluoroscopy in the  AP, oblique, and lateral views.  A 22 gauge spinal quinke needle was then advanced into the area of the trans foraminal spaces bilateral with confirmation of proper needle position using AP, oblique, and lateral fluoroscopic views. Once the needle tip was in the area of the transforaminal space, and there  was no blood, CSF or paraesthesias,  1.5 mL of Omnipaque 300mg/ml was injected on bilateral for a total of 3mL.  Fluoroscopic imaging in the AP and lateral views revealed a clear outline of the spinal nerve with proximal spread of agent through the neural foramen into the epidural space. A total combination of 2mL of Bupivacaine and 10 mg dexamethasone was injected on each side and at each level with displacement of the contrast dye confirming that the medication went into the area of the transforaminal spaces bilateral. A sterile bandaid was applied.   Patient tolerated the procedure well.    Patient was taken back to the recovery room for further observation.     The patient was discharged to home in stable condition

## 2023-12-26 NOTE — DISCHARGE INSTRUCTIONS

## 2024-01-26 DIAGNOSIS — E55.9 VITAMIN D DEFICIENCY: ICD-10-CM

## 2024-01-26 RX ORDER — ERGOCALCIFEROL 1.25 1/1
CAPSULE ORAL
Qty: 12 CAPSULE | Refills: 0 | Status: SHIPPED | OUTPATIENT
Start: 2024-01-26 | End: 2024-04-23

## 2024-02-01 DIAGNOSIS — G47.09 OTHER INSOMNIA: ICD-10-CM

## 2024-02-01 DIAGNOSIS — I10 ESSENTIAL HYPERTENSION: ICD-10-CM

## 2024-02-01 RX ORDER — AMLODIPINE AND BENAZEPRIL HYDROCHLORIDE 10; 40 MG/1; MG/1
1 CAPSULE ORAL DAILY
Qty: 90 CAPSULE | Refills: 2 | Status: SHIPPED | OUTPATIENT
Start: 2024-02-01

## 2024-02-01 RX ORDER — METOPROLOL SUCCINATE 25 MG/1
25 TABLET, EXTENDED RELEASE ORAL DAILY
Qty: 90 TABLET | Refills: 2 | Status: SHIPPED | OUTPATIENT
Start: 2024-02-01 | End: 2024-10-28

## 2024-02-01 RX ORDER — TRAZODONE HYDROCHLORIDE 50 MG/1
TABLET ORAL
Qty: 90 TABLET | Refills: 2 | Status: SHIPPED | OUTPATIENT
Start: 2024-02-01

## 2024-02-01 NOTE — TELEPHONE ENCOUNTER
Care Due:                  Date            Visit Type   Department     Provider  --------------------------------------------------------------------------------                                EP -                              [facility] FAMILY  Last Visit: 11-      CARE ([facility])   MEDICINE       Rosie Jessica                              [facility] FAMILY  Next Visit: 05-      CARE ([facility])   MEDICINE       Mohan Schwartz                                                            Last  Test          Frequency    Reason                     Performed    Due Date  --------------------------------------------------------------------------------    Uric Acid...  12 months..  allopurinoL..............  Not Found    Overdue    [employer] Embedded Care Due Messages. Reference number: [id number].   2/01/2024 8:01:00 AM CST

## 2024-02-01 NOTE — TELEPHONE ENCOUNTER
Provider Staff:  Action required for this patient    Requires labs      Please see care gap opportunities below in Care Due Message.    Thanks!  [facility]     Appointments      Date Provider   Last Visit   10/25/2023 Mohan Schwartz MD   Next Visit   5/2/2024 Mohan Schwartz MD     Refill Decision Note   Dre Curiel  is requesting a refill authorization.  Brief Assessment and Rationale for Refill:  Approve     Medication Therapy Plan:        Comments:     Note composed:11:38 AM 02/01/2024

## 2024-02-05 ENCOUNTER — OFFICE VISIT (OUTPATIENT)
Dept: PAIN MEDICINE | Facility: CLINIC | Age: 59
End: 2024-02-05
Payer: COMMERCIAL

## 2024-02-05 VITALS
HEIGHT: 68 IN | WEIGHT: 247.38 LBS | SYSTOLIC BLOOD PRESSURE: 134 MMHG | DIASTOLIC BLOOD PRESSURE: 89 MMHG | HEART RATE: 75 BPM | BODY MASS INDEX: 37.49 KG/M2

## 2024-02-05 DIAGNOSIS — M51.36 DDD (DEGENERATIVE DISC DISEASE), LUMBAR: ICD-10-CM

## 2024-02-05 DIAGNOSIS — M43.17 SPONDYLOLISTHESIS OF LUMBOSACRAL REGION: ICD-10-CM

## 2024-02-05 DIAGNOSIS — M54.16 LUMBAR RADICULOPATHY, CHRONIC: Primary | ICD-10-CM

## 2024-02-05 DIAGNOSIS — R29.898 WEAKNESS OF RIGHT LOWER EXTREMITY: ICD-10-CM

## 2024-02-05 PROCEDURE — 3075F SYST BP GE 130 - 139MM HG: CPT | Mod: CPTII,S$GLB,, | Performed by: PHYSICIAN ASSISTANT

## 2024-02-05 PROCEDURE — 3008F BODY MASS INDEX DOCD: CPT | Mod: CPTII,S$GLB,, | Performed by: PHYSICIAN ASSISTANT

## 2024-02-05 PROCEDURE — 4010F ACE/ARB THERAPY RXD/TAKEN: CPT | Mod: CPTII,S$GLB,, | Performed by: PHYSICIAN ASSISTANT

## 2024-02-05 PROCEDURE — 99999 PR PBB SHADOW E&M-EST. PATIENT-LVL III: CPT | Mod: PBBFAC,,, | Performed by: PHYSICIAN ASSISTANT

## 2024-02-05 PROCEDURE — 99213 OFFICE O/P EST LOW 20 MIN: CPT | Mod: S$GLB,,, | Performed by: PHYSICIAN ASSISTANT

## 2024-02-05 PROCEDURE — 1159F MED LIST DOCD IN RCRD: CPT | Mod: CPTII,S$GLB,, | Performed by: PHYSICIAN ASSISTANT

## 2024-02-05 PROCEDURE — 3079F DIAST BP 80-89 MM HG: CPT | Mod: CPTII,S$GLB,, | Performed by: PHYSICIAN ASSISTANT

## 2024-02-05 RX ORDER — DICLOFENAC SODIUM 75 MG/1
75 TABLET, DELAYED RELEASE ORAL 2 TIMES DAILY
Qty: 180 TABLET | Refills: 2 | Status: SHIPPED | OUTPATIENT
Start: 2024-02-05

## 2024-02-05 NOTE — PROGRESS NOTES
Est Patient Chronic Pain Note (Follow up Visit)    Referring Physician: No ref. provider found    PCP: Mohan Schwartz MD    Chief Complaint:   Chief Complaint   Patient presents with    Leg Pain     Right side thigh pain   Injection follow up     SUBJECTIVE:    Interval History (2/5/2024): Patient presents today for follow-up visit.  he underwent Bilateral  lumbar  + L5/S1 TF UVALDO on 12/26/23.  The patient reports that he is/was better following the procedure.  he reports 85 % pain relief.   The changes have continued through this visit.  Patient reports pain as 2/10 today. He has no issues with the LLE anymore but continues to have pain and weakness involving RLE. It is still difficult to lift / RLE. He admits despite these concerns,  it is easier to walk since procedure.     Interval History (2/5/2024):   Dre Curiel presents today for follow-up visit.  Patient was last seen on 5/16/23. At that visit, the plan was to continue conservative treatment and follow up as needed. Patient reports pain as 6/10 today.   The patient states since his last visit in May his pain has gradually increased in lower back and down his legs, worse with RLE. He describes pain as stabbing and also experiences numbness.   Denies recent falls, bladder/bowel changes.      Interval History (5/16/2023):  Dre Curiel presents today for follow-up visit.  Patient was last seen on 3/7/2023. Last injection bilateral SIJ + bilateral IA hip injections on 01/24/2023 followed by bilateral L3-5 MBB on on 2/7/23 with 80% relief. Reports sustained relief from these injections. Needs refill of Lyrica 225 mg BID. Patient reports pain as 2/10 today.      Interval History (3/7/2023): Dre Curiel presents today for follow-up visit.  he underwent bilateral SIJ + bilateral IA hip injections on 01/24/2023 followed by bilateral L3-5 MBB on on 2/7/23.  The patient reports that he is/was better following the procedure.  he reports 80% pain relief  from each injection.  The changes lasted 4 weeks so far.  The changes have continued through this visit.  Patient reports pain as 5/10 today. He reports he has good days and bad days, but has more good days since injection. Pain is exacerbated with bending. Has some residual right sided lateral hip pain and burning and needles sensation his right thigh/leg, but this has been somewhat improved with Lyrica 225 mg BID. Also had MRI of lumbar spine with noted compression of L5 spinal nerves.    Initial HPI  Dre Curiel is a 58 y.o. male with past medical history significant for anxiety/depression, hypertension, hyperlipidemia, stage 3 chronic kidney disease a history of renal cell carcinoma status post left-sided nephrectomy, erectile dysfunction, obesity, obstructive sleep apnea who presents to the clinic for the evaluation of lower back and leg pain.  Patient reports pain has been present for several years without inciting accident injury or trauma.  Today patient reports pain is constant which is rated a 6/10.  Pain is described as sharp and stabbing and needles poking  in nature.  Patient reports pain in a bandlike distribution in the lower back which radiates down the lateral aspect of the right lower extremity to the calf in L4-5 distribution.  Pain is exacerbated when moving from sitting to standing, with lumbar forward flexion and lateral flexion and with ambulation.  Patient is a supervisor of product distribution, manages everything and reports he is standing on his feet all day.  Patient does report sensation of buckling in the right lower extremity associated with prolonged exertion.  Pain is improved with sitting.  Patient has trialed gabapentin 300 mg, 1-2 times daily in the past without meaningful relief.  Patient reports completing 8 sessions of conventional physical therapy on 12/2022 at [facility] without any meaningful relief in range of motion, strength or pain.    Patient reports  significant motor weakness.  Patient denies night fever/night sweats, urinary incontinence, bowel incontinence, significant weight loss, and loss of sensations    Pain Disability Index Review:         2/5/2024     8:16 AM 12/4/2023     9:01 AM 1/12/2023     8:17 AM   Last 3 PDI Scores   Pain Disability Index (PDI) 14 30 46       Non-Pharmacologic Treatments:  Physical Therapy/Home Exercise: yes  Ice/Heat:yes  TENS: no  Acupuncture: no  Massage: no  Chiropractic: no    Other: no      Pain Medications:  - Opioids: Percocet (Oxycodone/Acetaminophen) and Ultram (Tramadol HCL)  - Adjuvant Medications: Neurontin (Gabapentin), Trazodone (Desyrel), Xanax (Alprazolam), and Zoloft (Sertraline)    Pain Procedures:   bilateral SIJ + bilateral IA hip injections on 01/24/2023 with 80% relief  bilateral L3-5 MBB on on 2/7/23 with 80% relief  Bilateral lumbar + L5/S1 TF UVALDO on 12/2/6/23 with 85% relief      Past Medical History:   Diagnosis Date    Allergy     Anxiety     Cancer of kidney     Depression     Hyperlipidemia     Hypertension      Past Surgical History:   Procedure Laterality Date    COLONOSCOPY N/A 6/8/2018    Procedure: COLONOSCOPY;  Surgeon: Simeon Acharya III, MD;  Location: [facility];  Service: Endoscopy;  Laterality: N/A;    COLONOSCOPY N/A 6/11/2018    Procedure: COLONOSCOPY;  Surgeon: Simeon Acharya III, MD;  Location: [facility];  Service: Endoscopy;  Laterality: N/A;    COLONOSCOPY  06/2018    COLONOSCOPY N/A 11/17/2021    Procedure: COLONOSCOPY;  Surgeon: Tomi Lewis MD;  Location: [facility];  Service: Endoscopy;  Laterality: N/A;    COSMETIC SURGERY      INJECTION OF ANESTHETIC AGENT AROUND MEDIAL BRANCH NERVES INNERVATING LUMBAR FACET JOINT Bilateral 2/7/2023    Procedure: Bilateral L3-5 MBB with local;  Surgeon: Lopez Mendoza MD;  Location: [facility];  Service: Pain Management;  Laterality: Bilateral;    INJECTION OF ANESTHETIC AGENT INTO SACROILIAC JOINT Bilateral 1/24/2023    Procedure:  Bilateral SIJ Injection;  Surgeon: Lopez Mendoza MD;  Location: [facility];  Service: Pain Management;  Laterality: Bilateral;    INJECTION OF JOINT Bilateral 1/24/2023    Procedure: Bilateral Hip injection;  Surgeon: Lopez Mendoza MD;  Location: [facility];  Service: Pain Management;  Laterality: Bilateral;    kidney removal      LAPAROSCOPIC NEPHRECTOMY, HAND ASSISTED Left 9/16/2021    Procedure: NEPHRECTOMY, HAND-ASSISTED, LAPAROSCOPIC;  Surgeon: Tom Manzo MD;  Location: [facility];  Service: Urology;  Laterality: Left;    SELECTIVE INJECTION OF ANESTHETIC AGENT AROUND LUMBAR SPINAL NERVE ROOT BY TRANSFORAMINAL APPROACH Bilateral 12/26/2023    Procedure: Bilateral Lumbar L5/S1 TF UVALDO with RN IV sedation- Insurance only will pay for 1 level;  Surgeon: Lopez Mendoza MD;  Location: [facility];  Service: Pain Management;  Laterality: Bilateral;     Review of patient's allergies indicates:   Allergen Reactions    Iodine and iodide containing products      Other reaction(s): Swelling  Other reaction(s): Sneezing  Other reaction(s): Shortness of breath       Current Outpatient Medications   Medication Sig    allopurinoL (ZYLOPRIM) 100 MG tablet TAKE ONE TABLET BY MOUTH ONCE DAILY    amLODIPine-benazepriL (LOTREL) 10-40 mg per capsule Take 1 capsule by mouth once daily.    fluticasone propionate (FLONASE) 50 mcg/actuation nasal spray 2 sprays (100 mcg total) by Each Nostril route once daily.    hydroCHLOROthiazide (HYDRODIURIL) 12.5 MG Tab Take 1 tablet (12.5 mg total) by mouth once daily.    levocetirizine (XYZAL) 5 MG tablet Take 1 tablet (5 mg total) by mouth every evening.    metoprolol succinate (TOPROL-XL) 25 MG 24 hr tablet Take 1 tablet (25 mg total) by mouth once daily.    potassium chloride (KLOR-CON) 10 MEQ TbSR Take 10 mEq by mouth every Mon, Wed, Fri, Sun.    potassium chloride SA (K-DUR,KLOR-CON M) 10 MEQ tablet Take 1 tablet (10 mEq) BY MOUTH ONCE DAILY.    pravastatin (PRAVACHOL) 40  "MG tablet Take 1 tablet (40 mg total) by mouth once daily.    pregabalin (LYRICA) 225 MG Cap Take 1 capsule (225 mg total) by mouth 2 (two) times daily.    sodium bicarbonate 650 MG tablet Take 1 tablet (650 mg total) by mouth before breakfast. With food    testosterone cypionate (DEPOTESTOTERONE CYPIONATE) 200 mg/mL injection INJECT ONE ML INTO MUSCLE EVERY 14 DAYS    traZODone (DESYREL) 50 MG tablet Take 1 tablet (50 mg total) by mouth once daily.    VITAMIN D2 1,250 mcg (50,000 unit) capsule TAKE ONE CAPSULE BY MOUTH ONCE WEEKLY    diclofenac (VOLTAREN) 75 MG EC tablet Take 1 tablet (75 mg total) by mouth 2 (two) times daily.     No current facility-administered medications for this visit.       Review of Systems     GENERAL:  No weight loss, malaise or fevers.  HEENT:   No recent changes in vision or hearing  NECK:  Negative for lumps, no difficulty with swallowing.  RESPIRATORY:  Negative for cough, wheezing or shortness of breath, patient denies any recent URI.  CARDIOVASCULAR:  Negative for chest pain or palpitations.  GI:  Negative for abdominal discomfort, blood in stools or black stools or change in bowel habits.  MUSCULOSKELETAL:  See HPI.  SKIN:  Negative for lesions, rash, and itching.  PSYCH:  No mood disorder or recent psychosocial stressors.   HEMATOLOGY/LYMPHOLOGY:  Negative for prolonged bleeding, bruising easily or swollen nodes.    NEURO:   No history of syncope, paralysis, seizures or tremors.  All other reviewed and negative other than HPI.    OBJECTIVE:    /89   Pulse 75   Ht 5' 8" (1.727 m)   Wt 112.2 kg (247 lb 5.7 oz)   BMI 37.61 kg/m²       Physical Exam    GENERAL: Well appearing, in no acute distress, alert and oriented x3.  PSYCH:  Mood and affect appropriate.  SKIN: Skin color, texture, turgor normal, no rashes or lesions.  HEAD/FACE:  Normocephalic, atraumatic. Cranial nerves grossly intact.  PULM: No evidence of respiratory difficulty, symmetric chest rise.  GI:  Soft and " non-tender.    BACK: Straight leg raising in the sitting and supine positions is  positive to radicular pain on right only.   Minimal pain to palpation over the facet joints of the lumbar spine or spinous processes.  No pain with range of motion.     EXTREMITIES: Peripheral joint ROM is full and pain free without obvious instability or laxity in all four extremities. No deformities, edema, or skin discoloration. Good capillary refill.  MUSCULOSKELETAL: Able to stand on heels & toes.    hip provocative maneuvers are positive bilaterally but pain is improved      Facet loading test is positive bilaterally.   Bilateral upper and lower extremity strength is normal and symmetric.  No atrophy or tone abnormalities are noted.    RIGHT Lower extremity: Hip flexion 4/5, Hip Abduction 4/5, Hip Adduction 4/5, Knee extension 4+/5, Knee flexion 4+/5, Ankle dorsiflexion5/5, Extensor hallucis longus 5/5, Ankle plantarflexion 5/5  LEFT Lower extremity:  Hip flexion 5/5, Hip Abduction 5/5,Hip Adduction 5/5, Knee extension 5/5, Knee flexion 5/5, Ankle dorsiflexion 5/5, Extensor hallucis longus 5/5, Ankle plantarflexion 5/5  -Normal testing knee (patellar) jerk and ankle (achilles) jerk    NEURO: Bilateral upper and lower extremity coordination and muscle stretch reflexes are physiologic and symmetric. No loss of sensation is noted.  GAIT: normal.    Imaging (Reviewed on 2/5/2024):      01/22/20  X-Ray Lumbar Spine Ap And Lateral  FINDINGS:  Vertebral body heights maintained.  No definite spondylolisthesis.  L5-S1 evaluation limited by positioning.  There is sacralization of L5.  Multilevel facet degenerative findings noted.  L4-5 and L5-S1 degenerative disc height loss.      Bilateral x-ray SI joints 12/14/2022  FINDINGS:  No evidence for ankylosis.  No erosive changes.  No widening of the AC joints.    Bilateral hip XR 12/14/22  FINDINGS:  No acute fracture or dislocation.  No significant soft tissue swelling.   The femoral heads  are normally positioned within the native acetabuli.  Mild bilateral femoroacetabular degenerative osteoarthrosis with mild joint space narrowing and small osteophyte formation.  Chronic suspected bone island of the left femoral head.     Pubic symphysis, bilateral pubic rami and SI joints are intact.       MRI lumbar spine 01/23/2023  FINDINGS:  Transitional anatomy with partial sacralization of L5 peripherally.  There are bilateral L5 pars defects with grade 1 spondylolisthesis at L5-S1.  Vertebral body height is normal.  Marrow signal is within normal limits. The conus medullaris terminates at the level of L1-L2.  No abnormal signal within the conus. Intervertebral disc levels are as follows:     T12-L1 disc: Tiny chronic Schmorl's nodes.  Normal facet joints.  No significant stenosis.  The dural canal measures 13 mm.     L1-L2 disc : Normal.     L2-L3 disc: Normal disc space height with anterior osteophytes.  Minimal facet arthropathy.  No significant spinal or foraminal stenosis.  The dural canal measures 11 mm.  No foraminal stenosis.     L3-L4 disc: Mild disc bulge.  Anterior osteophytes.  Normal facet joints.  The dural canal measures 14 mm.  No foraminal stenosis.     L4-L5 disc: Normal disc space height with mild facet arthropathy.  No significant spinal or foraminal stenosis.  The dural canal measures 15 mm.     L5-S1 disc: Bilateral L5 pars defects with grade 1 spondylolisthesis.  Severe disc space height loss most pronounced toward the right with edematous Modic endplate change.  Disc and osteophyte encroach into the exit foramina, right greater than left.  There is severe right and moderate/severe left foraminal stenosis with compression of the exiting L5 spinal nerves.  Degenerative hypertrophic change toward the right of the disc space as well as degenerative change of the pseudoarticulation of L5 and S1 narrows the exit pathway of the right L5 spinal nerve as demonstrated on axial T2 series 6, image  28.  Mild facet arthropathy.  The dural canal measures 14 mm.     Impression:     1. Isthmic grade 1 spondylolisthesis at L5-S1 with severe bilateral foraminal stenosis and possible compression of the exiting L5 spinal nerves.  2. Degenerative hypertrophic change toward the right of the L5-S1 disc space causes severe narrowing of the exit pathway of the right L5 spinal nerve as demonstrated on axial T2 image 28.  3. Edematous Modic endplate change at L5-S1 toward the right at L5-S1 may correlate to focal symptoms.       ASSESSMENT: 58 y.o. year old male with lower back and leg pain, consistent with     1. Lumbar radiculopathy, chronic  diclofenac (VOLTAREN) 75 MG EC tablet      2. DDD (degenerative disc disease), lumbar  diclofenac (VOLTAREN) 75 MG EC tablet      3. Weakness of right lower extremity  diclofenac (VOLTAREN) 75 MG EC tablet      4. Spondylolisthesis of lumbosacral region          PLAN:   - Interventions: None at this time. Consider repeat injections in the future.    - S/p Lumbar TF Epidural steroid injection at L5/S1 (bilateral) on 12/26/23 with 85% relief.  bilateral SIJ + bilateral IA hip injections on 01/24/2023 with 80% relief  bilateral L3-5 MBB on on 2/7/23 with 80% relief    - Anticoagulation use: no anticoagulation     report:  Reviewed and consistent with medication use as prescribed.      - Medications:  --Continue Lyrica 225 mg b.i.d.  We discussed potential side effects of this medication which may include drowsiness,dizziness, dry mouth, constipation or peripheral edema.  - Gabapentin discontinued. Patient is not taking this medication (it was Rx'd by PCP).    -- Start Diclofenac 75 mg BID for pain. he denies any kidney or cardiac issues. Take with food.  Avoid other OTC NSAIDs (ex. Ibuprofen, naproxen) while taking this medication.     Offered Rx for MDP but patient does not want any more steroids. Contributes this to weight gain.      - Therapy:   We discussed  physical therapy to  help manage the patient/s painful condition. The patient was counseled that muscle strengthening will improve the long term prognosis in regards to pain and may also help increase range of motion and mobility. Patient declined referral to outpatient physical therapy at this time. Reports the last time he was in physical therapy it did not improve his condition.    - Imaging: Reviewed MRI of lumbar spine with patient and answered any questions they had regarding study.      - Consults/Referrals: Consider referral to neurosurgery. Patient states surgery is a last resort for him.     - Follow up visit: PRN.      The above plan and management options were discussed at length with patient. Patient is in agreement with the above and verbalized understanding.    - I discussed the goals of interventional chronic pain management with the patient on today's visit. We discussed a multimodal and systematic approach to pain.  This includes diagnostic and therapeutic injections, adjuvant pharmacologic treatment, physical therapy, and at times psychiatry.  I emphasized the importance of regular exercise, core strengthening and stretching, diet and weight loss as a cornerstone of long-term pain management.    - This condition does not require this patient to take time off of work, and the primary goal of our Pain Management services is to improve the patient's functional capacity.  - Patient Questions: Answered all of the patient's questions regarding diagnoses, therapy, treatment and next steps        Devora Thompson PA-C  Interventional Pain Management  [facility]

## 2024-04-03 ENCOUNTER — PATIENT MESSAGE (OUTPATIENT)
Dept: PULMONOLOGY | Facility: CLINIC | Age: 59
End: 2024-04-03
Payer: COMMERCIAL

## 2024-04-22 DIAGNOSIS — E55.9 VITAMIN D DEFICIENCY: ICD-10-CM

## 2024-04-22 DIAGNOSIS — I12.9 PARENCHYMAL RENAL HYPERTENSION, STAGE 1-4 OR UNSPECIFIED CHRONIC KIDNEY DISEASE: ICD-10-CM

## 2024-04-23 RX ORDER — HYDROCHLOROTHIAZIDE 12.5 MG/1
12.5 TABLET ORAL
Qty: 30 TABLET | Refills: 0 | Status: SHIPPED | OUTPATIENT
Start: 2024-04-23 | End: 2024-04-30

## 2024-04-23 RX ORDER — ERGOCALCIFEROL 1.25 1/1
CAPSULE ORAL
Qty: 12 CAPSULE | Refills: 0 | Status: SHIPPED | OUTPATIENT
Start: 2024-04-23

## 2024-04-23 NOTE — TELEPHONE ENCOUNTER
Refill Routing Note   Medication(s) are not appropriate for processing by [facility] for the following reason(s):        Outside of protocol  Required labs abnormal    [facility] action(s):  Defer  Route     Requires labs : Yes             Appointments  past 12m or future 3m with PCP    Date Provider   Last Visit   10/25/2023 Mohan Schwartz MD   Next Visit   5/2/2024 Mohan Schwartz MD   ED visits in past 90 days: 0        Note composed:2:53 AM 04/23/2024

## 2024-04-23 NOTE — TELEPHONE ENCOUNTER
Care Due:                  Date            Visit Type   Department     Provider  --------------------------------------------------------------------------------                                EP -                              [facility] FAMILY  Last Visit: 11-      CARE ([facility])   MEDICINE       Rosie Jessica                              [facility]  Next Visit: 05-      CARE ([facility])   MEDICINE       Mohan Schwartz                                                            Last  Test          Frequency    Reason                     Performed    Due Date  --------------------------------------------------------------------------------    Uric Acid...  12 months..  allopurinoL..............  Not Found    Overdue    [employer] Embedded Care Due Messages. Reference number: [id number].   4/23/2024 2:37:03 AM CDT

## 2024-04-24 ENCOUNTER — PATIENT MESSAGE (OUTPATIENT)
Dept: FAMILY MEDICINE | Facility: CLINIC | Age: 59
End: 2024-04-24
Payer: COMMERCIAL

## 2024-04-24 ENCOUNTER — TELEPHONE (OUTPATIENT)
Dept: FAMILY MEDICINE | Facility: CLINIC | Age: 59
End: 2024-04-24
Payer: COMMERCIAL

## 2024-04-24 DIAGNOSIS — G47.10 EXCESSIVE SLEEPINESS: ICD-10-CM

## 2024-04-24 DIAGNOSIS — Z13.1 DIABETES MELLITUS SCREENING: Primary | ICD-10-CM

## 2024-04-24 NOTE — TELEPHONE ENCOUNTER
He wants to be tested for diabetes and his thyroid checked with his blood work on Thursday , okay to add?

## 2024-04-25 ENCOUNTER — LAB VISIT (OUTPATIENT)
Dept: LAB | Facility: HOSPITAL | Age: 59
End: 2024-04-25
Attending: FAMILY MEDICINE
Payer: COMMERCIAL

## 2024-04-25 DIAGNOSIS — Z85.528 HISTORY OF RENAL CELL CARCINOMA: ICD-10-CM

## 2024-04-25 DIAGNOSIS — N18.32 STAGE 3B CHRONIC KIDNEY DISEASE: ICD-10-CM

## 2024-04-25 DIAGNOSIS — G47.33 OSA ON CPAP: ICD-10-CM

## 2024-04-25 DIAGNOSIS — I10 ESSENTIAL HYPERTENSION: ICD-10-CM

## 2024-04-25 DIAGNOSIS — G47.10 EXCESSIVE SLEEPINESS: ICD-10-CM

## 2024-04-25 DIAGNOSIS — Z79.890 ENCOUNTER FOR MONITORING TESTOSTERONE REPLACEMENT THERAPY: ICD-10-CM

## 2024-04-25 DIAGNOSIS — Z51.81 ENCOUNTER FOR MONITORING TESTOSTERONE REPLACEMENT THERAPY: ICD-10-CM

## 2024-04-25 DIAGNOSIS — Z13.1 DIABETES MELLITUS SCREENING: ICD-10-CM

## 2024-04-25 LAB
ALBUMIN SERPL BCP-MCNC: 3.6 G/DL (ref 3.5–5.2)
ALP SERPL-CCNC: 71 U/L (ref 55–135)
ALT SERPL W/O P-5'-P-CCNC: 47 U/L (ref 10–44)
ANION GAP SERPL CALC-SCNC: 9 MMOL/L (ref 8–16)
AST SERPL-CCNC: 24 U/L (ref 10–40)
BASOPHILS # BLD AUTO: 0.05 K/UL (ref 0–0.2)
BASOPHILS NFR BLD: 0.6 % (ref 0–1.9)
BILIRUB SERPL-MCNC: 0.4 MG/DL (ref 0.1–1)
BUN SERPL-MCNC: 18 MG/DL (ref 6–20)
CALCIUM SERPL-MCNC: 9.3 MG/DL (ref 8.7–10.5)
CHLORIDE SERPL-SCNC: 106 MMOL/L (ref 95–110)
CHOLEST SERPL-MCNC: 162 MG/DL (ref 120–199)
CHOLEST/HDLC SERPL: 4.4 {RATIO} (ref 2–5)
CO2 SERPL-SCNC: 25 MMOL/L (ref 23–29)
COMPLEXED PSA SERPL-MCNC: 0.95 NG/ML (ref 0–4)
CREAT SERPL-MCNC: 1.8 MG/DL (ref 0.5–1.4)
DIFFERENTIAL METHOD BLD: ABNORMAL
EOSINOPHIL # BLD AUTO: 0.2 K/UL (ref 0–0.5)
EOSINOPHIL NFR BLD: 2 % (ref 0–8)
ERYTHROCYTE [DISTWIDTH] IN BLOOD BY AUTOMATED COUNT: 13.6 % (ref 11.5–14.5)
EST. GFR  (NO RACE VARIABLE): 43.4 ML/MIN/1.73 M^2
ESTIMATED AVG GLUCOSE: 114 MG/DL (ref 68–131)
GLUCOSE SERPL-MCNC: 125 MG/DL (ref 70–110)
HBA1C MFR BLD: 5.6 % (ref 4–5.6)
HCT VFR BLD AUTO: 44.9 % (ref 40–54)
HDLC SERPL-MCNC: 37 MG/DL (ref 40–75)
HDLC SERPL: 22.8 % (ref 20–50)
HGB BLD-MCNC: 13.8 G/DL (ref 14–18)
IMM GRANULOCYTES # BLD AUTO: 0.09 K/UL (ref 0–0.04)
IMM GRANULOCYTES NFR BLD AUTO: 1 % (ref 0–0.5)
LDLC SERPL CALC-MCNC: 81 MG/DL (ref 63–159)
LYMPHOCYTES # BLD AUTO: 1.6 K/UL (ref 1–4.8)
LYMPHOCYTES NFR BLD: 19.1 % (ref 18–48)
MCH RBC QN AUTO: 29.6 PG (ref 27–31)
MCHC RBC AUTO-ENTMCNC: 30.7 G/DL (ref 32–36)
MCV RBC AUTO: 96 FL (ref 82–98)
MONOCYTES # BLD AUTO: 0.6 K/UL (ref 0.3–1)
MONOCYTES NFR BLD: 7.3 % (ref 4–15)
NEUTROPHILS # BLD AUTO: 6 K/UL (ref 1.8–7.7)
NEUTROPHILS NFR BLD: 70 % (ref 38–73)
NONHDLC SERPL-MCNC: 125 MG/DL
NRBC BLD-RTO: 0 /100 WBC
PLATELET # BLD AUTO: 208 K/UL (ref 150–450)
PMV BLD AUTO: 11.9 FL (ref 9.2–12.9)
POTASSIUM SERPL-SCNC: 5 MMOL/L (ref 3.5–5.1)
PROT SERPL-MCNC: 7.2 G/DL (ref 6–8.4)
RBC # BLD AUTO: 4.67 M/UL (ref 4.6–6.2)
SODIUM SERPL-SCNC: 140 MMOL/L (ref 136–145)
TESTOST SERPL-MCNC: 351 NG/DL (ref 304–1227)
TRIGL SERPL-MCNC: 220 MG/DL (ref 30–150)
TSH SERPL DL<=0.005 MIU/L-ACNC: 1.44 UIU/ML (ref 0.4–4)
WBC # BLD AUTO: 8.59 K/UL (ref 3.9–12.7)

## 2024-04-25 PROCEDURE — 36415 COLL VENOUS BLD VENIPUNCTURE: CPT | Mod: PO | Performed by: FAMILY MEDICINE

## 2024-04-25 PROCEDURE — 85025 COMPLETE CBC W/AUTO DIFF WBC: CPT | Performed by: FAMILY MEDICINE

## 2024-04-25 PROCEDURE — 80061 LIPID PANEL: CPT | Performed by: FAMILY MEDICINE

## 2024-04-25 PROCEDURE — 84153 ASSAY OF PSA TOTAL: CPT | Performed by: FAMILY MEDICINE

## 2024-04-25 PROCEDURE — 84403 ASSAY OF TOTAL TESTOSTERONE: CPT | Performed by: FAMILY MEDICINE

## 2024-04-25 PROCEDURE — 80053 COMPREHEN METABOLIC PANEL: CPT | Performed by: FAMILY MEDICINE

## 2024-04-25 PROCEDURE — 83036 HEMOGLOBIN GLYCOSYLATED A1C: CPT | Performed by: FAMILY MEDICINE

## 2024-04-25 PROCEDURE — 84443 ASSAY THYROID STIM HORMONE: CPT | Performed by: FAMILY MEDICINE

## 2024-04-28 DIAGNOSIS — I12.9 PARENCHYMAL RENAL HYPERTENSION, STAGE 1-4 OR UNSPECIFIED CHRONIC KIDNEY DISEASE: ICD-10-CM

## 2024-04-30 RX ORDER — HYDROCHLOROTHIAZIDE 12.5 MG/1
12.5 TABLET ORAL
Qty: 30 TABLET | Refills: 11 | Status: SHIPPED | OUTPATIENT
Start: 2024-04-30

## 2024-05-02 ENCOUNTER — OFFICE VISIT (OUTPATIENT)
Dept: FAMILY MEDICINE | Facility: CLINIC | Age: 59
End: 2024-05-02
Payer: COMMERCIAL

## 2024-05-02 VITALS
HEART RATE: 75 BPM | OXYGEN SATURATION: 97 % | WEIGHT: 262.88 LBS | DIASTOLIC BLOOD PRESSURE: 74 MMHG | BODY MASS INDEX: 39.97 KG/M2 | SYSTOLIC BLOOD PRESSURE: 132 MMHG

## 2024-05-02 DIAGNOSIS — N18.32 STAGE 3B CHRONIC KIDNEY DISEASE: ICD-10-CM

## 2024-05-02 DIAGNOSIS — N25.81 SECONDARY HYPERPARATHYROIDISM: ICD-10-CM

## 2024-05-02 DIAGNOSIS — R79.89 LOW TESTOSTERONE: ICD-10-CM

## 2024-05-02 DIAGNOSIS — E78.00 PURE HYPERCHOLESTEROLEMIA: ICD-10-CM

## 2024-05-02 DIAGNOSIS — Z51.81 ENCOUNTER FOR MONITORING TESTOSTERONE REPLACEMENT THERAPY: Primary | ICD-10-CM

## 2024-05-02 DIAGNOSIS — Z79.890 ENCOUNTER FOR MONITORING TESTOSTERONE REPLACEMENT THERAPY: Primary | ICD-10-CM

## 2024-05-02 DIAGNOSIS — E66.01 CLASS 2 SEVERE OBESITY WITH SERIOUS COMORBIDITY AND BODY MASS INDEX (BMI) OF 37.0 TO 37.9 IN ADULT, UNSPECIFIED OBESITY TYPE: ICD-10-CM

## 2024-05-02 DIAGNOSIS — G47.33 OSA ON CPAP: ICD-10-CM

## 2024-05-02 DIAGNOSIS — E79.0 ELEVATED URIC ACID IN BLOOD: ICD-10-CM

## 2024-05-02 DIAGNOSIS — Z85.528 HISTORY OF RENAL CELL CARCINOMA: ICD-10-CM

## 2024-05-02 PROBLEM — C64.2 RENAL CELL CARCINOMA OF LEFT KIDNEY: Status: RESOLVED | Noted: 2021-07-13 | Resolved: 2024-05-02

## 2024-05-02 PROCEDURE — 99999 PR PBB SHADOW E&M-EST. PATIENT-LVL IV: CPT | Mod: PBBFAC,,, | Performed by: FAMILY MEDICINE

## 2024-05-02 PROCEDURE — 4010F ACE/ARB THERAPY RXD/TAKEN: CPT | Mod: CPTII,S$GLB,, | Performed by: FAMILY MEDICINE

## 2024-05-02 PROCEDURE — 3008F BODY MASS INDEX DOCD: CPT | Mod: CPTII,S$GLB,, | Performed by: FAMILY MEDICINE

## 2024-05-02 PROCEDURE — 3078F DIAST BP <80 MM HG: CPT | Mod: CPTII,S$GLB,, | Performed by: FAMILY MEDICINE

## 2024-05-02 PROCEDURE — 1159F MED LIST DOCD IN RCRD: CPT | Mod: CPTII,S$GLB,, | Performed by: FAMILY MEDICINE

## 2024-05-02 PROCEDURE — 3044F HG A1C LEVEL LT 7.0%: CPT | Mod: CPTII,S$GLB,, | Performed by: FAMILY MEDICINE

## 2024-05-02 PROCEDURE — 99214 OFFICE O/P EST MOD 30 MIN: CPT | Mod: S$GLB,,, | Performed by: FAMILY MEDICINE

## 2024-05-02 PROCEDURE — 3075F SYST BP GE 130 - 139MM HG: CPT | Mod: CPTII,S$GLB,, | Performed by: FAMILY MEDICINE

## 2024-05-02 NOTE — PROGRESS NOTES
Chief Complaint:    No chief complaint on file.      History of Present Illness:    Patient presents today for six-month follow-up,   He is underlying hypertension hyperlipidemia, depression anxiety, chronic back pain, chronic kidney disease and history of nephrectomy secondary to renal cell carcinoma.    Following with urology for renal cell carcinoma surveillance so far been cancer free.    Doing well. Weight gain of 14 lbs.   He is trying to eat more healthy and cut back on meals but says he is unable to exercise due to back pain.     A1C stable, chemistries stable, CBC normal. Liver function good. Kidney function stable, creatinine 1.8. PSA and TSH stable.    He is also on testosterone replacement therapy doing well.     KARLY on CPAP. Takes Trazodone as needed. he can not sleep sometimes.    Mentions some memory issues due to stress from work. Says it may happen occasionally.     ROS:  Review of Systems   Constitutional:  Negative for activity change, chills, fatigue, fever and unexpected weight change.   HENT:  Negative for congestion, ear discharge, ear pain, hearing loss, postnasal drip and rhinorrhea.    Eyes:  Negative for pain and visual disturbance.   Respiratory:  Negative for cough, chest tightness and shortness of breath.    Cardiovascular:  Negative for chest pain and palpitations.   Gastrointestinal:  Negative for abdominal pain, diarrhea and vomiting.   Endocrine: Negative for heat intolerance.   Genitourinary:  Negative for dysuria, flank pain, frequency and hematuria.   Musculoskeletal:  Negative for back pain, gait problem and neck pain.   Skin:  Negative for color change and rash.   Neurological:  Negative for dizziness, tremors, seizures, numbness and headaches.   Psychiatric/Behavioral:  Positive for sleep disturbance. Negative for agitation, hallucinations, self-injury and suicidal ideas. The patient is not nervous/anxious.        Past Medical History:   Diagnosis Date    Allergy     Anxiety      Cancer of kidney     Depression     Hyperlipidemia     Hypertension        Social History:  Social History     Socioeconomic History    Marital status:    Occupational History     Employer: [employer]   Tobacco Use    Smoking status: Never    Smokeless tobacco: Never    Tobacco comments:     dips    Substance and Sexual Activity    Alcohol use: Yes    Drug use: No    Sexual activity: Yes     Partners: Female     Birth control/protection: None       Family History:   family history includes Cancer in his maternal uncle; Hypertension in his mother; No Known Problems in his brother, maternal aunt, maternal grandfather, maternal grandmother, paternal aunt, paternal grandfather, paternal grandmother, paternal uncle, and sister.    Health Maintenance   Topic Date Due    Colorectal Cancer Screening  11/17/2024    Lipid Panel  04/25/2025    TETANUS VACCINE  10/31/2027    Hepatitis C Screening  Completed    Shingles Vaccine  Completed       Exam:Physical     Vital Signs  Pulse: 75  SpO2: 97 %  BP: 132/74  BP Location: Left arm  Patient Position: Sitting  Height and Weight  Weight: 119.3 kg (262 lb 14.4 oz)]    Body mass index is 39.97 kg/m².    Physical Exam  Constitutional:       Appearance: He is well-developed.   Eyes:      Conjunctiva/sclera: Conjunctivae normal.      Pupils: Pupils are equal, round, and reactive to light.   Cardiovascular:      Rate and Rhythm: Normal rate and regular rhythm.      Heart sounds: Normal heart sounds. No murmur heard.  Pulmonary:      Effort: Pulmonary effort is normal. No respiratory distress.      Breath sounds: Normal breath sounds. No wheezing or rales.   Chest:      Chest wall: No tenderness.   Abdominal:      General: There is no distension.      Palpations: Abdomen is soft. There is no mass.      Tenderness: There is no abdominal tenderness. There is no guarding.   Musculoskeletal:         General: No tenderness.      Cervical back: Normal range of motion and neck  "supple.   Lymphadenopathy:      Cervical: No cervical adenopathy.   Skin:     General: Skin is warm and dry.   Neurological:      Mental Status: He is alert and oriented to person, place, and time.      Deep Tendon Reflexes: Reflexes are normal and symmetric.   Psychiatric:         Behavior: Behavior normal.         Thought Content: Thought content normal.         Judgment: Judgment normal.           Assessment:      ICD-10-CM ICD-9-CM   1. Encounter for monitoring testosterone replacement therapy  Z51.81 V58.83    Z79.890 V58.69   2. History of renal cell carcinoma  Z85.528 V10.52   3. Stage 3b chronic kidney disease  N18.32 585.3   4. KARLY on CPAP  G47.33 327.23   5. Low testosterone  R79.89 790.99   6. Pure hypercholesterolemia  E78.00 272.0   7. Secondary hyperparathyroidism  N25.81 588.81   8. Class 2 severe obesity with serious comorbidity and body mass index (BMI) of 37.0 to 37.9 in adult, unspecified obesity type  E66.01 278.01    Z68.37 V85.37   9. Elevated uric acid in blood  E79.0 790.6             Plan:  continue current medication and plan   Search "Gokul Cam intermittent fasting" on [employer].   Intermittent fasting combined with exercise will help with weight loss.      Send message with current medications. Lyrica and Gabapentin have side effects of weight gain. May need to consider changing medication if he is taking them.     Continue follow Nephrology for stage 3b CKD and renal cell carcinoma.  Other medical problems as above stable.  See labs below.  Follow-up 6 months.    Orders Placed This Encounter   Procedures    Comprehensive Metabolic Panel    CBC Auto Differential    Lipid Panel    PSA, Screening    Testosterone    TSH    Uric Acid      Current Outpatient Medications   Medication Sig Dispense Refill    allopurinoL (ZYLOPRIM) 100 MG tablet TAKE ONE TABLET BY MOUTH ONCE DAILY 90 tablet 0    amLODIPine-benazepriL (LOTREL) 10-40 mg per capsule Take 1 capsule by mouth once daily. 90 capsule 2    " diclofenac (VOLTAREN) 75 MG EC tablet Take 1 tablet (75 mg total) by mouth 2 (two) times daily. 180 tablet 2    fluticasone propionate (FLONASE) 50 mcg/actuation nasal spray 2 sprays (100 mcg total) by Each Nostril route once daily. 16 g 11    gabapentin (NEURONTIN) 300 MG capsule TAKE ONE CAPSULE BY MOUTH DURING THE DAY THEN TWO CAPSULES EVERY NIGHT AT BEDTIME 270 capsule 1    hydroCHLOROthiazide (HYDRODIURIL) 12.5 MG Tab TAKE ONE TABLET BY MOUTH ONCE DAILY 30 tablet 11    metoprolol succinate (TOPROL-XL) 25 MG 24 hr tablet Take 1 tablet (25 mg total) by mouth once daily. 90 tablet 2    potassium chloride SA (K-DUR,KLOR-CON M) 10 MEQ tablet Take 1 tablet (10 mEq) BY MOUTH ONCE DAILY. 90 tablet 3    pravastatin (PRAVACHOL) 40 MG tablet Take 1 tablet (40 mg total) by mouth once daily. 90 tablet 3    pregabalin (LYRICA) 225 MG Cap Take 1 capsule (225 mg total) by mouth 2 (two) times daily. 60 capsule 3    testosterone cypionate (DEPOTESTOTERONE CYPIONATE) 200 mg/mL injection INJECT ONE ML INTO MUSCLE EVERY 14 DAYS 2 mL 2    traZODone (DESYREL) 50 MG tablet Take 1 tablet (50 mg total) by mouth once daily. 90 tablet 2    VITAMIN D2 1,250 mcg (50,000 unit) capsule TAKE ONE CAPSULE BY MOUTH ONCE WEEKLY 12 capsule 0    levocetirizine (XYZAL) 5 MG tablet Take 1 tablet (5 mg total) by mouth every evening. 30 tablet 11    potassium chloride (KLOR-CON) 10 MEQ TbSR Take 10 mEq by mouth every Mon, Wed, Fri, Sun. (Patient not taking: Reported on 5/2/2024)      sodium bicarbonate 650 MG tablet Take 1 tablet (650 mg total) by mouth before breakfast. With food 180 tablet 1     No current facility-administered medications for this visit.     There are no discontinued medications.      Follow up in about 6 months (around 11/2/2024).      Mohan Schwartz MD    Scribe Attestation:   Francesco CAPUTO, am scribing for, and in the presence of, Dr.Arif Schwartz I performed the above scribed service and the documentation accurately describes the  services I performed. I attest to the accuracy of the note.    I, Dr. Mohan Schwartz, reviewed documentation as scribed above. I performed the services described in this documentation.  I agree that the record reflects my personal performance and is accurate and complete. Mohan Schwartz MD.  05/02/2024

## 2024-05-06 DIAGNOSIS — E34.9 TESTOSTERONE DEFICIENCY: ICD-10-CM

## 2024-05-06 RX ORDER — FLUTICASONE PROPIONATE 50 MCG
2 SPRAY, SUSPENSION (ML) NASAL
Qty: 48 G | Refills: 3 | Status: SHIPPED | OUTPATIENT
Start: 2024-05-06

## 2024-05-06 NOTE — TELEPHONE ENCOUNTER
No care due was identified.  [facility] Embedded Care Due Messages. Reference number: [id number].   5/06/2024 2:46:44 PM CDT

## 2024-05-07 RX ORDER — LEVOCETIRIZINE DIHYDROCHLORIDE 5 MG/1
5 TABLET, FILM COATED ORAL NIGHTLY
Qty: 90 TABLET | Refills: 3 | Status: SHIPPED | OUTPATIENT
Start: 2024-05-07

## 2024-05-07 RX ORDER — TESTOSTERONE CYPIONATE 200 MG/ML
INJECTION, SOLUTION INTRAMUSCULAR
Qty: 2 ML | Refills: 2 | Status: SHIPPED | OUTPATIENT
Start: 2024-05-07

## 2024-05-07 NOTE — TELEPHONE ENCOUNTER
Refill Routing Note   Medication(s) are not appropriate for processing by [facility] for the following reason(s):        Outside of protocol  No active prescription written by provider    [facility] action(s):  Defer  Route  Approve        Medication Therapy Plan: TESTOSTERONE-OP; Levocetirizine- on the med list 3/13/24.    Alert overridden per protocol: Yes     Appointments  past 12m or future 3m with PCP    Date Provider   Last Visit   2024 Mohan Schwartz MD   Next Visit   Visit date not found Mohan Schwartz MD   ED visits in past 90 days: 0        Note composed:11:15 PM 2024

## 2024-07-17 ENCOUNTER — LAB VISIT (OUTPATIENT)
Dept: LAB | Facility: HOSPITAL | Age: 59
End: 2024-07-17
Attending: FAMILY MEDICINE
Payer: COMMERCIAL

## 2024-07-17 ENCOUNTER — OFFICE VISIT (OUTPATIENT)
Dept: FAMILY MEDICINE | Facility: CLINIC | Age: 59
End: 2024-07-17
Payer: COMMERCIAL

## 2024-07-17 VITALS
BODY MASS INDEX: 39.84 KG/M2 | WEIGHT: 262 LBS | OXYGEN SATURATION: 98 % | SYSTOLIC BLOOD PRESSURE: 110 MMHG | DIASTOLIC BLOOD PRESSURE: 68 MMHG | HEART RATE: 71 BPM

## 2024-07-17 DIAGNOSIS — I10 ESSENTIAL HYPERTENSION: ICD-10-CM

## 2024-07-17 DIAGNOSIS — R42 DIZZINESS: ICD-10-CM

## 2024-07-17 DIAGNOSIS — R07.9 CHEST PAIN, UNSPECIFIED TYPE: Primary | ICD-10-CM

## 2024-07-17 DIAGNOSIS — I95.1 ORTHOSTATIC HYPOTENSION: ICD-10-CM

## 2024-07-17 LAB
ALBUMIN SERPL BCP-MCNC: 3.9 G/DL (ref 3.5–5.2)
ALP SERPL-CCNC: 83 U/L (ref 55–135)
ALT SERPL W/O P-5'-P-CCNC: 65 U/L (ref 10–44)
ANION GAP SERPL CALC-SCNC: 10 MMOL/L (ref 8–16)
AST SERPL-CCNC: 35 U/L (ref 10–40)
BASOPHILS # BLD AUTO: 0.07 K/UL (ref 0–0.2)
BASOPHILS NFR BLD: 0.7 % (ref 0–1.9)
BILIRUB SERPL-MCNC: 0.3 MG/DL (ref 0.1–1)
BUN SERPL-MCNC: 21 MG/DL (ref 6–20)
CALCIUM SERPL-MCNC: 9.2 MG/DL (ref 8.7–10.5)
CHLORIDE SERPL-SCNC: 103 MMOL/L (ref 95–110)
CO2 SERPL-SCNC: 22 MMOL/L (ref 23–29)
CREAT SERPL-MCNC: 2 MG/DL (ref 0.5–1.4)
DIFFERENTIAL METHOD BLD: ABNORMAL
EOSINOPHIL # BLD AUTO: 0.3 K/UL (ref 0–0.5)
EOSINOPHIL NFR BLD: 2.9 % (ref 0–8)
ERYTHROCYTE [DISTWIDTH] IN BLOOD BY AUTOMATED COUNT: 13.8 % (ref 11.5–14.5)
EST. GFR  (NO RACE VARIABLE): 38.2 ML/MIN/1.73 M^2
GLUCOSE SERPL-MCNC: 113 MG/DL (ref 70–110)
HCT VFR BLD AUTO: 40.8 % (ref 40–54)
HGB BLD-MCNC: 13.6 G/DL (ref 14–18)
IMM GRANULOCYTES # BLD AUTO: 0.14 K/UL (ref 0–0.04)
IMM GRANULOCYTES NFR BLD AUTO: 1.3 % (ref 0–0.5)
LYMPHOCYTES # BLD AUTO: 2.5 K/UL (ref 1–4.8)
LYMPHOCYTES NFR BLD: 24.1 % (ref 18–48)
MCH RBC QN AUTO: 30.4 PG (ref 27–31)
MCHC RBC AUTO-ENTMCNC: 33.3 G/DL (ref 32–36)
MCV RBC AUTO: 91 FL (ref 82–98)
MONOCYTES # BLD AUTO: 0.9 K/UL (ref 0.3–1)
MONOCYTES NFR BLD: 8.8 % (ref 4–15)
NEUTROPHILS # BLD AUTO: 6.5 K/UL (ref 1.8–7.7)
NEUTROPHILS NFR BLD: 62.2 % (ref 38–73)
NRBC BLD-RTO: 0 /100 WBC
PLATELET # BLD AUTO: 235 K/UL (ref 150–450)
PMV BLD AUTO: 11.9 FL (ref 9.2–12.9)
POTASSIUM SERPL-SCNC: 4.3 MMOL/L (ref 3.5–5.1)
PROT SERPL-MCNC: 7.3 G/DL (ref 6–8.4)
RBC # BLD AUTO: 4.48 M/UL (ref 4.6–6.2)
SODIUM SERPL-SCNC: 135 MMOL/L (ref 136–145)
WBC # BLD AUTO: 10.39 K/UL (ref 3.9–12.7)

## 2024-07-17 PROCEDURE — 3044F HG A1C LEVEL LT 7.0%: CPT | Mod: CPTII,S$GLB,, | Performed by: FAMILY MEDICINE

## 2024-07-17 PROCEDURE — 80053 COMPREHEN METABOLIC PANEL: CPT | Performed by: FAMILY MEDICINE

## 2024-07-17 PROCEDURE — 99214 OFFICE O/P EST MOD 30 MIN: CPT | Mod: S$GLB,,, | Performed by: FAMILY MEDICINE

## 2024-07-17 PROCEDURE — 1159F MED LIST DOCD IN RCRD: CPT | Mod: CPTII,S$GLB,, | Performed by: FAMILY MEDICINE

## 2024-07-17 PROCEDURE — 85025 COMPLETE CBC W/AUTO DIFF WBC: CPT | Performed by: FAMILY MEDICINE

## 2024-07-17 PROCEDURE — 4010F ACE/ARB THERAPY RXD/TAKEN: CPT | Mod: CPTII,S$GLB,, | Performed by: FAMILY MEDICINE

## 2024-07-17 PROCEDURE — 93010 ELECTROCARDIOGRAM REPORT: CPT | Mod: S$GLB,,, | Performed by: INTERNAL MEDICINE

## 2024-07-17 PROCEDURE — 36415 COLL VENOUS BLD VENIPUNCTURE: CPT | Mod: PO | Performed by: FAMILY MEDICINE

## 2024-07-17 PROCEDURE — 93005 ELECTROCARDIOGRAM TRACING: CPT | Mod: S$GLB,,, | Performed by: FAMILY MEDICINE

## 2024-07-17 PROCEDURE — 3074F SYST BP LT 130 MM HG: CPT | Mod: CPTII,S$GLB,, | Performed by: FAMILY MEDICINE

## 2024-07-17 PROCEDURE — 99999 PR PBB SHADOW E&M-EST. PATIENT-LVL III: CPT | Mod: PBBFAC,,, | Performed by: FAMILY MEDICINE

## 2024-07-17 PROCEDURE — 3078F DIAST BP <80 MM HG: CPT | Mod: CPTII,S$GLB,, | Performed by: FAMILY MEDICINE

## 2024-07-17 PROCEDURE — 3008F BODY MASS INDEX DOCD: CPT | Mod: CPTII,S$GLB,, | Performed by: FAMILY MEDICINE

## 2024-07-17 RX ORDER — AMLODIPINE AND BENAZEPRIL HYDROCHLORIDE 5; 20 MG/1; MG/1
1 CAPSULE ORAL DAILY
Qty: 30 CAPSULE | Refills: 11 | Status: SHIPPED | OUTPATIENT
Start: 2024-07-17 | End: 2025-07-17

## 2024-07-17 NOTE — PROGRESS NOTES
Chief Complaint:  No chief complaint on file.      History of Present Illness:    History of Present Illness    CHIEF COMPLAINT:  Patient presents with worsening dizziness and feeling drunk when walking around.    DIZZINESS:  He reports experiencing dizziness every day, describing it as feeling like the room is spinning, similar to being drunk. This sensation occurs when he stands up and walks around but resolves with sitting down and cooling off. He denies experiencing dizziness while seated.    CHEST PAIN:  He reports tightness in the chest, sometimes radiating down the arm with occasional sharp, needle-like pains. His arm jerks with the pain. These symptoms have occurred 3 times this week, mostly while walking but once while sitting. He denies constant symptoms, stating they have good days and bad days.    HYPOTENSION:  His blood pressure has been running low, with a recent reading of 111/77. He is currently taking amlodipine, benazepril, metoprolol, and a diuretic for blood pressure management. He is willing to increase his diuretic intake.    LIFESTYLE MODIFICATIONS:  He has reduced his eating to twice daily with smaller portions, giving away half his lunch today. He is also drinking a lot of water.    FAMILY:  His granddaughter's health is improving per her recent doctor's visit. He is trying to help her find employment but notes difficulties due to her domestic violence charge from a fight with her  in [place] where both were arrested. He reports the current job market is challenging.           Past Medical History:   Diagnosis Date    Allergy     Anxiety     Cancer of kidney     Depression     Hyperlipidemia     Hypertension        Social History:  Social History     Socioeconomic History    Marital status:    Occupational History     Employer: [employer]   Tobacco Use    Smoking status: Never    Smokeless tobacco: Never    Tobacco comments:     dips    Substance and Sexual Activity     Alcohol use: Yes    Drug use: No    Sexual activity: Yes     Partners: Female     Birth control/protection: None       Family History:   family history includes Cancer in his maternal uncle; Hypertension in his mother; No Known Problems in his brother, maternal aunt, maternal grandfather, maternal grandmother, paternal aunt, paternal grandfather, paternal grandmother, paternal uncle, and sister.    Health Maintenance   Topic Date Due    Colorectal Cancer Screening  11/17/2024    Lipid Panel  04/25/2025    TETANUS VACCINE  10/31/2027    Hepatitis C Screening  Completed    Shingles Vaccine  Completed       Exam:Physical     Vital Signs  Pulse: 71  SpO2: 98 %  BP: 110/68  BP Location: Left arm  Patient Position: Sitting  Height and Weight  Weight: 118.9 kg (262 lb 0.3 oz)]    Body mass index is 39.84 kg/m².    Physical Exam    Vitals: BP: 111/77.  General: No acute distress. Well-developed. Well-nourished.  Eyes: EOMI. Sclerae anicteric.  HENT: Normocephalic. Atraumatic. Nares patent. Moist oral mucosa.  Cardiovascular: Regular rate. Regular rhythm. No murmurs. No rubs. No gallops. Normal S1, S2.  Respiratory: Normal respiratory effort. Clear to auscultation bilaterally. No rales. No rhonchi. No wheezing.  Musculoskeletal: No  obvious deformity.  Extremities: No lower extremity edema.  Neurological: Alert & oriented x3. No slurred speech. Normal gait.  Psychiatric: Normal mood. Normal affect. Good insight. Good judgment.  Skin: Warm. Dry. No rash.     Javier-Hallpike maneuver is positive on the left  Neuro exam is otherwise normal      Assessment:        ICD-10-CM ICD-9-CM   1. Chest pain, unspecified type  R07.9 786.50   2. Essential hypertension  I10 401.9   3. Dizziness  R42 780.4   4. Orthostatic hypotension  I95.1 458.0         Plan:    Assessment & Plan    - Suspect patient's dizziness and chest pain may be related to low blood pressure from current antihypertensive regimen  - Decreased amlodipine/benazepril  dosage  - Chest pain concerning for potential cardiac etiology  - If dizziness persists after medication adjustment, will consider vestibular rehabilitation for possible BPPV  HYPERTENSION:  1. Patient to check blood pressure 2 times daily, in the morning and before work.  2. Patient to aim to keep systolic blood pressure (top number) between 120-140 mmHg and diastolic blood pressure (bottom number) less than 90 mmHg.  3. Patient to message doctor with blood pressure readings.  4. Decreased amlodipine/benazepril to a lower dose capsule, continue taking in the morning around 6:30am.  5. Continued metoprolol and furosemide at current doses.  CARDIAC EVALUATION:  1. Stress test ordered to evaluate heart function.  LABS:  1. Labs ordered.         Diagnoses and all orders for this visit:    Chest pain, unspecified type  -     Nuclear Stress - Cardiology Interpreted; Future    Essential hypertension    Dizziness    Orthostatic hypotension  -     Comprehensive Metabolic Panel; Future  -     CBC Auto Differential; Future    Other orders  -     amlodipine-benazepril 5-20 mg (LOTREL) 5-20 mg per capsule; Take 1 capsule by mouth once daily.        No follow-ups on file.      Mohan Schwartz MD

## 2024-07-18 ENCOUNTER — PATIENT MESSAGE (OUTPATIENT)
Dept: CARDIOLOGY | Facility: HOSPITAL | Age: 59
End: 2024-07-18
Payer: COMMERCIAL

## 2024-07-18 LAB
OHS QRS DURATION: 94 MS
OHS QTC CALCULATION: 444 MS

## 2024-07-19 ENCOUNTER — PATIENT MESSAGE (OUTPATIENT)
Dept: FAMILY MEDICINE | Facility: CLINIC | Age: 59
End: 2024-07-19
Payer: COMMERCIAL

## 2024-07-21 DIAGNOSIS — R74.8 ELEVATED LIVER ENZYMES: Primary | ICD-10-CM

## 2024-07-23 ENCOUNTER — TELEPHONE (OUTPATIENT)
Dept: FAMILY MEDICINE | Facility: CLINIC | Age: 59
End: 2024-07-23
Payer: COMMERCIAL

## 2024-07-23 ENCOUNTER — PATIENT MESSAGE (OUTPATIENT)
Dept: FAMILY MEDICINE | Facility: CLINIC | Age: 59
End: 2024-07-23
Payer: COMMERCIAL

## 2024-07-23 NOTE — TELEPHONE ENCOUNTER
----- Message from Loida Fisher sent at 7/23/2024 11:03 AM CDT -----  Contact: Self  Type:  Patient Returning Call    Who Called:  Self  Who Left Message for Patient:   FIDE ALTAMIRANO  Does the patient know what this is regarding?:  not sure  Best Call Back Number:  [phone]  Additional Information:  Please call the pt back! Thanks

## 2024-07-23 NOTE — TELEPHONE ENCOUNTER
----- Message from Ana Maria Brown sent at 7/23/2024 10:40 AM CDT -----  Contact: Dre [phone]  Would like to receive medical advice.     Would they like a call back or a response via [facility]:  call back    Additional information:  Dre states there is an appt that is after the ultra sound that he needs to schedule. Dre states he tried to schedule the appt but its fully booked up and I stated I did not see anything on my end to schedule for the pt. Please call Dre back for advice

## 2024-07-25 ENCOUNTER — PATIENT MESSAGE (OUTPATIENT)
Dept: FAMILY MEDICINE | Facility: CLINIC | Age: 59
End: 2024-07-25
Payer: COMMERCIAL

## 2024-07-25 ENCOUNTER — TELEPHONE (OUTPATIENT)
Dept: FAMILY MEDICINE | Facility: CLINIC | Age: 59
End: 2024-07-25
Payer: COMMERCIAL

## 2024-07-25 NOTE — TELEPHONE ENCOUNTER
----- Message from Liza Kim sent at 7/25/2024 12:26 PM CDT -----  Contact: SELF   .Type: Patient Call Back        Who called:   pATIENT      What is the request in detail:    CALLED IN WITH CONCERNS ABOUT TEST FOR TOMORROW . PLEASE CALL BACK  Can the clinic reply by [facility]?           Would the patient rather a call back or a response via [facility]?   CALL     Best call back number:  [phone]

## 2024-07-26 ENCOUNTER — TELEPHONE (OUTPATIENT)
Dept: PAIN MEDICINE | Facility: CLINIC | Age: 59
End: 2024-07-26
Payer: COMMERCIAL

## 2024-07-26 ENCOUNTER — APPOINTMENT (OUTPATIENT)
Dept: RADIOLOGY | Facility: HOSPITAL | Age: 59
End: 2024-07-26
Attending: FAMILY MEDICINE
Payer: COMMERCIAL

## 2024-07-26 DIAGNOSIS — R74.8 ELEVATED LIVER ENZYMES: ICD-10-CM

## 2024-07-26 PROCEDURE — 76700 US EXAM ABDOM COMPLETE: CPT | Mod: 26,,, | Performed by: RADIOLOGY

## 2024-07-26 PROCEDURE — 76700 US EXAM ABDOM COMPLETE: CPT | Mod: TC,PO

## 2024-07-26 NOTE — TELEPHONE ENCOUNTER
Called patient and scheduled an appt to discuss injections because patient stated he was in pain. Pt verbalized understanding.    Ana MANCUSO

## 2024-07-28 DIAGNOSIS — M1A.0710 IDIOPATHIC CHRONIC GOUT OF RIGHT FOOT WITHOUT TOPHUS: ICD-10-CM

## 2024-07-28 NOTE — TELEPHONE ENCOUNTER
Care Due:                  Date            Visit Type   Department     Provider  --------------------------------------------------------------------------------                                EP -                              [facility]  Last Visit: 07-      CARE ([facility])   MEDICINE       Mohan Schwartz                              [facility]  Next Visit: 11-      CARE ([facility])   MEDICINE       Mohan Schwartz                                                            Last  Test          Frequency    Reason                     Performed    Due Date  --------------------------------------------------------------------------------    Uric Acid...  12 months..  allopurinoL..............  Not Found    Overdue    Vitamin D...  12 months..  VITAMIN..................  02- 02-    [facility] Embedded Care Due Messages. Reference number: [id number].   7/28/2024 8:05:45 AM CDT

## 2024-07-29 ENCOUNTER — TELEPHONE (OUTPATIENT)
Dept: PAIN MEDICINE | Facility: CLINIC | Age: 59
End: 2024-07-29
Payer: COMMERCIAL

## 2024-07-29 ENCOUNTER — PATIENT MESSAGE (OUTPATIENT)
Dept: FAMILY MEDICINE | Facility: CLINIC | Age: 59
End: 2024-07-29
Payer: COMMERCIAL

## 2024-07-29 RX ORDER — ALLOPURINOL 100 MG/1
TABLET ORAL
Qty: 90 TABLET | Refills: 0 | Status: SHIPPED | OUTPATIENT
Start: 2024-07-29

## 2024-07-29 NOTE — TELEPHONE ENCOUNTER
Called patient and scheduled an appt for injections. Patient verbalized understanding.    Ana MANCUSO

## 2024-07-29 NOTE — TELEPHONE ENCOUNTER
Refill Routing Note   Medication(s) are not appropriate for processing by [facility] for the following reason(s):        Required labs outdated  Required labs abnormal    [facility] action(s):  Defer     Requires labs : Yes             Appointments  past 12m or future 3m with PCP    Date Provider   Last Visit   7/17/2024 Mohan Schwartz MD   Next Visit   11/11/2024 Mohan Schwartz MD   ED visits in past 90 days: 0        Note composed:3:21 PM 07/29/2024

## 2024-07-30 ENCOUNTER — PATIENT MESSAGE (OUTPATIENT)
Dept: HEPATOLOGY | Facility: CLINIC | Age: 59
End: 2024-07-30
Payer: COMMERCIAL

## 2024-07-31 NOTE — H&P (VIEW-ONLY)
Est Patient Chronic Pain Note (Follow up Visit)    Referring Physician: No ref. provider found    PCP: Mohan Schwartz MD    Chief Complaint:   Chief Complaint   Patient presents with    Low-back Pain        SUBJECTIVE:    Interval History (8/1/2024):   Dre Curiel presents today for follow-up visit.  Patient was last seen on 2/5/2024. Patient reports pain as 4/10 today however some days the pain can be as bad as an 8-9 on a pain scale of 0-10.   He localizes his pain to both hips and SI joints. Left side is worse. He experiences more pain with bending.  Currently takes Lyrica and Diclofenac. Requests medication refills today.    Interval History (2/5/2024): Patient presents today for follow-up visit.  he underwent Bilateral  lumbar  + L5/S1 TF UVALDO on 12/26/23.  The patient reports that he is/was better following the procedure.  he reports 85 % pain relief.   The changes have continued through this visit.  Patient reports pain as 2/10 today. He has no issues with the LLE anymore but continues to have pain and weakness involving RLE. It is still difficult to lift / RLE. He admits despite these concerns,  it is easier to walk since procedure.     Interval History (12/04/2023):   Dre Curiel presents today for follow-up visit.  Patient was last seen on 5/16/23. At that visit, the plan was to continue conservative treatment and follow up as needed. Patient reports pain as 6/10 today.   The patient states since his last visit in May his pain has gradually increased in lower back and down his legs, worse with RLE. He describes pain as stabbing and also experiences numbness.   Denies recent falls, bladder/bowel changes.      Interval History (5/16/2023):  Dre Curiel presents today for follow-up visit.  Patient was last seen on 3/7/2023. Last injection bilateral SIJ + bilateral IA hip injections on 01/24/2023 followed by bilateral L3-5 MBB on on 2/7/23 with 80% relief. Reports sustained relief from these  injections. Needs refill of Lyrica 225 mg BID. Patient reports pain as 2/10 today.      Interval History (3/7/2023): Dre Curiel presents today for follow-up visit.  he underwent bilateral SIJ + bilateral IA hip injections on 01/24/2023 followed by bilateral L3-5 MBB on on 2/7/23.  The patient reports that he is/was better following the procedure.  he reports 80% pain relief from each injection.  The changes lasted 4 weeks so far.  The changes have continued through this visit.  Patient reports pain as 5/10 today. He reports he has good days and bad days, but has more good days since injection. Pain is exacerbated with bending. Has some residual right sided lateral hip pain and burning and needles sensation his right thigh/leg, but this has been somewhat improved with Lyrica 225 mg BID. Also had MRI of lumbar spine with noted compression of L5 spinal nerves.    Initial HPI  Dre Curiel is a 59 y.o. male with past medical history significant for anxiety/depression, hypertension, hyperlipidemia, stage 3 chronic kidney disease a history of renal cell carcinoma status post left-sided nephrectomy, erectile dysfunction, obesity, obstructive sleep apnea who presents to the clinic for the evaluation of lower back and leg pain.  Patient reports pain has been present for several years without inciting accident injury or trauma.  Today patient reports pain is constant which is rated a 6/10.  Pain is described as sharp and stabbing and needles poking  in nature.  Patient reports pain in a bandlike distribution in the lower back which radiates down the lateral aspect of the right lower extremity to the calf in L4-5 distribution.  Pain is exacerbated when moving from sitting to standing, with lumbar forward flexion and lateral flexion and with ambulation.  Patient is a supervisor of product distribution, manages everything and reports he is standing on his feet all day.  Patient does report sensation of buckling in the  right lower extremity associated with prolonged exertion.  Pain is improved with sitting.  Patient has trialed gabapentin 300 mg, 1-2 times daily in the past without meaningful relief.  Patient reports completing 8 sessions of conventional physical therapy on 12/2022 at [facility] without any meaningful relief in range of motion, strength or pain.    Patient reports significant motor weakness.  Patient denies night fever/night sweats, urinary incontinence, bowel incontinence, significant weight loss, and loss of sensations    Pain Disability Index Review:         8/1/2024     8:13 AM 2/5/2024     8:16 AM 12/4/2023     9:01 AM   Last 3 PDI Scores   Pain Disability Index (PDI) 28 14 30       Non-Pharmacologic Treatments:  Physical Therapy/Home Exercise: yes  Ice/Heat:yes  TENS: no  Acupuncture: no  Massage: no  Chiropractic: no    Other: no      Pain Medications:  - Opioids: Percocet (Oxycodone/Acetaminophen) and Ultram (Tramadol HCL)  - Adjuvant Medications: Neurontin (Gabapentin), Trazodone (Desyrel), Xanax (Alprazolam), and Zoloft (Sertraline)    Pain Procedures:   bilateral SIJ + bilateral IA hip injections on 01/24/2023 with 80% relief  bilateral L3-5 MBB on on 2/7/23 with 80% relief  Bilateral lumbar + L5/S1 TF UVALDO on 12/2/6/23 with 85% relief      Past Medical History:   Diagnosis Date    Allergy     Anxiety     Cancer of kidney     Depression     Hyperlipidemia     Hypertension      Past Surgical History:   Procedure Laterality Date    COLONOSCOPY N/A 6/8/2018    Procedure: COLONOSCOPY;  Surgeon: Simeon Acharya III, MD;  Location: [facility];  Service: Endoscopy;  Laterality: N/A;    COLONOSCOPY N/A 6/11/2018    Procedure: COLONOSCOPY;  Surgeon: Simeon Acharya III, MD;  Location: [facility];  Service: Endoscopy;  Laterality: N/A;    COLONOSCOPY  06/2018    COLONOSCOPY N/A 11/17/2021    Procedure: COLONOSCOPY;  Surgeon: Tomi Lewis MD;  Location: [facility];  Service: Endoscopy;  Laterality:  N/A;    COSMETIC SURGERY      INJECTION OF ANESTHETIC AGENT AROUND MEDIAL BRANCH NERVES INNERVATING LUMBAR FACET JOINT Bilateral 2/7/2023    Procedure: Bilateral L3-5 MBB with local;  Surgeon: Lopez Mendoza MD;  Location: [facility];  Service: Pain Management;  Laterality: Bilateral;    INJECTION OF ANESTHETIC AGENT INTO SACROILIAC JOINT Bilateral 1/24/2023    Procedure: Bilateral SIJ Injection;  Surgeon: Lopez Mendoza MD;  Location: [facility];  Service: Pain Management;  Laterality: Bilateral;    INJECTION OF JOINT Bilateral 1/24/2023    Procedure: Bilateral Hip injection;  Surgeon: Lopez Mendoza MD;  Location: [facility];  Service: Pain Management;  Laterality: Bilateral;    kidney removal      LAPAROSCOPIC NEPHRECTOMY, HAND ASSISTED Left 9/16/2021    Procedure: NEPHRECTOMY, HAND-ASSISTED, LAPAROSCOPIC;  Surgeon: Tom Manzo MD;  Location: [facility];  Service: Urology;  Laterality: Left;    SELECTIVE INJECTION OF ANESTHETIC AGENT AROUND LUMBAR SPINAL NERVE ROOT BY TRANSFORAMINAL APPROACH Bilateral 12/26/2023    Procedure: Bilateral Lumbar L5/S1 TF UVALDO with RN IV sedation- Insurance only will pay for 1 level;  Surgeon: Lopez Mendoza MD;  Location: [facility];  Service: Pain Management;  Laterality: Bilateral;     Review of patient's allergies indicates:   Allergen Reactions    Iodine and iodide containing products      Other reaction(s): Swelling  Other reaction(s): Sneezing  Other reaction(s): Shortness of breath       Current Outpatient Medications   Medication Sig    allopurinoL (ZYLOPRIM) 100 MG tablet TAKE ONE TABLET BY MOUTH ONCE DAILY    amlodipine-benazepril 5-20 mg (LOTREL) 5-20 mg per capsule Take 1 capsule by mouth once daily.    diclofenac (VOLTAREN) 75 MG EC tablet Take 1 tablet (75 mg total) by mouth 2 (two) times daily.    fluticasone propionate (FLONASE) 50 mcg/actuation nasal spray USE TWO SPRAYS IN EACH NOSTRIL ONCE DAILY    gabapentin (NEURONTIN) 300 MG capsule TAKE ONE  CAPSULE BY MOUTH DURING THE DAY THEN TWO CAPSULES EVERY NIGHT AT BEDTIME    hydroCHLOROthiazide (HYDRODIURIL) 12.5 MG Tab TAKE ONE TABLET BY MOUTH ONCE DAILY    levocetirizine (XYZAL) 5 MG tablet Take 1 tablet (5 mg total) by mouth every evening.    metoprolol succinate (TOPROL-XL) 25 MG 24 hr tablet Take 1 tablet (25 mg total) by mouth once daily.    potassium chloride (KLOR-CON) 10 MEQ TbSR Take 10 mEq by mouth every Mon, Wed, Fri, Sun. (Patient not taking: Reported on 5/2/2024)    potassium chloride SA (K-DUR,KLOR-CON M) 10 MEQ tablet Take 1 tablet (10 mEq) BY MOUTH ONCE DAILY.    pravastatin (PRAVACHOL) 40 MG tablet Take 1 tablet (40 mg total) by mouth once daily.    pregabalin (LYRICA) 225 MG Cap Take 1 capsule (225 mg total) by mouth 2 (two) times daily.    sodium bicarbonate 650 MG tablet Take 1 tablet (650 mg total) by mouth before breakfast. With food    testosterone cypionate (DEPOTESTOTERONE CYPIONATE) 200 mg/mL injection INJECT ONE ML INTO MUSCLE EVERY 14 DAYS    traZODone (DESYREL) 50 MG tablet Take 1 tablet (50 mg total) by mouth once daily.    VITAMIN D2 1,250 mcg (50,000 unit) capsule TAKE ONE CAPSULE BY MOUTH ONCE WEEKLY     No current facility-administered medications for this visit.       Review of Systems     GENERAL:  No weight loss, malaise or fevers.  HEENT:   No recent changes in vision or hearing  NECK:  Negative for lumps, no difficulty with swallowing.  RESPIRATORY:  Negative for cough, wheezing or shortness of breath, patient denies any recent URI.  CARDIOVASCULAR:  Negative for chest pain or palpitations.  GI:  Negative for abdominal discomfort, blood in stools or black stools or change in bowel habits.  MUSCULOSKELETAL:  See HPI.  SKIN:  Negative for lesions, rash, and itching.  PSYCH:  No mood disorder or recent psychosocial stressors.   HEMATOLOGY/LYMPHOLOGY:  Negative for prolonged bleeding, bruising easily or swollen nodes.    NEURO:   No history of syncope, paralysis, seizures or  "tremors.  All other reviewed and negative other than HPI.    OBJECTIVE:    BP (!) 135/57   Pulse 74   Ht 5' 8" (1.727 m)   Wt 118.8 kg (262 lb)   BMI 39.84 kg/m²       Physical Exam    GENERAL: Well appearing, in no acute distress, alert and oriented x3.  PSYCH:  Mood and affect appropriate.  SKIN: Skin color, texture, turgor normal, no rashes or lesions.  HEAD/FACE:  Normocephalic, atraumatic. Cranial nerves grossly intact.  PULM: No evidence of respiratory difficulty, symmetric chest rise.  GI:  Soft and non-tender.    BACK: Straight leg raising in the sitting and supine positions is  negative to radicular pain.  Minimal pain to palpation over the facet joints of the lumbar spine or spinous processes.  No pain with range of motion.     EXTREMITIES: Peripheral joint ROM is full and pain free without obvious instability or laxity in all four extremities. No deformities, edema, or skin discoloration. Good capillary refill.  MUSCULOSKELETAL: Able to stand on heels & toes.    hip provocative maneuvers are positive bilaterally but pain is improved      Facet loading test is positive bilaterally.   Bilateral upper and lower extremity strength is normal and symmetric.  No atrophy or tone abnormalities are noted.    RIGHT Lower extremity: Hip flexion 4/5, Hip Abduction 4/5, Hip Adduction 4/5, Knee extension 4+/5, Knee flexion 4+/5, Ankle dorsiflexion5/5, Extensor hallucis longus 5/5, Ankle plantarflexion 5/5  LEFT Lower extremity:  Hip flexion 5/5, Hip Abduction 5/5,Hip Adduction 5/5, Knee extension 5/5, Knee flexion 5/5, Ankle dorsiflexion 5/5, Extensor hallucis longus 5/5, Ankle plantarflexion 5/5  -Normal testing knee (patellar) jerk and ankle (achilles) jerk    NEURO: Bilateral upper and lower extremity coordination and muscle stretch reflexes are physiologic and symmetric. No loss of sensation is noted.  GAIT: normal.    SIJ testing:  BILATERAL  - TTP over SI joint: Present  - Carmen's/ Gunner's: Positive    - " Sacroiliac Distraction Test (anterior pressure): Positive  - Sacroiliac Compression Test (lateral pressure): Positive   - TTP GT Bursa: Present  - IR/ER: painful for both hips    Imaging (Reviewed on 8/1/2024):      01/22/20  X-Ray Lumbar Spine Ap And Lateral  FINDINGS:  Vertebral body heights maintained.  No definite spondylolisthesis.  L5-S1 evaluation limited by positioning.  There is sacralization of L5.  Multilevel facet degenerative findings noted.  L4-5 and L5-S1 degenerative disc height loss.      Bilateral x-ray SI joints 12/14/2022  FINDINGS:  No evidence for ankylosis.  No erosive changes.  No widening of the AC joints.    Bilateral hip XR 12/14/22  FINDINGS:  No acute fracture or dislocation.  No significant soft tissue swelling.   The femoral heads are normally positioned within the native acetabuli.  Mild bilateral femoroacetabular degenerative osteoarthrosis with mild joint space narrowing and small osteophyte formation.  Chronic suspected bone island of the left femoral head.     Pubic symphysis, bilateral pubic rami and SI joints are intact.       MRI lumbar spine 01/23/2023  FINDINGS:  Transitional anatomy with partial sacralization of L5 peripherally.  There are bilateral L5 pars defects with grade 1 spondylolisthesis at L5-S1.  Vertebral body height is normal.  Marrow signal is within normal limits. The conus medullaris terminates at the level of L1-L2.  No abnormal signal within the conus. Intervertebral disc levels are as follows:     T12-L1 disc: Tiny chronic Schmorl's nodes.  Normal facet joints.  No significant stenosis.  The dural canal measures 13 mm.     L1-L2 disc : Normal.     L2-L3 disc: Normal disc space height with anterior osteophytes.  Minimal facet arthropathy.  No significant spinal or foraminal stenosis.  The dural canal measures 11 mm.  No foraminal stenosis.     L3-L4 disc: Mild disc bulge.  Anterior osteophytes.  Normal facet joints.  The dural canal measures 14 mm.  No  foraminal stenosis.     L4-L5 disc: Normal disc space height with mild facet arthropathy.  No significant spinal or foraminal stenosis.  The dural canal measures 15 mm.     L5-S1 disc: Bilateral L5 pars defects with grade 1 spondylolisthesis.  Severe disc space height loss most pronounced toward the right with edematous Modic endplate change.  Disc and osteophyte encroach into the exit foramina, right greater than left.  There is severe right and moderate/severe left foraminal stenosis with compression of the exiting L5 spinal nerves.  Degenerative hypertrophic change toward the right of the disc space as well as degenerative change of the pseudoarticulation of L5 and S1 narrows the exit pathway of the right L5 spinal nerve as demonstrated on axial T2 series 6, image 28.  Mild facet arthropathy.  The dural canal measures 14 mm.     Impression:     1. Isthmic grade 1 spondylolisthesis at L5-S1 with severe bilateral foraminal stenosis and possible compression of the exiting L5 spinal nerves.  2. Degenerative hypertrophic change toward the right of the L5-S1 disc space causes severe narrowing of the exit pathway of the right L5 spinal nerve as demonstrated on axial T2 image 28.  3. Edematous Modic endplate change at L5-S1 toward the right at L5-S1 may correlate to focal symptoms.       ASSESSMENT: 59 y.o. year old male with lower back and leg pain, consistent with     1. Sacroiliitis  Case Request-RAD/Other Procedure Area: Bilateral  Hip Joint injections, Bilateral Sacroiliac Joint Injections      2. Bilateral hip pain  Case Request-RAD/Other Procedure Area: Bilateral  Hip Joint injections, Bilateral Sacroiliac Joint Injections      3. Lumbar radiculopathy, chronic  diclofenac (VOLTAREN) 75 MG EC tablet      4. DDD (degenerative disc disease), lumbar  diclofenac (VOLTAREN) 75 MG EC tablet      5. Weakness of right lower extremity  diclofenac (VOLTAREN) 75 MG EC tablet          PLAN:   - Interventions: Schedule patient  for bilateral  hip and sacroiliac joint injections.  Explained the risks and benefits of the procedure in detail with the patient today in clinic along with alternative treatment options, and the patient elected to pursue the intervention.        - S/p Lumbar TF Epidural steroid injection at L5/S1 (bilateral) on 12/26/23 with 85% relief.  bilateral SIJ + bilateral IA hip injections on 01/24/2023 with 80% relief  bilateral L3-5 MBB on on 2/7/23 with 80% relief    - Anticoagulation use: no anticoagulation     report:  Reviewed and consistent with medication use as prescribed.      - Medications:  --Continue Lyrica 225 mg b.i.d.  We discussed potential side effects of this medication which may include drowsiness,dizziness, dry mouth, constipation or peripheral edema.Refill provided today.  - Gabapentin discontinued. Patient is not taking this medication (it was Rx'd by PCP).    -- Continue Diclofenac 75 mg BID for pain. he denies any kidney or cardiac issues. Take with food.  Avoid other OTC NSAIDs (ex. Ibuprofen, naproxen) while taking this medication. Refill provided today.      - Therapy:   We discussed  physical therapy to help manage the patient/s painful condition. The patient was counseled that muscle strengthening will improve the long term prognosis in regards to pain and may also help increase range of motion and mobility. Patient declined referral to outpatient physical therapy at this time. Reports the last time he was in physical therapy it did not improve his condition.    - Imaging: Reviewed MRI of lumbar spine with patient and answered any questions they had regarding study.      - Consults/Referrals: Consider referral to neurosurgery. Patient states surgery is a last resort for him.     - Follow up visit: 4 weeks after procedure.      The above plan and management options were discussed at length with patient. Patient is in agreement with the above and verbalized understanding.    - I discussed the  goals of interventional chronic pain management with the patient on today's visit. We discussed a multimodal and systematic approach to pain.  This includes diagnostic and therapeutic injections, adjuvant pharmacologic treatment, physical therapy, and at times psychiatry.  I emphasized the importance of regular exercise, core strengthening and stretching, diet and weight loss as a cornerstone of long-term pain management.    - This condition does not require this patient to take time off of work, and the primary goal of our Pain Management services is to improve the patient's functional capacity.  - Patient Questions: Answered all of the patient's questions regarding diagnoses, therapy, treatment and next steps      Visit today included increased complexity associated with the care of the episodic problem of chronic pain which was addressed and continue to manage the longitudinal care of the patient due to the serious and/or complex managed problem(s) listed above.      Devora Thompson PA-C  Interventional Pain Management  [facility] [place]

## 2024-07-31 NOTE — PROGRESS NOTES
Est Patient Chronic Pain Note (Follow up Visit)    Referring Physician: No ref. provider found    PCP: Mohan Schwartz MD    Chief Complaint:   Chief Complaint   Patient presents with    Low-back Pain        SUBJECTIVE:    Interval History (8/1/2024):   Dre Curiel presents today for follow-up visit.  Patient was last seen on 2/5/2024. Patient reports pain as 4/10 today however some days the pain can be as bad as an 8-9 on a pain scale of 0-10.   He localizes his pain to both hips and SI joints. Left side is worse. He experiences more pain with bending.  Currently takes Lyrica and Diclofenac. Requests medication refills today.    Interval History (2/5/2024): Patient presents today for follow-up visit.  he underwent Bilateral  lumbar  + L5/S1 TF UVALDO on 12/26/23.  The patient reports that he is/was better following the procedure.  he reports 85 % pain relief.   The changes have continued through this visit.  Patient reports pain as 2/10 today. He has no issues with the LLE anymore but continues to have pain and weakness involving RLE. It is still difficult to lift / RLE. He admits despite these concerns,  it is easier to walk since procedure.     Interval History (12/04/2023):   Dre Curiel presents today for follow-up visit.  Patient was last seen on 5/16/23. At that visit, the plan was to continue conservative treatment and follow up as needed. Patient reports pain as 6/10 today.   The patient states since his last visit in May his pain has gradually increased in lower back and down his legs, worse with RLE. He describes pain as stabbing and also experiences numbness.   Denies recent falls, bladder/bowel changes.      Interval History (5/16/2023):  Dre Curiel presents today for follow-up visit.  Patient was last seen on 3/7/2023. Last injection bilateral SIJ + bilateral IA hip injections on 01/24/2023 followed by bilateral L3-5 MBB on on 2/7/23 with 80% relief. Reports sustained relief from these  injections. Needs refill of Lyrica 225 mg BID. Patient reports pain as 2/10 today.      Interval History (3/7/2023): Dre Curiel presents today for follow-up visit.  he underwent bilateral SIJ + bilateral IA hip injections on 01/24/2023 followed by bilateral L3-5 MBB on on 2/7/23.  The patient reports that he is/was better following the procedure.  he reports 80% pain relief from each injection.  The changes lasted 4 weeks so far.  The changes have continued through this visit.  Patient reports pain as 5/10 today. He reports he has good days and bad days, but has more good days since injection. Pain is exacerbated with bending. Has some residual right sided lateral hip pain and burning and needles sensation his right thigh/leg, but this has been somewhat improved with Lyrica 225 mg BID. Also had MRI of lumbar spine with noted compression of L5 spinal nerves.    Initial HPI  Dre Curiel is a 59 y.o. male with past medical history significant for anxiety/depression, hypertension, hyperlipidemia, stage 3 chronic kidney disease a history of renal cell carcinoma status post left-sided nephrectomy, erectile dysfunction, obesity, obstructive sleep apnea who presents to the clinic for the evaluation of lower back and leg pain.  Patient reports pain has been present for several years without inciting accident injury or trauma.  Today patient reports pain is constant which is rated a 6/10.  Pain is described as sharp and stabbing and needles poking  in nature.  Patient reports pain in a bandlike distribution in the lower back which radiates down the lateral aspect of the right lower extremity to the calf in L4-5 distribution.  Pain is exacerbated when moving from sitting to standing, with lumbar forward flexion and lateral flexion and with ambulation.  Patient is a supervisor of product distribution, manages everything and reports he is standing on his feet all day.  Patient does report sensation of buckling in the  right lower extremity associated with prolonged exertion.  Pain is improved with sitting.  Patient has trialed gabapentin 300 mg, 1-2 times daily in the past without meaningful relief.  Patient reports completing 8 sessions of conventional physical therapy on 12/2022 at [facility] without any meaningful relief in range of motion, strength or pain.    Patient reports significant motor weakness.  Patient denies night fever/night sweats, urinary incontinence, bowel incontinence, significant weight loss, and loss of sensations    Pain Disability Index Review:         8/1/2024     8:13 AM 2/5/2024     8:16 AM 12/4/2023     9:01 AM   Last 3 PDI Scores   Pain Disability Index (PDI) 28 14 30       Non-Pharmacologic Treatments:  Physical Therapy/Home Exercise: yes  Ice/Heat:yes  TENS: no  Acupuncture: no  Massage: no  Chiropractic: no    Other: no      Pain Medications:  - Opioids: Percocet (Oxycodone/Acetaminophen) and Ultram (Tramadol HCL)  - Adjuvant Medications: Neurontin (Gabapentin), Trazodone (Desyrel), Xanax (Alprazolam), and Zoloft (Sertraline)    Pain Procedures:   bilateral SIJ + bilateral IA hip injections on 01/24/2023 with 80% relief  bilateral L3-5 MBB on on 2/7/23 with 80% relief  Bilateral lumbar + L5/S1 TF UVALDO on 12/2/6/23 with 85% relief      Past Medical History:   Diagnosis Date    Allergy     Anxiety     Cancer of kidney     Depression     Hyperlipidemia     Hypertension      Past Surgical History:   Procedure Laterality Date    COLONOSCOPY N/A 6/8/2018    Procedure: COLONOSCOPY;  Surgeon: Simeon Acharya III, MD;  Location: [facility];  Service: Endoscopy;  Laterality: N/A;    COLONOSCOPY N/A 6/11/2018    Procedure: COLONOSCOPY;  Surgeon: Simeon Acharya III, MD;  Location: [facility];  Service: Endoscopy;  Laterality: N/A;    COLONOSCOPY  06/2018    COLONOSCOPY N/A 11/17/2021    Procedure: COLONOSCOPY;  Surgeon: Tomi Lewis MD;  Location: [facility];  Service: Endoscopy;  Laterality:  N/A;    COSMETIC SURGERY      INJECTION OF ANESTHETIC AGENT AROUND MEDIAL BRANCH NERVES INNERVATING LUMBAR FACET JOINT Bilateral 2/7/2023    Procedure: Bilateral L3-5 MBB with local;  Surgeon: Lopez Mendoza MD;  Location: [facility];  Service: Pain Management;  Laterality: Bilateral;    INJECTION OF ANESTHETIC AGENT INTO SACROILIAC JOINT Bilateral 1/24/2023    Procedure: Bilateral SIJ Injection;  Surgeon: Lopez Mendoza MD;  Location: [facility];  Service: Pain Management;  Laterality: Bilateral;    INJECTION OF JOINT Bilateral 1/24/2023    Procedure: Bilateral Hip injection;  Surgeon: Lopez Mendoza MD;  Location: [facility];  Service: Pain Management;  Laterality: Bilateral;    kidney removal      LAPAROSCOPIC NEPHRECTOMY, HAND ASSISTED Left 9/16/2021    Procedure: NEPHRECTOMY, HAND-ASSISTED, LAPAROSCOPIC;  Surgeon: Tom Manzo MD;  Location: [facility];  Service: Urology;  Laterality: Left;    SELECTIVE INJECTION OF ANESTHETIC AGENT AROUND LUMBAR SPINAL NERVE ROOT BY TRANSFORAMINAL APPROACH Bilateral 12/26/2023    Procedure: Bilateral Lumbar L5/S1 TF UVALDO with RN IV sedation- Insurance only will pay for 1 level;  Surgeon: Lopez Mendoza MD;  Location: [facility];  Service: Pain Management;  Laterality: Bilateral;     Review of patient's allergies indicates:   Allergen Reactions    Iodine and iodide containing products      Other reaction(s): Swelling  Other reaction(s): Sneezing  Other reaction(s): Shortness of breath       Current Outpatient Medications   Medication Sig    allopurinoL (ZYLOPRIM) 100 MG tablet TAKE ONE TABLET BY MOUTH ONCE DAILY    amlodipine-benazepril 5-20 mg (LOTREL) 5-20 mg per capsule Take 1 capsule by mouth once daily.    diclofenac (VOLTAREN) 75 MG EC tablet Take 1 tablet (75 mg total) by mouth 2 (two) times daily.    fluticasone propionate (FLONASE) 50 mcg/actuation nasal spray USE TWO SPRAYS IN EACH NOSTRIL ONCE DAILY    gabapentin (NEURONTIN) 300 MG capsule TAKE ONE  CAPSULE BY MOUTH DURING THE DAY THEN TWO CAPSULES EVERY NIGHT AT BEDTIME    hydroCHLOROthiazide (HYDRODIURIL) 12.5 MG Tab TAKE ONE TABLET BY MOUTH ONCE DAILY    levocetirizine (XYZAL) 5 MG tablet Take 1 tablet (5 mg total) by mouth every evening.    metoprolol succinate (TOPROL-XL) 25 MG 24 hr tablet Take 1 tablet (25 mg total) by mouth once daily.    potassium chloride (KLOR-CON) 10 MEQ TbSR Take 10 mEq by mouth every Mon, Wed, Fri, Sun. (Patient not taking: Reported on 5/2/2024)    potassium chloride SA (K-DUR,KLOR-CON M) 10 MEQ tablet Take 1 tablet (10 mEq) BY MOUTH ONCE DAILY.    pravastatin (PRAVACHOL) 40 MG tablet Take 1 tablet (40 mg total) by mouth once daily.    pregabalin (LYRICA) 225 MG Cap Take 1 capsule (225 mg total) by mouth 2 (two) times daily.    sodium bicarbonate 650 MG tablet Take 1 tablet (650 mg total) by mouth before breakfast. With food    testosterone cypionate (DEPOTESTOTERONE CYPIONATE) 200 mg/mL injection INJECT ONE ML INTO MUSCLE EVERY 14 DAYS    traZODone (DESYREL) 50 MG tablet Take 1 tablet (50 mg total) by mouth once daily.    VITAMIN D2 1,250 mcg (50,000 unit) capsule TAKE ONE CAPSULE BY MOUTH ONCE WEEKLY     No current facility-administered medications for this visit.       Review of Systems     GENERAL:  No weight loss, malaise or fevers.  HEENT:   No recent changes in vision or hearing  NECK:  Negative for lumps, no difficulty with swallowing.  RESPIRATORY:  Negative for cough, wheezing or shortness of breath, patient denies any recent URI.  CARDIOVASCULAR:  Negative for chest pain or palpitations.  GI:  Negative for abdominal discomfort, blood in stools or black stools or change in bowel habits.  MUSCULOSKELETAL:  See HPI.  SKIN:  Negative for lesions, rash, and itching.  PSYCH:  No mood disorder or recent psychosocial stressors.   HEMATOLOGY/LYMPHOLOGY:  Negative for prolonged bleeding, bruising easily or swollen nodes.    NEURO:   No history of syncope, paralysis, seizures or  "tremors.  All other reviewed and negative other than HPI.    OBJECTIVE:    BP (!) 135/57   Pulse 74   Ht 5' 8" (1.727 m)   Wt 118.8 kg (262 lb)   BMI 39.84 kg/m²       Physical Exam    GENERAL: Well appearing, in no acute distress, alert and oriented x3.  PSYCH:  Mood and affect appropriate.  SKIN: Skin color, texture, turgor normal, no rashes or lesions.  HEAD/FACE:  Normocephalic, atraumatic. Cranial nerves grossly intact.  PULM: No evidence of respiratory difficulty, symmetric chest rise.  GI:  Soft and non-tender.    BACK: Straight leg raising in the sitting and supine positions is  negative to radicular pain.  Minimal pain to palpation over the facet joints of the lumbar spine or spinous processes.  No pain with range of motion.     EXTREMITIES: Peripheral joint ROM is full and pain free without obvious instability or laxity in all four extremities. No deformities, edema, or skin discoloration. Good capillary refill.  MUSCULOSKELETAL: Able to stand on heels & toes.    hip provocative maneuvers are positive bilaterally but pain is improved      Facet loading test is positive bilaterally.   Bilateral upper and lower extremity strength is normal and symmetric.  No atrophy or tone abnormalities are noted.    RIGHT Lower extremity: Hip flexion 4/5, Hip Abduction 4/5, Hip Adduction 4/5, Knee extension 4+/5, Knee flexion 4+/5, Ankle dorsiflexion5/5, Extensor hallucis longus 5/5, Ankle plantarflexion 5/5  LEFT Lower extremity:  Hip flexion 5/5, Hip Abduction 5/5,Hip Adduction 5/5, Knee extension 5/5, Knee flexion 5/5, Ankle dorsiflexion 5/5, Extensor hallucis longus 5/5, Ankle plantarflexion 5/5  -Normal testing knee (patellar) jerk and ankle (achilles) jerk    NEURO: Bilateral upper and lower extremity coordination and muscle stretch reflexes are physiologic and symmetric. No loss of sensation is noted.  GAIT: normal.    SIJ testing:  BILATERAL  - TTP over SI joint: Present  - Carmen's/ Gunner's: Positive    - " Sacroiliac Distraction Test (anterior pressure): Positive  - Sacroiliac Compression Test (lateral pressure): Positive   - TTP GT Bursa: Present  - IR/ER: painful for both hips    Imaging (Reviewed on 8/1/2024):      01/22/20  X-Ray Lumbar Spine Ap And Lateral  FINDINGS:  Vertebral body heights maintained.  No definite spondylolisthesis.  L5-S1 evaluation limited by positioning.  There is sacralization of L5.  Multilevel facet degenerative findings noted.  L4-5 and L5-S1 degenerative disc height loss.      Bilateral x-ray SI joints 12/14/2022  FINDINGS:  No evidence for ankylosis.  No erosive changes.  No widening of the AC joints.    Bilateral hip XR 12/14/22  FINDINGS:  No acute fracture or dislocation.  No significant soft tissue swelling.   The femoral heads are normally positioned within the native acetabuli.  Mild bilateral femoroacetabular degenerative osteoarthrosis with mild joint space narrowing and small osteophyte formation.  Chronic suspected bone island of the left femoral head.     Pubic symphysis, bilateral pubic rami and SI joints are intact.       MRI lumbar spine 01/23/2023  FINDINGS:  Transitional anatomy with partial sacralization of L5 peripherally.  There are bilateral L5 pars defects with grade 1 spondylolisthesis at L5-S1.  Vertebral body height is normal.  Marrow signal is within normal limits. The conus medullaris terminates at the level of L1-L2.  No abnormal signal within the conus. Intervertebral disc levels are as follows:     T12-L1 disc: Tiny chronic Schmorl's nodes.  Normal facet joints.  No significant stenosis.  The dural canal measures 13 mm.     L1-L2 disc : Normal.     L2-L3 disc: Normal disc space height with anterior osteophytes.  Minimal facet arthropathy.  No significant spinal or foraminal stenosis.  The dural canal measures 11 mm.  No foraminal stenosis.     L3-L4 disc: Mild disc bulge.  Anterior osteophytes.  Normal facet joints.  The dural canal measures 14 mm.  No  foraminal stenosis.     L4-L5 disc: Normal disc space height with mild facet arthropathy.  No significant spinal or foraminal stenosis.  The dural canal measures 15 mm.     L5-S1 disc: Bilateral L5 pars defects with grade 1 spondylolisthesis.  Severe disc space height loss most pronounced toward the right with edematous Modic endplate change.  Disc and osteophyte encroach into the exit foramina, right greater than left.  There is severe right and moderate/severe left foraminal stenosis with compression of the exiting L5 spinal nerves.  Degenerative hypertrophic change toward the right of the disc space as well as degenerative change of the pseudoarticulation of L5 and S1 narrows the exit pathway of the right L5 spinal nerve as demonstrated on axial T2 series 6, image 28.  Mild facet arthropathy.  The dural canal measures 14 mm.     Impression:     1. Isthmic grade 1 spondylolisthesis at L5-S1 with severe bilateral foraminal stenosis and possible compression of the exiting L5 spinal nerves.  2. Degenerative hypertrophic change toward the right of the L5-S1 disc space causes severe narrowing of the exit pathway of the right L5 spinal nerve as demonstrated on axial T2 image 28.  3. Edematous Modic endplate change at L5-S1 toward the right at L5-S1 may correlate to focal symptoms.       ASSESSMENT: 59 y.o. year old male with lower back and leg pain, consistent with     1. Sacroiliitis  Case Request-RAD/Other Procedure Area: Bilateral  Hip Joint injections, Bilateral Sacroiliac Joint Injections      2. Bilateral hip pain  Case Request-RAD/Other Procedure Area: Bilateral  Hip Joint injections, Bilateral Sacroiliac Joint Injections      3. Lumbar radiculopathy, chronic  diclofenac (VOLTAREN) 75 MG EC tablet      4. DDD (degenerative disc disease), lumbar  diclofenac (VOLTAREN) 75 MG EC tablet      5. Weakness of right lower extremity  diclofenac (VOLTAREN) 75 MG EC tablet          PLAN:   - Interventions: Schedule patient  for bilateral  hip and sacroiliac joint injections.  Explained the risks and benefits of the procedure in detail with the patient today in clinic along with alternative treatment options, and the patient elected to pursue the intervention.        - S/p Lumbar TF Epidural steroid injection at L5/S1 (bilateral) on 12/26/23 with 85% relief.  bilateral SIJ + bilateral IA hip injections on 01/24/2023 with 80% relief  bilateral L3-5 MBB on on 2/7/23 with 80% relief    - Anticoagulation use: no anticoagulation     report:  Reviewed and consistent with medication use as prescribed.      - Medications:  --Continue Lyrica 225 mg b.i.d.  We discussed potential side effects of this medication which may include drowsiness,dizziness, dry mouth, constipation or peripheral edema.Refill provided today.  - Gabapentin discontinued. Patient is not taking this medication (it was Rx'd by PCP).    -- Continue Diclofenac 75 mg BID for pain. he denies any kidney or cardiac issues. Take with food.  Avoid other OTC NSAIDs (ex. Ibuprofen, naproxen) while taking this medication. Refill provided today.      - Therapy:   We discussed  physical therapy to help manage the patient/s painful condition. The patient was counseled that muscle strengthening will improve the long term prognosis in regards to pain and may also help increase range of motion and mobility. Patient declined referral to outpatient physical therapy at this time. Reports the last time he was in physical therapy it did not improve his condition.    - Imaging: Reviewed MRI of lumbar spine with patient and answered any questions they had regarding study.      - Consults/Referrals: Consider referral to neurosurgery. Patient states surgery is a last resort for him.     - Follow up visit: 4 weeks after procedure.      The above plan and management options were discussed at length with patient. Patient is in agreement with the above and verbalized understanding.    - I discussed the  goals of interventional chronic pain management with the patient on today's visit. We discussed a multimodal and systematic approach to pain.  This includes diagnostic and therapeutic injections, adjuvant pharmacologic treatment, physical therapy, and at times psychiatry.  I emphasized the importance of regular exercise, core strengthening and stretching, diet and weight loss as a cornerstone of long-term pain management.    - This condition does not require this patient to take time off of work, and the primary goal of our Pain Management services is to improve the patient's functional capacity.  - Patient Questions: Answered all of the patient's questions regarding diagnoses, therapy, treatment and next steps      Visit today included increased complexity associated with the care of the episodic problem of chronic pain which was addressed and continue to manage the longitudinal care of the patient due to the serious and/or complex managed problem(s) listed above.      Devora Thompson PA-C  Interventional Pain Management  [facility] [place]

## 2024-08-01 ENCOUNTER — OFFICE VISIT (OUTPATIENT)
Dept: PAIN MEDICINE | Facility: CLINIC | Age: 59
End: 2024-08-01
Payer: COMMERCIAL

## 2024-08-01 ENCOUNTER — OFFICE VISIT (OUTPATIENT)
Dept: HEPATOLOGY | Facility: CLINIC | Age: 59
End: 2024-08-01
Payer: COMMERCIAL

## 2024-08-01 ENCOUNTER — TELEPHONE (OUTPATIENT)
Dept: HEPATOLOGY | Facility: CLINIC | Age: 59
End: 2024-08-01
Payer: COMMERCIAL

## 2024-08-01 VITALS
HEART RATE: 74 BPM | SYSTOLIC BLOOD PRESSURE: 135 MMHG | HEIGHT: 68 IN | WEIGHT: 262 LBS | DIASTOLIC BLOOD PRESSURE: 57 MMHG | BODY MASS INDEX: 39.71 KG/M2

## 2024-08-01 VITALS — WEIGHT: 260 LBS | BODY MASS INDEX: 39.4 KG/M2 | HEIGHT: 68 IN

## 2024-08-01 DIAGNOSIS — K76.0 METABOLIC DYSFUNCTION-ASSOCIATED STEATOTIC LIVER DISEASE (MASLD): ICD-10-CM

## 2024-08-01 DIAGNOSIS — I10 ESSENTIAL HYPERTENSION: ICD-10-CM

## 2024-08-01 DIAGNOSIS — M25.552 BILATERAL HIP PAIN: ICD-10-CM

## 2024-08-01 DIAGNOSIS — M25.551 BILATERAL HIP PAIN: ICD-10-CM

## 2024-08-01 DIAGNOSIS — M54.16 LUMBAR RADICULOPATHY, CHRONIC: ICD-10-CM

## 2024-08-01 DIAGNOSIS — E78.00 PURE HYPERCHOLESTEROLEMIA: ICD-10-CM

## 2024-08-01 DIAGNOSIS — E66.01 CLASS 2 SEVERE OBESITY DUE TO EXCESS CALORIES WITH SERIOUS COMORBIDITY AND BODY MASS INDEX (BMI) OF 39.0 TO 39.9 IN ADULT: ICD-10-CM

## 2024-08-01 DIAGNOSIS — M51.36 DDD (DEGENERATIVE DISC DISEASE), LUMBAR: ICD-10-CM

## 2024-08-01 DIAGNOSIS — R74.8 ELEVATED LIVER ENZYMES: Primary | ICD-10-CM

## 2024-08-01 DIAGNOSIS — M46.1 SACROILIITIS: Primary | ICD-10-CM

## 2024-08-01 DIAGNOSIS — R29.898 WEAKNESS OF RIGHT LOWER EXTREMITY: ICD-10-CM

## 2024-08-01 PROCEDURE — 3078F DIAST BP <80 MM HG: CPT | Mod: CPTII,S$GLB,, | Performed by: PHYSICIAN ASSISTANT

## 2024-08-01 PROCEDURE — 3044F HG A1C LEVEL LT 7.0%: CPT | Mod: CPTII,S$GLB,, | Performed by: PHYSICIAN ASSISTANT

## 2024-08-01 PROCEDURE — 1160F RVW MEDS BY RX/DR IN RCRD: CPT | Mod: CPTII,95,,

## 2024-08-01 PROCEDURE — 1159F MED LIST DOCD IN RCRD: CPT | Mod: CPTII,95,,

## 2024-08-01 PROCEDURE — 3044F HG A1C LEVEL LT 7.0%: CPT | Mod: CPTII,95,,

## 2024-08-01 PROCEDURE — 3008F BODY MASS INDEX DOCD: CPT | Mod: CPTII,S$GLB,, | Performed by: PHYSICIAN ASSISTANT

## 2024-08-01 PROCEDURE — 3008F BODY MASS INDEX DOCD: CPT | Mod: CPTII,95,,

## 2024-08-01 PROCEDURE — 99204 OFFICE O/P NEW MOD 45 MIN: CPT | Mod: 95,,,

## 2024-08-01 PROCEDURE — G2211 COMPLEX E/M VISIT ADD ON: HCPCS | Mod: S$GLB,,, | Performed by: PHYSICIAN ASSISTANT

## 2024-08-01 PROCEDURE — 99999 PR PBB SHADOW E&M-EST. PATIENT-LVL III: CPT | Mod: PBBFAC,,, | Performed by: PHYSICIAN ASSISTANT

## 2024-08-01 PROCEDURE — 99214 OFFICE O/P EST MOD 30 MIN: CPT | Mod: S$GLB,,, | Performed by: PHYSICIAN ASSISTANT

## 2024-08-01 PROCEDURE — 4010F ACE/ARB THERAPY RXD/TAKEN: CPT | Mod: CPTII,95,,

## 2024-08-01 PROCEDURE — 4010F ACE/ARB THERAPY RXD/TAKEN: CPT | Mod: CPTII,S$GLB,, | Performed by: PHYSICIAN ASSISTANT

## 2024-08-01 PROCEDURE — 3075F SYST BP GE 130 - 139MM HG: CPT | Mod: CPTII,S$GLB,, | Performed by: PHYSICIAN ASSISTANT

## 2024-08-01 PROCEDURE — 1159F MED LIST DOCD IN RCRD: CPT | Mod: CPTII,S$GLB,, | Performed by: PHYSICIAN ASSISTANT

## 2024-08-01 RX ORDER — DICLOFENAC SODIUM 75 MG/1
75 TABLET, DELAYED RELEASE ORAL 2 TIMES DAILY
Qty: 180 TABLET | Refills: 2 | Status: SHIPPED | OUTPATIENT
Start: 2024-08-01

## 2024-08-01 RX ORDER — GABAPENTIN 300 MG/1
CAPSULE ORAL
Qty: 270 CAPSULE | Refills: 1 | Status: SHIPPED | OUTPATIENT
Start: 2024-08-01

## 2024-08-01 NOTE — TELEPHONE ENCOUNTER
I Called the patient and,I informed him to have follow up in 2 months.I schedule his virtual appt on 10/25/2024 at 9;00 am.

## 2024-08-01 NOTE — PATIENT INSTRUCTIONS
1. Fibroscan to look for fat or scar tissue in the liver with return to clinic   2. Follow up in 3 months with labs and fibroscan before in [place]      These things are important to improve fatty liver:  Limit alcohol consumption, no more than 2 serving(s) of alcohol in any day (1 serving is 5 ounces of wine, 12 ounces of beer, or 1.5 ounces of liquor) and do NOT recommend any daily alcohol use    Weight loss goal of 30-40 lbs. [facility] offers benefits to patients so let me know if you want a referral to the [facility] gym. Also, [facility] has dieticians that can create a weight loss plan. If you are interested let me know and I can place a referral. If so, contact the dietician team at [facility] at email [email or web address] or call [phone] to get scheduled. They do offer video visits   Avoid processed foods. No fried/fast foods. No sugary drinks or drinks with any calories.   Low carb/sugar and high protein diet (~1 g/kg/day of protein).Try to limit your carb intake to LESS than 30-45 grams of carbs with a meal or LESS than 5-10 grams with any snack (total of any snack foods eaten during that time). Use Zannel Pal or Lose It jessi to add up your carbs through the day. Do NOT drink any beverages with calories or carbs (this can lead to high blood sugar and weight gain). The main thing to focus on is low calorie, high protein, low carb)  Exercise, 5 days per week, 30 minutes per day, as tolerated  Recommend good cholesterol, blood pressure, blood sugar levels   There is a new medication called Rezdiffra for the treatment of F2-F3 liver scarring due to fatty liver. It is only indicated for patients with significant scar tissue from fatty liver (will reassess after fibroscan)    In some people, fatty liver can progress to steatohepatitis (inflamatory fatty liver) and possibly to cirrhosis, putting one at increased risk for liver cancer, liver failure, and death.  Therefore, the lifestyle changes are very important to decrease this risk.        Tips for low/moderate carbohydrate diet:  3 meals a day made up of the following:  -- Green vegetables, tomatoes, mushrooms, spaghetti squash, cauliflower, meat, poultry, seafood, eggs   -- Beans (1-1.5 cups) or nuts (1/4 cup): can have 1 x a day.  -- Greek yogurt and fermented foods like kefir, kimchi, saurkraut (be careful if you have swelling issues as lots of salt can worsen swelling or blood pressure)  -- Dressings, seasonings, condiments, etc should have less than 2 g sugars.   -- 1-2 servings of citrus fruits, berries, pineapple or melon a day (1/2 cup)  -- Avoid fried foods  -- Avoid grains, rice, pasta, potatoes, bread, corn, peas, oatmeal, grits, tortillas, crackers, chips  -- No soda, sweet tea, juices or lemonade  -- Use olive/avocado oil rather than vegetable oils or butter.

## 2024-08-01 NOTE — TELEPHONE ENCOUNTER
----- Message from Raymundo Rose NP sent at 8/1/2024 10:53 AM CDT -----  Please schedule patient for follow up 2 months, virtual.

## 2024-08-01 NOTE — PROGRESS NOTES
The patient location is: work, [place]  The chief complaint leading to consultation is: elevated liver enzymes    Visit type: audiovisual    Face to Face time with patient: 15 min  45 minutes of total time spent on the encounter, which includes face to face time and non-face to face time preparing to see the patient (eg, review of tests), Obtaining and/or reviewing separately obtained history, Documenting clinical information in the electronic or other health record, Independently interpreting results (not separately reported) and communicating results to the patient/family/caregiver, or Care coordination (not separately reported).         Each patient to whom he or she provides medical services by telemedicine is:  (1) informed of the relationship between the physician and patient and the respective role of any other health care provider with respect to management of the patient; and (2) notified that he or she may decline to receive medical services by telemedicine and may withdraw from such care at any time.    Notes:      [facility] - New Patient    REFERRING PROVIDER:  Dr. Mohan Schwartz  PCP: Mohan Schwartz MD     Chief Complaint:  fatty liver     HISTORY       HPI: This is a 59 y.o. patient with PMH noted below, presenting for evaluation of  fatty liver disease     Previous serologic w/u negative for Jeremy's, hemochromatosis, and viral hepatitis    Prior serologic workup:   Lab Results   Component Value Date    ANASCREEN Positive (A) 07/26/2024    FERRITIN 402 (H) 07/26/2024    FESATURATED 25 07/26/2024    CERULOPLSM 26.0 07/26/2024    HEPBSAG Non-reactive 07/26/2024    HEPCAB Non-reactive 07/26/2024         Interval HPI: Presents today alone. Reports that he was referred because his liver enzymes were elevated. Also had an US recently that noted fatty liver. Reports that he has been doing steroid injections for back for about 2 years. Weight gained with the start of injections for back. He also has  limited mobility due to back pain as were is OA in hip. States he also needs a hip replacement. Very stressed with personal life and works a lot    Diet: coke for breakfast, loves dessert  Exercise: walks a lot at work, no traditional  Supplements: none  Occupation: supervisor     Risk factors for fatty liver include:  Obesity - current BMI 39.53  HTN - managed on medication  HLD - pravastatin 40mg     LABS & DIAGNOSTIC STUDIES        Labs done 7/2024 show elevated transaminase levels (ALT > AST, elevated since 4/2024)  Platelets WNL, alk phos WNL  Synthetic liver functioning WNL    Lab Results   Component Value Date    ALT 65 (H) 07/17/2024    AST 35 07/17/2024    ALKPHOS 83 07/17/2024    BILITOT 0.3 07/17/2024    ALBUMIN 3.9 07/17/2024    INR 1.0 06/11/2018     07/17/2024       Imaging:  Abd U/S done 7/2024 noted  FINDINGS:  Hepatomegaly with an 18.5 cm echogenic liver.  The gallbladder is unremarkable.  There is no biliary ductal dilatation.  The pancreas and the  abdominal aorta were not well demonstrated secondary to overlying bowel gas.  The visualized portions of the inferior vena cava appear normal.  The right kidney appears normal.  Left kidney has been removed.  The spleen is unremarkable.  There is hepatopedal flow in the portal vein.   No free fluid in the upper abdomen.     Impression:    Hepatomegaly with fatty liver.  Patient status post left nephrectomy.      Liver fibrosis staging:  -- fibroscan - will obtain     Intermittent alcohol consumption, see below  Social History     Substance and Sexual Activity   Alcohol Use Yes    Comment: 2-3 times a month, 2-3 servings       Immunity to Hep A and B - will check with next labs     Denies family history of liver disease.         Allergy and medication list reviewed and updated     PMHX:  has a past medical history of Allergy, Anxiety, Cancer of kidney, Depression, Hyperlipidemia, and Hypertension.    PSHX:  has a past surgical history that  "includes kidney removal; Cosmetic surgery; Colonoscopy (N/A, 6/8/2018); Colonoscopy (N/A, 6/11/2018); Colonoscopy (06/2018); Laparoscopic nephrectomy, hand assisted (Left, 9/16/2021); Colonoscopy (N/A, 11/17/2021); Injection of anesthetic agent into sacroiliac joint (Bilateral, 1/24/2023); Injection of joint (Bilateral, 1/24/2023); Injection of anesthetic agent around medial branch nerves innervating lumbar facet joint (Bilateral, 2/7/2023); and Selective injection of anesthetic agent around lumbar spinal nerve root by transforaminal approach (Bilateral, 12/26/2023).    FAMILY HISTORY: Updated and reviewed in EPIC    ROS:   GENERAL: Denies fatigue  CARDIOVASCULAR: Denies edema  GI: Denies abdominal pain  SKIN: Denies rash, itching   NEURO: Denies confusion, memory loss, or mood changes    PHYSICAL EXAM:   In no acute distress; alert and oriented to person, place and time  VITALS: Ht 5' 8" (1.727 m)   Wt 117.9 kg (260 lb)   BMI 39.53 kg/m²   EYES: Sclerae anicteric  GI: Soft, non-tender, non-distended. No ascites.  EXTREMITIES:  No edema.  SKIN: Warm and dry. No jaundice. No telangectasias noted. No palmar erythema.  NEURO:  No asterixis.  PSYCH:  Thought and speech pattern appropriate. Behavior normal        ASSESSMENT & PLAN        EDUCATION:  See instructions discussed with patient in Instructions section of the After Visit Summary     ASSESSMENT & PLAN:  59 y.o. male with:  1.  Fatty liver, likely related to metabolic risk factors obesity, HTN, HLD  - see HPI  -- Immunity to Hep A and B : see HPI  -- Fibroscan before RTC  -- Recommendations discussed with patient:  Limit alcohol consumption, no more than 2 serving(s) of alcohol in any day (1 serving is 5 ounces of wine, 12 ounces of beer, or 1.5 ounces of liquor) and do NOT recommend any daily alcohol use    2. Weight loss goal of 30-40 lbs  3. Low carb/sugar, high fiber and protein diet, limit your carb intake to LESS than 30-45 grams of carbs with a meal or " LESS than 5-10 grams with any snack   4. Exercise, 5 days per week, 30 minutes per day, as tolerated  5. Recommend good cholesterol, blood pressure, blood sugar levels   6. There is a new medication called Rezdiffra for the treatment of F2-F3 liver scarring due to fatty liver. It is only indicated for those with stage 2 or 3 scar tissue (will reassess after fibroscan)    2. Elevated liver enzymes  -- will continue to trend liver enzymes  -- if they remain elevated will consider serological w/u     3. Obesity, HTN, HLD  -- Body mass index is 39.53 kg/m².   -- increases risk for fatty liver        Follow up in about 2 months (around 10/1/2024) for Virtual Visit. with fibroscan before  Orders Placed This Encounter   Procedures    FibroScan Transplant Hepatology(Vibration Controlled Transient Elastography)        Thank you for allowing me to participate in the care of TIMBO Pitts, FNP-C  Nurse Practitioner  [facility]  [facility]     I spent a total of 45 minutes on the day of the visit.This includes face to face time and non-face to face time preparing to see the patient (eg, review of tests), obtaining and/or reviewing separately obtained history, documenting clinical information in the electronic or other health record, independently interpreting results and communicating results to the patient/family/caregiver, and coordinating care.         CC'ed note to:   Mohan Schwartz MD

## 2024-08-05 ENCOUNTER — PATIENT MESSAGE (OUTPATIENT)
Dept: HEPATOLOGY | Facility: CLINIC | Age: 59
End: 2024-08-05
Payer: COMMERCIAL

## 2024-08-08 NOTE — PRE-PROCEDURE INSTRUCTIONS
Spoke with patient regarding procedure scheduled on 8.16    Arrival time 1030     Has patient been sick with fever or on antibiotics within the last 7 days? No     Does the patient have any open wounds, sores or rashes? No     Does the patient have any recent fractures? no     Has patient received a vaccination within the last 7 days? No     Received the COVID vaccination? yes     Has the patient stopped all medications as directed? na     Does patient have a pacemaker, defibrillator, or implantable stimulator? No     Does the patient have a ride to and from procedure and someone reliable to remain with patient?  girlfriend      Is the patient diabetic? no     Does the patient have sleep apnea? Or use O2 at home? jerad     Is the patient receiving sedation? Yes      Is the patient instructed to remain NPO beginning at midnight the night before their procedure? yes     Procedure location confirmed with patient? Yes     Covid- Denies signs/symptoms. Instructed to notify PAT/MD if any changes.

## 2024-08-12 ENCOUNTER — OFFICE VISIT (OUTPATIENT)
Dept: PULMONOLOGY | Facility: CLINIC | Age: 59
End: 2024-08-12
Payer: COMMERCIAL

## 2024-08-12 VITALS
WEIGHT: 256.19 LBS | HEART RATE: 82 BPM | RESPIRATION RATE: 18 BRPM | HEIGHT: 68 IN | DIASTOLIC BLOOD PRESSURE: 72 MMHG | OXYGEN SATURATION: 97 % | BODY MASS INDEX: 38.83 KG/M2 | SYSTOLIC BLOOD PRESSURE: 130 MMHG

## 2024-08-12 DIAGNOSIS — G47.33 OSA ON CPAP: Primary | ICD-10-CM

## 2024-08-12 PROCEDURE — 3044F HG A1C LEVEL LT 7.0%: CPT | Mod: CPTII,S$GLB,, | Performed by: PHYSICIAN ASSISTANT

## 2024-08-12 PROCEDURE — 3008F BODY MASS INDEX DOCD: CPT | Mod: CPTII,S$GLB,, | Performed by: PHYSICIAN ASSISTANT

## 2024-08-12 PROCEDURE — 99213 OFFICE O/P EST LOW 20 MIN: CPT | Mod: S$GLB,,, | Performed by: PHYSICIAN ASSISTANT

## 2024-08-12 PROCEDURE — 4010F ACE/ARB THERAPY RXD/TAKEN: CPT | Mod: CPTII,S$GLB,, | Performed by: PHYSICIAN ASSISTANT

## 2024-08-12 PROCEDURE — 1160F RVW MEDS BY RX/DR IN RCRD: CPT | Mod: CPTII,S$GLB,, | Performed by: PHYSICIAN ASSISTANT

## 2024-08-12 PROCEDURE — 3075F SYST BP GE 130 - 139MM HG: CPT | Mod: CPTII,S$GLB,, | Performed by: PHYSICIAN ASSISTANT

## 2024-08-12 PROCEDURE — 1159F MED LIST DOCD IN RCRD: CPT | Mod: CPTII,S$GLB,, | Performed by: PHYSICIAN ASSISTANT

## 2024-08-12 PROCEDURE — 3078F DIAST BP <80 MM HG: CPT | Mod: CPTII,S$GLB,, | Performed by: PHYSICIAN ASSISTANT

## 2024-08-12 PROCEDURE — 99999 PR PBB SHADOW E&M-EST. PATIENT-LVL IV: CPT | Mod: PBBFAC,,, | Performed by: PHYSICIAN ASSISTANT

## 2024-08-12 NOTE — ASSESSMENT & PLAN NOTE
10/28/2022 Home Sleep Study severe obstructive sleep apnea AHI 46. Mean SpO2 94.6%  Benefits and compliant with Auto CPAP 5-20 cm  Full face mask   Recommend shaving facial hair or trying nasal mask  May benefit from cpap contour pillow  Follow up anually CPAP download

## 2024-08-12 NOTE — PROGRESS NOTES
Subjective:       Patient ID: Dre Curiel is a 59 y.o. male.    Chief Complaint: Sleep Apnea      8/12/2024  Established patient here for KARLY on CPAP follow up  Compliance download reviewed, days with usage > 4 hours is 100%, average AHI is 7.5  Patient states improved symptoms with use of CPAP. Sleeping more soundly. Waking up feeling more refreshed. Improved daytime sleepiness.  Significant mask leak noted; he uses Full Face CPAP Mask, has thick facial hair and does not want to shave, does not want to try different mask  Denies significant health changes this year      8/12/2024     8:43 AM   EPWORTH SLEEPINESS SCALE   Sitting and reading 1   Watching TV 1   Sitting, inactive in a public place (e.g. a theatre or a meeting) 0   As a passenger in a car for an hour without a break 0   Lying down to rest in the afternoon when circumstances permit 1   Sitting and talking to someone 0   Sitting quietly after a lunch without alcohol 0   In a car, while stopped for a few minutes in traffic 0   Total score 3       Immunization History   Administered Date(s) Administered    COVID-19, MRNA, LN-S, PF (Pfizer) (Purple Cap) 12/07/2021    COVID-19, vector-nr, rS-Ad26, PF (Missy) 04/07/2021    Influenza 09/30/2011, 01/30/2013    Influenza - Quadrivalent - PF *Preferred* (6 months and older) 10/14/2015, 10/31/2017, 12/16/2020    Tdap 10/31/2017    Zoster Recombinant 12/16/2020, 01/03/2022      Tobacco Use: Low Risk  (8/12/2024)    Patient History     Smoking Tobacco Use: Never     Smokeless Tobacco Use: Never     Passive Exposure: Not on file      Past Medical History:   Diagnosis Date    Allergy     Anxiety     Cancer of kidney     Depression     Hyperlipidemia     Hypertension       Current Outpatient Medications on File Prior to Visit   Medication Sig Dispense Refill    allopurinoL (ZYLOPRIM) 100 MG tablet TAKE ONE TABLET BY MOUTH ONCE DAILY 90 tablet 0    amlodipine-benazepril 5-20 mg (LOTREL) 5-20 mg per capsule Take 1  capsule by mouth once daily. 30 capsule 11    diclofenac (VOLTAREN) 75 MG EC tablet Take 1 tablet (75 mg total) by mouth 2 (two) times daily. 180 tablet 2    fluticasone propionate (FLONASE) 50 mcg/actuation nasal spray USE TWO SPRAYS IN EACH NOSTRIL ONCE DAILY 48 g 3    gabapentin (NEURONTIN) 300 MG capsule TAKE ONE CAPSULE BY MOUTH DURING THE DAY THEN TWO CAPSULES EVERY NIGHT AT BEDTIME 270 capsule 1    hydroCHLOROthiazide (HYDRODIURIL) 12.5 MG Tab TAKE ONE TABLET BY MOUTH ONCE DAILY 30 tablet 11    levocetirizine (XYZAL) 5 MG tablet Take 1 tablet (5 mg total) by mouth every evening. 90 tablet 3    metoprolol succinate (TOPROL-XL) 25 MG 24 hr tablet Take 1 tablet (25 mg total) by mouth once daily. 90 tablet 2    potassium chloride (KLOR-CON) 10 MEQ TbSR Take 10 mEq by mouth every Mon, Wed, Fri, Sun.      potassium chloride SA (K-DUR,KLOR-CON M) 10 MEQ tablet Take 1 tablet (10 mEq) BY MOUTH ONCE DAILY. 90 tablet 3    pravastatin (PRAVACHOL) 40 MG tablet Take 1 tablet (40 mg total) by mouth once daily. 90 tablet 3    testosterone cypionate (DEPOTESTOTERONE CYPIONATE) 200 mg/mL injection INJECT ONE ML INTO MUSCLE EVERY 14 DAYS 2 mL 2    traZODone (DESYREL) 50 MG tablet Take 1 tablet (50 mg total) by mouth once daily. 90 tablet 2    VITAMIN D2 1,250 mcg (50,000 unit) capsule TAKE ONE CAPSULE BY MOUTH ONCE WEEKLY 12 capsule 0    sodium bicarbonate 650 MG tablet Take 1 tablet (650 mg total) by mouth before breakfast. With food 180 tablet 1     No current facility-administered medications on file prior to visit.        Review of Systems   Constitutional:  Negative for fever, weight loss, appetite change, fatigue and weakness.   HENT:  Negative for postnasal drip, rhinorrhea, sinus pressure, trouble swallowing and congestion.    Respiratory:  Negative for cough, sputum production, choking, chest tightness, shortness of breath, wheezing and dyspnea on extertion.    Cardiovascular:  Negative for chest pain and leg swelling.  "  Musculoskeletal:  Negative for arthralgias, gait problem and joint swelling.   Gastrointestinal:  Negative for nausea, vomiting and abdominal pain.   Neurological:  Negative for dizziness, weakness and headaches.   All other systems reviewed and are negative.      Objective:       Vitals:    08/12/24 0841   BP: 130/72   Pulse: 82   Resp: 18   SpO2: 97%   Weight: 116.2 kg (256 lb 2.8 oz)   Height: 5' 8" (1.727 m)       Physical Exam   Constitutional: He is oriented to person, place, and time. He appears well-developed and well-nourished. No distress.   HENT:   Head: Normocephalic.   Mouth/Throat: Oropharynx is clear and moist.   Cardiovascular: Normal rate and regular rhythm.   Pulmonary/Chest: Effort normal. No respiratory distress. He has no wheezes. He has no rhonchi. He has no rales.   Musculoskeletal:         General: No edema.      Cervical back: Normal range of motion and neck supple.   Neurological: He is alert and oriented to person, place, and time. Gait normal.   Skin: Skin is warm and dry.   Psychiatric: He has a normal mood and affect.   Vitals reviewed.    Personal Diagnostic Review    US Abdomen Complete  Narrative: EXAM: US ABDOMEN COMPLETE    HISTORY: Abnormal levels of other central enzymes.    TECHNIQUE:  Complete abdominal ultrasound    FINDINGS:  Hepatomegaly with an 18.5 cm echogenic liver.  The gallbladder is unremarkable.  There is no biliary ductal dilatation.  The pancreas and the  abdominal aorta were not well demonstrated secondary to overlying bowel gas.  The visualized portions of the inferior vena cava appear normal.  The right kidney appears normal.  Left kidney has been removed.  The spleen is unremarkable.  There is hepatopedal flow in the portal vein.   No free fluid in the upper abdomen.  Impression:   Hepatomegaly with fatty liver.  Patient status post left nephrectomy.    Finalized on: 7/27/2024 8:03 AM By:  Js Kearney MD  [facility]# [id number]      2024-07-27 08:05:[id number]    " [facility]            Assessment/Plan:       Problem List Items Addressed This Visit          Other    KARLY on CPAP - Primary     10/28/2022 Home Sleep Study severe obstructive sleep apnea AHI 46. Mean SpO2 94.6%  Benefits and compliant with Auto CPAP 5-20 cm  Full face mask   Recommend shaving facial hair or trying nasal mask  May benefit from cpap contour pillow  Follow up anually CPAP download          Relevant Orders    CPAP/BIPAP SUPPLIES       Follow up in about 1 year (around 8/12/2025) for KARLY follow up.    Discussed diagnosis, its evaluation, treatment and usual course. All questions answered.    Patient verbalized understanding of plan and left in no acute distress    Thank you for the courtesy of participating in the care of this patient    Gena Knight PA-C  [facility]

## 2024-08-13 ENCOUNTER — PROCEDURE VISIT (OUTPATIENT)
Dept: HEPATOLOGY | Facility: CLINIC | Age: 59
End: 2024-08-13
Payer: COMMERCIAL

## 2024-08-13 VITALS
HEIGHT: 68 IN | DIASTOLIC BLOOD PRESSURE: 72 MMHG | BODY MASS INDEX: 38.83 KG/M2 | WEIGHT: 256.19 LBS | HEART RATE: 85 BPM | SYSTOLIC BLOOD PRESSURE: 130 MMHG

## 2024-08-13 DIAGNOSIS — R74.8 ELEVATED LIVER ENZYMES: ICD-10-CM

## 2024-08-13 PROCEDURE — 91200 LIVER ELASTOGRAPHY: CPT | Mod: S$GLB,,,

## 2024-08-13 NOTE — PROCEDURES
FibroScan Transplant Hepatology(Vibration Controlled Transient Elastography)    Date/Time: 8/13/2024 9:00 AM    Performed by: Nunu Michael RN  Authorized by: Raymundo Rose NP    Diagnosis:  Other    Probe:  XL    Universal Protocol: Patient's identity, procedure and site were verified, confirmatory pause was performed.  Discussed procedure including risks and potential complications.  Questions answered.  Patient verbalizes understanding and wishes to proceed with VCTE.     Procedure: After providing explanations of the procedure, patient was placed in the supine position with right arm in maximum abduction to allow optimal exposure of right lateral abdomen.  Patient was briefly assessed, Testing was performed in the mid-axillary location, 50Hz Shear Wave pulses were applied and the resulting Shear Wave and Propagation Speed detected with a 3.5 MHz ultrasonic signal, using the FibroScan probe, Skin to liver capsule distance and liver parenchyma were accessed during the entire examination with the FibroScan probe, Patient was instructed to breathe normally and to abstain from sudden movements during the procedure, allowing for random measurements of liver stiffness. At least 10 Shear Waves were produced, Individual measurements of each Shear Wave were calculated.  Patient tolerated the procedure well with no complications.  Meets discharge criteria as was dismissed.  Rates pain 0 out of 10.  Patient will follow up with ordering provider to review results.    Findings  Median liver stiffness score:  5.9  CAP Reading: dB/m:  347    IQR/med %:  19  Interpretation  Fibrosis interpretation is based on medial liver stiffness - Kilopascal (kPa).    Fibrosis Stage:  F 0-1  Steatosis interpretation is based on controlled attenuation parameter - (dB/m).    Steatosis Grade:  S3  Comments/Plan:  No fibrosis with severe steatosis

## 2024-08-15 ENCOUNTER — PATIENT MESSAGE (OUTPATIENT)
Dept: FAMILY MEDICINE | Facility: CLINIC | Age: 59
End: 2024-08-15
Payer: COMMERCIAL

## 2024-08-16 ENCOUNTER — HOSPITAL ENCOUNTER (OUTPATIENT)
Facility: HOSPITAL | Age: 59
Discharge: HOME | End: 2024-08-16
Attending: ANESTHESIOLOGY | Admitting: ANESTHESIOLOGY
Payer: COMMERCIAL

## 2024-08-16 VITALS
TEMPERATURE: 98 F | HEIGHT: 68 IN | HEART RATE: 67 BPM | SYSTOLIC BLOOD PRESSURE: 123 MMHG | DIASTOLIC BLOOD PRESSURE: 65 MMHG | OXYGEN SATURATION: 96 % | RESPIRATION RATE: 16 BRPM | WEIGHT: 260.38 LBS | BODY MASS INDEX: 39.46 KG/M2

## 2024-08-16 DIAGNOSIS — M16.9 HIP OSTEOARTHRITIS: ICD-10-CM

## 2024-08-16 DIAGNOSIS — M25.551 BILATERAL HIP PAIN: ICD-10-CM

## 2024-08-16 DIAGNOSIS — M46.1 SACROILIITIS: ICD-10-CM

## 2024-08-16 DIAGNOSIS — M25.552 BILATERAL HIP PAIN: ICD-10-CM

## 2024-08-16 PROCEDURE — 63600175 PHARM REV CODE 636 W HCPCS: Performed by: ANESTHESIOLOGY

## 2024-08-16 PROCEDURE — 99152 MOD SED SAME PHYS/QHP 5/>YRS: CPT | Performed by: ANESTHESIOLOGY

## 2024-08-16 PROCEDURE — A9585 GADOBUTROL INJECTION: HCPCS | Performed by: ANESTHESIOLOGY

## 2024-08-16 PROCEDURE — 25000003 PHARM REV CODE 250: Performed by: ANESTHESIOLOGY

## 2024-08-16 PROCEDURE — 20610 DRAIN/INJ JOINT/BURSA W/O US: CPT | Mod: 50,59,, | Performed by: ANESTHESIOLOGY

## 2024-08-16 PROCEDURE — 20610 DRAIN/INJ JOINT/BURSA W/O US: CPT | Mod: 50,59 | Performed by: ANESTHESIOLOGY

## 2024-08-16 PROCEDURE — 25500020 PHARM REV CODE 255: Performed by: ANESTHESIOLOGY

## 2024-08-16 PROCEDURE — 27096 INJECT SACROILIAC JOINT: CPT | Mod: 50,,, | Performed by: ANESTHESIOLOGY

## 2024-08-16 PROCEDURE — 27096 INJECT SACROILIAC JOINT: CPT | Mod: 50 | Performed by: ANESTHESIOLOGY

## 2024-08-16 RX ORDER — SODIUM BICARBONATE 1 MEQ/ML
SYRINGE (ML) INTRAVENOUS
Status: DISCONTINUED | OUTPATIENT
Start: 2024-08-16 | End: 2024-08-16 | Stop reason: HOSPADM

## 2024-08-16 RX ORDER — DIPHENHYDRAMINE HCL 50 MG
50 CAPSULE ORAL ONCE
Qty: 1 CAPSULE | Refills: 0 | Status: SHIPPED | OUTPATIENT
Start: 2024-08-16 | End: 2024-08-16

## 2024-08-16 RX ORDER — TRIAMCINOLONE ACETONIDE 40 MG/ML
INJECTION, SUSPENSION INTRA-ARTICULAR; INTRAMUSCULAR
Status: DISCONTINUED | OUTPATIENT
Start: 2024-08-16 | End: 2024-08-16 | Stop reason: HOSPADM

## 2024-08-16 RX ORDER — BUPIVACAINE HYDROCHLORIDE 2.5 MG/ML
INJECTION, SOLUTION EPIDURAL; INFILTRATION; INTRACAUDAL
Status: DISCONTINUED | OUTPATIENT
Start: 2024-08-16 | End: 2024-08-16 | Stop reason: HOSPADM

## 2024-08-16 RX ORDER — GADOBUTROL 604.72 MG/ML
INJECTION INTRAVENOUS
Status: DISCONTINUED | OUTPATIENT
Start: 2024-08-16 | End: 2024-08-16 | Stop reason: HOSPADM

## 2024-08-16 RX ORDER — PREDNISONE 50 MG/1
50 TABLET ORAL DAILY
Qty: 3 TABLET | Refills: 0 | Status: SHIPPED | OUTPATIENT
Start: 2024-08-16 | End: 2024-08-19

## 2024-08-16 RX ORDER — MIDAZOLAM HYDROCHLORIDE 1 MG/ML
INJECTION, SOLUTION INTRAMUSCULAR; INTRAVENOUS
Status: DISCONTINUED | OUTPATIENT
Start: 2024-08-16 | End: 2024-08-16 | Stop reason: HOSPADM

## 2024-08-16 RX ORDER — FENTANYL CITRATE 50 UG/ML
INJECTION, SOLUTION INTRAMUSCULAR; INTRAVENOUS
Status: DISCONTINUED | OUTPATIENT
Start: 2024-08-16 | End: 2024-08-16 | Stop reason: HOSPADM

## 2024-08-16 NOTE — DISCHARGE INSTRUCTIONS

## 2024-08-16 NOTE — DISCHARGE SUMMARY
Discharge Note  Short Stay      SUMMARY     Admit Date: 8/16/2024    Attending Physician: Lopez Mendoza MD        Discharge Physician: Lopez Mendoza MD        Discharge Date: 8/16/2024 12:18 PM    Procedure(s) (LRB):  Bilateral  Hip Joint injections (Bilateral)  Bilateral Sacroiliac Joint Injections (Bilateral)    Final Diagnosis: Sacroiliitis [M46.1]  Bilateral hip pain [M25.551, M25.552]    Disposition: Home or self care    Patient Instructions:   Current Discharge Medication List        CONTINUE these medications which have NOT CHANGED    Details   allopurinoL (ZYLOPRIM) 100 MG tablet TAKE ONE TABLET BY MOUTH ONCE DAILY  Qty: 90 tablet, Refills: 0    Associated Diagnoses: Idiopathic chronic gout of right foot without tophus      amlodipine-benazepril 5-20 mg (LOTREL) 5-20 mg per capsule Take 1 capsule by mouth once daily.  Qty: 30 capsule, Refills: 11    Comments: .      diclofenac (VOLTAREN) 75 MG EC tablet Take 1 tablet (75 mg total) by mouth 2 (two) times daily.  Qty: 180 tablet, Refills: 2    Associated Diagnoses: Lumbar radiculopathy, chronic; DDD (degenerative disc disease), lumbar; Weakness of right lower extremity      gabapentin (NEURONTIN) 300 MG capsule TAKE ONE CAPSULE BY MOUTH DURING THE DAY THEN TWO CAPSULES EVERY NIGHT AT BEDTIME  Qty: 270 capsule, Refills: 1      hydroCHLOROthiazide (HYDRODIURIL) 12.5 MG Tab TAKE ONE TABLET BY MOUTH ONCE DAILY  Qty: 30 tablet, Refills: 11    Comments: .  Associated Diagnoses: Parenchymal renal hypertension, stage 1-4 or unspecified chronic kidney disease      levocetirizine (XYZAL) 5 MG tablet Take 1 tablet (5 mg total) by mouth every evening.  Qty: 90 tablet, Refills: 3      metoprolol succinate (TOPROL-XL) 25 MG 24 hr tablet Take 1 tablet (25 mg total) by mouth once daily.  Qty: 90 tablet, Refills: 2    Comments: .  Associated Diagnoses: Essential hypertension      potassium chloride (KLOR-CON) 10 MEQ TbSR Take 10 mEq by mouth every Mon, Wed, Fri, Sun.       potassium chloride SA (K-DUR,KLOR-CON M) 10 MEQ tablet Take 1 tablet (10 mEq) BY MOUTH ONCE DAILY.  Qty: 90 tablet, Refills: 3    Associated Diagnoses: Other specified disorders of kidney and ureter      pravastatin (PRAVACHOL) 40 MG tablet Take 1 tablet (40 mg total) by mouth once daily.  Qty: 90 tablet, Refills: 3    Associated Diagnoses: Mixed hyperlipidemia      sodium bicarbonate 650 MG tablet Take 1 tablet (650 mg total) by mouth before breakfast. With food  Qty: 180 tablet, Refills: 1    Associated Diagnoses: Stage 3b chronic kidney disease; S/p nephrectomy; Metabolic acidosis      traZODone (DESYREL) 50 MG tablet Take 1 tablet (50 mg total) by mouth once daily.  Qty: 90 tablet, Refills: 2    Associated Diagnoses: Other insomnia      VITAMIN D2 1,250 mcg (50,000 unit) capsule TAKE ONE CAPSULE BY MOUTH ONCE WEEKLY  Qty: 12 capsule, Refills: 0    Associated Diagnoses: Vitamin D deficiency      fluticasone propionate (FLONASE) 50 mcg/actuation nasal spray USE TWO SPRAYS IN EACH NOSTRIL ONCE DAILY  Qty: 48 g, Refills: 3      testosterone cypionate (DEPOTESTOTERONE CYPIONATE) 200 mg/mL injection INJECT ONE ML INTO MUSCLE EVERY 14 DAYS  Qty: 2 mL, Refills: 2    Associated Diagnoses: Testosterone deficiency                 Discharge Diagnosis: Sacroiliitis [M46.1]  Bilateral hip pain [M25.551, M25.552]  Condition on Discharge: Stable with no complications to procedure   Diet on Discharge: Same as before.  Activity: as per instruction sheet.  Discharge to: Home with a responsible adult.  Follow up: 2-4 weeks       Please call the office at [phone] if you experience any weakness or loss of sensation, fever > 101.5, pain uncontrolled with oral medications, persistent nausea/vomiting/or diarrhea, redness or drainage from the incisions, or any other worrisome concerns. If physician on call was not reached or could not communicate with our office for any reason please go to the nearest emergency department

## 2024-08-16 NOTE — OP NOTE
Vitals:    02/23/22 0805   BP: (!) 168/93   Pulse: (!) 111   Resp: (!) 22   Temp:        Procedure Date:08/16/2024      INFORMED CONSENT: The procedure, risks, benefits and options were discussed with patient. There are no contraindications to the procedure. The patient expressed understanding and agreed to proceed. The personnel performing the procedure was discussed. I verify that I personally obtained consent prior to the start of the procedure and the signed consent can be found on the patient's chart.       Anesthesia:   Conscious sedation provided by M.D    The patient was monitored with continuous pulse oximetry, EKG, and intermittent blood pressure monitors.  The patient was hemodynamically stable throughout the entire process was responsive to voice, and breathing spontaneously.  Supplemental O2 was provided at 2L/min via nasal cannula.  Patient was comfortable for the duration of the procedure. (See nurse documentation and case log for sedation time)    There was a total of 2mg IV Midazolam and 50mcg Fentanyl titrated for the procedure    Pre Procedure diagnosis: Sacroiliitis [M46.1]  Post-Procedure diagnosis: SAME      PROCEDURE:  Bilateral sacroiliac joint injection                          Bilateral Hip (acetabulofemoral) joint injection under fluoroscopic guidance    REASON FOR PROCEDURE:   Sacroiliitis [M46.1]  osteoarthritis of the hip (of the above noted laterality)    MEDICATIONS INJECTED: 1mL 40mg/ml Kenalog and 4mL Bupivacaine 0.25% into each site    LOCAL ANESTHETIC USED: Xylocaine 1% 6ml     ESTIMATED BLOOD LOSS: None.   COMPLICATIONS: None.     TECHNIQUE:   Sacroiliac joint injection:  Laying in the prone position, the patient was prepped and draped in the usual sterile fashion using ChloraPrep and fenestrated drape.  The area was determined under fluoroscopy.  Local Xylocaine was injected by raising a wheel and going down to the periosteum using a 27-gauge hypodermic needle.  The 3.5 inch  22-gauge spinal needle was introduce into the Left sacroiliac joint.  Negative pressure applied to confirm no intravascular placement.  Omnipaque was injected to confirm placement and to confirm that there was no vascular runoff.  The medication was then injected slowly.  The patient tolerated the procedure well.                       Hip Intraarticular Injection:  The patient was then repositioned on the table in supine orientation.. The area over the above noted joint/s was widely prepped with ChloraPrep and drapped in usual sterile fashion.    The above noted joint/s was identified on fluoroscopy. A 27-gauge 1.5 inch needle was used to localize the site of entry with 3 mL of 1% PF Lidocaine. A 22 gauge 3.5 inch spinal needle was then introduced and advanced towards the neck of the femur. The target site was approximately 0.5 to 1.0 cm distal to the lateral border of the femoral head and 0.5 to 1.0 cm below the superior aspect of the femoral neck. The needle was advanced until osseus interface was met. Following negative aspiration, a solution containing 4 mL of 0.25% Bupivacaine and 1 mL of Triamcinolone (40 mg/mL) was then injected. There was minimal resistance encountered throughout injection. No paresthesias were noted on injection. The needle stylet was replaced and the needle was removed intact. The patient tolerated the procedure well. A bandage was applied to the site of injection.    The patient was monitored for approximately 30 minutes after the procedure. Patient was given post procedure and discharge instructions to follow at home. We will see the patient back in two weeks or the patient may call to inform of status. The patient was discharged in a stable condition

## 2024-08-16 NOTE — PLAN OF CARE
Patient d/c home in stable condition via wheelchair with ride. Verbalized understanding of d/c instructions. Patient voiced no complaints. Patient stood at side of bed, walked steps with no new motor deficits. Neuro intact.    I have reviewed and confirmed nurses' notes for patient's medications, allergies, medical history, and surgical history.

## 2024-08-20 ENCOUNTER — HOSPITAL ENCOUNTER (OUTPATIENT)
Dept: RADIOLOGY | Facility: HOSPITAL | Age: 59
Discharge: HOME | End: 2024-08-20
Attending: UROLOGY
Payer: COMMERCIAL

## 2024-08-20 DIAGNOSIS — C64.2 RENAL CELL CARCINOMA OF LEFT KIDNEY: ICD-10-CM

## 2024-08-20 PROCEDURE — 25500020 PHARM REV CODE 255: Performed by: UROLOGY

## 2024-08-20 PROCEDURE — 71045 X-RAY EXAM CHEST 1 VIEW: CPT | Mod: 26,,, | Performed by: RADIOLOGY

## 2024-08-20 PROCEDURE — 74170 CT ABD WO CNTRST FLWD CNTRST: CPT | Mod: TC

## 2024-08-20 PROCEDURE — 71045 X-RAY EXAM CHEST 1 VIEW: CPT | Mod: TC

## 2024-08-20 PROCEDURE — 74170 CT ABD WO CNTRST FLWD CNTRST: CPT | Mod: 26,,, | Performed by: RADIOLOGY

## 2024-08-20 RX ADMIN — IOHEXOL 100 ML: 350 INJECTION, SOLUTION INTRAVENOUS at 09:08

## 2024-08-26 ENCOUNTER — LAB VISIT (OUTPATIENT)
Dept: LAB | Facility: HOSPITAL | Age: 59
End: 2024-08-26
Attending: UROLOGY
Payer: COMMERCIAL

## 2024-08-26 DIAGNOSIS — C64.2 RENAL CELL CARCINOMA OF LEFT KIDNEY: ICD-10-CM

## 2024-08-26 LAB
ALBUMIN SERPL BCP-MCNC: 3.8 G/DL (ref 3.5–5.2)
ALP SERPL-CCNC: 80 U/L (ref 55–135)
ALT SERPL W/O P-5'-P-CCNC: 51 U/L (ref 10–44)
ANION GAP SERPL CALC-SCNC: 13 MMOL/L (ref 8–16)
AST SERPL-CCNC: 22 U/L (ref 10–40)
BILIRUB SERPL-MCNC: 0.4 MG/DL (ref 0.1–1)
BUN SERPL-MCNC: 36 MG/DL (ref 6–20)
CALCIUM SERPL-MCNC: 9.1 MG/DL (ref 8.7–10.5)
CHLORIDE SERPL-SCNC: 106 MMOL/L (ref 95–110)
CO2 SERPL-SCNC: 19 MMOL/L (ref 23–29)
COMPLEXED PSA SERPL-MCNC: 0.45 NG/ML (ref 0–4)
CREAT SERPL-MCNC: 2.4 MG/DL (ref 0.5–1.4)
EST. GFR  (NO RACE VARIABLE): 30.5 ML/MIN/1.73 M^2
GLUCOSE SERPL-MCNC: 155 MG/DL (ref 70–110)
POTASSIUM SERPL-SCNC: 5 MMOL/L (ref 3.5–5.1)
PROT SERPL-MCNC: 7.3 G/DL (ref 6–8.4)
SODIUM SERPL-SCNC: 138 MMOL/L (ref 136–145)

## 2024-08-26 PROCEDURE — 80053 COMPREHEN METABOLIC PANEL: CPT | Performed by: UROLOGY

## 2024-08-26 PROCEDURE — 84153 ASSAY OF PSA TOTAL: CPT | Performed by: UROLOGY

## 2024-08-26 PROCEDURE — 36415 COLL VENOUS BLD VENIPUNCTURE: CPT | Performed by: UROLOGY

## 2024-08-28 ENCOUNTER — OFFICE VISIT (OUTPATIENT)
Dept: UROLOGY | Facility: CLINIC | Age: 59
End: 2024-08-28
Payer: COMMERCIAL

## 2024-08-28 VITALS
BODY MASS INDEX: 39.48 KG/M2 | HEART RATE: 66 BPM | DIASTOLIC BLOOD PRESSURE: 83 MMHG | HEIGHT: 68 IN | WEIGHT: 260.5 LBS | SYSTOLIC BLOOD PRESSURE: 121 MMHG

## 2024-08-28 DIAGNOSIS — N18.32 STAGE 3B CHRONIC KIDNEY DISEASE: ICD-10-CM

## 2024-08-28 DIAGNOSIS — C64.2 RENAL CELL CARCINOMA OF LEFT KIDNEY: Primary | ICD-10-CM

## 2024-08-28 DIAGNOSIS — R79.89 LOW TESTOSTERONE: ICD-10-CM

## 2024-08-28 LAB
BACTERIA #/AREA URNS AUTO: NORMAL /HPF
BILIRUB UR QL STRIP: NEGATIVE
CLARITY UR REFRACT.AUTO: CLEAR
COLOR UR AUTO: YELLOW
GLUCOSE UR QL STRIP: NEGATIVE
HGB UR QL STRIP: ABNORMAL
KETONES UR QL STRIP: NEGATIVE
LEUKOCYTE ESTERASE UR QL STRIP: NEGATIVE
MICROSCOPIC COMMENT: NORMAL
NITRITE UR QL STRIP: NEGATIVE
PH UR STRIP: 5 [PH] (ref 5–8)
PROT UR QL STRIP: NEGATIVE
RBC #/AREA URNS AUTO: 1 /HPF (ref 0–4)
SP GR UR STRIP: 1.02 (ref 1–1.03)
URN SPEC COLLECT METH UR: ABNORMAL
WBC #/AREA URNS AUTO: 0 /HPF (ref 0–5)

## 2024-08-28 PROCEDURE — 1160F RVW MEDS BY RX/DR IN RCRD: CPT | Mod: CPTII,S$GLB,, | Performed by: UROLOGY

## 2024-08-28 PROCEDURE — 3074F SYST BP LT 130 MM HG: CPT | Mod: CPTII,S$GLB,, | Performed by: UROLOGY

## 2024-08-28 PROCEDURE — 99214 OFFICE O/P EST MOD 30 MIN: CPT | Mod: S$GLB,,, | Performed by: UROLOGY

## 2024-08-28 PROCEDURE — 3044F HG A1C LEVEL LT 7.0%: CPT | Mod: CPTII,S$GLB,, | Performed by: UROLOGY

## 2024-08-28 PROCEDURE — 99999 PR PBB SHADOW E&M-EST. PATIENT-LVL IV: CPT | Mod: PBBFAC,,, | Performed by: UROLOGY

## 2024-08-28 PROCEDURE — 3079F DIAST BP 80-89 MM HG: CPT | Mod: CPTII,S$GLB,, | Performed by: UROLOGY

## 2024-08-28 PROCEDURE — 3008F BODY MASS INDEX DOCD: CPT | Mod: CPTII,S$GLB,, | Performed by: UROLOGY

## 2024-08-28 PROCEDURE — 4010F ACE/ARB THERAPY RXD/TAKEN: CPT | Mod: CPTII,S$GLB,, | Performed by: UROLOGY

## 2024-08-28 PROCEDURE — 81001 URINALYSIS AUTO W/SCOPE: CPT | Performed by: UROLOGY

## 2024-08-28 PROCEDURE — 1159F MED LIST DOCD IN RCRD: CPT | Mod: CPTII,S$GLB,, | Performed by: UROLOGY

## 2024-08-28 NOTE — PROGRESS NOTES
Chief Complaint:   Encounter Diagnoses   Name Primary?    Renal cell carcinoma of left kidney Yes    Stage 3b chronic kidney disease     Low testosterone        HPI:    8/28/24- here today to discuss labs and imaging, testosterone still being given by his PCP.  No evidence of gross hematuria.    55-year-old gentleman who comes in with a suspicious left renal mass.  MRI has been completed, would not have these results.  He has significant history of a right renal trauma in between 2nd and 3rd grades, requiring partial nephrectomy of the right kidney.  This ultrasound was done due to an abnormality of the gallbladder previously seen on past ultrasound 3 years ago.  Patient has had no other urological history, no family history of urological cancers or stones.  Patient has had no gross hematuria, he does have a history of smoking, currently chews tobacco.  Patient is otherwise asymptomatic.    Allergies:  Iodine and iodide containing products    Medications:  has a current medication list which includes the following prescription(s): allopurinol, amlodipine-benazepril 5-20 mg, diclofenac, fluticasone propionate, gabapentin, hydrochlorothiazide, levocetirizine, metoprolol succinate, potassium chloride, potassium chloride sa, pravastatin, testosterone cypionate, trazodone, vitamin d2, and sodium bicarbonate.    Review of Systems:  General: No fever, chills, fatigability, or weight loss.  Skin: No rashes, itching, or changes in color or texture of skin.  Chest: Denies BRIGGS, cyanosis, wheezing, cough, and sputum production.  Abdomen: Appetite fine. No weight loss. Denies diarrhea, abdominal pain, hematemesis, or blood in stool.  Musculoskeletal: No joint stiffness or swelling. Denies back pain.  : As above.  All other review of systems negative.    PMH:   has a past medical history of Allergy, Anxiety, Cancer of kidney, Depression, Hyperlipidemia, and Hypertension.    PSH:   has a past surgical history that includes  kidney removal; Cosmetic surgery; Colonoscopy (N/A, 6/8/2018); Colonoscopy (N/A, 6/11/2018); Colonoscopy (06/2018); Laparoscopic nephrectomy, hand assisted (Left, 9/16/2021); Colonoscopy (N/A, 11/17/2021); Injection of anesthetic agent into sacroiliac joint (Bilateral, 1/24/2023); Injection of joint (Bilateral, 1/24/2023); Injection of anesthetic agent around medial branch nerves innervating lumbar facet joint (Bilateral, 2/7/2023); Selective injection of anesthetic agent around lumbar spinal nerve root by transforaminal approach (Bilateral, 12/26/2023); Injection of joint (Bilateral, 8/16/2024); and Injection of anesthetic agent into sacroiliac joint (Bilateral, 8/16/2024).    FamHx: family history includes Cancer in his maternal uncle; Hypertension in his mother; No Known Problems in his brother, maternal aunt, maternal grandfather, maternal grandmother, paternal aunt, paternal grandfather, paternal grandmother, paternal uncle, and sister.    SocHx:  reports that he has never smoked. He has never used smokeless tobacco. He reports current alcohol use. He reports that he does not use drugs.      Physical Exam:  Vitals:    08/28/24 1000   BP: 121/83   Pulse: 66       General: A&Ox3, no apparent distress, no deformities  Neck: No masses, normal ROM  Lungs: normal inspiration, no use of accessory muscles  Heart: normal pulse, no arrhythmias  Abdomen: Soft, NT, ND, no masses, no hernias, no hepatosplenomegaly, incisions are intact  Skin: The skin is warm and dry. No jaundice.  Ext: No c/c/e.    Labs/Studies:   UA small blood 8/24  CT renal protocol absent left kidney, no mets 8/24  CXR clear 8/24  CT renogram 9.2 cm left renal lesion 7/21  Lasix renogram 45% right/55% left 7/21  Creatinine 2.4 8/24  Creatinine 1.7 5/23  PSA 0.45 8/24  Testosterone 784 10/22  Testosterone 247 4/22    Impression/Plan:       1. Left renal cell carcinoma-  pT3a F3 (renal sinus fat)  left EDWIN radical Nx  9/16/21    Patient is here for  surveillance screening.  Labs, chest x-ray and CT are stable.  Will have him return in 1 year for labs, renal ultrasound and a chest x-ray.  Due to radical nephrectomy, patient now has solitary kidney and major organ failure.  Current renal function is reduced, please see labs.  Nephrectomy secondary to renal cell carcinoma.  Of note no voiding complaints.    2. Hypogonadism- 200 mg every 2 weeks by his PCP.  Patient is being followed by Nephrology but has not seen them in over a year and will arrange for follow-up.    3. Microhematuria- no evidence of gross hematuria, history of smoking cigars.  Patient also does smokeless tobacco.  Will repeat a UA in 3 months and if abnormal proceed with workup.  Call with any issues prior to the next appointment.

## 2024-08-30 ENCOUNTER — HOSPITAL ENCOUNTER (OUTPATIENT)
Dept: RADIOLOGY | Facility: HOSPITAL | Age: 59
Discharge: HOME | End: 2024-08-30
Attending: FAMILY MEDICINE
Payer: COMMERCIAL

## 2024-08-30 ENCOUNTER — HOSPITAL ENCOUNTER (OUTPATIENT)
Dept: CARDIOLOGY | Facility: HOSPITAL | Age: 59
Discharge: HOME | End: 2024-08-30
Attending: FAMILY MEDICINE
Payer: COMMERCIAL

## 2024-08-30 DIAGNOSIS — R07.9 CHEST PAIN, UNSPECIFIED TYPE: ICD-10-CM

## 2024-08-30 PROCEDURE — A9502 TC99M TETROFOSMIN: HCPCS | Performed by: FAMILY MEDICINE

## 2024-08-30 PROCEDURE — 93017 CV STRESS TEST TRACING ONLY: CPT

## 2024-08-30 PROCEDURE — 63600175 PHARM REV CODE 636 W HCPCS: Performed by: FAMILY MEDICINE

## 2024-08-30 PROCEDURE — 78452 HT MUSCLE IMAGE SPECT MULT: CPT

## 2024-08-30 PROCEDURE — 78452 HT MUSCLE IMAGE SPECT MULT: CPT | Mod: 26,,, | Performed by: INTERNAL MEDICINE

## 2024-08-30 PROCEDURE — 93016 CV STRESS TEST SUPVJ ONLY: CPT | Mod: ,,, | Performed by: INTERNAL MEDICINE

## 2024-08-30 PROCEDURE — 93018 CV STRESS TEST I&R ONLY: CPT | Mod: ,,, | Performed by: INTERNAL MEDICINE

## 2024-08-30 RX ORDER — REGADENOSON 0.08 MG/ML
0.4 INJECTION, SOLUTION INTRAVENOUS ONCE
Status: COMPLETED | OUTPATIENT
Start: 2024-08-30 | End: 2024-08-30

## 2024-08-30 RX ADMIN — TETROFOSMIN 30.4 MILLICURIE: 1.38 INJECTION, POWDER, LYOPHILIZED, FOR SOLUTION INTRAVENOUS at 09:08

## 2024-08-30 RX ADMIN — REGADENOSON 0.4 MG: 0.08 INJECTION, SOLUTION INTRAVENOUS at 09:08

## 2024-08-30 RX ADMIN — TETROFOSMIN 9 MILLICURIE: 1.38 INJECTION, POWDER, LYOPHILIZED, FOR SOLUTION INTRAVENOUS at 07:08

## 2024-08-31 LAB
CV STRESS BASE HR: 65 BPM
DIASTOLIC BLOOD PRESSURE: 83 MMHG
NUC REST EJECTION FRACTION: 68
NUC STRESS EJECTION FRACTION: 72 %
OHS CV CPX 85 PERCENT MAX PREDICTED HEART RATE MALE: 138
OHS CV CPX ESTIMATED METS: 1
OHS CV CPX MAX PREDICTED HEART RATE: 162
OHS CV CPX PATIENT IS FEMALE: 0
OHS CV CPX PATIENT IS MALE: 1
OHS CV CPX PEAK DIASTOLIC BLOOD PRESSURE: 80 MMHG
OHS CV CPX PEAK HEAR RATE: 95 BPM
OHS CV CPX PEAK RATE PRESSURE PRODUCT: NORMAL
OHS CV CPX PEAK SYSTOLIC BLOOD PRESSURE: 130 MMHG
OHS CV CPX PERCENT MAX PREDICTED HEART RATE ACHIEVED: 59
OHS CV CPX RATE PRESSURE PRODUCT PRESENTING: 8450
SYSTOLIC BLOOD PRESSURE: 130 MMHG

## 2024-09-03 ENCOUNTER — PATIENT MESSAGE (OUTPATIENT)
Dept: FAMILY MEDICINE | Facility: CLINIC | Age: 59
End: 2024-09-03
Payer: COMMERCIAL

## 2024-09-03 ENCOUNTER — TELEPHONE (OUTPATIENT)
Dept: CARDIOLOGY | Facility: CLINIC | Age: 59
End: 2024-09-03
Payer: COMMERCIAL

## 2024-09-03 DIAGNOSIS — E55.9 VITAMIN D DEFICIENCY: ICD-10-CM

## 2024-09-03 RX ORDER — ERGOCALCIFEROL 1.25 MG/1
50000 CAPSULE ORAL
Qty: 12 CAPSULE | Refills: 0 | Status: SHIPPED | OUTPATIENT
Start: 2024-09-03

## 2024-09-03 NOTE — TELEPHONE ENCOUNTER
----- Message from Charley Silva sent at 9/3/2024  1:07 PM CDT -----  Contact: Dre  .Patient is calling to speak with the nurse regarding questions and concerns. Reports needing to speak with someone about appt  . Please give patient a call back at   [phone].

## 2024-09-03 NOTE — TELEPHONE ENCOUNTER
No care due was identified.  [facility] Embedded Care Due Messages. Reference number: [id number].   9/03/2024 5:05:58 PM CDT

## 2024-09-03 NOTE — TELEPHONE ENCOUNTER
Please arrange new patient consult appt with me.    Thanks    Mohan Penny MD sent to [facility]  Cc: Tony Manzo MD  Although the stress test is normal there was chest pain during the test.  We need to explore this further, may need a heart catheterization.    Recommend consultation with Cardiology preferably Dr. Jovany Manzo, Can you staff call this pt to schedule an appt with you. Thanks aq

## 2024-09-04 NOTE — TELEPHONE ENCOUNTER
Please arrange new patient consult appt with me.    Thanks    Dr Manzo  Called patient to schedule consult, no answer lvm to return call portal message sent and appt scheduled for Oct 24             Mohan Schwartz MD sent to [facility]  Cc: Tony Manzo MD  Although the stress test is normal there was chest pain during the test.  We need to explore this further, may need a heart catheterization.    Recommend consultation with Cardiology preferably Dr. Jovany Manzo, Can you staff call this pt to schedule an appt with you. Thanks aq

## 2024-09-05 ENCOUNTER — TELEPHONE (OUTPATIENT)
Dept: CARDIOLOGY | Facility: CLINIC | Age: 59
End: 2024-09-05
Payer: COMMERCIAL

## 2024-09-05 NOTE — TELEPHONE ENCOUNTER
----- Message from Milton Galloway MA sent at 9/5/2024  3:22 PM CDT -----  Contact: [phone]  Pt called                Pt is returning a phone call back to staff in regards to appt on 10/24/24.

## 2024-09-05 NOTE — TELEPHONE ENCOUNTER
Contacted PT in regards to see if we can get an earlier apt, sent message to  to see. PT stated verbal understanding

## 2024-09-06 ENCOUNTER — PATIENT MESSAGE (OUTPATIENT)
Dept: FAMILY MEDICINE | Facility: CLINIC | Age: 59
End: 2024-09-06
Payer: COMMERCIAL

## 2024-09-12 NOTE — PROGRESS NOTES
Est Patient Chronic Pain Note (Follow up Visit)    PCP: Mohan Schwartz MD    Chief Complaint:   Chief Complaint   Patient presents with    Low-back Pain     Left sided    Leg Pain     Right         SUBJECTIVE:  Interval History (9/13/2024):  Dre Curiel presents today for follow-up visit.  he underwent Bilateral SI Joint and   IA Hip injections  on 8/16/24.  The patient reports that he is/was better following the procedure.  he reports 85 % pain relief.  The changes have continued through this visit.  Patient reports pain as  2.5/10  today. Patient states he has been on vacation all week (at home) and feels better.   He is able to move his RLE/ hip better since the procedure.     Interval History (8/01/2024):   Dre Curiel presents today for follow-up visit.  Patient was last seen on 2/5/2024. Patient reports pain as 4/10 today however some days the pain can be as bad as an 8-9 on a pain scale of 0-10.   He localizes his pain to both hips and SI joints. Left side is worse. He experiences more pain with bending.  Currently takes Lyrica and Diclofenac. Requests medication refills today.    Interval History (2/5/2024): Patient presents today for follow-up visit.  he underwent Bilateral  lumbar  + L5/S1 TF UVALDO on 8/16/24.  The patient reports that he is/was better following the procedure.  he reports 85 % pain relief.   The changes have continued through this visit.  Patient reports pain as 2/10 today. He has no issues with the LLE anymore but continues to have pain and weakness involving RLE. It is still difficult to lift / RLE. He admits despite these concerns,  it is easier to walk since procedure.     Interval History (12/04/2023):   Dre Curiel presents today for follow-up visit.  Patient was last seen on 5/16/23. At that visit, the plan was to continue conservative treatment and follow up as needed. Patient reports pain as 6/10 today.   The patient states since his last visit in May his pain has  gradually increased in lower back and down his legs, worse with RLE. He describes pain as stabbing and also experiences numbness.   Denies recent falls, bladder/bowel changes.      Interval History (5/16/2023):  Dre Curiel presents today for follow-up visit.  Patient was last seen on 3/7/2023. Last injection bilateral SIJ + bilateral IA hip injections on 01/24/2023 followed by bilateral L3-5 MBB on on 2/7/23 with 80% relief. Reports sustained relief from these injections. Needs refill of Lyrica 225 mg BID. Patient reports pain as 2/10 today.      Interval History (3/7/2023): Dre Curiel presents today for follow-up visit.  he underwent bilateral SIJ + bilateral IA hip injections on 01/24/2023 followed by bilateral L3-5 MBB on on 2/7/23.  The patient reports that he is/was better following the procedure.  he reports 80% pain relief from each injection.  The changes lasted 4 weeks so far.  The changes have continued through this visit.  Patient reports pain as 5/10 today. He reports he has good days and bad days, but has more good days since injection. Pain is exacerbated with bending. Has some residual right sided lateral hip pain and burning and needles sensation his right thigh/leg, but this has been somewhat improved with Lyrica 225 mg BID. Also had MRI of lumbar spine with noted compression of L5 spinal nerves.    Initial HPI  Dre Curiel is a 59 y.o. male with past medical history significant for anxiety/depression, hypertension, hyperlipidemia, stage 3 chronic kidney disease a history of renal cell carcinoma status post left-sided nephrectomy, erectile dysfunction, obesity, obstructive sleep apnea who presents to the clinic for the evaluation of lower back and leg pain.  Patient reports pain has been present for several years without inciting accident injury or trauma.  Today patient reports pain is constant which is rated a 6/10.  Pain is described as sharp and stabbing and needles poking  in  nature.  Patient reports pain in a bandlike distribution in the lower back which radiates down the lateral aspect of the right lower extremity to the calf in L4-5 distribution.  Pain is exacerbated when moving from sitting to standing, with lumbar forward flexion and lateral flexion and with ambulation.  Patient is a supervisor of product distribution, manages everything and reports he is standing on his feet all day.  Patient does report sensation of buckling in the right lower extremity associated with prolonged exertion.  Pain is improved with sitting.  Patient has trialed gabapentin 300 mg, 1-2 times daily in the past without meaningful relief.  Patient reports completing 8 sessions of conventional physical therapy on 12/2022 at [facility] without any meaningful relief in range of motion, strength or pain.    Patient reports significant motor weakness.  Patient denies night fever/night sweats, urinary incontinence, bowel incontinence, significant weight loss, and loss of sensations    Pain Disability Index Review:         9/13/2024     9:07 AM 8/1/2024     8:13 AM 2/5/2024     8:16 AM   Last 3 PDI Scores   Pain Disability Index (PDI) 20 28 14       Non-Pharmacologic Treatments:  Physical Therapy/Home Exercise: yes  Ice/Heat:yes  TENS: no  Acupuncture: no  Massage: no  Chiropractic: no    Other: no      Pain Medications:  - Opioids: Percocet (Oxycodone/Acetaminophen) and Ultram (Tramadol HCL)  - Adjuvant Medications: Neurontin (Gabapentin), Trazodone (Desyrel), Xanax (Alprazolam), and Zoloft (Sertraline)    Pain Procedures:   bilateral SIJ + bilateral IA hip injections on 01/24/2023 with 80% relief  bilateral L3-5 MBB on on 2/7/23 with 80% relief  Bilateral lumbar + L5/S1 TF UVALDO on 12/2/6/23 with 85% relief  Bilateral Hip IA Joint and SI Joint injections on 8/16/2024 with 85% relief so far    Past Medical History:   Diagnosis Date    Allergy     Anxiety     Cancer of kidney     Depression      Hyperlipidemia     Hypertension      Past Surgical History:   Procedure Laterality Date    COLONOSCOPY N/A 6/8/2018    Procedure: COLONOSCOPY;  Surgeon: Simeon Acharya III, MD;  Location: [facility] ENDO;  Service: Endoscopy;  Laterality: N/A;    COLONOSCOPY N/A 6/11/2018    Procedure: COLONOSCOPY;  Surgeon: Simeon Acharya III, MD;  Location: [facility] ENDO;  Service: Endoscopy;  Laterality: N/A;    COLONOSCOPY  06/2018    COLONOSCOPY N/A 11/17/2021    Procedure: COLONOSCOPY;  Surgeon: Tomi Lewis MD;  Location: [facility] ENDO;  Service: Endoscopy;  Laterality: N/A;    COSMETIC SURGERY      INJECTION OF ANESTHETIC AGENT AROUND MEDIAL BRANCH NERVES INNERVATING LUMBAR FACET JOINT Bilateral 2/7/2023    Procedure: Bilateral L3-5 MBB with local;  Surgeon: Lopez Mendoza MD;  Location: [facility] PAIN MGT;  Service: Pain Management;  Laterality: Bilateral;    INJECTION OF ANESTHETIC AGENT INTO SACROILIAC JOINT Bilateral 1/24/2023    Procedure: Bilateral SIJ Injection;  Surgeon: Lopez Mendoza MD;  [facility];  Service: Pain Management;  Laterality: Bilateral;    INJECTION OF ANESTHETIC AGENT INTO SACROILIAC JOINT Bilateral 8/16/2024    Procedure: Bilateral Sacroiliac Joint Injections;  Surgeon: Lopez Mendoza MD;  [facility];  Service: Pain Management;  Laterality: Bilateral;    INJECTION OF JOINT Bilateral 1/24/2023    Procedure: Bilateral Hip injection;  Surgeon: Lopez Mendoza MD;  [facility];  Service: Pain Management;  Laterality: Bilateral;    INJECTION OF JOINT Bilateral 8/16/2024    Procedure: Bilateral  Hip Joint injections;  Surgeon: Lopez Mendoza MD;  Location: [facility] PAIN MGT;  Service: Pain Management;  Laterality: Bilateral;    kidney removal      LAPAROSCOPIC NEPHRECTOMY, HAND ASSISTED Left 9/16/2021    Procedure: NEPHRECTOMY, HAND-ASSISTED, LAPAROSCOPIC;  Surgeon: Tom Manzo MD;  Location: [facility] OR;  Service: Urology;  Laterality: Left;    SELECTIVE INJECTION OF  ANESTHETIC AGENT AROUND LUMBAR SPINAL NERVE ROOT BY TRANSFORAMINAL APPROACH Bilateral 12/26/2023    Procedure: Bilateral Lumbar L5/S1 TF UVALDO with RN IV sedation- Insurance only will pay for 1 level;  Surgeon: Lopez Mendoza MD;  Location: [facility];  Service: Pain Management;  Laterality: Bilateral;     Review of patient's allergies indicates:   Allergen Reactions    Iodine and iodide containing products      Other reaction(s): Swelling  Other reaction(s): Sneezing  Other reaction(s): Shortness of breath       Current Outpatient Medications   Medication Sig    allopurinoL (ZYLOPRIM) 100 MG tablet TAKE ONE TABLET BY MOUTH ONCE DAILY    amlodipine-benazepril 5-20 mg (LOTREL) 5-20 mg per capsule Take 1 capsule by mouth once daily.    diclofenac (VOLTAREN) 75 MG EC tablet Take 1 tablet (75 mg total) by mouth 2 (two) times daily.    ergocalciferol (ERGOCALCIFEROL) 50,000 unit Cap TAKE ONE CAPSULE BY MOUTH ONCE WEEKLY    fluticasone propionate (FLONASE) 50 mcg/actuation nasal spray USE TWO SPRAYS IN EACH NOSTRIL ONCE DAILY    gabapentin (NEURONTIN) 300 MG capsule TAKE ONE CAPSULE BY MOUTH DURING THE DAY THEN TWO CAPSULES EVERY NIGHT AT BEDTIME    hydroCHLOROthiazide (HYDRODIURIL) 12.5 MG Tab TAKE ONE TABLET BY MOUTH ONCE DAILY    levocetirizine (XYZAL) 5 MG tablet Take 1 tablet (5 mg total) by mouth every evening.    metoprolol succinate (TOPROL-XL) 25 MG 24 hr tablet Take 1 tablet (25 mg total) by mouth once daily.    potassium chloride (KLOR-CON) 10 MEQ TbSR Take 10 mEq by mouth every Mon, Wed, Fri, Sun.    potassium chloride SA (K-DUR,KLOR-CON M) 10 MEQ tablet Take 1 tablet (10 mEq) BY MOUTH ONCE DAILY.    pravastatin (PRAVACHOL) 40 MG tablet Take 1 tablet (40 mg total) by mouth once daily.    sodium bicarbonate 650 MG tablet Take 1 tablet (650 mg total) by mouth before breakfast. With food    testosterone cypionate (DEPOTESTOTERONE CYPIONATE) 200 mg/mL injection INJECT ONE ML INTO MUSCLE EVERY 14 DAYS     traZODone (DESYREL) 50 MG tablet Take 1 tablet (50 mg total) by mouth once daily.     No current facility-administered medications for this visit.       Review of Systems     GENERAL:  No weight loss, malaise or fevers.  HEENT:   No recent changes in vision or hearing  NECK:  Negative for lumps, no difficulty with swallowing.  RESPIRATORY:  Negative for cough, wheezing or shortness of breath, patient denies any recent URI.  CARDIOVASCULAR:  Negative for chest pain or palpitations.  GI:  Negative for abdominal discomfort, blood in stools or black stools or change in bowel habits.  MUSCULOSKELETAL:  See HPI.  SKIN:  Negative for lesions, rash, and itching.  PSYCH:  No mood disorder or recent psychosocial stressors.   HEMATOLOGY/LYMPHOLOGY:  Negative for prolonged bleeding, bruising easily or swollen nodes.    NEURO:   No history of syncope, paralysis, seizures or tremors.  All other reviewed and negative other than HPI.    OBJECTIVE:        GENERAL: Well appearing, in no acute distress, alert and oriented x3.  PSYCH:  Mood and affect appropriate.  SKIN: Skin color, texture, turgor normal, no rashes or lesions.  HEAD/FACE:  Normocephalic, atraumatic. Cranial nerves grossly intact.  PULM: No evidence of respiratory difficulty, symmetric chest rise.  GI:  Soft and non-tender.    BACK: Straight leg raising in the sitting and supine positions is  negative to radicular pain.  No pain to palpation over the facet joints of the lumbar spine or spinous processes.  No pain with range of motion.     EXTREMITIES: Peripheral joint ROM is full and pain free without obvious instability or laxity in all four extremities. No deformities, edema, or skin discoloration. Good capillary refill.  MUSCULOSKELETAL: Able to stand on heels & toes.    hip provocative maneuvers are positive bilaterally but pain is improved      Facet loading test is positive bilaterally.   Bilateral upper and lower extremity strength is normal and symmetric.  No  atrophy or tone abnormalities are noted.    RIGHT Lower extremity: Hip flexion 4+/5, Hip Abduction 4+/5, Hip Adduction 4+/5, Knee extension 5/5, Knee flexion 5/5, Ankle dorsiflexion5/5, Extensor hallucis longus 5/5, Ankle plantarflexion 5/5  LEFT Lower extremity:  Hip flexion 5/5, Hip Abduction 5/5,Hip Adduction 5/5, Knee extension 5/5, Knee flexion 5/5, Ankle dorsiflexion 5/5, Extensor hallucis longus 5/5, Ankle plantarflexion 5/5  -Normal testing knee (patellar) jerk and ankle (achilles) jerk    NEURO: Bilateral upper and lower extremity coordination and muscle stretch reflexes are physiologic and symmetric. No loss of sensation is noted.  GAIT: normal.    SIJ testing:  BILATERAL  - TTP over SI joint: Much improved  - TTP GT Bursa: Much improved  - IR/ER of R/L hip: No pain    Imaging (Reviewed on 9/13/2024):      01/22/20  X-Ray Lumbar Spine Ap And Lateral  FINDINGS:  Vertebral body heights maintained.  No definite spondylolisthesis.  L5-S1 evaluation limited by positioning.  There is sacralization of L5.  Multilevel facet degenerative findings noted.  L4-5 and L5-S1 degenerative disc height loss.      Bilateral x-ray SI joints 12/14/2022  FINDINGS:  No evidence for ankylosis.  No erosive changes.  No widening of the AC joints.    Bilateral hip XR 12/14/22  FINDINGS:  No acute fracture or dislocation.  No significant soft tissue swelling.   The femoral heads are normally positioned within the native acetabuli.  Mild bilateral femoroacetabular degenerative osteoarthrosis with mild joint space narrowing and small osteophyte formation.  Chronic suspected bone island of the left femoral head.     Pubic symphysis, bilateral pubic rami and SI joints are intact.       MRI lumbar spine 01/23/2023  FINDINGS:  Transitional anatomy with partial sacralization of L5 peripherally.  There are bilateral L5 pars defects with grade 1 spondylolisthesis at L5-S1.  Vertebral body height is normal.  Marrow signal is within normal  limits. The conus medullaris terminates at the level of L1-L2.  No abnormal signal within the conus. Intervertebral disc levels are as follows:     T12-L1 disc: Tiny chronic Schmorl's nodes.  Normal facet joints.  No significant stenosis.  The dural canal measures 13 mm.     L1-L2 disc : Normal.     L2-L3 disc: Normal disc space height with anterior osteophytes.  Minimal facet arthropathy.  No significant spinal or foraminal stenosis.  The dural canal measures 11 mm.  No foraminal stenosis.     L3-L4 disc: Mild disc bulge.  Anterior osteophytes.  Normal facet joints.  The dural canal measures 14 mm.  No foraminal stenosis.     L4-L5 disc: Normal disc space height with mild facet arthropathy.  No significant spinal or foraminal stenosis.  The dural canal measures 15 mm.     L5-S1 disc: Bilateral L5 pars defects with grade 1 spondylolisthesis.  Severe disc space height loss most pronounced toward the right with edematous Modic endplate change.  Disc and osteophyte encroach into the exit foramina, right greater than left.  There is severe right and moderate/severe left foraminal stenosis with compression of the exiting L5 spinal nerves.  Degenerative hypertrophic change toward the right of the disc space as well as degenerative change of the pseudoarticulation of L5 and S1 narrows the exit pathway of the right L5 spinal nerve as demonstrated on axial T2 series 6, image 28.  Mild facet arthropathy.  The dural canal measures 14 mm.     Impression:     1. Isthmic grade 1 spondylolisthesis at L5-S1 with severe bilateral foraminal stenosis and possible compression of the exiting L5 spinal nerves.  2. Degenerative hypertrophic change toward the right of the L5-S1 disc space causes severe narrowing of the exit pathway of the right L5 spinal nerve as demonstrated on axial T2 image 28.  3. Edematous Modic endplate change at L5-S1 toward the right at L5-S1 may correlate to focal symptoms.       ASSESSMENT: 59 y.o. year old male  with lower back and leg pain, consistent with     1. Sacroiliitis        2. Bilateral hip pain        3. Osteoarthritis of both hips, unspecified osteoarthritis type              PLAN:   - Interventions: None at this time. Can consider repeat injections in the future.      - S/p bilateral  hip and sacroiliac joint injections on 8/16/24 with 85% relief so far.  - S/p Lumbar TF Epidural steroid injection at L5/S1 (bilateral) on 12/26/23 with 85% relief.  bilateral SIJ + bilateral IA hip injections on 01/24/2023 with 80% relief  bilateral L3-5 MBB on on 2/7/23 with 80% relief    - Anticoagulation use: no anticoagulation     report:  Reviewed and consistent with medication use as prescribed.      - Medications:  --Continue Gabapentin 300 mg per PCP.  -- Continue Diclofenac 75 mg BID for pain.     - Can take Tylenol (acetaminophen) 1000mg (two tablets of 500mg) 3x per day as needed for pain (to take up to max dose of 3000mg per day).        - Therapy:   We discussed  physical therapy to help manage the patient/s painful condition. The patient was counseled that muscle strengthening will improve the long term prognosis in regards to pain and may also help increase range of motion and mobility. Patient declined referral to outpatient physical therapy at this time. Reports the last time he was in physical therapy it did not improve his condition.    - Imaging: Reviewed MRI of lumbar spine with patient and answered any questions they had regarding study.      - Consults/Referrals: Follow up with PCP regarding scheduling C-scope.          Continue follow up with Cardiology.     - Follow up visit: 4-5 months.  Send scheduling ticket.      The above plan and management options were discussed at length with patient. Patient is in agreement with the above and verbalized understanding.    - I discussed the goals of interventional chronic pain management with the patient on today's visit. We discussed a multimodal and systematic  approach to pain.  This includes diagnostic and therapeutic injections, adjuvant pharmacologic treatment, physical therapy, and at times psychiatry.  I emphasized the importance of regular exercise, core strengthening and stretching, diet and weight loss as a cornerstone of long-term pain management.    - This condition does not require this patient to take time off of work, and the primary goal of our Pain Management services is to improve the patient's functional capacity.  - Patient Questions: Answered all of the patient's questions regarding diagnoses, therapy, treatment and next steps      Devora Thompson PA-C  Interventional Pain Management  [facility]

## 2024-09-13 ENCOUNTER — OFFICE VISIT (OUTPATIENT)
Dept: PAIN MEDICINE | Facility: CLINIC | Age: 59
End: 2024-09-13
Payer: COMMERCIAL

## 2024-09-13 ENCOUNTER — PATIENT MESSAGE (OUTPATIENT)
Dept: FAMILY MEDICINE | Facility: CLINIC | Age: 59
End: 2024-09-13
Payer: COMMERCIAL

## 2024-09-13 ENCOUNTER — PATIENT MESSAGE (OUTPATIENT)
Dept: PAIN MEDICINE | Facility: CLINIC | Age: 59
End: 2024-09-13

## 2024-09-13 DIAGNOSIS — M25.552 BILATERAL HIP PAIN: ICD-10-CM

## 2024-09-13 DIAGNOSIS — M16.0 OSTEOARTHRITIS OF BOTH HIPS, UNSPECIFIED OSTEOARTHRITIS TYPE: ICD-10-CM

## 2024-09-13 DIAGNOSIS — M25.551 BILATERAL HIP PAIN: ICD-10-CM

## 2024-09-13 DIAGNOSIS — M46.1 SACROILIITIS: Primary | ICD-10-CM

## 2024-09-13 DIAGNOSIS — Z12.11 COLON CANCER SCREENING: Primary | ICD-10-CM

## 2024-09-13 PROCEDURE — 99999 PR PBB SHADOW E&M-EST. PATIENT-LVL III: CPT | Mod: PBBFAC,,, | Performed by: PHYSICIAN ASSISTANT

## 2024-09-17 ENCOUNTER — OFFICE VISIT (OUTPATIENT)
Dept: CARDIOLOGY | Facility: CLINIC | Age: 59
End: 2024-09-17
Payer: COMMERCIAL

## 2024-09-17 VITALS — DIASTOLIC BLOOD PRESSURE: 76 MMHG | SYSTOLIC BLOOD PRESSURE: 124 MMHG | RESPIRATION RATE: 16 BRPM | HEART RATE: 80 BPM

## 2024-09-17 DIAGNOSIS — N18.32 STAGE 3B CHRONIC KIDNEY DISEASE: ICD-10-CM

## 2024-09-17 DIAGNOSIS — E78.2 MIXED HYPERLIPIDEMIA: ICD-10-CM

## 2024-09-17 DIAGNOSIS — C64.2 RENAL CELL CARCINOMA OF LEFT KIDNEY: ICD-10-CM

## 2024-09-17 DIAGNOSIS — G47.33 OSA ON CPAP: ICD-10-CM

## 2024-09-17 DIAGNOSIS — R94.31 ABNORMAL ECG: ICD-10-CM

## 2024-09-17 DIAGNOSIS — I10 ESSENTIAL HYPERTENSION: ICD-10-CM

## 2024-09-17 DIAGNOSIS — E66.01 CLASS 2 SEVERE OBESITY DUE TO EXCESS CALORIES WITH SERIOUS COMORBIDITY AND BODY MASS INDEX (BMI) OF 39.0 TO 39.9 IN ADULT: ICD-10-CM

## 2024-09-17 DIAGNOSIS — Z72.0 TOBACCO DIPPER: ICD-10-CM

## 2024-09-17 DIAGNOSIS — I25.118 CORONARY ARTERY DISEASE OF NATIVE ARTERY OF NATIVE HEART WITH STABLE ANGINA PECTORIS: Primary | ICD-10-CM

## 2024-09-17 PROCEDURE — 99215 OFFICE O/P EST HI 40 MIN: CPT | Mod: S$GLB,,, | Performed by: INTERNAL MEDICINE

## 2024-09-17 PROCEDURE — 4010F ACE/ARB THERAPY RXD/TAKEN: CPT | Mod: CPTII,S$GLB,, | Performed by: INTERNAL MEDICINE

## 2024-09-17 PROCEDURE — 1159F MED LIST DOCD IN RCRD: CPT | Mod: CPTII,S$GLB,, | Performed by: INTERNAL MEDICINE

## 2024-09-17 PROCEDURE — 3044F HG A1C LEVEL LT 7.0%: CPT | Mod: CPTII,S$GLB,, | Performed by: INTERNAL MEDICINE

## 2024-09-17 PROCEDURE — 3074F SYST BP LT 130 MM HG: CPT | Mod: CPTII,S$GLB,, | Performed by: INTERNAL MEDICINE

## 2024-09-17 PROCEDURE — 99999 PR PBB SHADOW E&M-EST. PATIENT-LVL III: CPT | Mod: PBBFAC,,, | Performed by: INTERNAL MEDICINE

## 2024-09-17 PROCEDURE — 3078F DIAST BP <80 MM HG: CPT | Mod: CPTII,S$GLB,, | Performed by: INTERNAL MEDICINE

## 2024-09-17 PROCEDURE — 1160F RVW MEDS BY RX/DR IN RCRD: CPT | Mod: CPTII,S$GLB,, | Performed by: INTERNAL MEDICINE

## 2024-09-17 RX ORDER — ISOSORBIDE MONONITRATE 30 MG/1
30 TABLET, EXTENDED RELEASE ORAL DAILY
Qty: 30 TABLET | Refills: 11 | Status: SHIPPED | OUTPATIENT
Start: 2024-09-17 | End: 2025-09-17

## 2024-09-17 RX ORDER — NITROGLYCERIN 0.4 MG/1
0.4 TABLET SUBLINGUAL EVERY 5 MIN PRN
Qty: 20 TABLET | Refills: 12 | Status: SHIPPED | OUTPATIENT
Start: 2024-09-17 | End: 2025-09-17

## 2024-09-17 NOTE — PROGRESS NOTES
Subjective   Patient ID:  Dre Curiel is a 59 y.o. male who presents for evaluation of Chest Pain    Pt referred by Dr Mohan Schwartz        HPI:  Pt presents for CP.  Has seen Pradeep Mckay, in past w last visit in 2021.  Has HTN, dyslipidemia, obesity, CRI, s/p left nephrectomy in 2021 for cancer, KARLY, oral tobacco.  Iodine allergy.  Has chronic CP.  PCP ordered stress test -- negative -- and pt advised to see us for possible heart cath.  Approx 3 times/month, tightness mid chest, few minutes.  Worse w stress.  States he walks a lot, and can notice it w walking.  Nuclear stress test Aug 2024 personally reviewed: normal perfusion, normal EF.  Ecgs in past NSR, low voltage.   BP controlled.  Weight loss needed.  Uses CPAP.  On statin.      Past Medical History:   Diagnosis Date    Allergy     Anxiety     Cancer of kidney     Depression     Hyperlipidemia     Hypertension        Current Outpatient Medications:     allopurinoL (ZYLOPRIM) 100 MG tablet, TAKE ONE TABLET BY MOUTH ONCE DAILY, Disp: 90 tablet, Rfl: 0    amlodipine-benazepril 5-20 mg (LOTREL) 5-20 mg per capsule, Take 1 capsule by mouth once daily., Disp: 30 capsule, Rfl: 11    diclofenac (VOLTAREN) 75 MG EC tablet, Take 1 tablet (75 mg total) by mouth 2 (two) times daily., Disp: 180 tablet, Rfl: 2    ergocalciferol (ERGOCALCIFEROL) 50,000 unit Cap, TAKE ONE CAPSULE BY MOUTH ONCE WEEKLY, Disp: 12 capsule, Rfl: 0    fluticasone propionate (FLONASE) 50 mcg/actuation nasal spray, USE TWO SPRAYS IN EACH NOSTRIL ONCE DAILY, Disp: 48 g, Rfl: 3    gabapentin (NEURONTIN) 300 MG capsule, TAKE ONE CAPSULE BY MOUTH DURING THE DAY THEN TWO CAPSULES EVERY NIGHT AT BEDTIME, Disp: 270 capsule, Rfl: 1    hydroCHLOROthiazide (HYDRODIURIL) 12.5 MG Tab, TAKE ONE TABLET BY MOUTH ONCE DAILY, Disp: 30 tablet, Rfl: 11    isosorbide mononitrate (IMDUR) 30 MG 24 hr tablet, Take 1 tablet (30 mg total) by mouth once daily., Disp: 30 tablet, Rfl: 11    levocetirizine (XYZAL) 5  MG tablet, Take 1 tablet (5 mg total) by mouth every evening., Disp: 90 tablet, Rfl: 3    metoprolol succinate (TOPROL-XL) 25 MG 24 hr tablet, Take 1 tablet (25 mg total) by mouth once daily., Disp: 90 tablet, Rfl: 2    nitroGLYCERIN (NITROSTAT) 0.4 MG SL tablet, Place 1 tablet (0.4 mg total) under the tongue every 5 (five) minutes as needed., Disp: 20 tablet, Rfl: 12    potassium chloride (KLOR-CON) 10 MEQ TbSR, Take 10 mEq by mouth every Mon, Wed, Fri, Sun., Disp: , Rfl:     potassium chloride SA (K-DUR,KLOR-CON M) 10 MEQ tablet, Take 1 tablet (10 mEq) BY MOUTH ONCE DAILY., Disp: 90 tablet, Rfl: 3    pravastatin (PRAVACHOL) 40 MG tablet, Take 1 tablet (40 mg total) by mouth once daily., Disp: 90 tablet, Rfl: 3    sodium bicarbonate 650 MG tablet, Take 1 tablet (650 mg total) by mouth before breakfast. With food, Disp: 180 tablet, Rfl: 1    testosterone cypionate (DEPOTESTOTERONE CYPIONATE) 200 mg/mL injection, INJECT ONE ML INTO MUSCLE EVERY 14 DAYS, Disp: 2 mL, Rfl: 2    traZODone (DESYREL) 50 MG tablet, Take 1 tablet (50 mg total) by mouth once daily., Disp: 90 tablet, Rfl: 2      Review of Systems   Constitutional: Negative.   HENT: Negative.     Eyes: Negative.    Cardiovascular:  Positive for chest pain.   Respiratory: Negative.     Endocrine: Negative.    Hematologic/Lymphatic: Negative.    Skin: Negative.    Musculoskeletal: Negative.    Gastrointestinal: Negative.    Genitourinary: Negative.    Neurological: Negative.    Psychiatric/Behavioral: Negative.     Allergic/Immunologic: Negative.          /76   Pulse 80   Resp 16     Wt Readings from Last 3 Encounters:   08/28/24 118.2 kg (260 lb 7.6 oz)   08/16/24 118.1 kg (260 lb 5.8 oz)   08/13/24 116.2 kg (256 lb 2.8 oz)     Temp Readings from Last 3 Encounters:   08/16/24 98 °F (36.7 °C) (Temporal)   12/26/23 97.2 °F (36.2 °C) (Temporal)   11/06/23 97 °F (36.1 °C) (Oral)     BP Readings from Last 3 Encounters:   09/17/24 124/76   08/28/24 121/83    08/16/24 123/65     Pulse Readings from Last 3 Encounters:   09/17/24 80   08/28/24 66   08/16/24 67          Objective     Physical Exam  Vitals and nursing note reviewed.   Constitutional:       Appearance: He is well-developed. He is obese.   HENT:      Head: Normocephalic.   Neck:      Thyroid: No thyromegaly.      Vascular: Normal carotid pulses. No carotid bruit, hepatojugular reflux or JVD.   Cardiovascular:      Rate and Rhythm: Normal rate and regular rhythm.      Chest Wall: PMI is not displaced.      Pulses:           Radial pulses are 2+ on the right side and 2+ on the left side.      Heart sounds: S1 normal and S2 normal. Heart sounds not distant. No midsystolic click and no opening snap. No murmur heard.     No friction rub. No S3 or S4 sounds.   Pulmonary:      Effort: Pulmonary effort is normal.      Breath sounds: Normal breath sounds. No wheezing or rales.   Abdominal:      General: Bowel sounds are normal. There is no distension or abdominal bruit.      Palpations: Abdomen is soft. There is no mass.      Tenderness: There is no abdominal tenderness.   Musculoskeletal:      Cervical back: Normal range of motion and neck supple.   Skin:     General: Skin is warm.   Neurological:      Mental Status: He is alert and oriented to person, place, and time.   Psychiatric:         Behavior: Behavior normal.         I have reviewed all pertinent labs and cardiac studies.          Chemistry        Component Value Date/Time     08/26/2024 0750    K 5.0 08/26/2024 0750     08/26/2024 0750    CO2 19 (L) 08/26/2024 0750    BUN 36 (H) 08/26/2024 0750    CREATININE 2.4 (H) 08/26/2024 0750     (H) 08/26/2024 0750        Component Value Date/Time    CALCIUM 9.1 08/26/2024 0750    ALKPHOS 80 08/26/2024 0750    AST 22 08/26/2024 0750    ALT 51 (H) 08/26/2024 0750    BILITOT 0.4 08/26/2024 0750    ESTGFRAFRICA 40 (A) 06/28/2022 0951    EGFRNONAA 34 (A) 06/28/2022 0951        Lab Results   Component  Value Date    WBC 13.47 (H) 08/26/2024    HGB 15.0 08/26/2024    HCT 47.3 08/26/2024    MCV 95 08/26/2024     08/26/2024       Lab Results   Component Value Date    HGBA1C 5.6 04/25/2024     Lab Results   Component Value Date    CHOL 162 04/25/2024    CHOL 170 10/17/2023    CHOL 151 04/11/2023     Lab Results   Component Value Date    HDL 37 (L) 04/25/2024    HDL 35 (L) 10/17/2023    HDL 43 04/11/2023     Lab Results   Component Value Date    LDLCALC 81.0 04/25/2024    LDLCALC 65.8 10/17/2023    LDLCALC 80.2 04/11/2023     Lab Results   Component Value Date    TRIG 220 (H) 04/25/2024    TRIG 346 (H) 10/17/2023    TRIG 139 04/11/2023       Lab Results   Component Value Date    CHOLHDL 22.8 04/25/2024    CHOLHDL 20.6 10/17/2023    CHOLHDL 28.5 04/11/2023       No results found for this or any previous visit.      Results for orders placed during the hospital encounter of 08/30/24    Nuclear Stress - Cardiology Interpreted    Interpretation Summary    Normal myocardial perfusion scan. There is no evidence of myocardial ischemia or infarction.    The gated perfusion images showed an ejection fraction of 68% at rest. The gated perfusion images showed an ejection fraction of 72% post stress.    The ECG portion of the study is negative for ischemia.    The patient reported chest pain during the stress test.    Chest discomfort resolved prior to the end of the recovery period.         Assessment and Plan     1. Coronary artery disease of native artery of native heart with stable angina pectoris    2. Abnormal ECG    3. Mixed hyperlipidemia    4. KARLY on CPAP    5. Renal cell carcinoma of left kidney    6. Essential hypertension    7. Class 2 severe obesity due to excess calories with serious comorbidity and body mass index (BMI) of 39.0 to 39.9 in adult    8. Stage 3b chronic kidney disease    9.         Plan:      CAD with chronic angina.  Normal nuclear stress test Aug 2024.  Discussed in detail with  pt.  Would not pursue cardiac cath at present time in light of CRI and advise escalation of medical tx.  Risks/benefits of LHC/PCI discussed and increased risk for contrast nephropathy discussed.  Add Imdur 30 mg qd.  Sl ntg prn -- pt advised how to use and when to go to ER.  Pt will call and/or see me if any angina/CP sxs worsen despite medical tx and if so can then consider LHC if needed.  HTN control.  Statin tx.  CRI: f/u w Nephrology/PCP as advised.  Weight loss.  Tobacco use discouraged.  CPAP.  Echocardiogram.      F/u 3 months.

## 2024-09-20 ENCOUNTER — HOSPITAL ENCOUNTER (OUTPATIENT)
Dept: PREADMISSION TESTING | Facility: HOSPITAL | Age: 59
Discharge: HOME | End: 2024-09-20
Attending: INTERNAL MEDICINE
Payer: COMMERCIAL

## 2024-09-20 DIAGNOSIS — Z12.11 COLON CANCER SCREENING: ICD-10-CM

## 2024-09-24 ENCOUNTER — HOSPITAL ENCOUNTER (OUTPATIENT)
Dept: CARDIOLOGY | Facility: HOSPITAL | Age: 59
Discharge: HOME | End: 2024-09-24
Attending: INTERNAL MEDICINE
Payer: COMMERCIAL

## 2024-09-24 VITALS
SYSTOLIC BLOOD PRESSURE: 124 MMHG | HEIGHT: 68 IN | BODY MASS INDEX: 39.4 KG/M2 | DIASTOLIC BLOOD PRESSURE: 76 MMHG | WEIGHT: 260 LBS

## 2024-09-24 DIAGNOSIS — E78.2 MIXED HYPERLIPIDEMIA: ICD-10-CM

## 2024-09-24 DIAGNOSIS — C64.2 RENAL CELL CARCINOMA OF LEFT KIDNEY: ICD-10-CM

## 2024-09-24 DIAGNOSIS — I10 ESSENTIAL HYPERTENSION: ICD-10-CM

## 2024-09-24 DIAGNOSIS — R94.31 ABNORMAL ECG: ICD-10-CM

## 2024-09-24 DIAGNOSIS — I25.118 CORONARY ARTERY DISEASE OF NATIVE ARTERY OF NATIVE HEART WITH STABLE ANGINA PECTORIS: ICD-10-CM

## 2024-09-24 DIAGNOSIS — G47.33 OSA ON CPAP: ICD-10-CM

## 2024-09-24 DIAGNOSIS — N18.32 STAGE 3B CHRONIC KIDNEY DISEASE: ICD-10-CM

## 2024-09-24 DIAGNOSIS — E66.01 CLASS 2 SEVERE OBESITY DUE TO EXCESS CALORIES WITH SERIOUS COMORBIDITY AND BODY MASS INDEX (BMI) OF 39.0 TO 39.9 IN ADULT: ICD-10-CM

## 2024-09-24 LAB
AORTIC ROOT ANNULUS: 3.31 CM
ASCENDING AORTA: 3.43 CM
AV INDEX (PROSTH): 0.73
AV MEAN GRADIENT: 3 MMHG
AV PEAK GRADIENT: 6 MMHG
AV REGURGITATION PRESSURE HALF TIME: 890.54 MS
AV VALVE AREA BY VELOCITY RATIO: 2.11 CM²
AV VALVE AREA: 2.05 CM²
AV VELOCITY RATIO: 0.75
BSA FOR ECHO PROCEDURE: 2.38 M2
CV ECHO LV RWT: 0.53 CM
DOP CALC AO PEAK VEL: 1.25 M/S
DOP CALC AO VTI: 25.2 CM
DOP CALC LVOT AREA: 2.8 CM2
DOP CALC LVOT DIAMETER: 1.89 CM
DOP CALC LVOT PEAK VEL: 0.94 M/S
DOP CALC LVOT STROKE VOLUME: 51.6 CM3
DOP CALC RVOT PEAK VEL: 0.91 M/S
DOP CALC RVOT VTI: 17.1 CM
DOP CALCLVOT PEAK VEL VTI: 18.4 CM
E WAVE DECELERATION TIME: 154.16 MSEC
E/A RATIO: 0.73
E/E' RATIO: 8.86 M/S
ECHO LV POSTERIOR WALL: 1.24 CM (ref 0.6–1.1)
EJECTION FRACTION: 60 %
FRACTIONAL SHORTENING: 31 % (ref 28–44)
INTERVENTRICULAR SEPTUM: 1.27 CM (ref 0.6–1.1)
IVRT: 59.94 MSEC
LA MAJOR: 5.34 CM
LA MINOR: 4.95 CM
LA WIDTH: 3.5 CM
LEFT ATRIUM AREA SYSTOLIC (APICAL 2 CHAMBER): 14.11 CM2
LEFT ATRIUM AREA SYSTOLIC (APICAL 4 CHAMBER): 16.19 CM2
LEFT ATRIUM SIZE: 3.99 CM
LEFT ATRIUM VOLUME INDEX MOD: 18 ML/M2
LEFT ATRIUM VOLUME INDEX: 26.6 ML/M2
LEFT ATRIUM VOLUME MOD: 41.11 ML
LEFT ATRIUM VOLUME: 60.98 CM3
LEFT INTERNAL DIMENSION IN SYSTOLE: 3.27 CM (ref 2.1–4)
LEFT VENTRICLE DIASTOLIC VOLUME INDEX: 45.12 ML/M2
LEFT VENTRICLE DIASTOLIC VOLUME: 103.32 ML
LEFT VENTRICLE END SYSTOLIC VOLUME APICAL 2 CHAMBER: 35.63 ML
LEFT VENTRICLE END SYSTOLIC VOLUME APICAL 4 CHAMBER: 40.03 ML
LEFT VENTRICLE MASS INDEX: 99 G/M2
LEFT VENTRICLE SYSTOLIC VOLUME INDEX: 18.9 ML/M2
LEFT VENTRICLE SYSTOLIC VOLUME: 43.22 ML
LEFT VENTRICULAR INTERNAL DIMENSION IN DIASTOLE: 4.72 CM (ref 3.5–6)
LEFT VENTRICULAR MASS: 227.55 G
LV LATERAL E/E' RATIO: 8.86 M/S
LV SEPTAL E/E' RATIO: 8.86 M/S
LVED V (TEICH): 103.32 ML
LVES V (TEICH): 43.22 ML
LVOT MG: 2.04 MMHG
LVOT MV: 0.67 CM/S
MV PEAK A VEL: 0.85 M/S
MV PEAK E VEL: 0.62 M/S
OHS CV RV/LV RATIO: 0.72 CM
PISA AR MAX VEL: 2.33 M/S
PISA TR MAX VEL: 2.2 M/S
PULM VEIN S/D RATIO: 1.43
PV MEAN GRADIENT: 2 MMHG
PV MV: 0.71 M/S
PV PEAK D VEL: 0.35 M/S
PV PEAK GRADIENT: 5 MMHG
PV PEAK S VEL: 0.5 M/S
PV PEAK VELOCITY: 1.09 M/S
RA MAJOR: 4.53 CM
RA PRESSURE ESTIMATED: 3 MMHG
RA WIDTH: 3.35 CM
RIGHT VENTRICULAR END-DIASTOLIC DIMENSION: 3.4 CM
RV TB RVSP: 5 MMHG
SINUS: 3.28 CM
STJ: 3.28 CM
TDI LATERAL: 0.07 M/S
TDI SEPTAL: 0.07 M/S
TDI: 0.07 M/S
TR MAX PG: 19 MMHG
TRICUSPID ANNULAR PLANE SYSTOLIC EXCURSION: 1.95 CM
TV REST PULMONARY ARTERY PRESSURE: 22 MMHG
Z-SCORE OF LEFT VENTRICULAR DIMENSION IN END DIASTOLE: -6.17
Z-SCORE OF LEFT VENTRICULAR DIMENSION IN END SYSTOLE: -3.79

## 2024-09-24 PROCEDURE — 93306 TTE W/DOPPLER COMPLETE: CPT | Mod: 26,,, | Performed by: INTERNAL MEDICINE

## 2024-09-24 PROCEDURE — 93306 TTE W/DOPPLER COMPLETE: CPT

## 2024-09-25 ENCOUNTER — PATIENT MESSAGE (OUTPATIENT)
Dept: PAIN MEDICINE | Facility: CLINIC | Age: 59
End: 2024-09-25
Payer: COMMERCIAL

## 2024-09-26 ENCOUNTER — OFFICE VISIT (OUTPATIENT)
Dept: PAIN MEDICINE | Facility: CLINIC | Age: 59
End: 2024-09-26
Payer: COMMERCIAL

## 2024-09-26 DIAGNOSIS — M25.552 BILATERAL HIP PAIN: ICD-10-CM

## 2024-09-26 DIAGNOSIS — M46.1 SACROILIITIS: Primary | ICD-10-CM

## 2024-09-26 DIAGNOSIS — M16.0 OSTEOARTHRITIS OF BOTH HIPS, UNSPECIFIED OSTEOARTHRITIS TYPE: ICD-10-CM

## 2024-09-26 DIAGNOSIS — M25.551 BILATERAL HIP PAIN: ICD-10-CM

## 2024-09-26 PROCEDURE — 3044F HG A1C LEVEL LT 7.0%: CPT | Mod: CPTII,95,, | Performed by: PHYSICIAN ASSISTANT

## 2024-09-26 PROCEDURE — 99213 OFFICE O/P EST LOW 20 MIN: CPT | Mod: 95,,, | Performed by: PHYSICIAN ASSISTANT

## 2024-09-26 PROCEDURE — 4010F ACE/ARB THERAPY RXD/TAKEN: CPT | Mod: CPTII,95,, | Performed by: PHYSICIAN ASSISTANT

## 2024-09-26 NOTE — PROGRESS NOTES
Est Patient Chronic Pain Note (Follow up Visit)    PCP: Mohan Schwartz MD  The patient location is: [place], [place]  The chief complaint leading to consultation is: discuss medical Marijuana referral    Visit type: audiovisual    Face to Face time with patient: 5-10 minutes of total time spent on the encounter, which includes face to face time and non-face to face time preparing to see the patient (eg, review of tests), Obtaining and/or reviewing separately obtained history, Documenting clinical information in the electronic or other health record, Independently interpreting results (not separately reported) and communicating results to the patient/family/caregiver, or Care coordination (not separately reported).   Each patient to whom he or she provides medical services by telemedicine is:  (1) informed of the relationship between the physician and patient and the respective role of any other health care provider with respect to management of the patient; and (2) notified that he or she may decline to receive medical services by telemedicine and may withdraw from such care at any time.    Notes:    SUBJECTIVE:    Interval History (9/26/2024):   Dre Curiel presents today for follow-up visit.  Patient was last seen on 9/13/2024. Patient is interested in medical marijuana (specifically THC gummies) for the treatment of his chronic pain. He reports speaking with a friend and his boss regarding this treatment. He is hoping it will help his pain and to relax and sleep better a night so he does not have to take as much Trazodone. Patient reports pain as 5/10 today.    Interval History (9/13/2024):  Dre Curiel presents today for follow-up visit.  he underwent Bilateral SI Joint and   IA Hip injections  on 8/16/24.  The patient reports that he is/was better following the procedure.  he reports 85 % pain relief.  The changes have continued through this visit.  Patient reports pain as  2.5/10  today. Patient states he has  been on vacation all week (at home) and feels better.   He is able to move his RLE/ hip better since the procedure.     Interval History (8/01/2024):   Dre Curiel presents today for follow-up visit.  Patient was last seen on 2/5/2024. Patient reports pain as 4/10 today however some days the pain can be as bad as an 8-9 on a pain scale of 0-10.   He localizes his pain to both hips and SI joints. Left side is worse. He experiences more pain with bending.  Currently takes Lyrica and Diclofenac. Requests medication refills today.    Interval History (2/5/2024): Patient presents today for follow-up visit.  he underwent Bilateral  lumbar  + L5/S1 TF UVALDO on 8/16/24.  The patient reports that he is/was better following the procedure.  he reports 85 % pain relief.   The changes have continued through this visit.  Patient reports pain as 2/10 today. He has no issues with the LLE anymore but continues to have pain and weakness involving RLE. It is still difficult to lift / RLE. He admits despite these concerns,  it is easier to walk since procedure.     Interval History (12/04/2023):   Dre Curiel presents today for follow-up visit.  Patient was last seen on 5/16/23. At that visit, the plan was to continue conservative treatment and follow up as needed. Patient reports pain as 6/10 today.   The patient states since his last visit in May his pain has gradually increased in lower back and down his legs, worse with RLE. He describes pain as stabbing and also experiences numbness.   Denies recent falls, bladder/bowel changes.      Interval History (5/16/2023):  Dre Curiel presents today for follow-up visit.  Patient was last seen on 3/7/2023. Last injection bilateral SIJ + bilateral IA hip injections on 01/24/2023 followed by bilateral L3-5 MBB on on 2/7/23 with 80% relief. Reports sustained relief from these injections. Needs refill of Lyrica 225 mg BID. Patient reports pain as 2/10 today.      Interval History  (3/7/2023): Dre Curiel presents today for follow-up visit.  he underwent bilateral SIJ + bilateral IA hip injections on 01/24/2023 followed by bilateral L3-5 MBB on on 2/7/23.  The patient reports that he is/was better following the procedure.  he reports 80% pain relief from each injection.  The changes lasted 4 weeks so far.  The changes have continued through this visit.  Patient reports pain as 5/10 today. He reports he has good days and bad days, but has more good days since injection. Pain is exacerbated with bending. Has some residual right sided lateral hip pain and burning and needles sensation his right thigh/leg, but this has been somewhat improved with Lyrica 225 mg BID. Also had MRI of lumbar spine with noted compression of L5 spinal nerves.    Initial HPI  Dre Curiel is a 59 y.o. male with past medical history significant for anxiety/depression, hypertension, hyperlipidemia, stage 3 chronic kidney disease a history of renal cell carcinoma status post left-sided nephrectomy, erectile dysfunction, obesity, obstructive sleep apnea who presents to the clinic for the evaluation of lower back and leg pain.  Patient reports pain has been present for several years without inciting accident injury or trauma.  Today patient reports pain is constant which is rated a 6/10.  Pain is described as sharp and stabbing and needles poking  in nature.  Patient reports pain in a bandlike distribution in the lower back which radiates down the lateral aspect of the right lower extremity to the calf in L4-5 distribution.  Pain is exacerbated when moving from sitting to standing, with lumbar forward flexion and lateral flexion and with ambulation.  Patient is a supervisor of product distribution, manages everything and reports he is standing on his feet all day.  Patient does report sensation of buckling in the right lower extremity associated with prolonged exertion.  Pain is improved with sitting.  Patient has  trialed gabapentin 300 mg, 1-2 times daily in the past without meaningful relief.  Patient reports completing 8 sessions of conventional physical therapy on 12/2022 at [facility] without any meaningful relief in range of motion, strength or pain.    Patient reports significant motor weakness.  Patient denies night fever/night sweats, urinary incontinence, bowel incontinence, significant weight loss, and loss of sensations    Pain Disability Index Review:         9/13/2024     9:07 AM 8/1/2024     8:13 AM 2/5/2024     8:16 AM   Last 3 PDI Scores   Pain Disability Index (PDI) 20 28 14       Non-Pharmacologic Treatments:  Physical Therapy/Home Exercise: yes  Ice/Heat:yes  TENS: no  Acupuncture: no  Massage: no  Chiropractic: no    Other: no      Pain Medications:  - Opioids: Percocet (Oxycodone/Acetaminophen) and Ultram (Tramadol HCL)  - Adjuvant Medications: Neurontin (Gabapentin), Trazodone (Desyrel), Xanax (Alprazolam), and Zoloft (Sertraline)    Pain Procedures:   bilateral SIJ + bilateral IA hip injections on 01/24/2023 with 80% relief  bilateral L3-5 MBB on on 2/7/23 with 80% relief  Bilateral lumbar + L5/S1 TF UVALDO on 12/2/6/23 with 85% relief  Bilateral Hip IA Joint and SI Joint injections on 8/16/2024 with 85% relief so far    Past Medical History:   Diagnosis Date    Allergy     Anxiety     Cancer of kidney     Depression     Hyperlipidemia     Hypertension      Past Surgical History:   Procedure Laterality Date    COLONOSCOPY N/A 6/8/2018    Procedure: COLONOSCOPY;  Surgeon: Simeon Acharya III, MD;  Location: [facility];  Service: Endoscopy;  Laterality: N/A;    COLONOSCOPY N/A 6/11/2018    Procedure: COLONOSCOPY;  Surgeon: Simeon Acharya III, MD;  Location: [facility];  Service: Endoscopy;  Laterality: N/A;    COLONOSCOPY  06/2018    COLONOSCOPY N/A 11/17/2021    Procedure: COLONOSCOPY;  Surgeon: Tomi Lewis MD;  Location: [facility];  Service: Endoscopy;  Laterality: N/A;    COSMETIC  SURGERY      INJECTION OF ANESTHETIC AGENT AROUND MEDIAL BRANCH NERVES INNERVATING LUMBAR FACET JOINT Bilateral 2/7/2023    Procedure: Bilateral L3-5 MBB with local;  Surgeon: Lopez Mendoza MD;  Location: [facility];  Service: Pain Management;  Laterality: Bilateral;    INJECTION OF ANESTHETIC AGENT INTO SACROILIAC JOINT Bilateral 1/24/2023    Procedure: Bilateral SIJ Injection;  Surgeon: Lopez Mendoza MD;  [facility];  Service: Pain Management;  Laterality: Bilateral;    INJECTION OF ANESTHETIC AGENT INTO SACROILIAC JOINT Bilateral 8/16/2024    Procedure: Bilateral Sacroiliac Joint Injections;  Surgeon: Lopez Mendoza MD;  [facility];  Service: Pain Management;  Laterality: Bilateral;    INJECTION OF JOINT Bilateral 1/24/2023    Procedure: Bilateral Hip injection;  Surgeon: Lopez Mendoza MD;  [facility];  Service: Pain Management;  Laterality: Bilateral;    INJECTION OF JOINT Bilateral 8/16/2024    Procedure: Bilateral  Hip Joint injections;  Surgeon: Lopez Mendoza MD;  Location: [facility];  Service: Pain Management;  Laterality: Bilateral;    kidney removal      LAPAROSCOPIC NEPHRECTOMY, HAND ASSISTED Left 9/16/2021    Procedure: NEPHRECTOMY, HAND-ASSISTED, LAPAROSCOPIC;  Surgeon: Tom Manzo MD;  Location: [facility];  Service: Urology;  Laterality: Left;    SELECTIVE INJECTION OF ANESTHETIC AGENT AROUND LUMBAR SPINAL NERVE ROOT BY TRANSFORAMINAL APPROACH Bilateral 12/26/2023    Procedure: Bilateral Lumbar L5/S1 TF UVALDO with RN IV sedation- Insurance only will pay for 1 level;  Surgeon: Lopez Mendoza MD;  Location: [facility];  Service: Pain Management;  Laterality: Bilateral;     Review of patient's allergies indicates:   Allergen Reactions    Iodine and iodide containing products      Other reaction(s): Swelling  Other reaction(s): Sneezing  Other reaction(s): Shortness of breath       Current Outpatient Medications   Medication Sig    allopurinoL  (ZYLOPRIM) 100 MG tablet TAKE ONE TABLET BY MOUTH ONCE DAILY    amlodipine-benazepril 5-20 mg (LOTREL) 5-20 mg per capsule Take 1 capsule by mouth once daily.    diclofenac (VOLTAREN) 75 MG EC tablet Take 1 tablet (75 mg total) by mouth 2 (two) times daily.    ergocalciferol (ERGOCALCIFEROL) 50,000 unit Cap TAKE ONE CAPSULE BY MOUTH ONCE WEEKLY    fluticasone propionate (FLONASE) 50 mcg/actuation nasal spray USE TWO SPRAYS IN EACH NOSTRIL ONCE DAILY    gabapentin (NEURONTIN) 300 MG capsule TAKE ONE CAPSULE BY MOUTH DURING THE DAY THEN TWO CAPSULES EVERY NIGHT AT BEDTIME    hydroCHLOROthiazide (HYDRODIURIL) 12.5 MG Tab TAKE ONE TABLET BY MOUTH ONCE DAILY    isosorbide mononitrate (IMDUR) 30 MG 24 hr tablet Take 1 tablet (30 mg total) by mouth once daily.    levocetirizine (XYZAL) 5 MG tablet Take 1 tablet (5 mg total) by mouth every evening.    metoprolol succinate (TOPROL-XL) 25 MG 24 hr tablet Take 1 tablet (25 mg total) by mouth once daily.    nitroGLYCERIN (NITROSTAT) 0.4 MG SL tablet Place 1 tablet (0.4 mg total) under the tongue every 5 (five) minutes as needed.    potassium chloride (KLOR-CON) 10 MEQ TbSR Take 10 mEq by mouth every Mon, Wed, Fri, Sun.    potassium chloride SA (K-DUR,KLOR-CON M) 10 MEQ tablet Take 1 tablet (10 mEq) BY MOUTH ONCE DAILY.    pravastatin (PRAVACHOL) 40 MG tablet Take 1 tablet (40 mg total) by mouth once daily.    sodium bicarbonate 650 MG tablet Take 1 tablet (650 mg total) by mouth before breakfast. With food    testosterone cypionate (DEPOTESTOTERONE CYPIONATE) 200 mg/mL injection INJECT ONE ML INTO MUSCLE EVERY 14 DAYS    traZODone (DESYREL) 50 MG tablet Take 1 tablet (50 mg total) by mouth once daily.     No current facility-administered medications for this visit.       Review of Systems     GENERAL:  No weight loss, malaise or fevers.  HEENT:   No recent changes in vision or hearing  NECK:  Negative for lumps, no difficulty with swallowing.  RESPIRATORY:  Negative for cough,  wheezing or shortness of breath, patient denies any recent URI.  CARDIOVASCULAR:  Negative for chest pain or palpitations.  GI:  Negative for abdominal discomfort, blood in stools or black stools or change in bowel habits.  MUSCULOSKELETAL:  See HPI.  SKIN:  Negative for lesions, rash, and itching.  PSYCH:  No mood disorder or recent psychosocial stressors.   HEMATOLOGY/LYMPHOLOGY:  Negative for prolonged bleeding, bruising easily or swollen nodes.    NEURO:   No history of syncope, paralysis, seizures or tremors.  All other reviewed and negative other than HPI.    OBJECTIVE:    Telemedicine Physical Exam:   There were no vitals filed for this visit.  There is no height or weight on file to calculate BMI.   (reviewed on 9/26/2024)     GENERAL: Well appearing, in no acute distress, alert and oriented x3.  Cooperative.  PSYCH:  Mood and affect appropriate.  SKIN: Skin color & texture with no obvious abnormalities.    HEAD/FACE:  Normocephalic, atraumatic.    PULM:  No difficulty breathing. No nasal flaring. No obvious wheezing.  EXTREMITIES: No obvious deformities. Moving all extremities well, appears to have symmetric strength throughout.  MUSCULOSKELETAL: No obvious atrophy abnormalities are noted.   NEURO: No obvious neurologic deficit.   GAIT: sitting.     Physical Exam: last in clinic visit:      GENERAL: Well appearing, in no acute distress, alert and oriented x3.  PSYCH:  Mood and affect appropriate.  SKIN: Skin color, texture, turgor normal, no rashes or lesions.  HEAD/FACE:  Normocephalic, atraumatic. Cranial nerves grossly intact.  PULM: No evidence of respiratory difficulty, symmetric chest rise.  GI:  Soft and non-tender.    BACK: Straight leg raising in the sitting and supine positions is  negative to radicular pain.  No pain to palpation over the facet joints of the lumbar spine or spinous processes.  No pain with range of motion.     EXTREMITIES: Peripheral joint ROM is full and pain free without  obvious instability or laxity in all four extremities. No deformities, edema, or skin discoloration. Good capillary refill.  MUSCULOSKELETAL: Able to stand on heels & toes.    hip provocative maneuvers are positive bilaterally but pain is improved      Facet loading test is positive bilaterally.   Bilateral upper and lower extremity strength is normal and symmetric.  No atrophy or tone abnormalities are noted.    RIGHT Lower extremity: Hip flexion 4+/5, Hip Abduction 4+/5, Hip Adduction 4+/5, Knee extension 5/5, Knee flexion 5/5, Ankle dorsiflexion5/5, Extensor hallucis longus 5/5, Ankle plantarflexion 5/5  LEFT Lower extremity:  Hip flexion 5/5, Hip Abduction 5/5,Hip Adduction 5/5, Knee extension 5/5, Knee flexion 5/5, Ankle dorsiflexion 5/5, Extensor hallucis longus 5/5, Ankle plantarflexion 5/5  -Normal testing knee (patellar) jerk and ankle (achilles) jerk    NEURO: Bilateral upper and lower extremity coordination and muscle stretch reflexes are physiologic and symmetric. No loss of sensation is noted.  GAIT: normal.    SIJ testing:  BILATERAL  - TTP over SI joint: Much improved  - TTP GT Bursa: Much improved  - IR/ER of R/L hip: No pain    Imaging (Reviewed on 9/26/2024):      01/22/20  X-Ray Lumbar Spine Ap And Lateral  FINDINGS:  Vertebral body heights maintained.  No definite spondylolisthesis.  L5-S1 evaluation limited by positioning.  There is sacralization of L5.  Multilevel facet degenerative findings noted.  L4-5 and L5-S1 degenerative disc height loss.      Bilateral x-ray SI joints 12/14/2022  FINDINGS:  No evidence for ankylosis.  No erosive changes.  No widening of the AC joints.    Bilateral hip XR 12/14/22  FINDINGS:  No acute fracture or dislocation.  No significant soft tissue swelling.   The femoral heads are normally positioned within the native acetabuli.  Mild bilateral femoroacetabular degenerative osteoarthrosis with mild joint space narrowing and small osteophyte formation.  Chronic  suspected bone island of the left femoral head.     Pubic symphysis, bilateral pubic rami and SI joints are intact.       MRI lumbar spine 01/23/2023  FINDINGS:  Transitional anatomy with partial sacralization of L5 peripherally.  There are bilateral L5 pars defects with grade 1 spondylolisthesis at L5-S1.  Vertebral body height is normal.  Marrow signal is within normal limits. The conus medullaris terminates at the level of L1-L2.  No abnormal signal within the conus. Intervertebral disc levels are as follows:     T12-L1 disc: Tiny chronic Schmorl's nodes.  Normal facet joints.  No significant stenosis.  The dural canal measures 13 mm.     L1-L2 disc : Normal.     L2-L3 disc: Normal disc space height with anterior osteophytes.  Minimal facet arthropathy.  No significant spinal or foraminal stenosis.  The dural canal measures 11 mm.  No foraminal stenosis.     L3-L4 disc: Mild disc bulge.  Anterior osteophytes.  Normal facet joints.  The dural canal measures 14 mm.  No foraminal stenosis.     L4-L5 disc: Normal disc space height with mild facet arthropathy.  No significant spinal or foraminal stenosis.  The dural canal measures 15 mm.     L5-S1 disc: Bilateral L5 pars defects with grade 1 spondylolisthesis.  Severe disc space height loss most pronounced toward the right with edematous Modic endplate change.  Disc and osteophyte encroach into the exit foramina, right greater than left.  There is severe right and moderate/severe left foraminal stenosis with compression of the exiting L5 spinal nerves.  Degenerative hypertrophic change toward the right of the disc space as well as degenerative change of the pseudoarticulation of L5 and S1 narrows the exit pathway of the right L5 spinal nerve as demonstrated on axial T2 series 6, image 28.  Mild facet arthropathy.  The dural canal measures 14 mm.     Impression:     1. Isthmic grade 1 spondylolisthesis at L5-S1 with severe bilateral foraminal stenosis and possible  compression of the exiting L5 spinal nerves.  2. Degenerative hypertrophic change toward the right of the L5-S1 disc space causes severe narrowing of the exit pathway of the right L5 spinal nerve as demonstrated on axial T2 image 28.  3. Edematous Modic endplate change at L5-S1 toward the right at L5-S1 may correlate to focal symptoms.       ASSESSMENT: 59 y.o. year old male with lower back and leg pain, consistent with     1. Sacroiliitis  Ambulatory referral/consult to Pain Clinic      2. Bilateral hip pain  Ambulatory referral/consult to Pain Clinic      3. Osteoarthritis of both hips, unspecified osteoarthritis type  Ambulatory referral/consult to Pain Clinic        PLAN:   - Interventions: None at this time. Can consider repeat injections in the future.      - S/p bilateral  hip and sacroiliac joint injections on 8/16/24 with 85% relief so far.  - S/p Lumbar TF Epidural steroid injection at L5/S1 (bilateral) on 12/26/23 with 85% relief.  bilateral SIJ + bilateral IA hip injections on 01/24/2023 with 80% relief  bilateral L3-5 MBB on on 2/7/23 with 80% relief    - Anticoagulation use: no anticoagulation     report:  Reviewed and consistent with medication use as prescribed.      - Medications:  -- Continue Gabapentin 300 mg per PCP.  -- Continue Diclofenac 75 mg BID for pain.     - Can take Tylenol (acetaminophen) 1000mg (two tablets of 500mg) 3x per day as needed for pain (to take up to max dose of 3000mg per day).        - Therapy:  Continue activities as tolerated.    We discussed  physical therapy to help manage the patient/s painful condition. The patient was counseled that muscle strengthening will improve the long term prognosis in regards to pain and may also help increase range of motion and mobility. Patient previously declined referral to outpatient physical therapy at this time. Reports the last time he was in physical therapy it did not improve his condition.    - Imaging: Reviewed previous  imaging.    - Consults/Referrals: Follow up with PCP regarding scheduling C-scope.          Continue follow up with Cardiology.           Refer to Dr. Darling for consideration of medical marijuana for chronic pain treatment     - Follow up visit: as needed.      The above plan and management options were discussed at length with patient. Patient is in agreement with the above and verbalized understanding.    - I discussed the goals of interventional chronic pain management with the patient on today's visit. We discussed a multimodal and systematic approach to pain.  This includes diagnostic and therapeutic injections, adjuvant pharmacologic treatment, physical therapy, and at times psychiatry.  I emphasized the importance of regular exercise, core strengthening and stretching, diet and weight loss as a cornerstone of long-term pain management.    - This condition does not require this patient to take time off of work, and the primary goal of our Pain Management services is to improve the patient's functional capacity.  - Patient Questions: Answered all of the patient's questions regarding diagnoses, therapy, treatment and next steps      Devora Thompson PA-C  Interventional Pain Management  [facility]

## 2024-09-27 ENCOUNTER — HOSPITAL ENCOUNTER (OUTPATIENT)
Dept: PREADMISSION TESTING | Facility: HOSPITAL | Age: 59
Discharge: HOME | End: 2024-09-27
Attending: INTERNAL MEDICINE
Payer: COMMERCIAL

## 2024-09-27 DIAGNOSIS — Z12.11 COLON CANCER SCREENING: Primary | ICD-10-CM

## 2024-09-27 RX ORDER — SODIUM, POTASSIUM,MAG SULFATES 17.5-3.13G
1 SOLUTION, RECONSTITUTED, ORAL ORAL DAILY
Qty: 1 KIT | Refills: 0 | Status: SHIPPED | OUTPATIENT
Start: 2024-09-27 | End: 2024-09-29

## 2024-10-18 ENCOUNTER — TELEPHONE (OUTPATIENT)
Dept: PREADMISSION TESTING | Facility: HOSPITAL | Age: 59
End: 2024-10-18
Payer: COMMERCIAL

## 2024-10-18 NOTE — TELEPHONE ENCOUNTER
This pt is having colonoscopy on 10/25/2024. You saw him in the office on 9/17/2024, is he cleared from cardiac standpoint to have procedure.  Please advise.    Thank you,  JW

## 2024-10-21 ENCOUNTER — E-CONSULT (OUTPATIENT)
Dept: CARDIOLOGY | Facility: CLINIC | Age: 59
End: 2024-10-21
Payer: COMMERCIAL

## 2024-10-21 DIAGNOSIS — G47.09 OTHER INSOMNIA: ICD-10-CM

## 2024-10-21 DIAGNOSIS — Z01.810 PREOP CARDIOVASCULAR EXAM: Primary | ICD-10-CM

## 2024-10-21 DIAGNOSIS — M1A.0710 IDIOPATHIC CHRONIC GOUT OF RIGHT FOOT WITHOUT TOPHUS: ICD-10-CM

## 2024-10-21 DIAGNOSIS — N28.89 OTHER SPECIFIED DISORDERS OF KIDNEY AND URETER: ICD-10-CM

## 2024-10-21 RX ORDER — POTASSIUM CHLORIDE 750 MG/1
TABLET, EXTENDED RELEASE ORAL
Qty: 90 TABLET | Refills: 0 | Status: SHIPPED | OUTPATIENT
Start: 2024-10-21

## 2024-10-21 RX ORDER — ALLOPURINOL 100 MG/1
100 TABLET ORAL
Qty: 90 TABLET | Refills: 0 | Status: SHIPPED | OUTPATIENT
Start: 2024-10-21

## 2024-10-21 RX ORDER — TRAZODONE HYDROCHLORIDE 50 MG/1
TABLET ORAL
Qty: 90 TABLET | Refills: 2 | Status: SHIPPED | OUTPATIENT
Start: 2024-10-21

## 2024-10-21 NOTE — TELEPHONE ENCOUNTER
Refill Routing Note   Medication(s) are not appropriate for processing by [facility] for the following reason(s):        Required labs outdated  Required labs abnormal    [facility] action(s):  Defer  Approve      Medication Therapy Plan: FLOS      Appointments  past 12m or future 3m with PCP    Date Provider   Last Visit   7/17/2024 Mohan Schwartz MD   Next Visit   11/11/2024 Mohan Schwartz MD   ED visits in past 90 days: 0        Note composed:2:01 PM 10/21/2024

## 2024-10-21 NOTE — CONSULTS
[facility] - Cardiology  Response for E-Consult     Patient Name: Dre Curiel  MRN: [id number]  Primary Care Provider: Mohan Schwartz MD   Requesting Provider: Fozia Aleman NP  E-Consult to General Cardiology  Consult performed by: Isidoro Mckeon MD  Consult ordered by: Fozia Aleman, NP           60 yo male, E consult for preop clearance  colonoscopy  The chart reviewed.  PMH HTN, dyslipidemia, obesity, CRI, s/p left nephrectomy in 2021 for cancer, KARLY, oral tobacco.   08/24 phar NUKE stress test no ischemia  09/24 echo EF nml      Plan  Elevated periop risk of CV events for non-high risk procedure.  Ok to proceed the scheduled procedure without further cardiac study.    Total time of Consultation: 10 minute    I did not speak to the requesting provider verbally about this.     *This eConsult is based on the clinical data available to me and is furnished without benefit of a physical examination. The eConsult will need to be interpreted in light of any clinical issues or changes in patient status not available to me at the time of filing this eConsults. Significant changes in patient condition or level of acuity should result in immediate formal consultation and reevaluation. Please alert me if you have further questions.    Thank you for this eConsult referral.     Isidoro Mckeon MD  [facility] - Cardiology

## 2024-10-21 NOTE — TELEPHONE ENCOUNTER
Care Due:                  Date            Visit Type   Department     Provider  --------------------------------------------------------------------------------                                EP -                              [facility]  Last Visit: 07-      CARE ([facility])   MEDICINE       Mohan Schwartz                              [facility]  Next Visit: 11-      CARE ([facility])   MEDICINE       Mohan Schwartz                                                            Last  Test          Frequency    Reason                     Performed    Due Date  --------------------------------------------------------------------------------    Uric Acid...  12 months..  allopurinoL..............  Not Found    Overdue    Vitamin D...  12 months..  ergocalciferol...........  02- 02-    [facility] Embedded Care Due Messages. Reference number: [id number].   10/21/2024 8:01:33 AM CDT

## 2024-10-25 ENCOUNTER — ANESTHESIA (OUTPATIENT)
Dept: ENDOSCOPY | Facility: HOSPITAL | Age: 59
End: 2024-10-25
Payer: COMMERCIAL

## 2024-10-25 ENCOUNTER — ANESTHESIA EVENT (OUTPATIENT)
Dept: ENDOSCOPY | Facility: HOSPITAL | Age: 59
End: 2024-10-25
Payer: COMMERCIAL

## 2024-10-25 ENCOUNTER — OFFICE VISIT (OUTPATIENT)
Dept: HEPATOLOGY | Facility: CLINIC | Age: 59
End: 2024-10-25
Payer: COMMERCIAL

## 2024-10-25 ENCOUNTER — HOSPITAL ENCOUNTER (OUTPATIENT)
Facility: HOSPITAL | Age: 59
Discharge: HOME | End: 2024-10-25
Attending: COLON & RECTAL SURGERY | Admitting: COLON & RECTAL SURGERY
Payer: COMMERCIAL

## 2024-10-25 DIAGNOSIS — R74.8 ELEVATED LIVER ENZYMES: Primary | ICD-10-CM

## 2024-10-25 DIAGNOSIS — Z12.11 SCREENING FOR COLON CANCER: ICD-10-CM

## 2024-10-25 PROCEDURE — 99499 UNLISTED E&M SERVICE: CPT | Mod: 95,,,

## 2024-10-25 PROCEDURE — 45380 COLONOSCOPY AND BIOPSY: CPT | Mod: 33 | Performed by: COLON & RECTAL SURGERY

## 2024-10-25 PROCEDURE — 88305 TISSUE EXAM BY PATHOLOGIST: CPT | Performed by: PATHOLOGY

## 2024-10-25 PROCEDURE — 37000009 HC ANESTHESIA EA ADD 15 MINS: Performed by: COLON & RECTAL SURGERY

## 2024-10-25 PROCEDURE — 45380 COLONOSCOPY AND BIOPSY: CPT | Mod: 33,,, | Performed by: COLON & RECTAL SURGERY

## 2024-10-25 PROCEDURE — 88305 TISSUE EXAM BY PATHOLOGIST: CPT | Mod: 26,,, | Performed by: PATHOLOGY

## 2024-10-25 PROCEDURE — 27201012 HC FORCEPS, HOT/COLD, DISP: Performed by: COLON & RECTAL SURGERY

## 2024-10-25 PROCEDURE — 25000003 PHARM REV CODE 250: Performed by: COLON & RECTAL SURGERY

## 2024-10-25 PROCEDURE — 37000008 HC ANESTHESIA 1ST 15 MINUTES: Performed by: COLON & RECTAL SURGERY

## 2024-10-25 PROCEDURE — 63600175 PHARM REV CODE 636 W HCPCS

## 2024-10-25 RX ORDER — DEXTROMETHORPHAN/PSEUDOEPHED 2.5-7.5/.8
DROPS ORAL
Status: COMPLETED | OUTPATIENT
Start: 2024-10-25 | End: 2024-10-25

## 2024-10-25 RX ORDER — LIDOCAINE HYDROCHLORIDE 10 MG/ML
INJECTION, SOLUTION EPIDURAL; INFILTRATION; INTRACAUDAL; PERINEURAL
Status: DISCONTINUED | OUTPATIENT
Start: 2024-10-25 | End: 2024-10-25

## 2024-10-25 RX ORDER — PROPOFOL 10 MG/ML
VIAL (ML) INTRAVENOUS
Status: DISCONTINUED | OUTPATIENT
Start: 2024-10-25 | End: 2024-10-25

## 2024-10-25 RX ADMIN — PROPOFOL 80 MG: 10 INJECTION, EMULSION INTRAVENOUS at 11:10

## 2024-10-25 RX ADMIN — LIDOCAINE HYDROCHLORIDE 50 MG: 10 SOLUTION INTRAVENOUS at 11:10

## 2024-10-25 RX ADMIN — PROPOFOL 30 MG: 10 INJECTION, EMULSION INTRAVENOUS at 11:10

## 2024-10-25 RX ADMIN — PROPOFOL 40 MG: 10 INJECTION, EMULSION INTRAVENOUS at 11:10

## 2024-10-25 NOTE — TRANSFER OF CARE
"Anesthesia Transfer of Care Note    Patient: Dre Curiel    Procedure(s) Performed: Procedure(s) (LRB):  COLONOSCOPY (N/A)    Patient location: GI    Anesthesia Type: MAC    Transport from OR: Transported from OR on room air with adequate spontaneous ventilation    Post pain: adequate analgesia    Post assessment: no apparent anesthetic complications    Post vital signs: stable    Level of consciousness: awake    Nausea/Vomiting: no nausea/vomiting    Complications: none    Transfer of care protocol was followed      Last vitals: Visit Vitals  /72   Pulse 79   Temp 36.5 °C (97.7 °F)   Resp 18   Ht 5' 8" (1.727 m)   Wt 117.9 kg (260 lb)   SpO2 96%   BMI 39.53 kg/m²     "

## 2024-10-25 NOTE — ANESTHESIA POSTPROCEDURE EVALUATION
Anesthesia Post Evaluation    Patient: Dre Curiel    Procedure(s) Performed: Procedure(s) (LRB):  COLONOSCOPY (N/A)    Final Anesthesia Type: MAC      Patient location during evaluation: GI PACU  Patient participation: Yes- Able to Participate  Level of consciousness: awake and alert  Post-procedure vital signs: reviewed and stable  Pain management: adequate  Airway patency: patent    PONV status at discharge: No PONV  Anesthetic complications: no      Cardiovascular status: blood pressure returned to baseline and hemodynamically stable  Respiratory status: unassisted, spontaneous ventilation and room air  Hydration status: euvolemic  Follow-up not needed.              Vitals Value Taken Time   /76 10/25/24 1216   Temp 36.1 °C (97 °F) 10/25/24 1203   Pulse 76 10/25/24 1216   Resp 18 10/25/24 1216   SpO2 98 % 10/25/24 1216         Event Time   Out of Recovery 12:22:50         Pain/Marisa Score: Marisa Score: 10 (10/25/2024 12:16 PM)

## 2024-10-25 NOTE — ANESTHESIA PREPROCEDURE EVALUATION
10/25/2024  Dre Curiel is a 59 y.o., male.    Patient Active Problem List   Diagnosis    Idiopathic chronic gout of right foot    Insomnia    Low testosterone    Impotence due to erectile dysfunction    Premature ejaculation    Hyperlipidemia    Former consumption of alcohol    Encounter for monitoring testosterone replacement therapy    Leukocytosis    Essential hypertension    Renal cell carcinoma of left kidney    Left renal mass    Renal mass, left    Preop cardiovascular exam    S/p nephrectomy    Stage 3b chronic kidney disease    KARLY on CPAP    Class 2 severe obesity due to excess calories with serious comorbidity and body mass index (BMI) of 39.0 to 39.9 in adult    Sacroiliitis    Primary osteoarthritis of both hips    Lumbar spondylosis    History of renal cell carcinoma    Lumbar radiculopathy    Secondary hyperparathyroidism    Elevated liver enzymes    Metabolic dysfunction-associated steatotic liver disease (MASLD)    Bilateral hip pain    Abnormal ECG    Coronary artery disease of native artery with stable angina pectoris         Past Medical History:   Diagnosis Date    Allergy     Anxiety     Cancer of kidney     Depression     Hyperlipidemia     Hypertension      Past Surgical History:   Procedure Laterality Date    COLONOSCOPY N/A 6/8/2018    Procedure: COLONOSCOPY;  Surgeon: Simeon Acharya III, MD;  Location: [facility];  Service: Endoscopy;  Laterality: N/A;    COLONOSCOPY N/A 6/11/2018    Procedure: COLONOSCOPY;  Surgeon: Simeon Acharya III, MD;  Location: [facility];  Service: Endoscopy;  Laterality: N/A;    COLONOSCOPY  06/2018    COLONOSCOPY N/A 11/17/2021    Procedure: COLONOSCOPY;  Surgeon: Tomi Lewis MD;  Location: [facility];  Service: Endoscopy;  Laterality: N/A;    COSMETIC SURGERY      INJECTION OF ANESTHETIC AGENT AROUND MEDIAL BRANCH NERVES  INNERVATING LUMBAR FACET JOINT Bilateral 2/7/2023    Procedure: Bilateral L3-5 MBB with local;  Surgeon: Lopez Mendoza MD;  [facility];  Service: Pain Management;  Laterality: Bilateral;    INJECTION OF ANESTHETIC AGENT INTO SACROILIAC JOINT Bilateral 1/24/2023    Procedure: Bilateral SIJ Injection;  Surgeon: Lopez Mendoza MD;  Location: [facility];  Service: Pain Management;  Laterality: Bilateral;    INJECTION OF ANESTHETIC AGENT INTO SACROILIAC JOINT Bilateral 8/16/2024    Procedure: Bilateral Sacroiliac Joint Injections;  Surgeon: Lopez Mendoza MD;  Location: [facility];  Service: Pain Management;  Laterality: Bilateral;    INJECTION OF JOINT Bilateral 1/24/2023    Procedure: Bilateral Hip injection;  Surgeon: Lopez Mendoza MD;  Location: [facility];  Service: Pain Management;  Laterality: Bilateral;    INJECTION OF JOINT Bilateral 8/16/2024    Procedure: Bilateral  Hip Joint injections;  Surgeon: Lopez Mendoza MD;  [facility]: [facility];  Service: Pain Management;  Laterality: Bilateral;    kidney removal      LAPAROSCOPIC NEPHRECTOMY, HAND ASSISTED Left 9/16/2021    Procedure: NEPHRECTOMY, HAND-ASSISTED, LAPAROSCOPIC;  Surgeon: Tom Manzo MD;  Location: [facility];  Service: Urology;  Laterality: Left;    SELECTIVE INJECTION OF ANESTHETIC AGENT AROUND LUMBAR SPINAL NERVE ROOT BY TRANSFORAMINAL APPROACH Bilateral 12/26/2023    Procedure: Bilateral Lumbar L5/S1 TF UVALDO with RN IV sedation- Insurance only will pay for 1 level;  Surgeon: Lopez Mendoza MD;  Location: [facility];  Service: Pain Management;  Laterality: Bilateral;       Pre-op Assessment    I have reviewed the Patient Summary Reports.     I have reviewed the Nursing Notes. I have reviewed the NPO Status.   I have reviewed the Medications.     Review of Systems  Anesthesia Hx:  No problems with previous Anesthesia               Denies Personal Hx of Anesthesia complications.                    Social:  Alcohol  Use, Non-Smoker       Hematology/Oncology:                        --  Cancer in past history:                     Cardiovascular:     Hypertension   CAD              ECG has been reviewed. Note from Dr. Mckeon:  60 yo male, E consult for preop clearance  colonoscopy  The chart reviewed.  PMH HTN, dyslipidemia, obesity, CRI, s/p left nephrectomy in  for cancer, KARLY, oral tobacco.    phar NUKE stress test no ischemia   echo EF nml        Plan  Elevated periop risk of CV events for non-high risk procedure.  Ok to proceed the scheduled procedure without further cardiac study.                             Pulmonary:        Sleep Apnea                Renal/:  Chronic Renal Disease                Hepatic/GI:  Bowel Prep.    Liver Disease,               Musculoskeletal:  Arthritis               Neurological:    Neuromuscular Disease,                                   Endocrine:        Obesity / BMI > 30  Psych:   anxiety depression              Results for orders placed or performed in visit on 24   EKG 12-lead    Collection Time: 24  4:04 PM   Result Value Ref Range    QRS Duration 94 ms    [facility] QTC Calculation 444 ms    Narrative    Test Reason : CHEST PAIN DIZZY    Vent. Rate : 068 BPM     Atrial Rate : 068 BPM     P-R Int : 182 ms          QRS Dur : 094 ms      QT Int : 418 ms       P-R-T Axes : 061 054 053 degrees     QTc Int : 444 ms    Normal sinus rhythm  Normal ECG  When compared with ECG of 2024 16:02,  Premature atrial complexes are no longer Present  Confirmed by GILLIAN LAUREN MD ([id number]) on 2024 2:01:46 PM    Referred By:  ISH           Confirmed By:GILLIAN LAUREN MD     Results for orders placed during the hospital encounter of 24    Echo    Interpretation Summary    Left Ventricle: The left ventricle is normal in size. Normal wall thickness. There is concentric remodeling. There is normal systolic function with a visually estimated ejection fraction of 60 - 65%.  Ejection fraction by visual approximation is 60%. There is normal diastolic function.    Right Ventricle: Normal right ventricular cavity size. Wall thickness is normal. Systolic function is normal.    Pulmonary Artery: The estimated pulmonary artery systolic pressure is 22 mmHg.    IVC/SVC: Normal venous pressure at 3 mmHg.      Physical Exam  General: Well nourished, Cooperative, Alert and Oriented    Airway:  Mallampati: II   Mouth Opening: Normal  TM Distance: Normal  Tongue: Normal  Neck ROM: Normal ROM    Chest/Lungs:  Normal Respiratory Rate    Heart:  Rate: Normal  Rhythm: Regular Rhythm        Anesthesia Plan  Type of Anesthesia, risks & benefits discussed:    Anesthesia Type: MAC, Gen Natural Airway  Intra-op Monitoring Plan: Standard ASA Monitors  Induction:  IV  Informed Consent: Informed consent signed with the Patient and all parties understand the risks and agree with anesthesia plan.  All questions answered.   ASA Score: 3  Day of Surgery Review of History & Physical: H&P Update referred to the surgeon/provider.    Ready For Surgery From Anesthesia Perspective.     .

## 2024-10-25 NOTE — PROGRESS NOTES
The patient location is: [place], [place]  The chief complaint leading to consultation is: elevated liver enzymes    Visit type: audiovisual    Face to Face time with patient: 15 min  45 minutes of total time spent on the encounter, which includes face to face time and non-face to face time preparing to see the patient (eg, review of tests), Obtaining and/or reviewing separately obtained history, Documenting clinical information in the electronic or other health record, Independently interpreting results (not separately reported) and communicating results to the patient/family/caregiver, or Care coordination (not separately reported).         Each patient to whom he or she provides medical services by telemedicine is:  (1) informed of the relationship between the physician and patient and the respective role of any other health care provider with respect to management of the patient; and (2) notified that he or she may decline to receive medical services by telemedicine and may withdraw from such care at any time.    Notes:      [facility] - New Patient    REFERRING PROVIDER:  No ref. provider found  PCP: Mohan Schwartz MD     Chief Complaint:  fatty liver     HISTORY       HPI: This is a 59 y.o. patient with PMH noted below, presenting for evaluation of  fatty liver disease     Previous serologic w/u negative for Jeremy's, hemochromatosis, and viral hepatitis    Prior serologic workup:   Lab Results   Component Value Date    ANASCREEN Positive (A) 07/26/2024    FERRITIN 402 (H) 07/26/2024    FESATURATED 25 07/26/2024    CERULOPLSM 26.0 07/26/2024    HEPBSAG Non-reactive 07/26/2024    HEPCAB Non-reactive 07/26/2024         Interval HPI: Presents today alone. Reports that he was referred because his liver enzymes were elevated. Also had an US recently that noted fatty liver. Reports that he has been doing steroid injections for back for about 2 years. Weight gained with the start of injections for back. He  also has limited mobility due to back pain as were is OA in hip. States he also needs a hip replacement. Very stressed with personal life and works a lot    Diet: coke for breakfast, loves dessert  Exercise: walks a lot at work, no traditional  Supplements: none  Occupation: supervisor     Risk factors for fatty liver include:  Obesity - current BMI 39.53  HTN - managed on medication  HLD - pravastatin 40mg     LABS & DIAGNOSTIC STUDIES        Labs done 7/2024 show elevated transaminase levels (ALT > AST, elevated since 4/2024)  Platelets WNL, alk phos WNL  Synthetic liver functioning WNL    Lab Results   Component Value Date    ALT 51 (H) 08/26/2024    AST 22 08/26/2024    ALKPHOS 80 08/26/2024    BILITOT 0.4 08/26/2024    ALBUMIN 3.8 08/26/2024    INR 1.0 06/11/2018     08/26/2024       Imaging:  Abd U/S done 7/2024 noted  FINDINGS:  Hepatomegaly with an 18.5 cm echogenic liver.  The gallbladder is unremarkable.  There is no biliary ductal dilatation.  The pancreas and the  abdominal aorta were not well demonstrated secondary to overlying bowel gas.  The visualized portions of the inferior vena cava appear normal.  The right kidney appears normal.  Left kidney has been removed.  The spleen is unremarkable.  There is hepatopedal flow in the portal vein.   No free fluid in the upper abdomen.     Impression:    Hepatomegaly with fatty liver.  Patient status post left nephrectomy.      Liver fibrosis staging:  -- Fibroscan 8/2024 F0-1, S3 (kPa 5.9 )     Intermittent alcohol consumption, see below  Social History     Substance and Sexual Activity   Alcohol Use Yes    Comment: 2-3 times a month, 2-3 servings       Immunity to Hep A and B - will check with next labs     Denies family history of liver disease.         Allergy and medication list reviewed and updated     PMHX:  has a past medical history of Allergy, Anxiety, Cancer of kidney, Depression, Hyperlipidemia, and Hypertension.    PSHX:  has a past  surgical history that includes kidney removal; Cosmetic surgery; Colonoscopy (N/A, 6/8/2018); Colonoscopy (N/A, 6/11/2018); Colonoscopy (06/2018); Laparoscopic nephrectomy, hand assisted (Left, 9/16/2021); Colonoscopy (N/A, 11/17/2021); Injection of anesthetic agent into sacroiliac joint (Bilateral, 1/24/2023); Injection of joint (Bilateral, 1/24/2023); Injection of anesthetic agent around medial branch nerves innervating lumbar facet joint (Bilateral, 2/7/2023); Selective injection of anesthetic agent around lumbar spinal nerve root by transforaminal approach (Bilateral, 12/26/2023); Injection of joint (Bilateral, 8/16/2024); and Injection of anesthetic agent into sacroiliac joint (Bilateral, 8/16/2024).    FAMILY HISTORY: Updated and reviewed in EPIC    ROS:   GENERAL: Denies fatigue  CARDIOVASCULAR: Denies edema  GI: Denies abdominal pain  SKIN: Denies rash, itching   NEURO: Denies confusion, memory loss, or mood changes    PHYSICAL EXAM:   In no acute distress; alert and oriented to person, place and time  VITALS: There were no vitals taken for this visit.  EYES: Sclerae anicteric  GI: Soft, non-tender, non-distended. No ascites.  EXTREMITIES:  No edema.  SKIN: Warm and dry. No jaundice. No telangectasias noted. No palmar erythema.  NEURO:  No asterixis.  PSYCH:  Thought and speech pattern appropriate. Behavior normal        ASSESSMENT & PLAN        EDUCATION:  See instructions discussed with patient in Instructions section of the After Visit Summary     ASSESSMENT & PLAN:  59 y.o. male with:  1.  Fatty liver, likely related to metabolic risk factors obesity, HTN, HLD  - see HPI  -- Immunity to Hep A and B : see HPI  -- Fibroscan before RTC  -- Recommendations discussed with patient:  Limit alcohol consumption, no more than 2 serving(s) of alcohol in any day (1 serving is 5 ounces of wine, 12 ounces of beer, or 1.5 ounces of liquor) and do NOT recommend any daily alcohol use    2. Weight loss goal of 30-40  lbs  3. Low carb/sugar, high fiber and protein diet, limit your carb intake to LESS than 30-45 grams of carbs with a meal or LESS than 5-10 grams with any snack   4. Exercise, 5 days per week, 30 minutes per day, as tolerated  5. Recommend good cholesterol, blood pressure, blood sugar levels   6. There is a new medication called Rezdiffra for the treatment of F2-F3 liver scarring due to fatty liver. It is only indicated for those with stage 2 or 3 scar tissue (will reassess after fibroscan)    2. Elevated liver enzymes  -- will continue to trend liver enzymes  -- if they remain elevated will consider serological w/u     3. Obesity, HTN, HLD  -- There is no height or weight on file to calculate BMI.   -- increases risk for fatty liver        No follow-ups on file. with fibroscan before  No orders of the defined types were placed in this encounter.       Thank you for allowing me to participate in the care of TIMBO Pitts, FNP-C  Nurse Practitioner  [facility]  [facility]     I spent a total of 45 minutes on the day of the visit.This includes face to face time and non-face to face time preparing to see the patient (eg, review of tests), obtaining and/or reviewing separately obtained history, documenting clinical information in the electronic or other health record, independently interpreting results and communicating results to the patient/family/caregiver, and coordinating care.         CC'ed note to:   No ref. provider found

## 2024-10-28 VITALS
OXYGEN SATURATION: 98 % | TEMPERATURE: 97 F | HEIGHT: 68 IN | SYSTOLIC BLOOD PRESSURE: 109 MMHG | BODY MASS INDEX: 39.4 KG/M2 | RESPIRATION RATE: 18 BRPM | HEART RATE: 76 BPM | DIASTOLIC BLOOD PRESSURE: 76 MMHG | WEIGHT: 260 LBS

## 2024-10-29 ENCOUNTER — PATIENT OUTREACH (OUTPATIENT)
Dept: ADMINISTRATIVE | Facility: HOSPITAL | Age: 59
End: 2024-10-29
Payer: COMMERCIAL

## 2024-10-29 DIAGNOSIS — N18.32 STAGE 3B CHRONIC KIDNEY DISEASE: Primary | ICD-10-CM

## 2024-10-30 LAB
FINAL PATHOLOGIC DIAGNOSIS: NORMAL
GROSS: NORMAL
Lab: NORMAL

## 2024-11-04 ENCOUNTER — LAB VISIT (OUTPATIENT)
Dept: LAB | Facility: HOSPITAL | Age: 59
End: 2024-11-04
Attending: FAMILY MEDICINE
Payer: COMMERCIAL

## 2024-11-04 DIAGNOSIS — Z79.890 ENCOUNTER FOR MONITORING TESTOSTERONE REPLACEMENT THERAPY: ICD-10-CM

## 2024-11-04 DIAGNOSIS — Z51.81 ENCOUNTER FOR MONITORING TESTOSTERONE REPLACEMENT THERAPY: ICD-10-CM

## 2024-11-04 DIAGNOSIS — N18.32 STAGE 3B CHRONIC KIDNEY DISEASE: ICD-10-CM

## 2024-11-04 DIAGNOSIS — E79.0 ELEVATED URIC ACID IN BLOOD: ICD-10-CM

## 2024-11-04 LAB
ALBUMIN SERPL BCP-MCNC: 3.8 G/DL (ref 3.5–5.2)
ALBUMIN/CREAT UR: 4.8 UG/MG (ref 0–30)
ALP SERPL-CCNC: 64 U/L (ref 40–150)
ALT SERPL W/O P-5'-P-CCNC: 66 U/L (ref 10–44)
ANION GAP SERPL CALC-SCNC: 12 MMOL/L (ref 8–16)
AST SERPL-CCNC: 33 U/L (ref 10–40)
BASOPHILS # BLD AUTO: 0.05 K/UL (ref 0–0.2)
BASOPHILS NFR BLD: 0.7 % (ref 0–1.9)
BILIRUB SERPL-MCNC: 0.4 MG/DL (ref 0.1–1)
BUN SERPL-MCNC: 17 MG/DL (ref 6–20)
CALCIUM SERPL-MCNC: 9.6 MG/DL (ref 8.7–10.5)
CHLORIDE SERPL-SCNC: 103 MMOL/L (ref 95–110)
CHOLEST SERPL-MCNC: 186 MG/DL (ref 120–199)
CHOLEST/HDLC SERPL: 7.8 {RATIO} (ref 2–5)
CO2 SERPL-SCNC: 25 MMOL/L (ref 23–29)
COMPLEXED PSA SERPL-MCNC: 0.71 NG/ML (ref 0–4)
CREAT SERPL-MCNC: 2.2 MG/DL (ref 0.5–1.4)
CREAT UR-MCNC: 395 MG/DL (ref 23–375)
DIFFERENTIAL METHOD BLD: ABNORMAL
EOSINOPHIL # BLD AUTO: 0.1 K/UL (ref 0–0.5)
EOSINOPHIL NFR BLD: 1.6 % (ref 0–8)
ERYTHROCYTE [DISTWIDTH] IN BLOOD BY AUTOMATED COUNT: 13.1 % (ref 11.5–14.5)
EST. GFR  (NO RACE VARIABLE): 33.9 ML/MIN/1.73 M^2
GLUCOSE SERPL-MCNC: 171 MG/DL (ref 70–110)
HCT VFR BLD AUTO: 47.8 % (ref 40–54)
HDLC SERPL-MCNC: 24 MG/DL (ref 40–75)
HDLC SERPL: 12.9 % (ref 20–50)
HGB BLD-MCNC: 15.2 G/DL (ref 14–18)
IMM GRANULOCYTES # BLD AUTO: 0.08 K/UL (ref 0–0.04)
IMM GRANULOCYTES NFR BLD AUTO: 1.1 % (ref 0–0.5)
LDLC SERPL CALC-MCNC: 94.8 MG/DL (ref 63–159)
LYMPHOCYTES # BLD AUTO: 1.5 K/UL (ref 1–4.8)
LYMPHOCYTES NFR BLD: 19.5 % (ref 18–48)
MCH RBC QN AUTO: 30.7 PG (ref 27–31)
MCHC RBC AUTO-ENTMCNC: 31.8 G/DL (ref 32–36)
MCV RBC AUTO: 97 FL (ref 82–98)
MICROALBUMIN UR DL<=1MG/L-MCNC: 19 UG/ML
MONOCYTES # BLD AUTO: 0.4 K/UL (ref 0.3–1)
MONOCYTES NFR BLD: 4.8 % (ref 4–15)
NEUTROPHILS # BLD AUTO: 5.4 K/UL (ref 1.8–7.7)
NEUTROPHILS NFR BLD: 72.3 % (ref 38–73)
NONHDLC SERPL-MCNC: 162 MG/DL
NRBC BLD-RTO: 0 /100 WBC
PLATELET # BLD AUTO: 243 K/UL (ref 150–450)
PMV BLD AUTO: 11.5 FL (ref 9.2–12.9)
POTASSIUM SERPL-SCNC: 4.4 MMOL/L (ref 3.5–5.1)
PROT SERPL-MCNC: 7.4 G/DL (ref 6–8.4)
RBC # BLD AUTO: 4.95 M/UL (ref 4.6–6.2)
SODIUM SERPL-SCNC: 140 MMOL/L (ref 136–145)
TESTOST SERPL-MCNC: 569 NG/DL (ref 304–1227)
TRIGL SERPL-MCNC: 336 MG/DL (ref 30–150)
TSH SERPL DL<=0.005 MIU/L-ACNC: 3.16 UIU/ML (ref 0.4–4)
URATE SERPL-MCNC: 11.6 MG/DL (ref 3.4–7)
WBC # BLD AUTO: 7.43 K/UL (ref 3.9–12.7)

## 2024-11-04 PROCEDURE — 84550 ASSAY OF BLOOD/URIC ACID: CPT | Performed by: FAMILY MEDICINE

## 2024-11-04 PROCEDURE — 80053 COMPREHEN METABOLIC PANEL: CPT | Performed by: FAMILY MEDICINE

## 2024-11-04 PROCEDURE — 84153 ASSAY OF PSA TOTAL: CPT | Performed by: FAMILY MEDICINE

## 2024-11-04 PROCEDURE — 85025 COMPLETE CBC W/AUTO DIFF WBC: CPT | Performed by: FAMILY MEDICINE

## 2024-11-04 PROCEDURE — 84443 ASSAY THYROID STIM HORMONE: CPT | Performed by: FAMILY MEDICINE

## 2024-11-04 PROCEDURE — 80061 LIPID PANEL: CPT | Performed by: FAMILY MEDICINE

## 2024-11-04 PROCEDURE — 36415 COLL VENOUS BLD VENIPUNCTURE: CPT | Mod: PN | Performed by: FAMILY MEDICINE

## 2024-11-04 PROCEDURE — 84403 ASSAY OF TOTAL TESTOSTERONE: CPT | Performed by: FAMILY MEDICINE

## 2024-11-04 PROCEDURE — 82043 UR ALBUMIN QUANTITATIVE: CPT | Performed by: FAMILY MEDICINE

## 2024-11-11 ENCOUNTER — OFFICE VISIT (OUTPATIENT)
Dept: FAMILY MEDICINE | Facility: CLINIC | Age: 59
End: 2024-11-11
Payer: COMMERCIAL

## 2024-11-11 VITALS
OXYGEN SATURATION: 98 % | HEART RATE: 83 BPM | DIASTOLIC BLOOD PRESSURE: 78 MMHG | WEIGHT: 246.94 LBS | SYSTOLIC BLOOD PRESSURE: 110 MMHG | BODY MASS INDEX: 37.54 KG/M2

## 2024-11-11 DIAGNOSIS — Z00.00 WELL ADULT EXAM: Primary | ICD-10-CM

## 2024-11-11 DIAGNOSIS — Z90.5 S/P NEPHRECTOMY: ICD-10-CM

## 2024-11-11 DIAGNOSIS — N18.32 STAGE 3B CHRONIC KIDNEY DISEASE: ICD-10-CM

## 2024-11-11 DIAGNOSIS — Z79.890 ENCOUNTER FOR MONITORING TESTOSTERONE REPLACEMENT THERAPY: ICD-10-CM

## 2024-11-11 DIAGNOSIS — Z87.898 FORMER CONSUMPTION OF ALCOHOL: ICD-10-CM

## 2024-11-11 DIAGNOSIS — E79.0 ELEVATED URIC ACID IN BLOOD: ICD-10-CM

## 2024-11-11 DIAGNOSIS — M51.369 DDD (DEGENERATIVE DISC DISEASE), LUMBAR: ICD-10-CM

## 2024-11-11 DIAGNOSIS — Z51.81 ENCOUNTER FOR MONITORING TESTOSTERONE REPLACEMENT THERAPY: ICD-10-CM

## 2024-11-11 DIAGNOSIS — M1A.0710 IDIOPATHIC CHRONIC GOUT OF RIGHT FOOT WITHOUT TOPHUS: ICD-10-CM

## 2024-11-11 DIAGNOSIS — I10 ESSENTIAL HYPERTENSION: ICD-10-CM

## 2024-11-11 DIAGNOSIS — E34.9 TESTOSTERONE DEFICIENCY: ICD-10-CM

## 2024-11-11 DIAGNOSIS — M54.16 LUMBAR RADICULOPATHY, CHRONIC: ICD-10-CM

## 2024-11-11 DIAGNOSIS — R74.8 ELEVATED LIVER ENZYMES: ICD-10-CM

## 2024-11-11 DIAGNOSIS — G47.09 OTHER INSOMNIA: ICD-10-CM

## 2024-11-11 DIAGNOSIS — I25.118 CORONARY ARTERY DISEASE OF NATIVE ARTERY OF NATIVE HEART WITH STABLE ANGINA PECTORIS: ICD-10-CM

## 2024-11-11 DIAGNOSIS — E78.2 MIXED HYPERLIPIDEMIA: ICD-10-CM

## 2024-11-11 DIAGNOSIS — R29.898 WEAKNESS OF RIGHT LOWER EXTREMITY: ICD-10-CM

## 2024-11-11 DIAGNOSIS — Z23 NEED FOR VACCINATION: ICD-10-CM

## 2024-11-11 PROCEDURE — 3078F DIAST BP <80 MM HG: CPT | Mod: CPTII,S$GLB,, | Performed by: FAMILY MEDICINE

## 2024-11-11 PROCEDURE — 90656 IIV3 VACC NO PRSV 0.5 ML IM: CPT | Mod: S$GLB,,, | Performed by: FAMILY MEDICINE

## 2024-11-11 PROCEDURE — 1159F MED LIST DOCD IN RCRD: CPT | Mod: CPTII,S$GLB,, | Performed by: FAMILY MEDICINE

## 2024-11-11 PROCEDURE — 3008F BODY MASS INDEX DOCD: CPT | Mod: CPTII,S$GLB,, | Performed by: FAMILY MEDICINE

## 2024-11-11 PROCEDURE — 3074F SYST BP LT 130 MM HG: CPT | Mod: CPTII,S$GLB,, | Performed by: FAMILY MEDICINE

## 2024-11-11 PROCEDURE — 99396 PREV VISIT EST AGE 40-64: CPT | Mod: 25,S$GLB,, | Performed by: FAMILY MEDICINE

## 2024-11-11 PROCEDURE — 3066F NEPHROPATHY DOC TX: CPT | Mod: CPTII,S$GLB,, | Performed by: FAMILY MEDICINE

## 2024-11-11 PROCEDURE — 4010F ACE/ARB THERAPY RXD/TAKEN: CPT | Mod: CPTII,S$GLB,, | Performed by: FAMILY MEDICINE

## 2024-11-11 PROCEDURE — 99999 PR PBB SHADOW E&M-EST. PATIENT-LVL III: CPT | Mod: PBBFAC,,, | Performed by: FAMILY MEDICINE

## 2024-11-11 PROCEDURE — 90471 IMMUNIZATION ADMIN: CPT | Mod: S$GLB,,, | Performed by: FAMILY MEDICINE

## 2024-11-11 PROCEDURE — 3044F HG A1C LEVEL LT 7.0%: CPT | Mod: CPTII,S$GLB,, | Performed by: FAMILY MEDICINE

## 2024-11-11 PROCEDURE — 3061F NEG MICROALBUMINURIA REV: CPT | Mod: CPTII,S$GLB,, | Performed by: FAMILY MEDICINE

## 2024-11-11 RX ORDER — METOPROLOL SUCCINATE 25 MG/1
25 TABLET, EXTENDED RELEASE ORAL DAILY
Qty: 90 TABLET | Refills: 2 | Status: SHIPPED | OUTPATIENT
Start: 2024-11-11 | End: 2025-08-08

## 2024-11-11 RX ORDER — ISOSORBIDE MONONITRATE 30 MG/1
30 TABLET, EXTENDED RELEASE ORAL DAILY
Qty: 30 TABLET | Refills: 11 | Status: SHIPPED | OUTPATIENT
Start: 2024-11-11 | End: 2025-11-11

## 2024-11-11 RX ORDER — TESTOSTERONE CYPIONATE 200 MG/ML
INJECTION, SOLUTION INTRAMUSCULAR
Qty: 2 ML | Refills: 2 | Status: SHIPPED | OUTPATIENT
Start: 2024-11-11

## 2024-11-11 RX ORDER — TRAZODONE HYDROCHLORIDE 50 MG/1
50 TABLET ORAL DAILY
Qty: 90 TABLET | Refills: 2 | Status: SHIPPED | OUTPATIENT
Start: 2024-11-11

## 2024-11-11 RX ORDER — ALLOPURINOL 100 MG/1
100 TABLET ORAL DAILY
Qty: 90 TABLET | Refills: 0 | Status: SHIPPED | OUTPATIENT
Start: 2024-11-11

## 2024-11-11 RX ORDER — PRAVASTATIN SODIUM 40 MG/1
40 TABLET ORAL DAILY
Qty: 90 TABLET | Refills: 3 | Status: SHIPPED | OUTPATIENT
Start: 2024-11-11

## 2024-11-11 RX ORDER — AMLODIPINE AND BENAZEPRIL HYDROCHLORIDE 5; 20 MG/1; MG/1
1 CAPSULE ORAL DAILY
Qty: 30 CAPSULE | Refills: 11 | Status: SHIPPED | OUTPATIENT
Start: 2024-11-11 | End: 2025-11-11

## 2024-11-11 RX ORDER — POTASSIUM CHLORIDE 750 MG/1
10 TABLET, EXTENDED RELEASE ORAL
Qty: 16 TABLET | Refills: 5 | Status: SHIPPED | OUTPATIENT
Start: 2024-11-11

## 2024-11-11 RX ORDER — DICLOFENAC SODIUM 75 MG/1
75 TABLET, DELAYED RELEASE ORAL 2 TIMES DAILY
Qty: 180 TABLET | Refills: 2 | Status: SHIPPED | OUTPATIENT
Start: 2024-11-11

## 2024-11-11 RX ORDER — GABAPENTIN 300 MG/1
CAPSULE ORAL
Qty: 270 CAPSULE | Refills: 1 | Status: SHIPPED | OUTPATIENT
Start: 2024-11-11

## 2024-11-11 NOTE — PROGRESS NOTES
Chief Complaint:    Chief Complaint   Patient presents with    Follow-up       History of Present Illness:    History of Present Illness      Patient presents today for six-month follow-up,   He is underlying hypertension hyperlipidemia, depression anxiety, chronic back pain, chronic kidney disease and history of nephrectomy secondary to renal cell carcinoma.    Following with urology for renal cell carcinoma surveillance so far been cancer free.    Patient presents today for follow-up. He recently underwent a colonoscopy where one polyp was removed. He has been advised to return for another colonoscopy in five years. He reports a weight loss of about 14 lbs, currently weighing 240 lbs down from 260 lbs. He attributes this to dietary changes, including reduced intake of sweets, starches, and junk food. He notes significant weight fluctuations, typically losing around 7 lbs over weekends, which he associates with decreased fluid intake during non-work days. He is eating more vegetables (except potatoes) and lean meats such as baked or grilled chicken, fish, and turkey. He eats approximately 1.5 meals per day, with lunch not always being finished. He has eggs during work breaks and tries to avoid eating at night. He is considering adding fish oil to his diet. He reports significantly reduced alcohol consumption. His last alcoholic beverage was a single beer two weeks ago. Previously, he acknowledges daily alcohol use after work, though the exact timeframe for this pattern was not specified. He takes allopurinol and hydrochlorothiazide in the morning, along with potassium and testosterone as prescribed. He is aware that testosterone needs to be refilled. No concerns or side effects from medications are reported. He has a history of kidney removal approximately three years ago. His creatinine levels have been monitored since the procedure. In September 2021, his creatinine was 1.3. Currently, his creatinine level is  2.2, which is consistent with recent trends.      ROS:  General: denies fever, denies chills, denies fatigue, denies weight gain, admits weight loss, denies loss of appetite  Eyes: denies vision changes, denies blurry vision, denies eye pain, denies eye discharge  ENT: denies ear pain, denies hearing loss, denies tinnitus, denies nasal congestion, denies sore throat  Cardiovascular: denies chest pain, denies palpitations, denies lower extremity edema  Respiratory: denies cough, denies shortness of breath, denies wheezing, denies sputum production  Endocrine: denies polyuria, denies polydipsia, denies heat intolerance, denies cold intolerance  Gastrointestinal: denies abdominal pain, denies heartburn, denies nausea, denies vomiting, denies diarrhea, denies constipation, denies blood in stool  Genitourinary: denies dysuria, denies urgency, denies frequency, denies hematuria, denies nocturia, denies incontinence  Heme & Lymphatic: denies easy or excessive bleeding, denies easy bruising, denies swollen lymph nodes  Musculoskeletal: denies muscle pain, denies back pain, denies joint pain, denies joint swelling  Skin: denies rash, denies lesion, denies itching, denies skin texture changes, denies skin color changes  Neurological: denies headache, denies dizziness, denies numbness, denies tingling, denies seizure activity, denies speech difficulty, denies memory loss, denies confusion  Psychiatric: denies anxiety, denies depression, denies sleep difficulty           Past Medical History:   Diagnosis Date    Allergy     Anxiety     Cancer of kidney     Depression     Hyperlipidemia     Hypertension        Social History:  Social History     Socioeconomic History    Marital status:    Occupational History     Employer: [employer]   Tobacco Use    Smoking status: Never    Smokeless tobacco: Never    Tobacco comments:     dips    Substance and Sexual Activity    Alcohol use: Yes     Comment: 2-3 times a month, 2-3  servings    Drug use: No    Sexual activity: Yes     Partners: Female     Birth control/protection: None     Social Drivers of Health     Financial Resource Strain: Medium Risk (7/31/2024)    Overall Financial Resource Strain (CARDIA)     Difficulty of Paying Living Expenses: Somewhat hard   Food Insecurity: Food Insecurity Present (7/31/2024)    Hunger Vital Sign     Worried About Running Out of Food in the Last Year: Sometimes true     Ran Out of Food in the Last Year: Often true   Physical Activity: Insufficiently Active (7/31/2024)    Exercise Vital Sign     Days of Exercise per Week: 2 days     Minutes of Exercise per Session: 30 min   Stress: Stress Concern Present (7/31/2024)    [place] [facility] [employer] - Occupational Stress Questionnaire     Feeling of Stress : Very much   Housing Stability: Unknown (7/31/2024)    Housing Stability Vital Sign     Unable to Pay for Housing in the Last Year: No       Family History:   family history includes Cancer in his maternal uncle; Hypertension in his mother; No Known Problems in his brother, maternal aunt, maternal grandfather, maternal grandmother, paternal aunt, paternal grandfather, paternal grandmother, paternal uncle, and sister.    Health Maintenance   Topic Date Due    Aspirin/Antiplatelet Therapy  Never done    Lipid Panel  11/04/2025    High Dose Statin  11/11/2025    TETANUS VACCINE  10/31/2027    Colorectal Cancer Screening  10/25/2029    Hepatitis C Screening  Completed    Shingles Vaccine  Completed       Exam:Physical     Vital Signs  Pulse: 83  SpO2: 98 %  BP: 110/78  BP Location: Left arm  Patient Position: Sitting  Pain Score: 0-No pain  Height and Weight  Weight: 112 kg (246 lb 14.6 oz)]    Body mass index is 37.54 kg/m².    Physical Exam    Vitals: Weight: 240 something lbs.  General: Well-developed. Well-nourished. No acute distress.  Eyes: EOMI. Sclerae anicteric.  HENT: Normocephalic. Atraumatic. Nares patent. Moist oral  mucosa.  Cardiovascular: Regular rate. Regular rhythm. No murmurs. No rubs. No gallops. Normal S1, S2.  Respiratory: Normal respiratory effort. Clear to auscultation bilaterally. No rales. No rhonchi. No wheezing.  Musculoskeletal: No  obvious deformity.  Extremities: No lower extremity edema.  Neurological: Alert & oriented x3. No slurred speech. Normal gait.  Psychiatric: Normal mood. Normal affect. Good insight. Good judgment.  Skin: Warm. Dry. No rash.           Assessment:        ICD-10-CM ICD-9-CM   1. Well adult exam  Z00.00 V70.0   2. Need for vaccination  Z23 V05.9   3. Encounter for monitoring testosterone replacement therapy  Z51.81 V58.83    Z79.890 V58.69   4. Elevated liver enzymes  R74.8 790.5   5. S/p nephrectomy  Z90.5 V45.73   6. Elevated uric acid in blood  E79.0 790.6   7. Stage 3b chronic kidney disease  N18.32 585.3   8. Former consumption of alcohol  Z87.898 V11.3   9. 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 Lumbar radiculopathy, chronic  M54.16 724.4   11. DDD (degenerative disc disease), lumbar  M51.369 722.52   12. Weakness of right lower extremity  R29.898 729.89   13. Coronary artery disease of native artery of native heart with stable angina pectoris  I25.118 414.01     413.9   14. Essential hypertension  I10 401.9   15. Mixed hyperlipidemia  E78.2 272.2   16. Testosterone deficiency  E34.9 259.9   17. Other insomnia  G47.09 780.52         Plan:    Assessment & Plan    MEDICAL DECISION MAKING:  - Assessed weight loss progress, noting patient lost about 14 lbs  - Reviewed thyroid levels, testosterone, PSA, and cholesterol results  - Evaluated creatinine levels, noting fluctuation since kidney removal 3 years ago  - Considered impact of alcohol consumption on liver health  - Reviewed ultrasound results showing enlarged liver with fat accumulation  - Evaluated uric acid levels, found to be elevated at 11 (target <6)  - Will discontinue hydrochlorothiazide  due to potential negative effect on uric acid levels  - Determined need for nephrology follow-up due to persistently abnormal creatinine    PATIENT EDUCATION:  - Explained relationship between alcohol consumption, fat accumulation, and liver damage  - Discussed potential for cirrhosis development due to fatty liver  - Educated on the connection between liver dysfunction and weight gain    ACTION ITEMS/LIFESTYLE:  - Patient to continue weight loss efforts through dietary changes  - Recommend avoiding sugary drinks, juices, sweet tea, breads, and pastas  - Recommend increasing consumption of vegetables, lean meats (baked or grilled), and limited beans or brown rice  - Recommend avoiding potatoes and foods made from wheat flour (bread, biscuits, cookies, crackers)  - Recommend avoiding processed foods, including cereals  - Patient to monitor blood pressure twice daily after medication change, ensure it stays below 140/90    MEDICATIONS:  - Discontinued hydrochlorothiazide  - Discontinued potassium (associated with hydrochlorothiazide)  - Started fish oil: 2 capsules twice daily  - Continued allopurinol    REFERRALS:  - Referred to nephrology (Dr. Katie Barnes) for kidney function monitoring    FOLLOW UP:  - Follow up if blood pressure is consistently above 140/90 after medication changes         Dre was seen today for follow-up.    Diagnoses and all orders for this visit:    Well adult exam    Need for vaccination  -     Influenza - Trivalent - PF (ADULT)    Encounter for monitoring testosterone replacement therapy  -     Testosterone; Future  -     PSA, Screening; Future  -     Microalbumin/Creatinine Ratio, Urine; Future  -     Lipid Panel; Future  -     Hemoglobin A1C; Future  -     Comprehensive Metabolic Panel; Future  -     CBC Auto Differential; Future    Elevated liver enzymes    S/p nephrectomy    Elevated uric acid in blood  -     Uric Acid; Future    Stage 3b chronic kidney disease  -     Ambulatory  referral/consult to Nephrology; Future    Former consumption of alcohol    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    Lumbar radiculopathy, chronic  -     diclofenac (VOLTAREN) 75 MG EC tablet; Take 1 tablet (75 mg total) by mouth 2 (two) times daily.    DDD (degenerative disc disease), lumbar  -     diclofenac (VOLTAREN) 75 MG EC tablet; Take 1 tablet (75 mg total) by mouth 2 (two) times daily.    Weakness of right lower extremity  -     diclofenac (VOLTAREN) 75 MG EC tablet; Take 1 tablet (75 mg total) by mouth 2 (two) times daily.    Coronary artery disease of native artery of native heart with stable angina pectoris  -     isosorbide mononitrate (IMDUR) 30 MG 24 hr tablet; Take 1 tablet (30 mg total) by mouth once daily.    Essential hypertension  -     amlodipine-benazepril 5-20 mg (LOTREL) 5-20 mg per capsule; Take 1 capsule by mouth once daily.  -     metoprolol succinate (TOPROL-XL) 25 MG 24 hr tablet; Take 1 tablet (25 mg total) by mouth once daily.    Mixed hyperlipidemia  -     pravastatin (PRAVACHOL) 40 MG tablet; Take 1 tablet (40 mg total) by mouth once daily.    Testosterone deficiency  -     testosterone cypionate (DEPOTESTOTERONE CYPIONATE) 200 mg/mL injection; INJECT ONE ML INTO MUSCLE EVERY 14 DAYS    Other insomnia  -     traZODone (DESYREL) 50 MG tablet; Take 1 tablet (50 mg total) by mouth once daily.    Other orders  -     gabapentin (NEURONTIN) 300 MG capsule; TAKE ONE CAPSULE BY MOUTH DURING THE DAY THEN TWO CAPSULES EVERY NIGHT AT BEDTIME  -     potassium chloride (KLOR-CON) 10 MEQ TbSR; Take 1 tablet (10 mEq total) by mouth every Mon, Wed, Fri, Sun.        Follow up in about 6 months (around 5/11/2025).      Mohan Schwartz MD

## 2024-11-29 ENCOUNTER — LAB VISIT (OUTPATIENT)
Dept: LAB | Facility: HOSPITAL | Age: 59
End: 2024-11-29
Attending: UROLOGY
Payer: COMMERCIAL

## 2024-11-29 ENCOUNTER — TELEPHONE (OUTPATIENT)
Dept: UROLOGY | Facility: CLINIC | Age: 59
End: 2024-11-29
Payer: COMMERCIAL

## 2024-11-29 DIAGNOSIS — N39.0 RECURRENT UTI: ICD-10-CM

## 2024-11-29 DIAGNOSIS — N39.0 RECURRENT UTI: Primary | ICD-10-CM

## 2024-11-29 LAB
BILIRUB UR QL STRIP: NEGATIVE
CLARITY UR REFRACT.AUTO: CLEAR
COLOR UR AUTO: YELLOW
GLUCOSE UR QL STRIP: NEGATIVE
HGB UR QL STRIP: ABNORMAL
KETONES UR QL STRIP: NEGATIVE
LEUKOCYTE ESTERASE UR QL STRIP: NEGATIVE
MICROSCOPIC COMMENT: NORMAL
NITRITE UR QL STRIP: NEGATIVE
PH UR STRIP: 6 [PH] (ref 5–8)
PROT UR QL STRIP: ABNORMAL
RBC #/AREA URNS AUTO: 2 /HPF (ref 0–4)
SP GR UR STRIP: 1.02 (ref 1–1.03)
SQUAMOUS #/AREA URNS AUTO: 0 /HPF
URN SPEC COLLECT METH UR: ABNORMAL
WBC #/AREA URNS AUTO: 1 /HPF (ref 0–5)

## 2024-11-29 PROCEDURE — 81001 URINALYSIS AUTO W/SCOPE: CPT | Performed by: UROLOGY

## 2024-12-02 ENCOUNTER — PATIENT MESSAGE (OUTPATIENT)
Dept: UROLOGY | Facility: CLINIC | Age: 59
End: 2024-12-02
Payer: COMMERCIAL

## 2024-12-13 DIAGNOSIS — E55.9 VITAMIN D DEFICIENCY: ICD-10-CM

## 2024-12-13 NOTE — TELEPHONE ENCOUNTER
No care due was identified.  Health [facility] Embedded Care Due Messages. Reference number: [id number].   12/13/2024 8:43:42 AM CST

## 2024-12-14 RX ORDER — ERGOCALCIFEROL 1.25 MG/1
50000 CAPSULE ORAL
Qty: 12 CAPSULE | Refills: 0 | Status: SHIPPED | OUTPATIENT
Start: 2024-12-14

## 2024-12-16 DIAGNOSIS — R05.9 COUGH, UNSPECIFIED TYPE: Primary | ICD-10-CM

## 2024-12-16 RX ORDER — BENZONATATE 200 MG/1
200 CAPSULE ORAL 3 TIMES DAILY PRN
Qty: 30 CAPSULE | Refills: 0 | Status: SHIPPED | OUTPATIENT
Start: 2024-12-16 | End: 2024-12-26

## 2025-01-15 ENCOUNTER — TELEPHONE (OUTPATIENT)
Dept: NEPHROLOGY | Facility: CLINIC | Age: 60
End: 2025-01-15
Payer: COMMERCIAL

## 2025-01-15 DIAGNOSIS — N18.32 STAGE 3B CHRONIC KIDNEY DISEASE: Primary | ICD-10-CM

## 2025-01-17 ENCOUNTER — LAB VISIT (OUTPATIENT)
Dept: LAB | Facility: HOSPITAL | Age: 60
End: 2025-01-17
Attending: INTERNAL MEDICINE
Payer: COMMERCIAL

## 2025-01-17 ENCOUNTER — OFFICE VISIT (OUTPATIENT)
Dept: CARDIOLOGY | Facility: CLINIC | Age: 60
End: 2025-01-17
Payer: COMMERCIAL

## 2025-01-17 VITALS
SYSTOLIC BLOOD PRESSURE: 124 MMHG | BODY MASS INDEX: 37.96 KG/M2 | HEIGHT: 68 IN | OXYGEN SATURATION: 98 % | WEIGHT: 250.44 LBS | DIASTOLIC BLOOD PRESSURE: 82 MMHG | HEART RATE: 64 BPM

## 2025-01-17 DIAGNOSIS — R94.31 ABNORMAL ECG: ICD-10-CM

## 2025-01-17 DIAGNOSIS — E66.812 CLASS 2 SEVERE OBESITY DUE TO EXCESS CALORIES WITH SERIOUS COMORBIDITY AND BODY MASS INDEX (BMI) OF 39.0 TO 39.9 IN ADULT: ICD-10-CM

## 2025-01-17 DIAGNOSIS — C64.2 RENAL CELL CARCINOMA OF LEFT KIDNEY: ICD-10-CM

## 2025-01-17 DIAGNOSIS — I10 ESSENTIAL HYPERTENSION: ICD-10-CM

## 2025-01-17 DIAGNOSIS — I25.118 CORONARY ARTERY DISEASE OF NATIVE ARTERY OF NATIVE HEART WITH STABLE ANGINA PECTORIS: Primary | ICD-10-CM

## 2025-01-17 DIAGNOSIS — E78.2 MIXED HYPERLIPIDEMIA: ICD-10-CM

## 2025-01-17 DIAGNOSIS — Z72.0 TOBACCO DIPPER: ICD-10-CM

## 2025-01-17 DIAGNOSIS — N18.32 STAGE 3B CHRONIC KIDNEY DISEASE: ICD-10-CM

## 2025-01-17 DIAGNOSIS — E66.01 CLASS 2 SEVERE OBESITY DUE TO EXCESS CALORIES WITH SERIOUS COMORBIDITY AND BODY MASS INDEX (BMI) OF 39.0 TO 39.9 IN ADULT: ICD-10-CM

## 2025-01-17 DIAGNOSIS — G47.33 OSA ON CPAP: ICD-10-CM

## 2025-01-17 DIAGNOSIS — Z90.5 S/P NEPHRECTOMY: ICD-10-CM

## 2025-01-17 LAB
ALBUMIN SERPL BCP-MCNC: 3.7 G/DL (ref 3.5–5.2)
ANION GAP SERPL CALC-SCNC: 8 MMOL/L (ref 8–16)
BUN SERPL-MCNC: 14 MG/DL (ref 6–20)
CALCIUM SERPL-MCNC: 9.9 MG/DL (ref 8.7–10.5)
CHLORIDE SERPL-SCNC: 105 MMOL/L (ref 95–110)
CO2 SERPL-SCNC: 27 MMOL/L (ref 23–29)
CREAT SERPL-MCNC: 1.5 MG/DL (ref 0.5–1.4)
EST. GFR  (NO RACE VARIABLE): 53.6 ML/MIN/1.73 M^2
GLUCOSE SERPL-MCNC: 100 MG/DL (ref 70–110)
PHOSPHATE SERPL-MCNC: 2.6 MG/DL (ref 2.7–4.5)
POTASSIUM SERPL-SCNC: 4.5 MMOL/L (ref 3.5–5.1)
SODIUM SERPL-SCNC: 140 MMOL/L (ref 136–145)

## 2025-01-17 PROCEDURE — 99999 PR PBB SHADOW E&M-EST. PATIENT-LVL III: CPT | Mod: PBBFAC,,, | Performed by: INTERNAL MEDICINE

## 2025-01-17 PROCEDURE — 3008F BODY MASS INDEX DOCD: CPT | Mod: CPTII,S$GLB,, | Performed by: INTERNAL MEDICINE

## 2025-01-17 PROCEDURE — 36415 COLL VENOUS BLD VENIPUNCTURE: CPT | Performed by: INTERNAL MEDICINE

## 2025-01-17 PROCEDURE — 99214 OFFICE O/P EST MOD 30 MIN: CPT | Mod: S$GLB,,, | Performed by: INTERNAL MEDICINE

## 2025-01-17 PROCEDURE — 1160F RVW MEDS BY RX/DR IN RCRD: CPT | Mod: CPTII,S$GLB,, | Performed by: INTERNAL MEDICINE

## 2025-01-17 PROCEDURE — 1159F MED LIST DOCD IN RCRD: CPT | Mod: CPTII,S$GLB,, | Performed by: INTERNAL MEDICINE

## 2025-01-17 PROCEDURE — 3074F SYST BP LT 130 MM HG: CPT | Mod: CPTII,S$GLB,, | Performed by: INTERNAL MEDICINE

## 2025-01-17 PROCEDURE — 80069 RENAL FUNCTION PANEL: CPT | Performed by: INTERNAL MEDICINE

## 2025-01-17 PROCEDURE — 3079F DIAST BP 80-89 MM HG: CPT | Mod: CPTII,S$GLB,, | Performed by: INTERNAL MEDICINE

## 2025-01-17 NOTE — PROGRESS NOTES
Subjective   Patient ID:  Dre Curiel is a 59 y.o. male who presents for evaluation of Coronary Artery Disease            HPI:  Pt presents for CP.  Has seen Dr. Jewell, Cards, in past w last visit in 2021.  Has HTN, dyslipidemia, obesity, CRI, s/p left nephrectomy in 2021 for cancer, KARLY, oral tobacco.  Iodine allergy.  Has chronic CP.  PCP ordered stress test in 2024 -- negative -- and pt advised to see us for possible heart cath.  Approx 3 times/month, tightness mid chest, few minutes.  Worse w stress.  States he walks a lot, and can notice it w walking.  Nuclear stress test Aug 2024 personally reviewed: normal perfusion, normal EF.  Ecgs in past NSR, low voltage.   Imdur added last appt.  Angina controlled.  No acute CP sxs.  Stable BRIGGS.  BP controlled.  Compliant w meds.  Uses CPAP.  Labs reviewed.  On statin tx.  Echo Sept 2024 normal EF, normal PAP.        Past Medical History:   Diagnosis Date    Allergy     Anxiety     Cancer of kidney     Depression     Hyperlipidemia     Hypertension        Current Outpatient Medications:     allopurinoL (ZYLOPRIM) 100 MG tablet, Take 1 tablet (100 mg total) by mouth once daily., Disp: 90 tablet, Rfl: 0    amlodipine-benazepril 5-20 mg (LOTREL) 5-20 mg per capsule, Take 1 capsule by mouth once daily., Disp: 30 capsule, Rfl: 11    diclofenac (VOLTAREN) 75 MG EC tablet, Take 1 tablet (75 mg total) by mouth 2 (two) times daily., Disp: 180 tablet, Rfl: 2    ergocalciferol (ERGOCALCIFEROL) 50,000 unit Cap, Take 1 capsule (50,000 Units total) by mouth every 7 days., Disp: 12 capsule, Rfl: 0    fluticasone propionate (FLONASE) 50 mcg/actuation nasal spray, USE TWO SPRAYS IN EACH NOSTRIL ONCE DAILY, Disp: 48 g, Rfl: 3    gabapentin (NEURONTIN) 300 MG capsule, TAKE ONE CAPSULE BY MOUTH DURING THE DAY THEN TWO CAPSULES EVERY NIGHT AT BEDTIME, Disp: 270 capsule, Rfl: 1    isosorbide mononitrate (IMDUR) 30 MG 24 hr tablet, Take 1 tablet (30 mg total) by mouth once daily., Disp:  "30 tablet, Rfl: 11    levocetirizine (XYZAL) 5 MG tablet, Take 1 tablet (5 mg total) by mouth every evening., Disp: 90 tablet, Rfl: 3    metoprolol succinate (TOPROL-XL) 25 MG 24 hr tablet, Take 1 tablet (25 mg total) by mouth once daily., Disp: 90 tablet, Rfl: 2    nitroGLYCERIN (NITROSTAT) 0.4 MG SL tablet, Place 1 tablet (0.4 mg total) under the tongue every 5 (five) minutes as needed., Disp: 20 tablet, Rfl: 12    potassium chloride (KLOR-CON) 10 MEQ TbSR, Take 1 tablet (10 mEq total) by mouth every Mon, Wed, Fri, Sun., Disp: 16 tablet, Rfl: 5    pravastatin (PRAVACHOL) 40 MG tablet, Take 1 tablet (40 mg total) by mouth once daily., Disp: 90 tablet, Rfl: 3    sodium bicarbonate 650 MG tablet, Take 1 tablet (650 mg total) by mouth before breakfast. With food, Disp: 180 tablet, Rfl: 1    testosterone cypionate (DEPOTESTOTERONE CYPIONATE) 200 mg/mL injection, INJECT ONE ML INTO MUSCLE EVERY 14 DAYS, Disp: 2 mL, Rfl: 2    traZODone (DESYREL) 50 MG tablet, Take 1 tablet (50 mg total) by mouth once daily., Disp: 90 tablet, Rfl: 2      Review of Systems   Constitutional: Negative.   HENT:  Positive for congestion.    Eyes: Negative.    Cardiovascular:  Positive for chest pain and dyspnea on exertion.   Respiratory:  Positive for cough and shortness of breath.    Endocrine: Negative.    Hematologic/Lymphatic: Negative.    Skin: Negative.    Musculoskeletal:  Positive for arthritis.   Gastrointestinal: Negative.    Genitourinary: Negative.    Neurological: Negative.    Psychiatric/Behavioral: Negative.     Allergic/Immunologic: Negative.          /82 (BP Location: Left arm, Patient Position: Sitting)   Pulse 64   Ht 5' 8" (1.727 m)   Wt 113.6 kg (250 lb 7.1 oz)   SpO2 98%   BMI 38.08 kg/m²     Wt Readings from Last 3 Encounters:   01/17/25 113.6 kg (250 lb 7.1 oz)   11/11/24 112 kg (246 lb 14.6 oz)   10/25/24 117.9 kg (260 lb)     Temp Readings from Last 3 Encounters:   10/25/24 97 °F (36.1 °C)   08/16/24 98 °F " (36.7 °C) (Temporal)   12/26/23 97.2 °F (36.2 °C) (Temporal)     BP Readings from Last 3 Encounters:   01/17/25 124/82   11/11/24 110/78   10/25/24 109/76     Pulse Readings from Last 3 Encounters:   01/17/25 64   11/11/24 83   10/25/24 76          Objective     Physical Exam  Vitals and nursing note reviewed.   Constitutional:       Appearance: He is well-developed. He is obese.   HENT:      Head: Normocephalic.   Neck:      Thyroid: No thyromegaly.      Vascular: Normal carotid pulses. No carotid bruit, hepatojugular reflux or JVD.   Cardiovascular:      Rate and Rhythm: Normal rate and regular rhythm.      Chest Wall: PMI is not displaced.      Pulses:           Radial pulses are 2+ on the right side and 2+ on the left side.      Heart sounds: S1 normal and S2 normal. Heart sounds not distant. No midsystolic click and no opening snap. No murmur heard.     No friction rub. No S3 or S4 sounds.   Pulmonary:      Effort: Pulmonary effort is normal.      Breath sounds: Normal breath sounds. No wheezing or rales.   Abdominal:      General: Bowel sounds are normal. There is no distension or abdominal bruit.      Palpations: Abdomen is soft. There is no mass.      Tenderness: There is no abdominal tenderness.   Musculoskeletal:      Cervical back: Normal range of motion and neck supple.   Skin:     General: Skin is warm.   Neurological:      Mental Status: He is alert and oriented to person, place, and time.   Psychiatric:         Behavior: Behavior normal.         I have reviewed all pertinent labs and cardiac studies.          Chemistry        Component Value Date/Time     11/04/2024 0805    K 4.4 11/04/2024 0805     11/04/2024 0805    CO2 25 11/04/2024 0805    BUN 17 11/04/2024 0805    CREATININE 2.2 (H) 11/04/2024 0805     (H) 11/04/2024 0805        Component Value Date/Time    CALCIUM 9.6 11/04/2024 0805    ALKPHOS 64 11/04/2024 0805    AST 33 11/04/2024 0805    ALT 66 (H) 11/04/2024 0805     BILITOT 0.4 11/04/2024 0805    ESTGFRAFRICA 40 (A) 06/28/2022 0951    EGFRNONAA 34 (A) 06/28/2022 0951        Lab Results   Component Value Date    WBC 7.43 11/04/2024    HGB 15.2 11/04/2024    HCT 47.8 11/04/2024    MCV 97 11/04/2024     11/04/2024       Lab Results   Component Value Date    HGBA1C 5.6 04/25/2024     Lab Results   Component Value Date    CHOL 186 11/04/2024    CHOL 162 04/25/2024    CHOL 170 10/17/2023     Lab Results   Component Value Date    HDL 24 (L) 11/04/2024    HDL 37 (L) 04/25/2024    HDL 35 (L) 10/17/2023     Lab Results   Component Value Date    LDLCALC 94.8 11/04/2024    LDLCALC 81.0 04/25/2024    LDLCALC 65.8 10/17/2023     Lab Results   Component Value Date    TRIG 336 (H) 11/04/2024    TRIG 220 (H) 04/25/2024    TRIG 346 (H) 10/17/2023       Lab Results   Component Value Date    CHOLHDL 12.9 (L) 11/04/2024    CHOLHDL 22.8 04/25/2024    CHOLHDL 20.6 10/17/2023       No results found for this or any previous visit.      Results for orders placed during the hospital encounter of 08/30/24    Nuclear Stress - Cardiology Interpreted    Interpretation Summary    Normal myocardial perfusion scan. There is no evidence of myocardial ischemia or infarction.    The gated perfusion images showed an ejection fraction of 68% at rest. The gated perfusion images showed an ejection fraction of 72% post stress.    The ECG portion of the study is negative for ischemia.    The patient reported chest pain during the stress test.    Chest discomfort resolved prior to the end of the recovery period.         Assessment and Plan     1. Coronary artery disease of native artery of native heart with stable angina pectoris    2. Abnormal ECG    3. Class 2 severe obesity due to excess calories with serious comorbidity and body mass index (BMI) of 39.0 to 39.9 in adult    4. Mixed hyperlipidemia    5. Essential hypertension    6. KARLY on CPAP    7. Stage 3b chronic kidney disease    8. Renal cell carcinoma of  left kidney    9.     10. S/p nephrectomy        Plan:      CAD with chronic angina.  Normal nuclear stress test Aug 2024.  Discussed in detail with pt.  Would not pursue cardiac cath at present time in light of CRI and advise escalation of medical tx.  Risks/benefits of LHC/PCI discussed and increased risk for contrast nephropathy discussed.  Imdur 30 mg qd.  Sl ntg prn -- pt advised how to use and when to go to ER.  Pt will call and/or see me if any angina/CP sxs worsen despite medical tx and if so can then consider LHC if needed.  HTN control.  Statin tx.  CRI: f/u w Nephrology/PCP as advised.  Weight loss.  Tobacco use discouraged.  CPAP.        F/u 6 months.

## 2025-01-17 NOTE — PROGRESS NOTES
Subjective   Patient ID:  Dre Curiel is a 59 y.o. male who presents for evaluation of Coronary Artery Disease            HPI:  Pt presents for eval.  Has seen Dr. Jewell, Cards, in past w last visit in 2021.  Has HTN, dyslipidemia, obesity, CRI, s/p left nephrectomy in 2021 for cancer, KARLY, oral tobacco.  Iodine allergy.  Has chronic CP.  PCP ordered stress test in 2024-- negative -- and pt advised to see us for possible heart cath.  Approx 3 times/month, tightness mid chest, few minutes.  Worse w stress.  States he walks a lot, and can notice it w walking.  Nuclear stress test Aug 2024 personally reviewed: normal perfusion, normal EF.  Ecgs in past NSR, low voltage.   Imdur added Sept 2024 Cards appt.      BP controlled.  Weight loss needed.  Uses CPAP.  On statin.      Past Medical History:   Diagnosis Date    Allergy     Anxiety     Cancer of kidney     Depression     Hyperlipidemia     Hypertension        Current Outpatient Medications:     allopurinoL (ZYLOPRIM) 100 MG tablet, Take 1 tablet (100 mg total) by mouth once daily., Disp: 90 tablet, Rfl: 0    amlodipine-benazepril 5-20 mg (LOTREL) 5-20 mg per capsule, Take 1 capsule by mouth once daily., Disp: 30 capsule, Rfl: 11    diclofenac (VOLTAREN) 75 MG EC tablet, Take 1 tablet (75 mg total) by mouth 2 (two) times daily., Disp: 180 tablet, Rfl: 2    ergocalciferol (ERGOCALCIFEROL) 50,000 unit Cap, Take 1 capsule (50,000 Units total) by mouth every 7 days., Disp: 12 capsule, Rfl: 0    fluticasone propionate (FLONASE) 50 mcg/actuation nasal spray, USE TWO SPRAYS IN EACH NOSTRIL ONCE DAILY, Disp: 48 g, Rfl: 3    gabapentin (NEURONTIN) 300 MG capsule, TAKE ONE CAPSULE BY MOUTH DURING THE DAY THEN TWO CAPSULES EVERY NIGHT AT BEDTIME, Disp: 270 capsule, Rfl: 1    isosorbide mononitrate (IMDUR) 30 MG 24 hr tablet, Take 1 tablet (30 mg total) by mouth once daily., Disp: 30 tablet, Rfl: 11    levocetirizine (XYZAL) 5 MG tablet, Take 1 tablet (5 mg total) by mouth  "every evening., Disp: 90 tablet, Rfl: 3    metoprolol succinate (TOPROL-XL) 25 MG 24 hr tablet, Take 1 tablet (25 mg total) by mouth once daily., Disp: 90 tablet, Rfl: 2    nitroGLYCERIN (NITROSTAT) 0.4 MG SL tablet, Place 1 tablet (0.4 mg total) under the tongue every 5 (five) minutes as needed., Disp: 20 tablet, Rfl: 12    potassium chloride (KLOR-CON) 10 MEQ TbSR, Take 1 tablet (10 mEq total) by mouth every Mon, Wed, Fri, Sun., Disp: 16 tablet, Rfl: 5    pravastatin (PRAVACHOL) 40 MG tablet, Take 1 tablet (40 mg total) by mouth once daily., Disp: 90 tablet, Rfl: 3    sodium bicarbonate 650 MG tablet, Take 1 tablet (650 mg total) by mouth before breakfast. With food, Disp: 180 tablet, Rfl: 1    testosterone cypionate (DEPOTESTOTERONE CYPIONATE) 200 mg/mL injection, INJECT ONE ML INTO MUSCLE EVERY 14 DAYS, Disp: 2 mL, Rfl: 2    traZODone (DESYREL) 50 MG tablet, Take 1 tablet (50 mg total) by mouth once daily., Disp: 90 tablet, Rfl: 2      Review of Systems   Constitutional: Negative.   HENT: Negative.     Eyes: Negative.    Cardiovascular:  Positive for chest pain.   Respiratory: Negative.     Endocrine: Negative.    Hematologic/Lymphatic: Negative.    Skin: Negative.    Musculoskeletal: Negative.    Gastrointestinal: Negative.    Genitourinary: Negative.    Neurological: Negative.    Psychiatric/Behavioral: Negative.     Allergic/Immunologic: Negative.          /82 (BP Location: Left arm, Patient Position: Sitting)   Pulse 64   Ht 5' 8" (1.727 m)   Wt 113.6 kg (250 lb 7.1 oz)   SpO2 98%   BMI 38.08 kg/m²     Wt Readings from Last 3 Encounters:   01/17/25 113.6 kg (250 lb 7.1 oz)   11/11/24 112 kg (246 lb 14.6 oz)   10/25/24 117.9 kg (260 lb)     Temp Readings from Last 3 Encounters:   10/25/24 97 °F (36.1 °C)   08/16/24 98 °F (36.7 °C) (Temporal)   12/26/23 97.2 °F (36.2 °C) (Temporal)     BP Readings from Last 3 Encounters:   01/17/25 124/82   11/11/24 110/78   10/25/24 109/76     Pulse Readings from " Last 3 Encounters:   01/17/25 64   11/11/24 83   10/25/24 76          Objective     Physical Exam  Vitals and nursing note reviewed.   Constitutional:       Appearance: He is well-developed. He is obese.   HENT:      Head: Normocephalic.   Neck:      Thyroid: No thyromegaly.      Vascular: Normal carotid pulses. No carotid bruit, hepatojugular reflux or JVD.   Cardiovascular:      Rate and Rhythm: Normal rate and regular rhythm.      Chest Wall: PMI is not displaced.      Pulses:           Radial pulses are 2+ on the right side and 2+ on the left side.      Heart sounds: S1 normal and S2 normal. Heart sounds not distant. No midsystolic click and no opening snap. No murmur heard.     No friction rub. No S3 or S4 sounds.   Pulmonary:      Effort: Pulmonary effort is normal.      Breath sounds: Normal breath sounds. No wheezing or rales.   Abdominal:      General: Bowel sounds are normal. There is no distension or abdominal bruit.      Palpations: Abdomen is soft. There is no mass.      Tenderness: There is no abdominal tenderness.   Musculoskeletal:      Cervical back: Normal range of motion and neck supple.   Skin:     General: Skin is warm.   Neurological:      Mental Status: He is alert and oriented to person, place, and time.   Psychiatric:         Behavior: Behavior normal.         I have reviewed all pertinent labs and cardiac studies.          Chemistry        Component Value Date/Time     11/04/2024 0805    K 4.4 11/04/2024 0805     11/04/2024 0805    CO2 25 11/04/2024 0805    BUN 17 11/04/2024 0805    CREATININE 2.2 (H) 11/04/2024 0805     (H) 11/04/2024 0805        Component Value Date/Time    CALCIUM 9.6 11/04/2024 0805    ALKPHOS 64 11/04/2024 0805    AST 33 11/04/2024 0805    ALT 66 (H) 11/04/2024 0805    BILITOT 0.4 11/04/2024 0805    ESTGFRAFRICA 40 (A) 06/28/2022 0951    EGFRNONAA 34 (A) 06/28/2022 0951        Lab Results   Component Value Date    WBC 7.43 11/04/2024    HGB 15.2  11/04/2024    HCT 47.8 11/04/2024    MCV 97 11/04/2024     11/04/2024       Lab Results   Component Value Date    HGBA1C 5.6 04/25/2024     Lab Results   Component Value Date    CHOL 186 11/04/2024    CHOL 162 04/25/2024    CHOL 170 10/17/2023     Lab Results   Component Value Date    HDL 24 (L) 11/04/2024    HDL 37 (L) 04/25/2024    HDL 35 (L) 10/17/2023     Lab Results   Component Value Date    LDLCALC 94.8 11/04/2024    LDLCALC 81.0 04/25/2024    LDLCALC 65.8 10/17/2023     Lab Results   Component Value Date    TRIG 336 (H) 11/04/2024    TRIG 220 (H) 04/25/2024    TRIG 346 (H) 10/17/2023       Lab Results   Component Value Date    CHOLHDL 12.9 (L) 11/04/2024    CHOLHDL 22.8 04/25/2024    CHOLHDL 20.6 10/17/2023       No results found for this or any previous visit.      Results for orders placed during the hospital encounter of 08/30/24    Nuclear Stress - Cardiology Interpreted    Interpretation Summary    Normal myocardial perfusion scan. There is no evidence of myocardial ischemia or infarction.    The gated perfusion images showed an ejection fraction of 68% at rest. The gated perfusion images showed an ejection fraction of 72% post stress.    The ECG portion of the study is negative for ischemia.    The patient reported chest pain during the stress test.    Chest discomfort resolved prior to the end of the recovery period.        Results for orders placed during the hospital encounter of 09/24/24    Echo    Interpretation Summary    Left Ventricle: The left ventricle is normal in size. Normal wall thickness. There is concentric remodeling. There is normal systolic function with a visually estimated ejection fraction of 60 - 65%. Ejection fraction by visual approximation is 60%. There is normal diastolic function.    Right Ventricle: Normal right ventricular cavity size. Wall thickness is normal. Systolic function is normal.    Pulmonary Artery: The estimated pulmonary artery systolic pressure is 22  mmHg.    IVC/SVC: Normal venous pressure at 3 mmHg.       Assessment and Plan     1. Coronary artery disease of native artery of native heart with stable angina pectoris    2. Abnormal ECG    3. Class 2 severe obesity due to excess calories with serious comorbidity and body mass index (BMI) of 39.0 to 39.9 in adult    4. Mixed hyperlipidemia    5. Essential hypertension    6. KARLY on CPAP    7. Stage 3b chronic kidney disease    8. Renal cell carcinoma of left kidney        Plan:      CAD with chronic angina.  Normal nuclear stress test Aug 2024.  Discussed in detail with pt.  Would not pursue cardiac cath at present time in light of CRI and advise escalation of medical tx.  Risks/benefits of LHC/PCI discussed and increased risk for contrast nephropathy discussed.  Add Imdur 30 mg qd.  Sl ntg prn -- pt advised how to use and when to go to ER.  Pt will call and/or see me if any angina/CP sxs worsen despite medical tx and if so can then consider LHC if needed.  HTN control.  Statin tx.  CRI: f/u w Nephrology/PCP as advised.  Weight loss.  Tobacco use discouraged.  CPAP.        F/u 3 months.                 HPI

## 2025-01-29 ENCOUNTER — OFFICE VISIT (OUTPATIENT)
Dept: NEPHROLOGY | Facility: CLINIC | Age: 60
End: 2025-01-29
Payer: COMMERCIAL

## 2025-01-29 VITALS
BODY MASS INDEX: 37.89 KG/M2 | HEIGHT: 68 IN | WEIGHT: 250 LBS | SYSTOLIC BLOOD PRESSURE: 146 MMHG | HEART RATE: 80 BPM | DIASTOLIC BLOOD PRESSURE: 84 MMHG

## 2025-01-29 DIAGNOSIS — N18.30 STAGE 3 CHRONIC KIDNEY DISEASE, UNSPECIFIED WHETHER STAGE 3A OR 3B CKD: Primary | ICD-10-CM

## 2025-01-29 DIAGNOSIS — Z90.5 SINGLE KIDNEY: ICD-10-CM

## 2025-01-29 DIAGNOSIS — I10 HYPERTENSION, UNSPECIFIED TYPE: ICD-10-CM

## 2025-01-29 DIAGNOSIS — N25.81 SECONDARY HYPERPARATHYROIDISM: ICD-10-CM

## 2025-01-29 DIAGNOSIS — N18.32 STAGE 3B CHRONIC KIDNEY DISEASE: ICD-10-CM

## 2025-01-29 PROCEDURE — 1159F MED LIST DOCD IN RCRD: CPT | Mod: CPTII,S$GLB,, | Performed by: INTERNAL MEDICINE

## 2025-01-29 PROCEDURE — 4010F ACE/ARB THERAPY RXD/TAKEN: CPT | Mod: CPTII,S$GLB,, | Performed by: INTERNAL MEDICINE

## 2025-01-29 PROCEDURE — 99999 PR PBB SHADOW E&M-EST. PATIENT-LVL IV: CPT | Mod: PBBFAC,,, | Performed by: INTERNAL MEDICINE

## 2025-01-29 PROCEDURE — 3077F SYST BP >= 140 MM HG: CPT | Mod: CPTII,S$GLB,, | Performed by: INTERNAL MEDICINE

## 2025-01-29 PROCEDURE — 99215 OFFICE O/P EST HI 40 MIN: CPT | Mod: S$GLB,,, | Performed by: INTERNAL MEDICINE

## 2025-01-29 PROCEDURE — 3008F BODY MASS INDEX DOCD: CPT | Mod: CPTII,S$GLB,, | Performed by: INTERNAL MEDICINE

## 2025-01-29 PROCEDURE — 3066F NEPHROPATHY DOC TX: CPT | Mod: CPTII,S$GLB,, | Performed by: INTERNAL MEDICINE

## 2025-01-29 PROCEDURE — 3079F DIAST BP 80-89 MM HG: CPT | Mod: CPTII,S$GLB,, | Performed by: INTERNAL MEDICINE

## 2025-01-29 PROCEDURE — 1160F RVW MEDS BY RX/DR IN RCRD: CPT | Mod: CPTII,S$GLB,, | Performed by: INTERNAL MEDICINE

## 2025-01-29 RX ORDER — HYDROCHLOROTHIAZIDE 12.5 MG/1
12.5 TABLET ORAL DAILY
COMMUNITY
Start: 2025-01-27

## 2025-01-29 NOTE — PROGRESS NOTES
"Subjective:       Patient ID: Dre Curiel is a 59 y.o. male.    Chief Complaint: Chronic Kidney Disease    HPI    He presents to clinic today for routine follow-up.  Since his last office visit he has been doing well and has no specific or new complaints.  His laboratory studies and medications were reviewed.  All Nephrology related questions were answered to his satisfaction.      Review of Systems   Constitutional: Negative.    HENT: Negative.     Respiratory: Negative.     Cardiovascular: Negative.    Gastrointestinal: Negative.    Genitourinary: Negative.    Musculoskeletal: Negative.    Skin: Negative.        BP (!) 146/84   Pulse 80   Ht 5' 8" (1.727 m)   Wt 113.4 kg (250 lb)   BMI 38.01 kg/m²     Lab Results   Component Value Date    WBC 7.43 11/04/2024    HGB 15.2 11/04/2024    HCT 47.8 11/04/2024    MCV 97 11/04/2024     11/04/2024      BMP  Lab Results   Component Value Date     01/17/2025    K 4.5 01/17/2025     01/17/2025    CO2 27 01/17/2025    BUN 14 01/17/2025    CREATININE 1.5 (H) 01/17/2025    CALCIUM 9.9 01/17/2025    ANIONGAP 8 01/17/2025    ESTGFRAFRICA 40 (A) 06/28/2022    EGFRNONAA 34 (A) 06/28/2022     CMP  Sodium   Date Value Ref Range Status   01/17/2025 140 136 - 145 mmol/L Final     Potassium   Date Value Ref Range Status   01/17/2025 4.5 3.5 - 5.1 mmol/L Final     Chloride   Date Value Ref Range Status   01/17/2025 105 95 - 110 mmol/L Final     CO2   Date Value Ref Range Status   01/17/2025 27 23 - 29 mmol/L Final     Glucose   Date Value Ref Range Status   01/17/2025 100 70 - 110 mg/dL Final     BUN   Date Value Ref Range Status   01/17/2025 14 6 - 20 mg/dL Final     Creatinine   Date Value Ref Range Status   01/17/2025 1.5 (H) 0.5 - 1.4 mg/dL Final     Calcium   Date Value Ref Range Status   01/17/2025 9.9 8.7 - 10.5 mg/dL Final     Total Protein   Date Value Ref Range Status   11/04/2024 7.4 6.0 - 8.4 g/dL Final     Albumin   Date Value Ref Range Status "   01/17/2025 3.7 3.5 - 5.2 g/dL Final     Total Bilirubin   Date Value Ref Range Status   11/04/2024 0.4 0.1 - 1.0 mg/dL Final     Comment:     For infants and newborns, interpretation of results should be based  on gestational age, weight and in agreement with clinical  observations.    Premature Infant recommended reference ranges:  Up to 24 hours.............<8.0 mg/dL  Up to 48 hours............<12.0 mg/dL  3-5 days..................<15.0 mg/dL  6-29 days.................<15.0 mg/dL       Alkaline Phosphatase   Date Value Ref Range Status   11/04/2024 64 40 - 150 U/L Final     AST   Date Value Ref Range Status   11/04/2024 33 10 - 40 U/L Final     ALT   Date Value Ref Range Status   11/04/2024 66 (H) 10 - 44 U/L Final     Anion Gap   Date Value Ref Range Status   01/17/2025 8 8 - 16 mmol/L Final     eGFR if    Date Value Ref Range Status   06/28/2022 40 (A) >60 mL/min/1.73 m^2 Final     eGFR if non    Date Value Ref Range Status   06/28/2022 34 (A) >60 mL/min/1.73 m^2 Final     Comment:     Calculation used to obtain the estimated glomerular filtration  rate (eGFR) is the CKD-EPI equation.        Current Outpatient Medications on File Prior to Visit   Medication Sig Dispense Refill    allopurinoL (ZYLOPRIM) 100 MG tablet Take 1 tablet (100 mg total) by mouth once daily. 90 tablet 0    amlodipine-benazepril 5-20 mg (LOTREL) 5-20 mg per capsule Take 1 capsule by mouth once daily. 30 capsule 11    diclofenac (VOLTAREN) 75 MG EC tablet Take 1 tablet (75 mg total) by mouth 2 (two) times daily. 180 tablet 2    ergocalciferol (ERGOCALCIFEROL) 50,000 unit Cap Take 1 capsule (50,000 Units total) by mouth every 7 days. 12 capsule 0    fluticasone propionate (FLONASE) 50 mcg/actuation nasal spray USE TWO SPRAYS IN EACH NOSTRIL ONCE DAILY 48 g 3    gabapentin (NEURONTIN) 300 MG capsule TAKE ONE CAPSULE BY MOUTH DURING THE DAY THEN TWO CAPSULES EVERY NIGHT AT BEDTIME 270 capsule 1     hydroCHLOROthiazide 12.5 MG Tab Take 12.5 mg by mouth once daily.      isosorbide mononitrate (IMDUR) 30 MG 24 hr tablet Take 1 tablet (30 mg total) by mouth once daily. 30 tablet 11    levocetirizine (XYZAL) 5 MG tablet Take 1 tablet (5 mg total) by mouth every evening. 90 tablet 3    metoprolol succinate (TOPROL-XL) 25 MG 24 hr tablet Take 1 tablet (25 mg total) by mouth once daily. 90 tablet 2    nitroGLYCERIN (NITROSTAT) 0.4 MG SL tablet Place 1 tablet (0.4 mg total) under the tongue every 5 (five) minutes as needed. 20 tablet 12    potassium chloride (KLOR-CON) 10 MEQ TbSR Take 1 tablet (10 mEq total) by mouth every Mon, Wed, Fri, Sun. 16 tablet 5    pravastatin (PRAVACHOL) 40 MG tablet Take 1 tablet (40 mg total) by mouth once daily. 90 tablet 3    testosterone cypionate (DEPOTESTOTERONE CYPIONATE) 200 mg/mL injection INJECT ONE ML INTO MUSCLE EVERY 14 DAYS 2 mL 2    traZODone (DESYREL) 50 MG tablet Take 1 tablet (50 mg total) by mouth once daily. 90 tablet 2    [DISCONTINUED] sodium bicarbonate 650 MG tablet Take 1 tablet (650 mg total) by mouth before breakfast. With food (Patient not taking: Reported on 1/29/2025) 180 tablet 1     No current facility-administered medications on file prior to visit.            Objective:            Physical Exam  Constitutional:       Appearance: Normal appearance.   HENT:      Head: Normocephalic and atraumatic.   Eyes:      General: No scleral icterus.     Extraocular Movements: Extraocular movements intact.      Pupils: Pupils are equal, round, and reactive to light.   Pulmonary:      Effort: Pulmonary effort is normal.      Breath sounds: No stridor.   Musculoskeletal:      Right lower leg: No edema.      Left lower leg: No edema.   Skin:     General: Skin is warm and dry.   Neurological:      General: No focal deficit present.      Mental Status: He is alert and oriented to person, place, and time.   Psychiatric:         Mood and Affect: Mood normal.         Behavior:  Behavior normal.       Assessment:       1. Stage 3 chronic kidney disease, unspecified whether stage 3a or 3b CKD    2. Hypertension, unspecified type    3. Secondary hyperparathyroidism    4. Single kidney    5. Stage 3b chronic kidney disease        Plan:       1. Creatinine has remained relatively stable ranging between 1.5 and 2.4 for the past year.  This fluctuation in creatinine is hemodynamic in nature.  He has been concentrating on drinking more water lately.  His most recent creatinine is down to 1.5.  Loss of renal function is due to decreased nephron mass from previous nephrectomy.      On review of his medications he is on daily Voltaren/diclofenac.  We discussed the importance of avoiding nonsteroidal anti-inflammatory agents.  I provided him with a list of these agents as well.  I will send a message to his primary care physician advising him as well.    2. Blood pressure is adequately controlled on current regimen.  He is on a RAAS inhibitor.    3. Serum phosphorus was low normal at 2.6.  Calcium is stable 9.9 with an albumin of 3.7.  Will check an intact PTH and vitamin-D prior to his next office visit.    4. He underwent a unilateral nephrectomy in September of 2021.    5. Duplicate      Approximately 40 minutes was spent in face-to-face conversation, chart review and coordination of care with other providers.      Pedro Stacy MD

## 2025-02-12 ENCOUNTER — OFFICE VISIT (OUTPATIENT)
Dept: PAIN MEDICINE | Facility: CLINIC | Age: 60
End: 2025-02-12
Payer: COMMERCIAL

## 2025-02-12 ENCOUNTER — TELEPHONE (OUTPATIENT)
Dept: PAIN MEDICINE | Facility: CLINIC | Age: 60
End: 2025-02-12
Payer: COMMERCIAL

## 2025-02-12 VITALS — HEIGHT: 68 IN | BODY MASS INDEX: 38.01 KG/M2

## 2025-02-12 DIAGNOSIS — M51.362 DEGENERATION OF INTERVERTEBRAL DISC OF LUMBAR REGION WITH DISCOGENIC BACK PAIN AND LOWER EXTREMITY PAIN: ICD-10-CM

## 2025-02-12 DIAGNOSIS — M54.16 LUMBAR RADICULOPATHY, CHRONIC: Primary | ICD-10-CM

## 2025-02-12 NOTE — PROGRESS NOTES
"Est Patient Chronic Pain Note (Follow up Visit)    PCP: Mohan Schwartz MD    The patient location is: home  The chief complaint leading to consultation is: "time for shots again"     Visit type: audiovisual  Encounter which includes face to face time and non-face to face time preparing to see the patient (eg, review of tests), Obtaining and/or reviewing separately obtained history, Documenting clinical information in the electronic or other health record, Independently interpreting results (not separately reported) and communicating results to the patient/family/caregiver, or Care coordination (not separately reported).      Each patient to whom he or she provides medical services by telemedicine is:  (1) informed of the relationship between the physician and patient and the respective role of any other health care provider with respect to management of the patient; and (2) notified that he or she may decline to receive medical services by telemedicine and may withdraw from such care at any time.    Notes:    SUBJECTIVE:    Interval History (2/12/2025):  Dre Curiel presents today for follow-up visit.  Patient was last seen on 9/26/2024.  Patient reports pain as 6/10 today. Patient reports worsening pain in lower back, hip and LE over the past few months. He usually has worse pain on the left side but now it is getting worse on the right side.  He reports pain in LE/feet 6/10 and hip pain 4/10.  Also c/o tingling in toes and buttocks. States this has been constant.    Interval History (9/26/2024):   Dre Curiel presents today for follow-up visit.  Patient was last seen on 9/13/2024. Patient is interested in medical marijuana (specifically THC gummies) for the treatment of his chronic pain. He reports speaking with a friend and his boss regarding this treatment. He is hoping it will help his pain and to relax and sleep better a night so he does not have to take as much Trazodone. Patient reports pain as 5/10 " today.    Interval History (9/13/2024):  Dre Curiel presents today for follow-up visit.  he underwent Bilateral SI Joint and   IA Hip injections  on 10/25/24.  The patient reports that he is/was better following the procedure.  he reports 85 % pain relief.  The changes have continued through this visit.  Patient reports pain as  2.5/10  today. Patient states he has been on vacation all week (at home) and feels better.   He is able to move his RLE/ hip better since the procedure.     Interval History (8/01/2024):   Dre Curiel presents today for follow-up visit.  Patient was last seen on 2/5/2024. Patient reports pain as 4/10 today however some days the pain can be as bad as an 8-9 on a pain scale of 0-10.   He localizes his pain to both hips and SI joints. Left side is worse. He experiences more pain with bending.  Currently takes Lyrica and Diclofenac. Requests medication refills today.    Interval History (2/5/2024): Patient presents today for follow-up visit.  he underwent Bilateral  lumbar  + L5/S1 TF UVALDO on 10/25/24.  The patient reports that he is/was better following the procedure.  he reports 85 % pain relief.   The changes have continued through this visit.  Patient reports pain as 2/10 today. He has no issues with the LLE anymore but continues to have pain and weakness involving RLE. It is still difficult to lift / RLE. He admits despite these concerns,  it is easier to walk since procedure.     Interval History (12/04/2023):   Dre Curiel presents today for follow-up visit.  Patient was last seen on 5/16/23. At that visit, the plan was to continue conservative treatment and follow up as needed. Patient reports pain as 6/10 today.   The patient states since his last visit in May his pain has gradually increased in lower back and down his legs, worse with RLE. He describes pain as stabbing and also experiences numbness.   Denies recent falls, bladder/bowel changes.      Interval History  (5/16/2023):  Dre Curiel presents today for follow-up visit.  Patient was last seen on 3/7/2023. Last injection bilateral SIJ + bilateral IA hip injections on 01/24/2023 followed by bilateral L3-5 MBB on on 2/7/23 with 80% relief. Reports sustained relief from these injections. Needs refill of Lyrica 225 mg BID. Patient reports pain as 2/10 today.      Interval History (3/7/2023): Dre Curiel presents today for follow-up visit.  he underwent bilateral SIJ + bilateral IA hip injections on 01/24/2023 followed by bilateral L3-5 MBB on on 2/7/23.  The patient reports that he is/was better following the procedure.  he reports 80% pain relief from each injection.  The changes lasted 4 weeks so far.  The changes have continued through this visit.  Patient reports pain as 5/10 today. He reports he has good days and bad days, but has more good days since injection. Pain is exacerbated with bending. Has some residual right sided lateral hip pain and burning and needles sensation his right thigh/leg, but this has been somewhat improved with Lyrica 225 mg BID. Also had MRI of lumbar spine with noted compression of L5 spinal nerves.    Initial HPI  Dre Curiel is a 59 y.o. male with past medical history significant for anxiety/depression, hypertension, hyperlipidemia, stage 3 chronic kidney disease a history of renal cell carcinoma status post left-sided nephrectomy, erectile dysfunction, obesity, obstructive sleep apnea who presents to the clinic for the evaluation of lower back and leg pain.  Patient reports pain has been present for several years without inciting accident injury or trauma.  Today patient reports pain is constant which is rated a 6/10.  Pain is described as sharp and stabbing and needles poking  in nature.  Patient reports pain in a bandlike distribution in the lower back which radiates down the lateral aspect of the right lower extremity to the calf in L4-5 distribution.  Pain is exacerbated when  moving from sitting to standing, with lumbar forward flexion and lateral flexion and with ambulation.  Patient is a supervisor of product distribution, manages everything and reports he is standing on his feet all day.  Patient does report sensation of buckling in the right lower extremity associated with prolonged exertion.  Pain is improved with sitting.  Patient has trialed gabapentin 300 mg, 1-2 times daily in the past without meaningful relief.  Patient reports completing 8 sessions of conventional physical therapy on 12/2022 at [facility] without any meaningful relief in range of motion, strength or pain.    Patient reports significant motor weakness.  Patient denies night fever/night sweats, urinary incontinence, bowel incontinence, significant weight loss, and loss of sensations    Pain Disability Index Review:         9/13/2024     9:07 AM 8/1/2024     8:13 AM 2/5/2024     8:16 AM   Last 3 PDI Scores   Pain Disability Index (PDI) 20 28 14       Non-Pharmacologic Treatments:  Physical Therapy/Home Exercise: yes  Ice/Heat:yes  TENS: no  Acupuncture: no  Massage: no  Chiropractic: no    Other: no      Pain Medications:  - Opioids: Percocet (Oxycodone/Acetaminophen) and Ultram (Tramadol HCL)  - Adjuvant Medications: Neurontin (Gabapentin), Trazodone (Desyrel), Xanax (Alprazolam), and Zoloft (Sertraline)    Pain Procedures:   bilateral SIJ + bilateral IA hip injections on 01/24/2023 with 80% relief  bilateral L3-5 MBB on on 2/7/23 with 80% relief  Bilateral lumbar + L5/S1 TF UVALDO on 12/2/6/23 with 85% relief  Bilateral Hip IA Joint and SI Joint injections on 8/16/2024 with 85% relief so far    Past Medical History:   Diagnosis Date    Allergy     Anxiety     Cancer of kidney     Depression     Hyperlipidemia     Hypertension      Past Surgical History:   Procedure Laterality Date    COLONOSCOPY N/A 6/8/2018    Procedure: COLONOSCOPY;  Surgeon: Simeon Acharya III, MD;  Location: [facility];   Service: Endoscopy;  Laterality: N/A;    COLONOSCOPY N/A 6/11/2018    Procedure: COLONOSCOPY;  Surgeon: Simeon Acharya III, MD;  Location: [facility] ENDO;  Service: Endoscopy;  Laterality: N/A;    COLONOSCOPY  06/2018    COLONOSCOPY N/A 11/17/2021    Procedure: COLONOSCOPY;  Surgeon: Tomi Lewis MD;  Location: [facility] ENDO;  Service: Endoscopy;  Laterality: N/A;    COLONOSCOPY N/A 10/25/2024    Procedure: COLONOSCOPY;  Surgeon: Tomi Lewis MD;  Location: [facility];  Service: Endoscopy;  Laterality: N/A;    COSMETIC SURGERY      INJECTION OF ANESTHETIC AGENT AROUND MEDIAL BRANCH NERVES INNERVATING LUMBAR FACET JOINT Bilateral 2/7/2023    Procedure: Bilateral L3-5 MBB with local;  Surgeon: Lopez Mendoza MD;  Location: [facility] PAIN MGT;  Service: Pain Management;  Laterality: Bilateral;    INJECTION OF ANESTHETIC AGENT INTO SACROILIAC JOINT Bilateral 1/24/2023    Procedure: Bilateral SIJ Injection;  Surgeon: Lopez Mendoza MD;  [facility];  Service: Pain Management;  Laterality: Bilateral;    INJECTION OF ANESTHETIC AGENT INTO SACROILIAC JOINT Bilateral 8/16/2024    Procedure: Bilateral Sacroiliac Joint Injections;  Surgeon: Lopez Mendoza MD;  [facility];  Service: Pain Management;  Laterality: Bilateral;    INJECTION OF JOINT Bilateral 1/24/2023    Procedure: Bilateral Hip injection;  Surgeon: Lopez Mendoza MD;  [facility];  Service: Pain Management;  Laterality: Bilateral;    INJECTION OF JOINT Bilateral 8/16/2024    Procedure: Bilateral  Hip Joint injections;  Surgeon: Lopez Mendoza MD;  Location: [facility]IN [facility]GT;  Service: Pain Management;  Laterality: Bilateral;    kidney removal      LAPAROSCOPIC NEPHRECTOMY, HAND ASSISTED Left 9/16/2021    Procedure: NEPHRECTOMY, HAND-ASSISTED, LAPAROSCOPIC;  Surgeon: Tom Manzo MD;  Location: [facility];  Service: Urology;  Laterality: Left;    SELECTIVE INJECTION OF ANESTHETIC AGENT AROUND LUMBAR SPINAL NERVE ROOT BY  TRANSFORAMINAL APPROACH Bilateral 12/26/2023    Procedure: Bilateral Lumbar L5/S1 TF UVALDO with RN IV sedation- Insurance only will pay for 1 level;  Surgeon: Lopez Mendoza MD;  Location: [facility];  Service: Pain Management;  Laterality: Bilateral;     Review of patient's allergies indicates:   Allergen Reactions    Iodine and iodide containing products      Other reaction(s): Swelling  Other reaction(s): Sneezing  Other reaction(s): Shortness of breath       Current Outpatient Medications   Medication Sig    allopurinoL (ZYLOPRIM) 100 MG tablet Take 1 tablet (100 mg total) by mouth once daily.    amlodipine-benazepril 5-20 mg (LOTREL) 5-20 mg per capsule Take 1 capsule by mouth once daily.    diclofenac (VOLTAREN) 75 MG EC tablet Take 1 tablet (75 mg total) by mouth 2 (two) times daily.    ergocalciferol (ERGOCALCIFEROL) 50,000 unit Cap Take 1 capsule (50,000 Units total) by mouth every 7 days.    fluticasone propionate (FLONASE) 50 mcg/actuation nasal spray USE TWO SPRAYS IN EACH NOSTRIL ONCE DAILY    gabapentin (NEURONTIN) 300 MG capsule TAKE ONE CAPSULE BY MOUTH DURING THE DAY THEN TWO CAPSULES EVERY NIGHT AT BEDTIME    hydroCHLOROthiazide 12.5 MG Tab Take 12.5 mg by mouth once daily.    isosorbide mononitrate (IMDUR) 30 MG 24 hr tablet Take 1 tablet (30 mg total) by mouth once daily.    levocetirizine (XYZAL) 5 MG tablet Take 1 tablet (5 mg total) by mouth every evening.    metoprolol succinate (TOPROL-XL) 25 MG 24 hr tablet Take 1 tablet (25 mg total) by mouth once daily.    nitroGLYCERIN (NITROSTAT) 0.4 MG SL tablet Place 1 tablet (0.4 mg total) under the tongue every 5 (five) minutes as needed.    potassium chloride (KLOR-CON) 10 MEQ TbSR Take 1 tablet (10 mEq total) by mouth every Mon, Wed, Fri, Sun.    pravastatin (PRAVACHOL) 40 MG tablet Take 1 tablet (40 mg total) by mouth once daily.    testosterone cypionate (DEPOTESTOTERONE CYPIONATE) 200 mg/mL injection INJECT ONE ML INTO MUSCLE EVERY 14 DAYS     "traZODone (DESYREL) 50 MG tablet Take 1 tablet (50 mg total) by mouth once daily.     No current facility-administered medications for this visit.       Review of Systems     GENERAL:  No weight loss, malaise or fevers.  HEENT:   No recent changes in vision or hearing  NECK:  Negative for lumps, no difficulty with swallowing.  RESPIRATORY:  Negative for cough, wheezing or shortness of breath, patient denies any recent URI.  CARDIOVASCULAR:  Negative for chest pain or palpitations.  GI:  Negative for abdominal discomfort, blood in stools or black stools or change in bowel habits.  MUSCULOSKELETAL:  See HPI.  SKIN:  Negative for lesions, rash, and itching.  PSYCH:  No mood disorder or recent psychosocial stressors.   HEMATOLOGY/LYMPHOLOGY:  Negative for prolonged bleeding, bruising easily or swollen nodes.    NEURO:   No history of syncope, paralysis, seizures or tremors.  All other reviewed and negative other than HPI.    OBJECTIVE:    Telemedicine Physical Exam:   Vitals:    02/12/25 1317   Height: 5' 8" (1.727 m)     Body mass index is 38.01 kg/m².   (reviewed on 2/12/2025)     (Physical exam performed virtually with patient guided on specific actions and diagnostic maneuvers)  GENERAL: Well appearing, in no acute distress, alert and oriented x3.  Cooperative.  PSYCH:  Mood and affect appropriate.  SKIN: Skin color & texture with no obvious abnormalities.    HEAD/FACE:  Normocephalic, atraumatic.    PULM:  No difficulty breathing. No nasal flaring. No obvious wheezing.  EXTREMITIES: No obvious deformities. Moving all extremities well, appears to have symmetric strength throughout.  MUSCULOSKELETAL: No obvious atrophy abnormalities are noted.   NEURO: No obvious neurologic deficit.   GAIT: sitting.     Physical Exam: last in clinic visit:  GENERAL: Well appearing, in no acute distress, alert and oriented x3.  PSYCH:  Mood and affect appropriate.  SKIN: Skin color, texture, turgor normal, no rashes or " lesions.  HEAD/FACE:  Normocephalic, atraumatic. Cranial nerves grossly intact.  PULM: No evidence of respiratory difficulty, symmetric chest rise.  GI:  Soft and non-tender.    BACK: Straight leg raising in the sitting and supine positions is equivical to radicular pain. There is mild pain to palpation over the facet joints of the lumbar spine or spinous processes.  No pain with range of motion.     EXTREMITIES: Peripheral joint ROM is full and pain free without obvious instability or laxity in all four extremities. No deformities, edema, or skin discoloration. Good capillary refill.  MUSCULOSKELETAL: Able to stand on heels & toes.    hip provocative maneuvers are positive bilaterally but pain is improved      Facet loading test is positive bilaterally.   Bilateral upper and lower extremity strength is normal and symmetric.  No atrophy or tone abnormalities are noted.    RIGHT Lower extremity: Hip flexion 4+/5, Hip Abduction 4+/5, Hip Adduction 4+/5, Knee extension 5/5, Knee flexion 5/5, Ankle dorsiflexion5/5, Extensor hallucis longus 5/5, Ankle plantarflexion 5/5  LEFT Lower extremity:  Hip flexion 5/5, Hip Abduction 5/5,Hip Adduction 5/5, Knee extension 5/5, Knee flexion 5/5, Ankle dorsiflexion 5/5, Extensor hallucis longus 5/5, Ankle plantarflexion 5/5  -Normal testing knee (patellar) jerk and ankle (achilles) jerk    NEURO: Bilateral upper and lower extremity coordination and muscle stretch reflexes are physiologic and symmetric. No loss of sensation is noted.  GAIT: normal.    SIJ testing:  BILATERAL  - TTP over SI joint: Much improved  - TTP GT Bursa: Much improved  - IR/ER of R/L hip: No pain    Imaging (Reviewed on 2/12/2025):      01/22/20  X-Ray Lumbar Spine Ap And Lateral  FINDINGS:  Vertebral body heights maintained.  No definite spondylolisthesis.  L5-S1 evaluation limited by positioning.  There is sacralization of L5.  Multilevel facet degenerative findings noted.  L4-5 and L5-S1 degenerative disc  height loss.      Bilateral x-ray SI joints 12/14/2022  FINDINGS:  No evidence for ankylosis.  No erosive changes.  No widening of the AC joints.    Bilateral hip XR 12/14/22  FINDINGS:  No acute fracture or dislocation.  No significant soft tissue swelling.   The femoral heads are normally positioned within the native acetabuli.  Mild bilateral femoroacetabular degenerative osteoarthrosis with mild joint space narrowing and small osteophyte formation.  Chronic suspected bone island of the left femoral head.     Pubic symphysis, bilateral pubic rami and SI joints are intact.       MRI lumbar spine 01/23/2023  FINDINGS:  Transitional anatomy with partial sacralization of L5 peripherally.  There are bilateral L5 pars defects with grade 1 spondylolisthesis at L5-S1.  Vertebral body height is normal.  Marrow signal is within normal limits. The conus medullaris terminates at the level of L1-L2.  No abnormal signal within the conus. Intervertebral disc levels are as follows:     T12-L1 disc: Tiny chronic Schmorl's nodes.  Normal facet joints.  No significant stenosis.  The dural canal measures 13 mm.     L1-L2 disc : Normal.     L2-L3 disc: Normal disc space height with anterior osteophytes.  Minimal facet arthropathy.  No significant spinal or foraminal stenosis.  The dural canal measures 11 mm.  No foraminal stenosis.     L3-L4 disc: Mild disc bulge.  Anterior osteophytes.  Normal facet joints.  The dural canal measures 14 mm.  No foraminal stenosis.     L4-L5 disc: Normal disc space height with mild facet arthropathy.  No significant spinal or foraminal stenosis.  The dural canal measures 15 mm.     L5-S1 disc: Bilateral L5 pars defects with grade 1 spondylolisthesis.  Severe disc space height loss most pronounced toward the right with edematous Modic endplate change.  Disc and osteophyte encroach into the exit foramina, right greater than left.  There is severe right and moderate/severe left foraminal stenosis with  compression of the exiting L5 spinal nerves.  Degenerative hypertrophic change toward the right of the disc space as well as degenerative change of the pseudoarticulation of L5 and S1 narrows the exit pathway of the right L5 spinal nerve as demonstrated on axial T2 series 6, image 28.  Mild facet arthropathy.  The dural canal measures 14 mm.     Impression:     1. Isthmic grade 1 spondylolisthesis at L5-S1 with severe bilateral foraminal stenosis and possible compression of the exiting L5 spinal nerves.  2. Degenerative hypertrophic change toward the right of the L5-S1 disc space causes severe narrowing of the exit pathway of the right L5 spinal nerve as demonstrated on axial T2 image 28.  3. Edematous Modic endplate change at L5-S1 toward the right at L5-S1 may correlate to focal symptoms.       ASSESSMENT: 59 y.o. year old male with lower back and leg pain, consistent with     1. Lumbar radiculopathy, chronic  Case Request-RAD/Other Procedure Area: Bilateral Lumbar L5/S1 TF Epidural Steroid Injection      2. Degeneration of intervertebral disc of lumbar region with discogenic back pain and lower extremity pain  Case Request-RAD/Other Procedure Area: Bilateral Lumbar L5/S1 TF Epidural Steroid Injection          PLAN:   - Interventions: Schedule repeat Bilateral Lumbar L5/S1 TF epidural steroid injections. Explained the risks and benefits of the procedure in detail with the patient today in clinic along with alternative treatment options, and the patient elected to pursue the intervention.          Can consider Hip/SI Joint injections in the future.      - S/p bilateral  hip and sacroiliac joint injections on 8/16/24 with 85% relief so far.  - S/p Lumbar TF Epidural steroid injection at L5/S1 (bilateral) on 12/26/23 with 85% relief.  bilateral SIJ + bilateral IA hip injections on 01/24/2023 with 80% relief  bilateral L3-5 MBB on on 2/7/23 with 80% relief    - Anticoagulation use: no anticoagulation     report:   Reviewed and consistent with medication use as prescribed.      - Medications:  -- Continue Gabapentin 300 mg.   -- Continue Diclofenac 75 mg BID for pain.   - Can take Tylenol (acetaminophen) 1000mg (two tablets of 500mg) 3x per day as needed for pain (to take up to max dose of 3000mg per day).    Patient was previously referred to Dr. Darling for medical marijuana.     - Therapy:  Continue activities as tolerated.    We discussed  physical therapy to help manage the patient/s painful condition. The patient was counseled that muscle strengthening will improve the long term prognosis in regards to pain and may also help increase range of motion and mobility. Patient previously declined referral to outpatient physical therapy at this time. Reports the last time he was in physical therapy it did not improve his condition.    - Imaging: Reviewed previous imaging.    - Follow up visit: can follow up in clinic or Virtually 4-6 weeks after intervention or as needed.      The above plan and management options were discussed at length with patient. Patient is in agreement with the above and verbalized understanding.    - I discussed the goals of interventional chronic pain management with the patient on today's visit. We discussed a multimodal and systematic approach to pain.  This includes diagnostic and therapeutic injections, adjuvant pharmacologic treatment, physical therapy, and at times psychiatry.  I emphasized the importance of regular exercise, core strengthening and stretching, diet and weight loss as a cornerstone of long-term pain management.    - This condition does not require this patient to take time off of work, and the primary goal of our Pain Management services is to improve the patient's functional capacity.  - Patient Questions: Answered all of the patient's questions regarding diagnoses, therapy, treatment and next steps      Devora Thompson PA-C  Interventional Pain Management  [facility]  [place]

## 2025-02-12 NOTE — H&P (VIEW-ONLY)
"Est Patient Chronic Pain Note (Follow up Visit)    PCP: Mohan Schwartz MD    The patient location is: home  The chief complaint leading to consultation is: "time for shots again"     Visit type: audiovisual  Encounter which includes face to face time and non-face to face time preparing to see the patient (eg, review of tests), Obtaining and/or reviewing separately obtained history, Documenting clinical information in the electronic or other health record, Independently interpreting results (not separately reported) and communicating results to the patient/family/caregiver, or Care coordination (not separately reported).      Each patient to whom he or she provides medical services by telemedicine is:  (1) informed of the relationship between the physician and patient and the respective role of any other health care provider with respect to management of the patient; and (2) notified that he or she may decline to receive medical services by telemedicine and may withdraw from such care at any time.    Notes:    SUBJECTIVE:    Interval History (2/12/2025):  Dre Curiel presents today for follow-up visit.  Patient was last seen on 9/26/2024.  Patient reports pain as 6/10 today. Patient reports worsening pain in lower back, hip and LE over the past few months. He usually has worse pain on the left side but now it is getting worse on the right side.  He reports pain in LE/feet 6/10 and hip pain 4/10.  Also c/o tingling in toes and buttocks. States this has been constant.    Interval History (9/26/2024):   Dre Curiel presents today for follow-up visit.  Patient was last seen on 9/13/2024. Patient is interested in medical marijuana (specifically THC gummies) for the treatment of his chronic pain. He reports speaking with a friend and his boss regarding this treatment. He is hoping it will help his pain and to relax and sleep better a night so he does not have to take as much Trazodone. Patient reports pain as 5/10 " today.    Interval History (9/13/2024):  Dre Curiel presents today for follow-up visit.  he underwent Bilateral SI Joint and   IA Hip injections  on 10/25/24.  The patient reports that he is/was better following the procedure.  he reports 85 % pain relief.  The changes have continued through this visit.  Patient reports pain as  2.5/10  today. Patient states he has been on vacation all week (at home) and feels better.   He is able to move his RLE/ hip better since the procedure.     Interval History (8/01/2024):   Dre Curiel presents today for follow-up visit.  Patient was last seen on 2/5/2024. Patient reports pain as 4/10 today however some days the pain can be as bad as an 8-9 on a pain scale of 0-10.   He localizes his pain to both hips and SI joints. Left side is worse. He experiences more pain with bending.  Currently takes Lyrica and Diclofenac. Requests medication refills today.    Interval History (2/5/2024): Patient presents today for follow-up visit.  he underwent Bilateral  lumbar  + L5/S1 TF UVALDO on 10/25/24.  The patient reports that he is/was better following the procedure.  he reports 85 % pain relief.   The changes have continued through this visit.  Patient reports pain as 2/10 today. He has no issues with the LLE anymore but continues to have pain and weakness involving RLE. It is still difficult to lift / RLE. He admits despite these concerns,  it is easier to walk since procedure.     Interval History (12/04/2023):   Dre Curiel presents today for follow-up visit.  Patient was last seen on 5/16/23. At that visit, the plan was to continue conservative treatment and follow up as needed. Patient reports pain as 6/10 today.   The patient states since his last visit in May his pain has gradually increased in lower back and down his legs, worse with RLE. He describes pain as stabbing and also experiences numbness.   Denies recent falls, bladder/bowel changes.      Interval History  (5/16/2023):  Dre Curiel presents today for follow-up visit.  Patient was last seen on 3/7/2023. Last injection bilateral SIJ + bilateral IA hip injections on 01/24/2023 followed by bilateral L3-5 MBB on on 2/7/23 with 80% relief. Reports sustained relief from these injections. Needs refill of Lyrica 225 mg BID. Patient reports pain as 2/10 today.      Interval History (3/7/2023): Dre Curiel presents today for follow-up visit.  he underwent bilateral SIJ + bilateral IA hip injections on 01/24/2023 followed by bilateral L3-5 MBB on on 2/7/23.  The patient reports that he is/was better following the procedure.  he reports 80% pain relief from each injection.  The changes lasted 4 weeks so far.  The changes have continued through this visit.  Patient reports pain as 5/10 today. He reports he has good days and bad days, but has more good days since injection. Pain is exacerbated with bending. Has some residual right sided lateral hip pain and burning and needles sensation his right thigh/leg, but this has been somewhat improved with Lyrica 225 mg BID. Also had MRI of lumbar spine with noted compression of L5 spinal nerves.    Initial HPI  Dre Curiel is a 59 y.o. male with past medical history significant for anxiety/depression, hypertension, hyperlipidemia, stage 3 chronic kidney disease a history of renal cell carcinoma status post left-sided nephrectomy, erectile dysfunction, obesity, obstructive sleep apnea who presents to the clinic for the evaluation of lower back and leg pain.  Patient reports pain has been present for several years without inciting accident injury or trauma.  Today patient reports pain is constant which is rated a 6/10.  Pain is described as sharp and stabbing and needles poking  in nature.  Patient reports pain in a bandlike distribution in the lower back which radiates down the lateral aspect of the right lower extremity to the calf in L4-5 distribution.  Pain is exacerbated when  moving from sitting to standing, with lumbar forward flexion and lateral flexion and with ambulation.  Patient is a supervisor of product distribution, manages everything and reports he is standing on his feet all day.  Patient does report sensation of buckling in the right lower extremity associated with prolonged exertion.  Pain is improved with sitting.  Patient has trialed gabapentin 300 mg, 1-2 times daily in the past without meaningful relief.  Patient reports completing 8 sessions of conventional physical therapy on 12/2022 at [facility] without any meaningful relief in range of motion, strength or pain.    Patient reports significant motor weakness.  Patient denies night fever/night sweats, urinary incontinence, bowel incontinence, significant weight loss, and loss of sensations    Pain Disability Index Review:         9/13/2024     9:07 AM 8/1/2024     8:13 AM 2/5/2024     8:16 AM   Last 3 PDI Scores   Pain Disability Index (PDI) 20 28 14       Non-Pharmacologic Treatments:  Physical Therapy/Home Exercise: yes  Ice/Heat:yes  TENS: no  Acupuncture: no  Massage: no  Chiropractic: no    Other: no      Pain Medications:  - Opioids: Percocet (Oxycodone/Acetaminophen) and Ultram (Tramadol HCL)  - Adjuvant Medications: Neurontin (Gabapentin), Trazodone (Desyrel), Xanax (Alprazolam), and Zoloft (Sertraline)    Pain Procedures:   bilateral SIJ + bilateral IA hip injections on 01/24/2023 with 80% relief  bilateral L3-5 MBB on on 2/7/23 with 80% relief  Bilateral lumbar + L5/S1 TF UVALDO on 12/2/6/23 with 85% relief  Bilateral Hip IA Joint and SI Joint injections on 8/16/2024 with 85% relief so far    Past Medical History:   Diagnosis Date    Allergy     Anxiety     Cancer of kidney     Depression     Hyperlipidemia     Hypertension      Past Surgical History:   Procedure Laterality Date    COLONOSCOPY N/A 6/8/2018    Procedure: COLONOSCOPY;  Surgeon: Simeon Acharya III, MD;  Location: [facility];   Service: Endoscopy;  Laterality: N/A;    COLONOSCOPY N/A 6/11/2018    Procedure: COLONOSCOPY;  Surgeon: Simeon Acharya III, MD;  Location: [facility];  Service: Endoscopy;  Laterality: N/A;    COLONOSCOPY  06/2018    COLONOSCOPY N/A 11/17/2021    Procedure: COLONOSCOPY;  Surgeon: Tomi Lewis MD;  Location: [facility];  Service: Endoscopy;  Laterality: N/A;    COLONOSCOPY N/A 10/25/2024    Procedure: COLONOSCOPY;  Surgeon: Tomi Lewis MD;  Location: [facility];  Service: Endoscopy;  Laterality: N/A;    COSMETIC SURGERY      INJECTION OF ANESTHETIC AGENT AROUND MEDIAL BRANCH NERVES INNERVATING LUMBAR FACET JOINT Bilateral 2/7/2023    Procedure: Bilateral L3-5 MBB with local;  Surgeon: Lopez Mendoza MD;  Location: [facility];  Service: Pain Management;  Laterality: Bilateral;    INJECTION OF ANESTHETIC AGENT INTO SACROILIAC JOINT Bilateral 1/24/2023    Procedure: Bilateral SIJ Injection;  Surgeon: Lopez Mendoza MD;  [facility];  Service: Pain Management;  Laterality: Bilateral;    INJECTION OF ANESTHETIC AGENT INTO SACROILIAC JOINT Bilateral 8/16/2024    Procedure: Bilateral Sacroiliac Joint Injections;  Surgeon: Lopez Mendoza MD;  [facility];  Service: Pain Management;  Laterality: Bilateral;    INJECTION OF JOINT Bilateral 1/24/2023    Procedure: Bilateral Hip injection;  Surgeon: Lopez Mendoza MD;  [facility];  Service: Pain Management;  Laterality: Bilateral;    INJECTION OF JOINT Bilateral 8/16/2024    Procedure: Bilateral  Hip Joint injections;  Surgeon: Lopez Mendoza MD;  Location: [facility];  Service: Pain Management;  Laterality: Bilateral;    kidney removal      LAPAROSCOPIC NEPHRECTOMY, HAND ASSISTED Left 9/16/2021    Procedure: NEPHRECTOMY, HAND-ASSISTED, LAPAROSCOPIC;  Surgeon: Tom Manzo MD;  Location: [facility];  Service: Urology;  Laterality: Left;    SELECTIVE INJECTION OF ANESTHETIC AGENT AROUND LUMBAR SPINAL NERVE ROOT BY  TRANSFORAMINAL APPROACH Bilateral 12/26/2023    Procedure: Bilateral Lumbar L5/S1 TF UVALDO with RN IV sedation- Insurance only will pay for 1 level;  Surgeon: Lopez Mendoza MD;  Location: [facility];  Service: Pain Management;  Laterality: Bilateral;     Review of patient's allergies indicates:   Allergen Reactions    Iodine and iodide containing products      Other reaction(s): Swelling  Other reaction(s): Sneezing  Other reaction(s): Shortness of breath       Current Outpatient Medications   Medication Sig    allopurinoL (ZYLOPRIM) 100 MG tablet Take 1 tablet (100 mg total) by mouth once daily.    amlodipine-benazepril 5-20 mg (LOTREL) 5-20 mg per capsule Take 1 capsule by mouth once daily.    diclofenac (VOLTAREN) 75 MG EC tablet Take 1 tablet (75 mg total) by mouth 2 (two) times daily.    ergocalciferol (ERGOCALCIFEROL) 50,000 unit Cap Take 1 capsule (50,000 Units total) by mouth every 7 days.    fluticasone propionate (FLONASE) 50 mcg/actuation nasal spray USE TWO SPRAYS IN EACH NOSTRIL ONCE DAILY    gabapentin (NEURONTIN) 300 MG capsule TAKE ONE CAPSULE BY MOUTH DURING THE DAY THEN TWO CAPSULES EVERY NIGHT AT BEDTIME    hydroCHLOROthiazide 12.5 MG Tab Take 12.5 mg by mouth once daily.    isosorbide mononitrate (IMDUR) 30 MG 24 hr tablet Take 1 tablet (30 mg total) by mouth once daily.    levocetirizine (XYZAL) 5 MG tablet Take 1 tablet (5 mg total) by mouth every evening.    metoprolol succinate (TOPROL-XL) 25 MG 24 hr tablet Take 1 tablet (25 mg total) by mouth once daily.    nitroGLYCERIN (NITROSTAT) 0.4 MG SL tablet Place 1 tablet (0.4 mg total) under the tongue every 5 (five) minutes as needed.    potassium chloride (KLOR-CON) 10 MEQ TbSR Take 1 tablet (10 mEq total) by mouth every Mon, Wed, Fri, Sun.    pravastatin (PRAVACHOL) 40 MG tablet Take 1 tablet (40 mg total) by mouth once daily.    testosterone cypionate (DEPOTESTOTERONE CYPIONATE) 200 mg/mL injection INJECT ONE ML INTO MUSCLE EVERY 14 DAYS     "traZODone (DESYREL) 50 MG tablet Take 1 tablet (50 mg total) by mouth once daily.     No current facility-administered medications for this visit.       Review of Systems     GENERAL:  No weight loss, malaise or fevers.  HEENT:   No recent changes in vision or hearing  NECK:  Negative for lumps, no difficulty with swallowing.  RESPIRATORY:  Negative for cough, wheezing or shortness of breath, patient denies any recent URI.  CARDIOVASCULAR:  Negative for chest pain or palpitations.  GI:  Negative for abdominal discomfort, blood in stools or black stools or change in bowel habits.  MUSCULOSKELETAL:  See HPI.  SKIN:  Negative for lesions, rash, and itching.  PSYCH:  No mood disorder or recent psychosocial stressors.   HEMATOLOGY/LYMPHOLOGY:  Negative for prolonged bleeding, bruising easily or swollen nodes.    NEURO:   No history of syncope, paralysis, seizures or tremors.  All other reviewed and negative other than HPI.    OBJECTIVE:    Telemedicine Physical Exam:   Vitals:    02/12/25 1317   Height: 5' 8" (1.727 m)     Body mass index is 38.01 kg/m².   (reviewed on 2/12/2025)     (Physical exam performed virtually with patient guided on specific actions and diagnostic maneuvers)  GENERAL: Well appearing, in no acute distress, alert and oriented x3.  Cooperative.  PSYCH:  Mood and affect appropriate.  SKIN: Skin color & texture with no obvious abnormalities.    HEAD/FACE:  Normocephalic, atraumatic.    PULM:  No difficulty breathing. No nasal flaring. No obvious wheezing.  EXTREMITIES: No obvious deformities. Moving all extremities well, appears to have symmetric strength throughout.  MUSCULOSKELETAL: No obvious atrophy abnormalities are noted.   NEURO: No obvious neurologic deficit.   GAIT: sitting.     Physical Exam: last in clinic visit:  GENERAL: Well appearing, in no acute distress, alert and oriented x3.  PSYCH:  Mood and affect appropriate.  SKIN: Skin color, texture, turgor normal, no rashes or " lesions.  HEAD/FACE:  Normocephalic, atraumatic. Cranial nerves grossly intact.  PULM: No evidence of respiratory difficulty, symmetric chest rise.  GI:  Soft and non-tender.    BACK: Straight leg raising in the sitting and supine positions is equivical to radicular pain. There is mild pain to palpation over the facet joints of the lumbar spine or spinous processes.  No pain with range of motion.     EXTREMITIES: Peripheral joint ROM is full and pain free without obvious instability or laxity in all four extremities. No deformities, edema, or skin discoloration. Good capillary refill.  MUSCULOSKELETAL: Able to stand on heels & toes.    hip provocative maneuvers are positive bilaterally but pain is improved      Facet loading test is positive bilaterally.   Bilateral upper and lower extremity strength is normal and symmetric.  No atrophy or tone abnormalities are noted.    RIGHT Lower extremity: Hip flexion 4+/5, Hip Abduction 4+/5, Hip Adduction 4+/5, Knee extension 5/5, Knee flexion 5/5, Ankle dorsiflexion5/5, Extensor hallucis longus 5/5, Ankle plantarflexion 5/5  LEFT Lower extremity:  Hip flexion 5/5, Hip Abduction 5/5,Hip Adduction 5/5, Knee extension 5/5, Knee flexion 5/5, Ankle dorsiflexion 5/5, Extensor hallucis longus 5/5, Ankle plantarflexion 5/5  -Normal testing knee (patellar) jerk and ankle (achilles) jerk    NEURO: Bilateral upper and lower extremity coordination and muscle stretch reflexes are physiologic and symmetric. No loss of sensation is noted.  GAIT: normal.    SIJ testing:  BILATERAL  - TTP over SI joint: Much improved  - TTP GT Bursa: Much improved  - IR/ER of R/L hip: No pain    Imaging (Reviewed on 2/12/2025):      01/22/20  X-Ray Lumbar Spine Ap And Lateral  FINDINGS:  Vertebral body heights maintained.  No definite spondylolisthesis.  L5-S1 evaluation limited by positioning.  There is sacralization of L5.  Multilevel facet degenerative findings noted.  L4-5 and L5-S1 degenerative disc  height loss.      Bilateral x-ray SI joints 12/14/2022  FINDINGS:  No evidence for ankylosis.  No erosive changes.  No widening of the AC joints.    Bilateral hip XR 12/14/22  FINDINGS:  No acute fracture or dislocation.  No significant soft tissue swelling.   The femoral heads are normally positioned within the native acetabuli.  Mild bilateral femoroacetabular degenerative osteoarthrosis with mild joint space narrowing and small osteophyte formation.  Chronic suspected bone island of the left femoral head.     Pubic symphysis, bilateral pubic rami and SI joints are intact.       MRI lumbar spine 01/23/2023  FINDINGS:  Transitional anatomy with partial sacralization of L5 peripherally.  There are bilateral L5 pars defects with grade 1 spondylolisthesis at L5-S1.  Vertebral body height is normal.  Marrow signal is within normal limits. The conus medullaris terminates at the level of L1-L2.  No abnormal signal within the conus. Intervertebral disc levels are as follows:     T12-L1 disc: Tiny chronic Schmorl's nodes.  Normal facet joints.  No significant stenosis.  The dural canal measures 13 mm.     L1-L2 disc : Normal.     L2-L3 disc: Normal disc space height with anterior osteophytes.  Minimal facet arthropathy.  No significant spinal or foraminal stenosis.  The dural canal measures 11 mm.  No foraminal stenosis.     L3-L4 disc: Mild disc bulge.  Anterior osteophytes.  Normal facet joints.  The dural canal measures 14 mm.  No foraminal stenosis.     L4-L5 disc: Normal disc space height with mild facet arthropathy.  No significant spinal or foraminal stenosis.  The dural canal measures 15 mm.     L5-S1 disc: Bilateral L5 pars defects with grade 1 spondylolisthesis.  Severe disc space height loss most pronounced toward the right with edematous Modic endplate change.  Disc and osteophyte encroach into the exit foramina, right greater than left.  There is severe right and moderate/severe left foraminal stenosis with  compression of the exiting L5 spinal nerves.  Degenerative hypertrophic change toward the right of the disc space as well as degenerative change of the pseudoarticulation of L5 and S1 narrows the exit pathway of the right L5 spinal nerve as demonstrated on axial T2 series 6, image 28.  Mild facet arthropathy.  The dural canal measures 14 mm.     Impression:     1. Isthmic grade 1 spondylolisthesis at L5-S1 with severe bilateral foraminal stenosis and possible compression of the exiting L5 spinal nerves.  2. Degenerative hypertrophic change toward the right of the L5-S1 disc space causes severe narrowing of the exit pathway of the right L5 spinal nerve as demonstrated on axial T2 image 28.  3. Edematous Modic endplate change at L5-S1 toward the right at L5-S1 may correlate to focal symptoms.       ASSESSMENT: 59 y.o. year old male with lower back and leg pain, consistent with     1. Lumbar radiculopathy, chronic  Case Request-RAD/Other Procedure Area: Bilateral Lumbar L5/S1 TF Epidural Steroid Injection      2. Degeneration of intervertebral disc of lumbar region with discogenic back pain and lower extremity pain  Case Request-RAD/Other Procedure Area: Bilateral Lumbar L5/S1 TF Epidural Steroid Injection          PLAN:   - Interventions: Schedule repeat Bilateral Lumbar L5/S1 TF epidural steroid injections. Explained the risks and benefits of the procedure in detail with the patient today in clinic along with alternative treatment options, and the patient elected to pursue the intervention.          Can consider Hip/SI Joint injections in the future.      - S/p bilateral  hip and sacroiliac joint injections on 8/16/24 with 85% relief so far.  - S/p Lumbar TF Epidural steroid injection at L5/S1 (bilateral) on 12/26/23 with 85% relief.  bilateral SIJ + bilateral IA hip injections on 01/24/2023 with 80% relief  bilateral L3-5 MBB on on 2/7/23 with 80% relief    - Anticoagulation use: no anticoagulation     report:   Reviewed and consistent with medication use as prescribed.      - Medications:  -- Continue Gabapentin 300 mg.   -- Continue Diclofenac 75 mg BID for pain.   - Can take Tylenol (acetaminophen) 1000mg (two tablets of 500mg) 3x per day as needed for pain (to take up to max dose of 3000mg per day).    Patient was previously referred to Dr. Darling for medical marijuana.     - Therapy:  Continue activities as tolerated.    We discussed  physical therapy to help manage the patient/s painful condition. The patient was counseled that muscle strengthening will improve the long term prognosis in regards to pain and may also help increase range of motion and mobility. Patient previously declined referral to outpatient physical therapy at this time. Reports the last time he was in physical therapy it did not improve his condition.    - Imaging: Reviewed previous imaging.    - Follow up visit: can follow up in clinic or Virtually 4-6 weeks after intervention or as needed.      The above plan and management options were discussed at length with patient. Patient is in agreement with the above and verbalized understanding.    - I discussed the goals of interventional chronic pain management with the patient on today's visit. We discussed a multimodal and systematic approach to pain.  This includes diagnostic and therapeutic injections, adjuvant pharmacologic treatment, physical therapy, and at times psychiatry.  I emphasized the importance of regular exercise, core strengthening and stretching, diet and weight loss as a cornerstone of long-term pain management.    - This condition does not require this patient to take time off of work, and the primary goal of our Pain Management services is to improve the patient's functional capacity.  - Patient Questions: Answered all of the patient's questions regarding diagnoses, therapy, treatment and next steps      Devora Thompson PA-C  Interventional Pain Management  [facility]  [place]

## 2025-03-07 ENCOUNTER — HOSPITAL ENCOUNTER (OUTPATIENT)
Facility: HOSPITAL | Age: 60
Discharge: HOME | End: 2025-03-07
Attending: ANESTHESIOLOGY | Admitting: ANESTHESIOLOGY
Payer: COMMERCIAL

## 2025-03-07 VITALS
BODY MASS INDEX: 37.23 KG/M2 | WEIGHT: 245.69 LBS | SYSTOLIC BLOOD PRESSURE: 126 MMHG | TEMPERATURE: 98 F | RESPIRATION RATE: 16 BRPM | DIASTOLIC BLOOD PRESSURE: 66 MMHG | HEART RATE: 67 BPM | OXYGEN SATURATION: 98 % | HEIGHT: 68 IN

## 2025-03-07 DIAGNOSIS — M54.16 LUMBAR RADICULOPATHY: ICD-10-CM

## 2025-03-07 PROCEDURE — 64483 NJX AA&/STRD TFRM EPI L/S 1: CPT | Mod: 50 | Performed by: ANESTHESIOLOGY

## 2025-03-07 PROCEDURE — 25000003 PHARM REV CODE 250: Performed by: ANESTHESIOLOGY

## 2025-03-07 PROCEDURE — 64483 NJX AA&/STRD TFRM EPI L/S 1: CPT | Mod: 50,,, | Performed by: ANESTHESIOLOGY

## 2025-03-07 PROCEDURE — 63600175 PHARM REV CODE 636 W HCPCS: Performed by: ANESTHESIOLOGY

## 2025-03-07 PROCEDURE — 25500020 PHARM REV CODE 255: Performed by: ANESTHESIOLOGY

## 2025-03-07 PROCEDURE — A9585 GADOBUTROL INJECTION: HCPCS | Performed by: ANESTHESIOLOGY

## 2025-03-07 RX ORDER — DEXAMETHASONE SODIUM PHOSPHATE 10 MG/ML
INJECTION, SOLUTION INTRA-ARTICULAR; INTRALESIONAL; INTRAMUSCULAR; INTRAVENOUS; SOFT TISSUE
Status: DISCONTINUED | OUTPATIENT
Start: 2025-03-07 | End: 2025-03-07 | Stop reason: HOSPADM

## 2025-03-07 RX ORDER — FENTANYL CITRATE 50 UG/ML
INJECTION, SOLUTION INTRAMUSCULAR; INTRAVENOUS
Status: DISCONTINUED | OUTPATIENT
Start: 2025-03-07 | End: 2025-03-07 | Stop reason: HOSPADM

## 2025-03-07 RX ORDER — BUPIVACAINE HYDROCHLORIDE 2.5 MG/ML
INJECTION, SOLUTION EPIDURAL; INFILTRATION; INTRACAUDAL; PERINEURAL
Status: DISCONTINUED | OUTPATIENT
Start: 2025-03-07 | End: 2025-03-07 | Stop reason: HOSPADM

## 2025-03-07 RX ORDER — MIDAZOLAM HYDROCHLORIDE 1 MG/ML
INJECTION INTRAMUSCULAR; INTRAVENOUS
Status: DISCONTINUED | OUTPATIENT
Start: 2025-03-07 | End: 2025-03-07 | Stop reason: HOSPADM

## 2025-03-07 RX ORDER — GADOBUTROL 604.72 MG/ML
INJECTION INTRAVENOUS
Status: DISCONTINUED | OUTPATIENT
Start: 2025-03-07 | End: 2025-03-07 | Stop reason: HOSPADM

## 2025-03-07 RX ORDER — SODIUM BICARBONATE 1 MEQ/ML
SYRINGE (ML) INTRAVENOUS
Status: DISCONTINUED | OUTPATIENT
Start: 2025-03-07 | End: 2025-03-07 | Stop reason: HOSPADM

## 2025-03-07 NOTE — PLAN OF CARE
Pt verbalized understanding of discharge instructions. Bandaids x 2 to lower back c/d/i. Patient voiced no complaints, with no further questions at this time. Patient stood at side of bed, walked steps with no new motor deficits.  VSS on RA. Recovery care complete.

## 2025-03-07 NOTE — OP NOTE
Dre Curiel  59 y.o. male      Vitals:    03/07/25 0813   BP: 136/82   Pulse: 66   Resp: 14   Temp:      Procedure Date:03/07/2025        INFORMED CONSENT: The procedure, risks, benefits and options were discussed with patient. There are no contraindications to the procedure. The patient expressed understanding and agreed to proceed. The personnel performing the procedure was discussed. I verify that I personally obtained consent prior to the start of the procedure and the signed consent can be found on the patient's chart.       Anesthesia:   Conscious sedation provided by M.D    The patient was monitored with continuous pulse oximetry, EKG, and intermittent blood pressure monitors.  The patient was hemodynamically stable throughout the entire process was responsive to voice, and breathing spontaneously.  Supplemental O2 was provided at 2L/min via nasal cannula.  Patient was comfortable for the duration of the procedure. (See nurse documentation and case log for sedation time)    There was a total of 2mg IV Midazolam and 50mcg Fentanyl titrated for the procedure    Pre Procedure diagnosis: Lumbar radiculopathy, chronic [M54.16]  Degeneration of intervertebral disc of lumbar region with discogenic back pain and lower extremity pain [M51.362]  Post-Procedure diagnosis: SAME     Complications: None    Specimens: None      DESCRIPTION OF PROCEDURE: The patient was brought to the procedure room. IV access was obtained prior to the procedure. The patient was positioned prone on the fluoroscopy table. Continuous hemodynamic monitoring was initiated including blood pressure, EKG, and pulse oximetry. . The skin was prepped with chlorhexidine and draped in a sterile fashion.      The  L5/S1  transforaminal spaces were identified with fluoroscopy in the  AP, oblique, and lateral views.  A 22 gauge spinal quinke needle was then advanced into the area of the trans foraminal spaces bilateral with confirmation of proper needle  position using AP, oblique, and lateral fluoroscopic views. Once the needle tip was in the area of the transforaminal space, and there was no blood, CSF or paraesthesias,  1.5 mL of Omnipaque 300mg/ml was injected on bilateral for a total of 3mL.  Fluoroscopic imaging in the AP and lateral views revealed a clear outline of the spinal nerve with proximal spread of agent through the neural foramen into the epidural space. A total combination of 2mL of Bupivacaine and 10 mg dexamethasone was injected on each side and at each level with displacement of the contrast dye confirming that the medication went into the area of the transforaminal spaces bilateral. A sterile bandaid was applied.   Patient tolerated the procedure well.    Patient was taken back to the recovery room for further observation.     The patient was discharged to home in stable condition

## 2025-03-07 NOTE — DISCHARGE INSTRUCTIONS

## 2025-03-07 NOTE — DISCHARGE SUMMARY
Discharge Note  Short Stay      SUMMARY     Admit Date: 3/7/2025    Attending Physician: Lopez Mendoza MD        Discharge Physician: Lopez Mendoza MD        Discharge Date: 3/7/2025 8:20 AM    Procedure(s) (LRB):  Bilateral Lumbar L5/S1 TF Epidural Steroid Injection (Bilateral)    Final Diagnosis: Lumbar radiculopathy, chronic [M54.16]  Degeneration of intervertebral disc of lumbar region with discogenic back pain and lower extremity pain [M51.362]    Disposition: Home or self care    Patient Instructions:   Current Discharge Medication List        CONTINUE these medications which have NOT CHANGED    Details   allopurinoL (ZYLOPRIM) 100 MG tablet Take 1 tablet (100 mg total) by mouth once daily.  Qty: 90 tablet, Refills: 0    Associated Diagnoses: Idiopathic chronic gout of right foot without tophus      amlodipine-benazepril 5-20 mg (LOTREL) 5-20 mg per capsule Take 1 capsule by mouth once daily.  Qty: 30 capsule, Refills: 11    Comments: .  Associated Diagnoses: Essential hypertension      ergocalciferol (ERGOCALCIFEROL) 50,000 unit Cap Take 1 capsule (50,000 Units total) by mouth every 7 days.  Qty: 12 capsule, Refills: 0    Associated Diagnoses: Vitamin D deficiency      gabapentin (NEURONTIN) 300 MG capsule TAKE ONE CAPSULE BY MOUTH DURING THE DAY THEN TWO CAPSULES EVERY NIGHT AT BEDTIME  Qty: 270 capsule, Refills: 1      isosorbide mononitrate (IMDUR) 30 MG 24 hr tablet Take 1 tablet (30 mg total) by mouth once daily.  Qty: 30 tablet, Refills: 11    Associated Diagnoses: Coronary artery disease of native artery of native heart with stable angina pectoris      levocetirizine (XYZAL) 5 MG tablet Take 1 tablet (5 mg total) by mouth every evening.  Qty: 90 tablet, Refills: 3      metoprolol succinate (TOPROL-XL) 25 MG 24 hr tablet Take 1 tablet (25 mg total) by mouth once daily.  Qty: 90 tablet, Refills: 2    Comments: .  Associated Diagnoses: Essential hypertension      potassium chloride (KLOR-CON) 10 MEQ TbSR  Take 1 tablet (10 mEq total) by mouth every Mon, Wed, Fri, Sun.  Qty: 16 tablet, Refills: 5      pravastatin (PRAVACHOL) 40 MG tablet Take 1 tablet (40 mg total) by mouth once daily.  Qty: 90 tablet, Refills: 3    Associated Diagnoses: Mixed hyperlipidemia      testosterone cypionate (DEPOTESTOTERONE CYPIONATE) 200 mg/mL injection INJECT ONE ML INTO MUSCLE EVERY 14 DAYS  Qty: 2 mL, Refills: 2    Associated Diagnoses: Testosterone deficiency      traZODone (DESYREL) 50 MG tablet Take 1 tablet (50 mg total) by mouth once daily.  Qty: 90 tablet, Refills: 2    Associated Diagnoses: Other insomnia      diclofenac (VOLTAREN) 75 MG EC tablet Take 1 tablet (75 mg total) by mouth 2 (two) times daily.  Qty: 180 tablet, Refills: 2    Associated Diagnoses: Lumbar radiculopathy, chronic; DDD (degenerative disc disease), lumbar; Weakness of right lower extremity      fluticasone propionate (FLONASE) 50 mcg/actuation nasal spray USE TWO SPRAYS IN EACH NOSTRIL ONCE DAILY  Qty: 48 g, Refills: 3      hydroCHLOROthiazide 12.5 MG Tab Take 12.5 mg by mouth once daily.      nitroGLYCERIN (NITROSTAT) 0.4 MG SL tablet Place 1 tablet (0.4 mg total) under the tongue every 5 (five) minutes as needed.  Qty: 20 tablet, Refills: 12    Associated Diagnoses: Coronary artery disease of native artery of native heart with stable angina pectoris                 Discharge Diagnosis: Lumbar radiculopathy, chronic [M54.16]  Degeneration of intervertebral disc of lumbar region with discogenic back pain and lower extremity pain [M51.362]  Condition on Discharge: Stable with no complications to procedure   Diet on Discharge: Same as before.  Activity: as per instruction sheet.  Discharge to: Home with a responsible adult.  Follow up: 2-4 weeks       Please call the office at [phone] if you experience any weakness or loss of sensation, fever > 101.5, pain uncontrolled with oral medications, persistent nausea/vomiting/or diarrhea, redness or drainage  from the incisions, or any other worrisome concerns. If physician on call was not reached or could not communicate with our office for any reason please go to the nearest emergency department

## 2025-04-01 ENCOUNTER — TELEPHONE (OUTPATIENT)
Dept: PAIN MEDICINE | Facility: CLINIC | Age: 60
End: 2025-04-01
Payer: COMMERCIAL

## 2025-04-01 NOTE — TELEPHONE ENCOUNTER
----- Message from Kajal sent at 4/1/2025  4:25 PM CDT -----  Contact: Dre  Type:  Patient Returning CallWho Called:Angel Left Message for Patient:nurse Does the patient know what this is regarding?:pt returning missed call Would the patient rather a call back or a response via [employer]? [facility]:[phone] Information: Please call for additional information  
Patient confirmed his appointment   
done

## 2025-04-02 ENCOUNTER — OFFICE VISIT (OUTPATIENT)
Dept: PAIN MEDICINE | Facility: CLINIC | Age: 60
End: 2025-04-02
Payer: COMMERCIAL

## 2025-04-02 DIAGNOSIS — M51.362 DEGENERATION OF INTERVERTEBRAL DISC OF LUMBAR REGION WITH DISCOGENIC BACK PAIN AND LOWER EXTREMITY PAIN: ICD-10-CM

## 2025-04-02 DIAGNOSIS — M47.816 LUMBAR SPONDYLOSIS: ICD-10-CM

## 2025-04-02 DIAGNOSIS — M43.17 SPONDYLOLISTHESIS OF LUMBOSACRAL REGION: ICD-10-CM

## 2025-04-02 DIAGNOSIS — M43.06 PARS DEFECT OF LUMBAR SPINE: Primary | ICD-10-CM

## 2025-04-02 NOTE — PROGRESS NOTES
Est Patient Chronic Pain Note (Follow up Visit)    PCP: Mohan Schwartz MD    The patient location is: home  The chief complaint leading to consultation is:injection follow up     Visit type: audiovisual converted to Audio due to technical issues    Total time: 10 minutes on the phone, 12 min total encounter    Encounter which includes face to face time and non-face to face time preparing to see the patient (eg, review of tests), Obtaining and/or reviewing separately obtained history, Documenting clinical information in the electronic or other health record, Independently interpreting results (not separately reported) and communicating results to the patient/family/caregiver, or Care coordination (not separately reported).      Each patient to whom he or she provides medical services by telemedicine is:  (1) informed of the relationship between the physician and patient and the respective role of any other health care provider with respect to management of the patient; and (2) notified that he or she may decline to receive medical services by telemedicine and may withdraw from such care at any time.    Notes:    SUBJECTIVE:  Interval History (4/2/2025):  Dre Curiel presents today for follow-up visit.  he underwent Bilateral L5/S1  TF UVALDO on 3/7/25.  The patient reports that he is/was better following the procedure.  he reports 50% pain relief.  The changes lasted for nearly 4 weeks. He denies pain radiating down the legs but still has pain in the lower back.  Patient reports pain as 7/10 today.    Interval History (2/12/2025):  Dre Curiel presents today for follow-up visit.  Patient was last seen on 9/26/2024.  Patient reports pain as 6/10 today. Patient reports worsening pain in lower back, hip and LE over the past few months. He usually has worse pain on the left side but now it is getting worse on the right side.  He reports pain in LE/feet 6/10 and hip pain 4/10.  Also c/o tingling in toes and buttocks.  States this has been constant.    Interval History (9/26/2024):   Dre Curiel presents today for follow-up visit.  Patient was last seen on 9/13/2024. Patient is interested in medical marijuana (specifically THC gummies) for the treatment of his chronic pain. He reports speaking with a friend and his boss regarding this treatment. He is hoping it will help his pain and to relax and sleep better a night so he does not have to take as much Trazodone. Patient reports pain as 5/10 today.    Interval History (9/13/2024):  Dre Curiel presents today for follow-up visit.  he underwent Bilateral SI Joint and   IA Hip injections  on 3/7/25.  The patient reports that he is/was better following the procedure.  he reports 85 % pain relief.  The changes have continued through this visit.  Patient reports pain as  2.5/10  today. Patient states he has been on vacation all week (at home) and feels better.   He is able to move his RLE/ hip better since the procedure.     Interval History (8/01/2024):   Dre Curiel presents today for follow-up visit.  Patient was last seen on 2/5/2024. Patient reports pain as 4/10 today however some days the pain can be as bad as an 8-9 on a pain scale of 0-10.   He localizes his pain to both hips and SI joints. Left side is worse. He experiences more pain with bending.  Currently takes Lyrica and Diclofenac. Requests medication refills today.    Interval History (2/5/2024): Patient presents today for follow-up visit.  he underwent Bilateral  lumbar  + L5/S1 TF UVALDO on 3/7/25.  The patient reports that he is/was better following the procedure.  he reports 85 % pain relief.   The changes have continued through this visit.  Patient reports pain as 2/10 today. He has no issues with the LLE anymore but continues to have pain and weakness involving RLE. It is still difficult to lift / RLE. He admits despite these concerns,  it is easier to walk since procedure.     Interval History  (12/04/2023):   Dre Curiel presents today for follow-up visit.  Patient was last seen on 5/16/23. At that visit, the plan was to continue conservative treatment and follow up as needed. Patient reports pain as 6/10 today.   The patient states since his last visit in May his pain has gradually increased in lower back and down his legs, worse with RLE. He describes pain as stabbing and also experiences numbness.   Denies recent falls, bladder/bowel changes.      Interval History (5/16/2023):  Dre Curiel presents today for follow-up visit.  Patient was last seen on 3/7/2023. Last injection bilateral SIJ + bilateral IA hip injections on 01/24/2023 followed by bilateral L3-5 MBB on on 2/7/23 with 80% relief. Reports sustained relief from these injections. Needs refill of Lyrica 225 mg BID. Patient reports pain as 2/10 today.      Interval History (3/7/2023): Dre Curiel presents today for follow-up visit.  he underwent bilateral SIJ + bilateral IA hip injections on 01/24/2023 followed by bilateral L3-5 MBB on on 2/7/23.  The patient reports that he is/was better following the procedure.  he reports 80% pain relief from each injection.  The changes lasted 4 weeks so far.  The changes have continued through this visit.  Patient reports pain as 5/10 today. He reports he has good days and bad days, but has more good days since injection. Pain is exacerbated with bending. Has some residual right sided lateral hip pain and burning and needles sensation his right thigh/leg, but this has been somewhat improved with Lyrica 225 mg BID. Also had MRI of lumbar spine with noted compression of L5 spinal nerves.    Initial HPI  Dre Curiel is a 59 y.o. male with past medical history significant for anxiety/depression, hypertension, hyperlipidemia, stage 3 chronic kidney disease a history of renal cell carcinoma status post left-sided nephrectomy, erectile dysfunction, obesity, obstructive sleep apnea who presents to the  clinic for the evaluation of lower back and leg pain.  Patient reports pain has been present for several years without inciting accident injury or trauma.  Today patient reports pain is constant which is rated a 6/10.  Pain is described as sharp and stabbing and needles poking  in nature.  Patient reports pain in a bandlike distribution in the lower back which radiates down the lateral aspect of the right lower extremity to the calf in L4-5 distribution.  Pain is exacerbated when moving from sitting to standing, with lumbar forward flexion and lateral flexion and with ambulation.  Patient is a supervisor of product distribution, manages everything and reports he is standing on his feet all day.  Patient does report sensation of buckling in the right lower extremity associated with prolonged exertion.  Pain is improved with sitting.  Patient has trialed gabapentin 300 mg, 1-2 times daily in the past without meaningful relief.  Patient reports completing 8 sessions of conventional physical therapy on 12/2022 at [place] [facility] without any meaningful relief in range of motion, strength or pain.    Patient reports significant motor weakness.  Patient denies night fever/night sweats, urinary incontinence, bowel incontinence, significant weight loss, and loss of sensations    Pain Disability Index Review:         9/13/2024     9:07 AM 8/1/2024     8:13 AM 2/5/2024     8:16 AM   Last 3 PDI Scores   Pain Disability Index (PDI) 20 28 14       Non-Pharmacologic Treatments:  Physical Therapy/Home Exercise: yes  Ice/Heat:yes  TENS: no  Acupuncture: no  Massage: no  Chiropractic: no    Other: no      Pain Medications:  - Opioids: Percocet (Oxycodone/Acetaminophen) and Ultram (Tramadol HCL)  - Adjuvant Medications: Neurontin (Gabapentin), Trazodone (Desyrel), Xanax (Alprazolam), and Zoloft (Sertraline)    Pain Procedures:   bilateral SIJ + bilateral IA hip injections on 01/24/2023 with 80% relief  bilateral L3-5 MBB on  on 2/7/23 with 80% relief  Bilateral lumbar + L5/S1 TF UVALDO on 12/2/6/23 with 85% relief  Bilateral Hip IA Joint and SI Joint injections on 8/16/2024 with 85% relief so far  Bilateral Lumbar L5/S1 TF UVALDO on 3/7/2025 with 50% relief    Past Medical History:   Diagnosis Date    Allergy     Anxiety     Cancer of kidney     Depression     Hyperlipidemia     Hypertension      Past Surgical History:   Procedure Laterality Date    COLONOSCOPY N/A 6/8/2018    Procedure: COLONOSCOPY;  Surgeon: Simeon Acharya III, MD;  Location: [facility];  Service: Endoscopy;  Laterality: N/A;    COLONOSCOPY N/A 6/11/2018    Procedure: COLONOSCOPY;  Surgeon: Simeon Acharya III, MD;  Location: [facility] ENDO;  Service: Endoscopy;  Laterality: N/A;    COLONOSCOPY  06/2018    COLONOSCOPY N/A 11/17/2021    Procedure: COLONOSCOPY;  Surgeon: Tomi Lewis MD;  Location: [facility];  Service: Endoscopy;  Laterality: N/A;    COLONOSCOPY N/A 10/25/2024    Procedure: COLONOSCOPY;  Surgeon: Tomi Lewis MD;  Location: [facility];  Service: Endoscopy;  Laterality: N/A;    COSMETIC SURGERY      INJECTION OF ANESTHETIC AGENT AROUND MEDIAL BRANCH NERVES INNERVATING LUMBAR FACET JOINT Bilateral 2/7/2023    Procedure: Bilateral L3-5 MBB with local;  Surgeon: Lopez Mendoza MD;  Location: [facility] PAIN MGT;  Service: Pain Management;  Laterality: Bilateral;    INJECTION OF ANESTHETIC AGENT INTO SACROILIAC JOINT Bilateral 1/24/2023    Procedure: Bilateral SIJ Injection;  Surgeon: Lopez Mendoza MD;  Location: [facility] PAIN [facility]GT;  Service: Pain Management;  Laterality: Bilateral;    INJECTION OF ANESTHETIC AGENT INTO SACROILIAC JOINT Bilateral 8/16/2024    Procedure: Bilateral Sacroiliac Joint Injections;  Surgeon: Lopez Mendoza MD;  Location: [facility] PAIN [facility]GT;  Service: Pain Management;  Laterality: Bilateral;    INJECTION OF JOINT Bilateral 1/24/2023    Procedure: Bilateral Hip injection;  Surgeon: Lopez Mendoza MD;  Location: [facility] PAIN [facility]GT;  Service: Pain  Management;  Laterality: Bilateral;    INJECTION OF JOINT Bilateral 8/16/2024    Procedure: Bilateral  Hip Joint injections;  Surgeon: Lopez Mendoza MD;  Location: [facility] PAIN MGT;  Service: Pain Management;  Laterality: Bilateral;    kidney removal      LAPAROSCOPIC NEPHRECTOMY, HAND ASSISTED Left 9/16/2021    Procedure: NEPHRECTOMY, HAND-ASSISTED, LAPAROSCOPIC;  Surgeon: Tom Manzo MD;  Location: [facility] OR;  Service: Urology;  Laterality: Left;    SELECTIVE INJECTION OF ANESTHETIC AGENT AROUND LUMBAR SPINAL NERVE ROOT BY TRANSFORAMINAL APPROACH Bilateral 12/26/2023    Procedure: Bilateral Lumbar L5/S1 TF UVALDO with RN IV sedation- Insurance only will pay for 1 level;  Surgeon: Lopez Mendoza MD;  Location: [facility] PAIN MGT;  Service: Pain Management;  Laterality: Bilateral;    SELECTIVE INJECTION OF ANESTHETIC AGENT AROUND LUMBAR SPINAL NERVE ROOT BY TRANSFORAMINAL APPROACH Bilateral 3/7/2025    Procedure: Bilateral Lumbar L5/S1 TF Epidural Steroid Injection;  Surgeon: Lopez Mendoza MD;  Location: [facility] PAIN MGT;  Service: Pain Management;  Laterality: Bilateral;     Review of patient's allergies indicates:   Allergen Reactions    Iodine and iodide containing products      Other reaction(s): Swelling  Other reaction(s): Sneezing  Other reaction(s): Shortness of breath       Current Outpatient Medications   Medication Sig    allopurinoL (ZYLOPRIM) 100 MG tablet Take 1 tablet (100 mg total) by mouth once daily.    amlodipine-benazepril 5-20 mg (LOTREL) 5-20 mg per capsule Take 1 capsule by mouth once daily.    diclofenac (VOLTAREN) 75 MG EC tablet Take 1 tablet (75 mg total) by mouth 2 (two) times daily.    ergocalciferol (ERGOCALCIFEROL) 50,000 unit Cap Take 1 capsule (50,000 Units total) by mouth every 7 days.    fluticasone propionate (FLONASE) 50 mcg/actuation nasal spray USE TWO SPRAYS IN EACH NOSTRIL ONCE DAILY    gabapentin (NEURONTIN) 300 MG capsule TAKE ONE CAPSULE BY MOUTH DURING THE DAY THEN TWO  CAPSULES EVERY NIGHT AT BEDTIME    hydroCHLOROthiazide 12.5 MG Tab Take 12.5 mg by mouth once daily.    isosorbide mononitrate (IMDUR) 30 MG 24 hr tablet Take 1 tablet (30 mg total) by mouth once daily.    levocetirizine (XYZAL) 5 MG tablet Take 1 tablet (5 mg total) by mouth every evening.    metoprolol succinate (TOPROL-XL) 25 MG 24 hr tablet Take 1 tablet (25 mg total) by mouth once daily.    nitroGLYCERIN (NITROSTAT) 0.4 MG SL tablet Place 1 tablet (0.4 mg total) under the tongue every 5 (five) minutes as needed.    potassium chloride (KLOR-CON) 10 MEQ TbSR Take 1 tablet (10 mEq total) by mouth every Mon, Wed, Fri, Sun.    pravastatin (PRAVACHOL) 40 MG tablet Take 1 tablet (40 mg total) by mouth once daily.    testosterone cypionate (DEPOTESTOTERONE CYPIONATE) 200 mg/mL injection INJECT ONE ML INTO MUSCLE EVERY 14 DAYS    traZODone (DESYREL) 50 MG tablet Take 1 tablet (50 mg total) by mouth once daily.     No current facility-administered medications for this visit.       Review of Systems     GENERAL:  No weight loss, malaise or fevers.  HEENT:   No recent changes in vision or hearing  NECK:  Negative for lumps, no difficulty with swallowing.  RESPIRATORY:  Negative for cough, wheezing or shortness of breath, patient denies any recent URI.  CARDIOVASCULAR:  Negative for chest pain or palpitations.  GI:  Negative for abdominal discomfort, blood in stools or black stools or change in bowel habits.  MUSCULOSKELETAL:  See HPI.  SKIN:  Negative for lesions, rash, and itching.  PSYCH:  No mood disorder or recent psychosocial stressors.   HEMATOLOGY/LYMPHOLOGY:  Negative for prolonged bleeding, bruising easily or swollen nodes.    NEURO:   No history of syncope, paralysis, seizures or tremors.  All other reviewed and negative other than HPI.    OBJECTIVE:  Telemedicine Physical Exam:   There were no vitals filed for this visit.  There is no height or weight on file to calculate BMI.   (reviewed on 4/2/2025)      (Physical exam performed virtually with patient guided on specific actions and diagnostic maneuvers)  GENERAL: Well appearing, in no acute distress, alert and oriented x3.  Cooperative.  PSYCH:  Mood and affect appropriate.  SKIN: Skin color & texture with no obvious abnormalities.    HEAD/FACE:  Normocephalic, atraumatic.    PULM:  No difficulty breathing. No nasal flaring. No obvious wheezing.  EXTREMITIES: No obvious deformities. Moving all extremities well, appears to have symmetric strength throughout.  MUSCULOSKELETAL: No obvious atrophy abnormalities are noted.   NEURO: No obvious neurologic deficit.   GAIT: sitting.     Physical Exam: last in clinic visit:    GENERAL: Well appearing, in no acute distress, alert and oriented x3.  PSYCH:  Mood and affect appropriate.  SKIN: Skin color, texture, turgor normal, no rashes or lesions.  HEAD/FACE:  Normocephalic, atraumatic. Cranial nerves grossly intact.  PULM: No evidence of respiratory difficulty, symmetric chest rise.  GI:  Soft and non-tender.    BACK: Straight leg raising in the sitting and supine positions is equivical to radicular pain. There is mild pain to palpation over the facet joints of the lumbar spine or spinous processes.  No pain with range of motion.     EXTREMITIES: Peripheral joint ROM is full and pain free without obvious instability or laxity in all four extremities. No deformities, edema, or skin discoloration. Good capillary refill.  MUSCULOSKELETAL: Able to stand on heels & toes.    hip provocative maneuvers are positive bilaterally but pain is improved      Facet loading test is positive bilaterally.   Bilateral upper and lower extremity strength is normal and symmetric.  No atrophy or tone abnormalities are noted.    RIGHT Lower extremity: Hip flexion 4+/5, Hip Abduction 4+/5, Hip Adduction 4+/5, Knee extension 5/5, Knee flexion 5/5, Ankle dorsiflexion5/5, Extensor hallucis longus 5/5, Ankle plantarflexion 5/5  LEFT Lower extremity:   Hip flexion 5/5, Hip Abduction 5/5,Hip Adduction 5/5, Knee extension 5/5, Knee flexion 5/5, Ankle dorsiflexion 5/5, Extensor hallucis longus 5/5, Ankle plantarflexion 5/5  -Normal testing knee (patellar) jerk and ankle (achilles) jerk    NEURO: Bilateral upper and lower extremity coordination and muscle stretch reflexes are physiologic and symmetric. No loss of sensation is noted.  GAIT: normal.    SIJ testing:  BILATERAL  - TTP over SI joint: Much improved  - TTP GT Bursa: Much improved  - IR/ER of R/L hip: No pain    Imaging (Reviewed on 4/2/2025):      01/22/20  X-Ray Lumbar Spine Ap And Lateral  FINDINGS:  Vertebral body heights maintained.  No definite spondylolisthesis.  L5-S1 evaluation limited by positioning.  There is sacralization of L5.  Multilevel facet degenerative findings noted.  L4-5 and L5-S1 degenerative disc height loss.      Bilateral x-ray SI joints 12/14/2022  FINDINGS:  No evidence for ankylosis.  No erosive changes.  No widening of the AC joints.    Bilateral hip XR 12/14/22  FINDINGS:  No acute fracture or dislocation.  No significant soft tissue swelling.   The femoral heads are normally positioned within the native acetabuli.  Mild bilateral femoroacetabular degenerative osteoarthrosis with mild joint space narrowing and small osteophyte formation.  Chronic suspected bone island of the left femoral head.     Pubic symphysis, bilateral pubic rami and SI joints are intact.       MRI lumbar spine 01/23/2023  FINDINGS:  Transitional anatomy with partial sacralization of L5 peripherally.  There are bilateral L5 pars defects with grade 1 spondylolisthesis at L5-S1.  Vertebral body height is normal.  Marrow signal is within normal limits. The conus medullaris terminates at the level of L1-L2.  No abnormal signal within the conus. Intervertebral disc levels are as follows:     T12-L1 disc: Tiny chronic Schmorl's nodes.  Normal facet joints.  No significant stenosis.  The dural canal measures 13  mm.     L1-L2 disc : Normal.     L2-L3 disc: Normal disc space height with anterior osteophytes.  Minimal facet arthropathy.  No significant spinal or foraminal stenosis.  The dural canal measures 11 mm.  No foraminal stenosis.     L3-L4 disc: Mild disc bulge.  Anterior osteophytes.  Normal facet joints.  The dural canal measures 14 mm.  No foraminal stenosis.     L4-L5 disc: Normal disc space height with mild facet arthropathy.  No significant spinal or foraminal stenosis.  The dural canal measures 15 mm.     L5-S1 disc: Bilateral L5 pars defects with grade 1 spondylolisthesis.  Severe disc space height loss most pronounced toward the right with edematous Modic endplate change.  Disc and osteophyte encroach into the exit foramina, right greater than left.  There is severe right and moderate/severe left foraminal stenosis with compression of the exiting L5 spinal nerves.  Degenerative hypertrophic change toward the right of the disc space as well as degenerative change of the pseudoarticulation of L5 and S1 narrows the exit pathway of the right L5 spinal nerve as demonstrated on axial T2 series 6, image 28.  Mild facet arthropathy.  The dural canal measures 14 mm.     Impression:     1. Isthmic grade 1 spondylolisthesis at L5-S1 with severe bilateral foraminal stenosis and possible compression of the exiting L5 spinal nerves.  2. Degenerative hypertrophic change toward the right of the L5-S1 disc space causes severe narrowing of the exit pathway of the right L5 spinal nerve as demonstrated on axial T2 image 28.  3. Edematous Modic endplate change at L5-S1 toward the right at L5-S1 may correlate to focal symptoms.       ASSESSMENT: 59 y.o. year old male with lower back and leg pain, consistent with     1. Pars defect of lumbar spine        2. Degeneration of intervertebral disc of lumbar region with discogenic back pain and lower extremity pain        3. Spondylolisthesis of lumbosacral region        4. Lumbar  spondylosis              PLAN:   - Interventions: None at this time.    -S/p repeat Bilateral Lumbar L5/S1 TF epidural steroid injections on 3/7/2025 with 50% relief  - S/p bilateral  hip and sacroiliac joint injections on 8/16/24 with 85% relief so far.  - S/p Lumbar TF Epidural steroid injection at L5/S1 (bilateral) on 12/26/23 with 85% relief.  bilateral SIJ + bilateral IA hip injections on 01/24/2023 with 80% relief  bilateral L3-5 MBB on on 2/7/23 with 80% relief    - Anticoagulation use: no anticoagulation     report:  Reviewed and consistent with medication use as prescribed.      - Medications:  -- Continue Gabapentin 300 mg.   -- Stopped taking Diclofenac 75 mg  due to kidney issues, Stage 3b CKD.  - Can take Tylenol (acetaminophen) 1000mg (two tablets of 500mg) 3x per day as needed for pain (to take up to max dose of 3000mg per day).    Patient was previously referred to Dr. Darling for medical marijuana.     - Therapy:  Continue activities as tolerated.    We discussed  physical therapy to help manage the patient/s painful condition. The patient was counseled that muscle strengthening will improve the long term prognosis in regards to pain and may also help increase range of motion and mobility. Patient previously declined referral to outpatient physical therapy at this time. Reports the last time he was in physical therapy it did not improve his condition.    - Imaging: Reviewed previous imaging. Recommend updated advanced imaging studies if back pain continues/worsens.    - Consults/Referrals: Consider referral to Neurosurgery.    - Follow up visit: patient will update me to proceed with imaging / nsgy referral - depending on work schedule      The above plan and management options were discussed at length with patient. Patient is in agreement with the above and verbalized understanding.    - I discussed the goals of interventional chronic pain management with the patient on today's visit. We  discussed a multimodal and systematic approach to pain.  This includes diagnostic and therapeutic injections, adjuvant pharmacologic treatment, physical therapy, and at times psychiatry.  I emphasized the importance of regular exercise, core strengthening and stretching, diet and weight loss as a cornerstone of long-term pain management.    - This condition does not require this patient to take time off of work, and the primary goal of our Pain Management services is to improve the patient's functional capacity.  - Patient Questions: Answered all of the patient's questions regarding diagnoses, therapy, treatment and next steps      Devora Thompson PA-C  Interventional Pain Management  [facility]

## 2025-04-11 ENCOUNTER — PATIENT MESSAGE (OUTPATIENT)
Dept: PAIN MEDICINE | Facility: CLINIC | Age: 60
End: 2025-04-11
Payer: COMMERCIAL

## 2025-04-11 DIAGNOSIS — M25.551 BILATERAL HIP PAIN: Primary | ICD-10-CM

## 2025-04-11 DIAGNOSIS — M25.552 BILATERAL HIP PAIN: Primary | ICD-10-CM

## 2025-04-11 DIAGNOSIS — M46.1 SACROILIITIS: ICD-10-CM

## 2025-04-15 DIAGNOSIS — M1A.0710 IDIOPATHIC CHRONIC GOUT OF RIGHT FOOT WITHOUT TOPHUS: ICD-10-CM

## 2025-04-15 RX ORDER — ALLOPURINOL 100 MG/1
100 TABLET ORAL
Qty: 90 TABLET | Refills: 1 | Status: SHIPPED | OUTPATIENT
Start: 2025-04-15

## 2025-04-15 NOTE — TELEPHONE ENCOUNTER
Refill Routing Note   Medication(s) are not appropriate for processing by [facility] for the following reason(s):        Required labs abnormal    [facility] action(s):  Defer           Pharmacist review requested: Yes     Appointments  past 12m or future 3m with PCP    Date Provider   Last Visit   11/11/2024 Mohan Schwartz MD   Next Visit   5/20/2025 Mohan Schwartz MD   ED visits in past 90 days: 0        Note composed:9:05 AM 04/15/2025

## 2025-04-15 NOTE — TELEPHONE ENCOUNTER
Refill Decision Note   Dre Curiel  is requesting a refill authorization.  Brief Assessment and Rationale for Refill:  Approve     Medication Therapy Plan:        Pharmacist review requested: Yes   Comments:     Note composed:10:38 AM 04/15/2025

## 2025-04-15 NOTE — TELEPHONE ENCOUNTER
Care Due:                  Date            Visit Type   Department     Provider  --------------------------------------------------------------------------------                                EP -                              [facility]  Last Visit: 11-      CARE ([facility])   MEDICINE       Mohan Schwartz                              [facility]  Next Visit: 05-      CARE ([facility])   MEDICINE       Mohan Schwartz                                                            Last  Test          Frequency    Reason                     Performed    Due Date  --------------------------------------------------------------------------------    Vitamin D...  12 months..  ergocalciferol...........  Not Found    Overdue    [employer] Embedded Care Due Messages. Reference number: [id number].   4/15/2025 8:03:07 AM CDT

## 2025-04-17 DIAGNOSIS — E55.9 VITAMIN D DEFICIENCY: ICD-10-CM

## 2025-04-17 RX ORDER — ERGOCALCIFEROL 1.25 MG/1
50000 CAPSULE ORAL
Qty: 12 CAPSULE | Refills: 0 | Status: SHIPPED | OUTPATIENT
Start: 2025-04-17

## 2025-04-17 NOTE — TELEPHONE ENCOUNTER
No care due was identified.  [facility] Embedded Care Due Messages. Reference number: [id number].   4/17/2025 2:59:11 PM CDT

## 2025-04-19 ENCOUNTER — HOSPITAL ENCOUNTER (OUTPATIENT)
Dept: RADIOLOGY | Facility: HOSPITAL | Age: 60
Discharge: HOME | End: 2025-04-19
Attending: PHYSICIAN ASSISTANT
Payer: COMMERCIAL

## 2025-04-19 DIAGNOSIS — M46.1 SACROILIITIS: ICD-10-CM

## 2025-04-19 DIAGNOSIS — M25.551 BILATERAL HIP PAIN: ICD-10-CM

## 2025-04-19 DIAGNOSIS — M25.552 BILATERAL HIP PAIN: ICD-10-CM

## 2025-04-19 PROCEDURE — 73521 X-RAY EXAM HIPS BI 2 VIEWS: CPT | Mod: TC

## 2025-04-19 PROCEDURE — 73521 X-RAY EXAM HIPS BI 2 VIEWS: CPT | Mod: 26,,, | Performed by: RADIOLOGY

## 2025-04-20 NOTE — TELEPHONE ENCOUNTER
No care due was identified.  [facility] Embedded Care Due Messages. Reference number: [id number].   4/20/2025 8:02:26 AM CDT

## 2025-04-21 RX ORDER — LEVOCETIRIZINE DIHYDROCHLORIDE 5 MG/1
5 TABLET, FILM COATED ORAL NIGHTLY
Qty: 90 TABLET | Refills: 1 | Status: SHIPPED | OUTPATIENT
Start: 2025-04-21

## 2025-04-21 RX ORDER — FLUTICASONE PROPIONATE 50 MCG
2 SPRAY, SUSPENSION (ML) NASAL
Qty: 48 G | Refills: 1 | Status: SHIPPED | OUTPATIENT
Start: 2025-04-21

## 2025-04-21 NOTE — TELEPHONE ENCOUNTER
Refill Routing Note   Medication(s) are not appropriate for processing by [facility] for the following reason(s):        Drug-disease interactionlevocetirizine and Stage 3b chronic kidney disease     [facility] action(s):  Defer  Approve               Appointments  past 12m or future 3m with PCP    Date Provider   Last Visit   11/11/2024 Mohan Schwartz MD   Next Visit   5/20/2025 Mohan Schwartz MD   ED visits in past 90 days: 0        Note composed:7:15 AM 04/21/2025

## 2025-05-13 ENCOUNTER — LAB VISIT (OUTPATIENT)
Dept: LAB | Facility: HOSPITAL | Age: 60
End: 2025-05-13
Attending: FAMILY MEDICINE
Payer: COMMERCIAL

## 2025-05-13 DIAGNOSIS — E79.0 ELEVATED URIC ACID IN BLOOD: ICD-10-CM

## 2025-05-13 DIAGNOSIS — Z51.81 ENCOUNTER FOR MONITORING TESTOSTERONE REPLACEMENT THERAPY: ICD-10-CM

## 2025-05-13 DIAGNOSIS — Z79.890 ENCOUNTER FOR MONITORING TESTOSTERONE REPLACEMENT THERAPY: ICD-10-CM

## 2025-05-13 LAB
ABSOLUTE EOSINOPHIL (OHS): 0.12 K/UL
ABSOLUTE MONOCYTE (OHS): 0.5 K/UL (ref 0.3–1)
ABSOLUTE NEUTROPHIL COUNT (OHS): 4.98 K/UL (ref 1.8–7.7)
ALBUMIN SERPL BCP-MCNC: 3.8 G/DL (ref 3.5–5.2)
ALP SERPL-CCNC: 80 UNIT/L (ref 40–150)
ALT SERPL W/O P-5'-P-CCNC: 29 UNIT/L (ref 10–44)
ANION GAP (OHS): 11 MMOL/L (ref 8–16)
AST SERPL-CCNC: 27 UNIT/L (ref 11–45)
BASOPHILS # BLD AUTO: 0.07 K/UL
BASOPHILS NFR BLD AUTO: 0.9 %
BILIRUB SERPL-MCNC: 0.3 MG/DL (ref 0.1–1)
BUN SERPL-MCNC: 13 MG/DL (ref 6–20)
CALCIUM SERPL-MCNC: 8.9 MG/DL (ref 8.7–10.5)
CHLORIDE SERPL-SCNC: 105 MMOL/L (ref 95–110)
CHOLEST SERPL-MCNC: 136 MG/DL (ref 120–199)
CHOLEST/HDLC SERPL: 3.7 {RATIO} (ref 2–5)
CO2 SERPL-SCNC: 23 MMOL/L (ref 23–29)
CREAT SERPL-MCNC: 1.5 MG/DL (ref 0.5–1.4)
EAG (OHS): 120 MG/DL (ref 68–131)
ERYTHROCYTE [DISTWIDTH] IN BLOOD BY AUTOMATED COUNT: 13.3 % (ref 11.5–14.5)
GFR SERPLBLD CREATININE-BSD FMLA CKD-EPI: 54 ML/MIN/1.73/M2
GLUCOSE SERPL-MCNC: 144 MG/DL (ref 70–110)
HBA1C MFR BLD: 5.8 % (ref 4–5.6)
HCT VFR BLD AUTO: 49 % (ref 40–54)
HDLC SERPL-MCNC: 37 MG/DL (ref 40–75)
HDLC SERPL: 27.2 % (ref 20–50)
HGB BLD-MCNC: 15.3 GM/DL (ref 14–18)
IMM GRANULOCYTES # BLD AUTO: 0.1 K/UL (ref 0–0.04)
IMM GRANULOCYTES NFR BLD AUTO: 1.3 % (ref 0–0.5)
LDLC SERPL CALC-MCNC: 51.2 MG/DL (ref 63–159)
LYMPHOCYTES # BLD AUTO: 1.79 K/UL (ref 1–4.8)
MCH RBC QN AUTO: 29.1 PG (ref 27–31)
MCHC RBC AUTO-ENTMCNC: 31.2 G/DL (ref 32–36)
MCV RBC AUTO: 93 FL (ref 82–98)
NONHDLC SERPL-MCNC: 99 MG/DL
NUCLEATED RBC (/100WBC) (OHS): 0 /100 WBC
PLATELET # BLD AUTO: 243 K/UL (ref 150–450)
PMV BLD AUTO: 11.5 FL (ref 9.2–12.9)
POTASSIUM SERPL-SCNC: 4.3 MMOL/L (ref 3.5–5.1)
PROT SERPL-MCNC: 7.5 GM/DL (ref 6–8.4)
PSA SERPL-MCNC: 0.86 NG/ML
RBC # BLD AUTO: 5.25 M/UL (ref 4.6–6.2)
RELATIVE EOSINOPHIL (OHS): 1.6 %
RELATIVE LYMPHOCYTE (OHS): 23.7 % (ref 18–48)
RELATIVE MONOCYTE (OHS): 6.6 % (ref 4–15)
RELATIVE NEUTROPHIL (OHS): 65.9 % (ref 38–73)
SODIUM SERPL-SCNC: 139 MMOL/L (ref 136–145)
TESTOST SERPL-MCNC: 582 NG/DL (ref 304–1227)
TRIGL SERPL-MCNC: 239 MG/DL (ref 30–150)
URATE SERPL-MCNC: 7.2 MG/DL (ref 3.4–7)
WBC # BLD AUTO: 7.56 K/UL (ref 3.9–12.7)

## 2025-05-13 PROCEDURE — 80053 COMPREHEN METABOLIC PANEL: CPT

## 2025-05-13 PROCEDURE — 83036 HEMOGLOBIN GLYCOSYLATED A1C: CPT

## 2025-05-13 PROCEDURE — 84153 ASSAY OF PSA TOTAL: CPT

## 2025-05-13 PROCEDURE — 80061 LIPID PANEL: CPT

## 2025-05-13 PROCEDURE — 84550 ASSAY OF BLOOD/URIC ACID: CPT

## 2025-05-13 PROCEDURE — 85025 COMPLETE CBC W/AUTO DIFF WBC: CPT

## 2025-05-13 PROCEDURE — 84403 ASSAY OF TOTAL TESTOSTERONE: CPT

## 2025-05-13 PROCEDURE — 82043 UR ALBUMIN QUANTITATIVE: CPT

## 2025-05-13 PROCEDURE — 36415 COLL VENOUS BLD VENIPUNCTURE: CPT | Mod: PN

## 2025-05-14 LAB
ALBUMIN/CREAT UR: 11.5 UG/MG
CREAT UR-MCNC: 122 MG/DL (ref 23–375)
MICROALBUMIN UR-MCNC: 14 UG/ML (ref ?–5000)

## 2025-05-20 ENCOUNTER — OFFICE VISIT (OUTPATIENT)
Dept: FAMILY MEDICINE | Facility: CLINIC | Age: 60
End: 2025-05-20
Payer: COMMERCIAL

## 2025-05-20 VITALS
OXYGEN SATURATION: 97 % | HEART RATE: 73 BPM | WEIGHT: 254.31 LBS | DIASTOLIC BLOOD PRESSURE: 80 MMHG | BODY MASS INDEX: 38.54 KG/M2 | HEIGHT: 68 IN | SYSTOLIC BLOOD PRESSURE: 130 MMHG

## 2025-05-20 DIAGNOSIS — N18.32 STAGE 3B CHRONIC KIDNEY DISEASE: ICD-10-CM

## 2025-05-20 DIAGNOSIS — E34.9 TESTOSTERONE DEFICIENCY: ICD-10-CM

## 2025-05-20 DIAGNOSIS — Z79.890 ENCOUNTER FOR MONITORING TESTOSTERONE REPLACEMENT THERAPY: ICD-10-CM

## 2025-05-20 DIAGNOSIS — Z51.81 ENCOUNTER FOR MONITORING TESTOSTERONE REPLACEMENT THERAPY: ICD-10-CM

## 2025-05-20 DIAGNOSIS — R73.03 PREDIABETES: ICD-10-CM

## 2025-05-20 DIAGNOSIS — I10 ESSENTIAL HYPERTENSION: Primary | ICD-10-CM

## 2025-05-20 PROCEDURE — 3044F HG A1C LEVEL LT 7.0%: CPT | Mod: CPTII,S$GLB,, | Performed by: FAMILY MEDICINE

## 2025-05-20 PROCEDURE — 4010F ACE/ARB THERAPY RXD/TAKEN: CPT | Mod: CPTII,S$GLB,, | Performed by: FAMILY MEDICINE

## 2025-05-20 PROCEDURE — 3008F BODY MASS INDEX DOCD: CPT | Mod: CPTII,S$GLB,, | Performed by: FAMILY MEDICINE

## 2025-05-20 PROCEDURE — 3061F NEG MICROALBUMINURIA REV: CPT | Mod: CPTII,S$GLB,, | Performed by: FAMILY MEDICINE

## 2025-05-20 PROCEDURE — 3075F SYST BP GE 130 - 139MM HG: CPT | Mod: CPTII,S$GLB,, | Performed by: FAMILY MEDICINE

## 2025-05-20 PROCEDURE — G2211 COMPLEX E/M VISIT ADD ON: HCPCS | Mod: S$GLB,,, | Performed by: FAMILY MEDICINE

## 2025-05-20 PROCEDURE — 99214 OFFICE O/P EST MOD 30 MIN: CPT | Mod: S$GLB,,, | Performed by: FAMILY MEDICINE

## 2025-05-20 PROCEDURE — 1159F MED LIST DOCD IN RCRD: CPT | Mod: CPTII,S$GLB,, | Performed by: FAMILY MEDICINE

## 2025-05-20 PROCEDURE — 99999 PR PBB SHADOW E&M-EST. PATIENT-LVL IV: CPT | Mod: PBBFAC,,, | Performed by: FAMILY MEDICINE

## 2025-05-20 PROCEDURE — 3079F DIAST BP 80-89 MM HG: CPT | Mod: CPTII,S$GLB,, | Performed by: FAMILY MEDICINE

## 2025-05-20 PROCEDURE — 3066F NEPHROPATHY DOC TX: CPT | Mod: CPTII,S$GLB,, | Performed by: FAMILY MEDICINE

## 2025-05-20 RX ORDER — METOPROLOL SUCCINATE 25 MG/1
25 TABLET, EXTENDED RELEASE ORAL DAILY
Qty: 90 TABLET | Refills: 2 | Status: SHIPPED | OUTPATIENT
Start: 2025-05-20 | End: 2026-02-14

## 2025-05-20 RX ORDER — AMLODIPINE AND BENAZEPRIL HYDROCHLORIDE 5; 20 MG/1; MG/1
1 CAPSULE ORAL DAILY
Qty: 90 CAPSULE | Refills: 3 | Status: SHIPPED | OUTPATIENT
Start: 2025-05-20 | End: 2026-05-20

## 2025-05-20 RX ORDER — TESTOSTERONE CYPIONATE 200 MG/ML
INJECTION, SOLUTION INTRAMUSCULAR
Qty: 2 ML | Refills: 5 | Status: SHIPPED | OUTPATIENT
Start: 2025-05-20

## 2025-05-20 NOTE — PROGRESS NOTES
Chief Complaint:    Chief Complaint   Patient presents with    Follow-up       History of Present Illness:    History of Present Illness    Patient presents today for follow up of lightheadedness He experiences lightheadedness and dizziness when transitioning from lying down to standing position and when bending over. During these episodes, he describes seeing a very bright white light. Home blood pressure readings fluctuate between 130-140/80, with a recent reading of 123/71. He eats twice daily with boiled eggs as snacks between meals. His lunch typically includes vegetables and meat, such as peas, mashed potatoes, and meatloaf. He occasionally consumes pasta and chips every other day. He drinks regular Coke in the morning. He works long hours, leaving home at 6:00 AM and returning after 7:00 PM daily. Blood glucose is in pre-diabetic range. Creatinine is 1.5 mg/dL and remains stable. PSA and testosterone levels are normal. Uric acid remains mildly elevated though improved from previous values. Urinalysis is normal.      ROS:  General: denies fever, denies chills, denies fatigue, denies weight gain, denies weight loss, denies loss of appetite  Eyes: denies vision changes, denies blurry vision, denies eye pain, denies eye discharge, admits spots, specks or flashing lights  ENT: denies ear pain, denies hearing loss, denies tinnitus, denies nasal congestion, denies sore throat  Cardiovascular: denies chest pain, denies palpitations, denies lower extremity edema  Respiratory: denies cough, denies shortness of breath, denies wheezing, denies sputum production  Endocrine: denies polyuria, denies polydipsia, denies heat intolerance, denies cold intolerance  Gastrointestinal: denies abdominal pain, denies heartburn, denies nausea, denies vomiting, denies diarrhea, denies constipation, denies blood in stool  Genitourinary: denies dysuria, denies urgency, denies frequency, denies hematuria, denies nocturia, denies  incontinence  Heme & Lymphatic: denies easy or excessive bleeding, denies easy bruising, denies swollen lymph nodes  Musculoskeletal: denies muscle pain, denies back pain, denies joint pain, denies joint swelling, admits limb pain, admits body aches  Skin: denies rash, denies lesion, denies itching, denies skin texture changes, denies skin color changes  Neurological: denies headache, admits dizziness, denies numbness, denies tingling, denies seizure activity, denies speech difficulty, denies memory loss, denies confusion , admits lightheadedness, admits neardenies syncope  Psychiatric: denies anxiety, denies depression, denies sleep difficulty           Past Medical History:   Diagnosis Date    Allergy     Anxiety     Cancer of kidney     Depression     Hyperlipidemia     Hypertension        Social History:  Social History     Socioeconomic History    Marital status:    Occupational History     Employer: [employer]   Tobacco Use    Smoking status: Some Days    Smokeless tobacco: Never    Tobacco comments:     dips    Substance and Sexual Activity    Alcohol use: Not Currently     Comment: 2-3 times a month, 2-3 servings    Drug use: No    Sexual activity: Yes     Partners: Female     Birth control/protection: None     Social Drivers of Health     Financial Resource Strain: Medium Risk (7/31/2024)    Overall Financial Resource Strain (CARDIA)     Difficulty of Paying Living Expenses: Somewhat hard   Food Insecurity: Food Insecurity Present (7/31/2024)    Hunger Vital Sign     Worried About Running Out of Food in the Last Year: Sometimes true     Ran Out of Food in the Last Year: Often true   Physical Activity: Insufficiently Active (7/31/2024)    Exercise Vital Sign     Days of Exercise per Week: 2 days     Minutes of Exercise per Session: 30 min   Stress: Stress Concern Present (7/31/2024)    [employer] - Occupational Stress Questionnaire     Feeling of Stress : Very much  "  Housing Stability: Unknown (7/31/2024)    Housing Stability Vital Sign     Unable to Pay for Housing in the Last Year: No       Family History:   family history includes Arthritis in his mother; Cancer in his maternal uncle; Hypertension in his mother; No Known Problems in his brother, maternal aunt, maternal grandfather, maternal grandmother, paternal aunt, paternal grandfather, paternal grandmother, paternal uncle, and sister.    Health Maintenance   Topic Date Due    Aspirin/Antiplatelet Therapy  Never done    Pneumococcal Vaccines (Age 50+) (1 of 2 - PCV) Never done    COVID-19 Vaccine (3 - 2024-25 season) 09/01/2024    Hemoglobin A1c (Prediabetes)  05/13/2026    Annual UACr  05/13/2026    Lipid Panel  05/13/2026    High Dose Statin  05/20/2026    TETANUS VACCINE  10/31/2027    Colorectal Cancer Screening  10/25/2029    RSV Vaccine (Age 60+ and Pregnant patients) (1 - 1-dose 75+ series) 07/21/2041    Hepatitis C Screening  Completed    Shingles Vaccine  Completed    Influenza Vaccine  Completed    HIV Screening  Completed       Exam:Physical     Vital Signs  Pulse: 73  SpO2: 97 %  BP: 130/80  Pain Score: 0-No pain  Height and Weight  Height: 5' 8" (172.7 cm)  Weight: 115.4 kg (254 lb 4.8 oz)  BSA (Calculated - sq m): 2.35 sq meters  BMI (Calculated): 38.7  Weight in (lb) to have BMI = 25: 164.1]    Body mass index is 38.67 kg/m².    Physical Exam    General: Well-developed. Well-nourished. No acute distress.  Eyes: EOMI. Sclerae anicteric.  HENT: Normocephalic. Atraumatic. Nares patent. Moist oral mucosa.  Cardiovascular: Regular rate. Regular rhythm. No murmurs. No rubs. No gallops. Normal S1, S2.  Respiratory: Normal respiratory effort. Clear to auscultation bilaterally. No rales. No rhonchi. No wheezing.  Musculoskeletal: No  obvious deformity.  Extremities: No lower extremity edema.  Neurological: Alert & oriented x3. No slurred speech. Normal gait.  Psychiatric: Normal mood. Normal affect. Good insight. " Good judgment.  Skin: Warm. Dry. No rash.           Assessment:        ICD-10-CM ICD-9-CM   1. Essential hypertension  I10 401.9   2. Encounter for monitoring testosterone replacement therapy  Z51.81 V58.83    Z79.890 V58.69   3. Stage 3b chronic kidney disease  N18.32 585.3   4. Prediabetes  R73.03 790.29   5. Testosterone deficiency  E34.9 259.9         Plan:    Assessment & Plan    MEDICAL DECISION MAKING:  - Attributed reported dizziness and lightheadedness to potential side effects of Gabapentin and/or low BP.  - BP readings fluctuating but generally within acceptable range.  - Blood sugar levels increased to pre-diabetic range.  - Weight gain and dietary habits contributing factors to prediabetes.  - Started weight loss/diabetes medication (likely GLP-1 agonist) at 150 dollars/month for a 1-month trial, to be obtained through [facility].  - Renal function stable, PSA and testosterone levels normal.    PATIENT EDUCATION:  - Explained prediabetes and its potential progression to diabetes if not managed.  - Discussed complications of diabetes, including increased risk of heart attack, stroke, limb loss, and blindness.  - Educated on impact of dietary choices on glucose levels, particularly high-sugar drinks and carbohydrate-rich foods.  - Provided information on relationship between weight loss and glucose control.  - Explained concept and potential benefits of intermittent fasting.  - Discussed sugar content in soft drinks, noting that a can of regular Coke contains about 40 g of sugar.    ACTION ITEMS/LIFESTYLE:  - Patient to eliminate regular Coke.  - Recommend reducing intake of foods made from wheat flour, including bread, biscuits, cookies, crackers, and pasta.  - Patient to focus meals on vegetables and meat.  - Recommend limiting fruit intake to berries only.  - Patient to implement intermittent fasting by skipping 1 meal (lunch or dinner) once a week.  - Patient to discuss dietary changes with  son who prepares meals.  - Consider preparing meals in advance for multiple days.  - Patient to utilize frozen vegetables and canned beans for quick, healthy meal preparation.    MEDICATIONS:  - Continued Nitro Indur, metoprolol, and Amlodipine for BP.  - Continued lisinopril/HCTZ 5-20.    ORDERS:  - Ordered glucose levels recheck in 6 months.         Dre was seen today for follow-up.    Diagnoses and all orders for this visit:    Essential hypertension  -     Lipid Panel; Future  -     Comprehensive Metabolic Panel; Future  -     amlodipine-benazepril 5-20 mg (LOTREL) 5-20 mg per capsule; Take 1 capsule by mouth once daily.  -     metoprolol succinate (TOPROL-XL) 25 MG 24 hr tablet; Take 1 tablet (25 mg total) by mouth once daily.    Encounter for monitoring testosterone replacement therapy  -     Hemoglobin A1C; Future  -     CBC Auto Differential; Future  -     PSA, Screening; Future  -     Testosterone,Total; Future    Stage 3b chronic kidney disease    Prediabetes  -     Hemoglobin A1C; Future    Testosterone deficiency  -     testosterone cypionate (DEPOTESTOTERONE CYPIONATE) 200 mg/mL injection; INJECT ONE ML INTO MUSCLE EVERY 14 DAYS        Follow up in about 6 months (around 11/20/2025), or if symptoms worsen or fail to improve.      Mohan Schwartz MD

## 2025-05-21 ENCOUNTER — PATIENT MESSAGE (OUTPATIENT)
Dept: FAMILY MEDICINE | Facility: CLINIC | Age: 60
End: 2025-05-21
Payer: COMMERCIAL

## 2025-07-02 DIAGNOSIS — R94.31 ABNORMAL ECG: ICD-10-CM

## 2025-07-02 DIAGNOSIS — Z00.00 ROUTINE ADULT HEALTH MAINTENANCE: Primary | ICD-10-CM

## 2025-07-18 ENCOUNTER — OFFICE VISIT (OUTPATIENT)
Dept: CARDIOLOGY | Facility: CLINIC | Age: 60
End: 2025-07-18
Payer: COMMERCIAL

## 2025-07-18 VITALS
BODY MASS INDEX: 37.17 KG/M2 | WEIGHT: 244.5 LBS | DIASTOLIC BLOOD PRESSURE: 86 MMHG | HEART RATE: 60 BPM | OXYGEN SATURATION: 97 % | SYSTOLIC BLOOD PRESSURE: 130 MMHG

## 2025-07-18 DIAGNOSIS — N18.32 STAGE 3B CHRONIC KIDNEY DISEASE: ICD-10-CM

## 2025-07-18 DIAGNOSIS — I25.118 CORONARY ARTERY DISEASE OF NATIVE ARTERY OF NATIVE HEART WITH STABLE ANGINA PECTORIS: Primary | ICD-10-CM

## 2025-07-18 DIAGNOSIS — R73.03 PREDIABETES: ICD-10-CM

## 2025-07-18 DIAGNOSIS — E66.01 CLASS 2 SEVERE OBESITY DUE TO EXCESS CALORIES WITH SERIOUS COMORBIDITY AND BODY MASS INDEX (BMI) OF 39.0 TO 39.9 IN ADULT: ICD-10-CM

## 2025-07-18 DIAGNOSIS — I10 ESSENTIAL HYPERTENSION: ICD-10-CM

## 2025-07-18 DIAGNOSIS — E66.812 CLASS 2 SEVERE OBESITY DUE TO EXCESS CALORIES WITH SERIOUS COMORBIDITY AND BODY MASS INDEX (BMI) OF 39.0 TO 39.9 IN ADULT: ICD-10-CM

## 2025-07-18 DIAGNOSIS — G47.33 OSA ON CPAP: ICD-10-CM

## 2025-07-18 DIAGNOSIS — R94.31 ABNORMAL ECG: ICD-10-CM

## 2025-07-18 DIAGNOSIS — E78.2 MIXED HYPERLIPIDEMIA: ICD-10-CM

## 2025-07-18 PROCEDURE — 99999 PR PBB SHADOW E&M-EST. PATIENT-LVL III: CPT | Mod: PBBFAC,,, | Performed by: INTERNAL MEDICINE

## 2025-07-18 NOTE — PROGRESS NOTES
Subjective   Patient ID:  Dre Curiel is a 59 y.o. male who presents for evaluation of Follow-up        HPI:  Pt presents for eval.  Has seen Dr. Jewell, Cards, in past w last visit in 2021.  Has HTN, dyslipidemia, obesity, CRI, pre-DM, s/p left nephrectomy in 2021 for cancer, KARLY, oral tobacco.  Iodine allergy.  Has chronic CP.  PCP ordered stress test in 2024 -- negative -- and pt advised to see us for possible heart cath.  Nuclear stress test Aug 2024 personally reviewed: normal perfusion, normal EF.  Ecgs in past NSR, low voltage.   Stable chest pain sxs; stress related overall.  CP sxs have not changed for worse per pt.  Stable BRIGGS.  BP controlled.  Compliant w meds.  Uses CPAP.  Labs reviewed.  Lipids stable.  On statin tx.  Echo Sept 2024 normal EF, normal PAP.        Past Medical History:   Diagnosis Date    Allergy     Anxiety     Cancer of kidney     Depression     Hyperlipidemia     Hypertension        Current Outpatient Medications:     allopurinoL (ZYLOPRIM) 100 MG tablet, TAKE ONE TABLET BY MOUTH ONCE DAILY, Disp: 90 tablet, Rfl: 1    amlodipine-benazepril 5-20 mg (LOTREL) 5-20 mg per capsule, Take 1 capsule by mouth once daily., Disp: 90 capsule, Rfl: 3    ergocalciferol (ERGOCALCIFEROL) 50,000 unit Cap, Take 1 capsule (50,000 Units total) by mouth every 7 days., Disp: 12 capsule, Rfl: 0    fluticasone propionate (FLONASE) 50 mcg/actuation nasal spray, USE TWO SPRAYS IN EACH NOSTRIL ONCE DAILY, Disp: 48 g, Rfl: 1    gabapentin (NEURONTIN) 300 MG capsule, TAKE ONE CAPSULE BY MOUTH DURING THE DAY THEN TWO CAPSULES EVERY NIGHT AT BEDTIME, Disp: 270 capsule, Rfl: 1    isosorbide mononitrate (IMDUR) 30 MG 24 hr tablet, Take 1 tablet (30 mg total) by mouth once daily., Disp: 30 tablet, Rfl: 11    levocetirizine (XYZAL) 5 MG tablet, TAKE ONE TABLET BY MOUTH EVERY EVENING, Disp: 90 tablet, Rfl: 1    metoprolol succinate (TOPROL-XL) 25 MG 24 hr tablet, Take 1 tablet (25 mg total) by mouth once daily.,  Disp: 90 tablet, Rfl: 2    nitroGLYCERIN (NITROSTAT) 0.4 MG SL tablet, Place 1 tablet (0.4 mg total) under the tongue every 5 (five) minutes as needed., Disp: 20 tablet, Rfl: 12    potassium chloride (KLOR-CON) 10 MEQ TbSR, Take 1 tablet (10 mEq total) by mouth every Mon, Wed, Fri, Sun., Disp: 16 tablet, Rfl: 5    pravastatin (PRAVACHOL) 40 MG tablet, Take 1 tablet (40 mg total) by mouth once daily., Disp: 90 tablet, Rfl: 3    testosterone cypionate (DEPOTESTOTERONE CYPIONATE) 200 mg/mL injection, INJECT ONE ML INTO MUSCLE EVERY 14 DAYS, Disp: 2 mL, Rfl: 5    traZODone (DESYREL) 50 MG tablet, Take 1 tablet (50 mg total) by mouth once daily., Disp: 90 tablet, Rfl: 2      Review of Systems   Constitutional: Positive for weight loss.   HENT: Negative.     Eyes: Negative.    Cardiovascular:  Positive for chest pain and dyspnea on exertion.   Respiratory:  Positive for shortness of breath.    Endocrine: Negative.    Hematologic/Lymphatic: Negative.    Skin: Negative.    Musculoskeletal:  Positive for arthritis.   Gastrointestinal: Negative.    Genitourinary: Negative.    Neurological: Negative.    Psychiatric/Behavioral: Negative.     Allergic/Immunologic: Negative.          /86 (BP Location: Left arm, Patient Position: Sitting)   Pulse 60   Wt 110.9 kg (244 lb 7.8 oz)   SpO2 97%   BMI 37.17 kg/m²     Wt Readings from Last 3 Encounters:   07/18/25 110.9 kg (244 lb 7.8 oz)   05/20/25 115.4 kg (254 lb 4.8 oz)   03/07/25 111.4 kg (245 lb 11.2 oz)     Temp Readings from Last 3 Encounters:   03/07/25 97.5 °F (36.4 °C) (Temporal)   10/25/24 97 °F (36.1 °C)   08/16/24 98 °F (36.7 °C) (Temporal)     BP Readings from Last 3 Encounters:   07/18/25 130/86   05/20/25 130/80   03/07/25 126/66     Pulse Readings from Last 3 Encounters:   07/18/25 60   05/20/25 73   03/07/25 67          Objective     Physical Exam  Vitals and nursing note reviewed.   Constitutional:       Appearance: He is well-developed. He is obese.   HENT:       Head: Normocephalic.   Neck:      Thyroid: No thyromegaly.      Vascular: Normal carotid pulses. No carotid bruit, hepatojugular reflux or JVD.   Cardiovascular:      Rate and Rhythm: Normal rate and regular rhythm.      Chest Wall: PMI is not displaced.      Pulses:           Radial pulses are 2+ on the right side and 2+ on the left side.      Heart sounds: S1 normal and S2 normal. Heart sounds not distant. No midsystolic click and no opening snap. No murmur heard.     No friction rub. No S3 or S4 sounds.   Pulmonary:      Effort: Pulmonary effort is normal.      Breath sounds: Normal breath sounds. No wheezing or rales.   Abdominal:      General: Bowel sounds are normal. There is no distension or abdominal bruit.      Palpations: Abdomen is soft. There is no mass.      Tenderness: There is no abdominal tenderness.   Musculoskeletal:      Cervical back: Normal range of motion and neck supple.   Skin:     General: Skin is warm.   Neurological:      Mental Status: He is alert and oriented to person, place, and time.   Psychiatric:         Behavior: Behavior normal.         I have reviewed all pertinent labs and cardiac studies.          Chemistry        Component Value Date/Time     05/13/2025 0828     01/17/2025 0918    K 4.3 05/13/2025 0828    K 4.5 01/17/2025 0918     05/13/2025 0828     01/17/2025 0918    CO2 23 05/13/2025 0828    CO2 27 01/17/2025 0918    BUN 13 05/13/2025 0828    CREATININE 1.5 (H) 05/13/2025 0828     (H) 05/13/2025 0828     01/17/2025 0918        Component Value Date/Time    CALCIUM 8.9 05/13/2025 0828    CALCIUM 9.9 01/17/2025 0918    ALKPHOS 80 05/13/2025 0828    ALKPHOS 64 11/04/2024 0805    AST 27 05/13/2025 0828    AST 33 11/04/2024 0805    ALT 29 05/13/2025 0828    ALT 66 (H) 11/04/2024 0805    BILITOT 0.3 05/13/2025 0828    BILITOT 0.4 11/04/2024 0805    ESTGFRAFRICA 40 (A) 06/28/2022 0951    EGFRNONAA 34 (A) 06/28/2022 0951        Lab Results    Component Value Date    WBC 7.56 05/13/2025    HGB 15.3 05/13/2025    HCT 49.0 05/13/2025    MCV 93 05/13/2025     05/13/2025       Lab Results   Component Value Date    HGBA1C 5.8 (H) 05/13/2025     Lab Results   Component Value Date    CHOL 136 05/13/2025    CHOL 186 11/04/2024    CHOL 162 04/25/2024     Lab Results   Component Value Date    HDL 37 (L) 05/13/2025    HDL 24 (L) 11/04/2024    HDL 37 (L) 04/25/2024     Lab Results   Component Value Date    LDLCALC 51.2 (L) 05/13/2025    LDLCALC 94.8 11/04/2024    LDLCALC 81.0 04/25/2024     Lab Results   Component Value Date    TRIG 239 (H) 05/13/2025    TRIG 336 (H) 11/04/2024    TRIG 220 (H) 04/25/2024       Lab Results   Component Value Date    CHOLHDL 27.2 05/13/2025    CHOLHDL 12.9 (L) 11/04/2024    CHOLHDL 22.8 04/25/2024       No results found for this or any previous visit.      Results for orders placed during the hospital encounter of 08/30/24    Nuclear Stress - Cardiology Interpreted    Interpretation Summary    Normal myocardial perfusion scan. There is no evidence of myocardial ischemia or infarction.    The gated perfusion images showed an ejection fraction of 68% at rest. The gated perfusion images showed an ejection fraction of 72% post stress.    The ECG portion of the study is negative for ischemia.    The patient reported chest pain during the stress test.    Chest discomfort resolved prior to the end of the recovery period.         Assessment and Plan     1. Coronary artery disease of native artery of native heart with stable angina pectoris    2. Abnormal ECG    3. Class 2 severe obesity due to excess calories with serious comorbidity and body mass index (BMI) of 39.0 to 39.9 in adult    4. Mixed hyperlipidemia    5. Essential hypertension    6. KARLY on CPAP    7. Prediabetes    8. Stage 3b chronic kidney disease          Plan:    Stable CV status on current medical tx.  CAD with chronic angina.  Normal nuclear stress test Aug  2024.  Imdur 30 mg qd.  Sl ntg prn -- pt advised how to use and when to go to ER.  HTN control.  Statin tx.  CRI: f/u w Nephrology/PCP as advised.  Weight loss.  Tobacco use discouraged.  CPAP.      F/u 1 year.

## 2025-07-21 NOTE — TELEPHONE ENCOUNTER
Care Due:                  Date            Visit Type   Department     Provider  --------------------------------------------------------------------------------                                EP -                              [facility]  Last Visit: 05-      CARE ([facility])   MEDICINE       Mohan Schwartz                              [facility]  Next Visit: 11-      CARE ([facility])   MEDICINE       Mohan Schwartz                                                            Last  Test          Frequency    Reason                     Performed    Due Date  --------------------------------------------------------------------------------    Vitamin D...  12 months..  ergocalciferol...........  Not Found    Overdue    [employer] Embedded Care Due Messages. Reference number: [id number].   7/21/2025 9:46:12 AM CDT

## 2025-07-22 ENCOUNTER — LAB VISIT (OUTPATIENT)
Dept: LAB | Facility: HOSPITAL | Age: 60
End: 2025-07-22
Attending: INTERNAL MEDICINE
Payer: COMMERCIAL

## 2025-07-22 DIAGNOSIS — N18.30 STAGE 3 CHRONIC KIDNEY DISEASE, UNSPECIFIED WHETHER STAGE 3A OR 3B CKD: ICD-10-CM

## 2025-07-22 DIAGNOSIS — I10 HYPERTENSION, UNSPECIFIED TYPE: ICD-10-CM

## 2025-07-22 DIAGNOSIS — Z90.5 SINGLE KIDNEY: ICD-10-CM

## 2025-07-22 DIAGNOSIS — N25.81 SECONDARY HYPERPARATHYROIDISM: ICD-10-CM

## 2025-07-22 LAB
25(OH)D3+25(OH)D2 SERPL-MCNC: 50 NG/ML (ref 30–96)
ALBUMIN SERPL BCP-MCNC: 4 G/DL (ref 3.5–5.2)
ANION GAP (OHS): 7 MMOL/L (ref 8–16)
BUN SERPL-MCNC: 10 MG/DL (ref 6–20)
CALCIUM SERPL-MCNC: 9 MG/DL (ref 8.7–10.5)
CHLORIDE SERPL-SCNC: 105 MMOL/L (ref 95–110)
CO2 SERPL-SCNC: 25 MMOL/L (ref 23–29)
CREAT SERPL-MCNC: 1.6 MG/DL (ref 0.5–1.4)
GFR SERPLBLD CREATININE-BSD FMLA CKD-EPI: 49 ML/MIN/1.73/M2
GLUCOSE SERPL-MCNC: 116 MG/DL (ref 70–110)
PHOSPHATE SERPL-MCNC: 2.4 MG/DL (ref 2.7–4.5)
POTASSIUM SERPL-SCNC: 4.5 MMOL/L (ref 3.5–5.1)
PTH-INTACT SERPL-MCNC: 65.1 PG/ML (ref 9–77)
SODIUM SERPL-SCNC: 137 MMOL/L (ref 136–145)

## 2025-07-22 PROCEDURE — 36415 COLL VENOUS BLD VENIPUNCTURE: CPT | Mod: PN

## 2025-07-22 PROCEDURE — 83970 ASSAY OF PARATHORMONE: CPT

## 2025-07-22 PROCEDURE — 80069 RENAL FUNCTION PANEL: CPT

## 2025-07-22 PROCEDURE — 82306 VITAMIN D 25 HYDROXY: CPT

## 2025-07-22 RX ORDER — POTASSIUM CHLORIDE 750 MG/1
10 TABLET, EXTENDED RELEASE ORAL
Qty: 48 TABLET | Refills: 3 | Status: SHIPPED | OUTPATIENT
Start: 2025-07-23

## 2025-07-22 NOTE — TELEPHONE ENCOUNTER
Refill Decision Note   Dre Curiel  is requesting a refill authorization.  Brief Assessment and Rationale for Refill:  Approve     Medication Therapy Plan:         Pharmacist review requested: Yes   Extended chart review required: Yes   Comments:     Note composed:2:02 AM 07/22/2025

## 2025-07-22 NOTE — TELEPHONE ENCOUNTER
Refill Routing Note   Medication(s) are not appropriate for processing by [facility] for the following reason(s):        Required labs abnormal    [facility] action(s):  Defer           Pharmacist review requested: Yes     Appointments  past 12m or future 3m with PCP    Date Provider   Last Visit   5/20/2025 Mohan Schwartz MD   Next Visit   11/20/2025 Mohan Schwartz MD   ED visits in past 90 days: 0        Note composed:11:57 PM 07/21/2025

## 2025-07-29 ENCOUNTER — OFFICE VISIT (OUTPATIENT)
Dept: NEPHROLOGY | Facility: CLINIC | Age: 60
End: 2025-07-29
Payer: COMMERCIAL

## 2025-07-29 VITALS
WEIGHT: 245.13 LBS | BODY MASS INDEX: 37.15 KG/M2 | HEIGHT: 68 IN | DIASTOLIC BLOOD PRESSURE: 84 MMHG | HEART RATE: 74 BPM | RESPIRATION RATE: 20 BRPM | SYSTOLIC BLOOD PRESSURE: 134 MMHG

## 2025-07-29 DIAGNOSIS — Z90.5 SINGLE KIDNEY: ICD-10-CM

## 2025-07-29 DIAGNOSIS — N18.30 STAGE 3 CHRONIC KIDNEY DISEASE, UNSPECIFIED WHETHER STAGE 3A OR 3B CKD: Primary | ICD-10-CM

## 2025-07-29 DIAGNOSIS — I10 HYPERTENSION, UNSPECIFIED TYPE: ICD-10-CM

## 2025-07-29 DIAGNOSIS — N25.81 SECONDARY HYPERPARATHYROIDISM: ICD-10-CM

## 2025-07-29 PROCEDURE — 3079F DIAST BP 80-89 MM HG: CPT | Mod: CPTII,S$GLB,, | Performed by: INTERNAL MEDICINE

## 2025-07-29 PROCEDURE — 3061F NEG MICROALBUMINURIA REV: CPT | Mod: CPTII,S$GLB,, | Performed by: INTERNAL MEDICINE

## 2025-07-29 PROCEDURE — 3075F SYST BP GE 130 - 139MM HG: CPT | Mod: CPTII,S$GLB,, | Performed by: INTERNAL MEDICINE

## 2025-07-29 PROCEDURE — 99214 OFFICE O/P EST MOD 30 MIN: CPT | Mod: S$GLB,,, | Performed by: INTERNAL MEDICINE

## 2025-07-29 PROCEDURE — 3008F BODY MASS INDEX DOCD: CPT | Mod: CPTII,S$GLB,, | Performed by: INTERNAL MEDICINE

## 2025-07-29 PROCEDURE — 1160F RVW MEDS BY RX/DR IN RCRD: CPT | Mod: CPTII,S$GLB,, | Performed by: INTERNAL MEDICINE

## 2025-07-29 PROCEDURE — 3066F NEPHROPATHY DOC TX: CPT | Mod: CPTII,S$GLB,, | Performed by: INTERNAL MEDICINE

## 2025-07-29 PROCEDURE — 1159F MED LIST DOCD IN RCRD: CPT | Mod: CPTII,S$GLB,, | Performed by: INTERNAL MEDICINE

## 2025-07-29 PROCEDURE — 4010F ACE/ARB THERAPY RXD/TAKEN: CPT | Mod: CPTII,S$GLB,, | Performed by: INTERNAL MEDICINE

## 2025-07-29 PROCEDURE — 3044F HG A1C LEVEL LT 7.0%: CPT | Mod: CPTII,S$GLB,, | Performed by: INTERNAL MEDICINE

## 2025-07-29 PROCEDURE — 99999 PR PBB SHADOW E&M-EST. PATIENT-LVL IV: CPT | Mod: PBBFAC,,, | Performed by: INTERNAL MEDICINE

## 2025-07-29 NOTE — PROGRESS NOTES
"Subjective:       Patient ID: Dre Curiel is a 60 y.o. male.    Chief Complaint:  CKD, hypertension, solitary kidney    HPI    He presents to clinic today for routine follow-up.  Since his last office visit he has been doing well and has no specific or new complaints.  His laboratory studies and medications were reviewed.  All Nephrology related questions were answered to his satisfaction.      Review of Systems   Constitutional: Negative.    HENT: Negative.     Respiratory: Negative.     Cardiovascular: Negative.    Gastrointestinal: Negative.    Genitourinary: Negative.    Musculoskeletal: Negative.    Skin: Negative.        /84   Pulse 74   Resp 20   Ht 5' 8" (1.727 m)   Wt 111.2 kg (245 lb 2.4 oz)   BMI 37.28 kg/m²     Lab Results   Component Value Date    WBC 7.56 05/13/2025    HGB 15.3 05/13/2025    HCT 49.0 05/13/2025    MCV 93 05/13/2025     05/13/2025      BMP  Lab Results   Component Value Date     07/22/2025    K 4.5 07/22/2025     07/22/2025    CO2 25 07/22/2025    BUN 10 07/22/2025    CREATININE 1.6 (H) 07/22/2025    CALCIUM 9.0 07/22/2025    ANIONGAP 7 (L) 07/22/2025    ESTGFRAFRICA 40 (A) 06/28/2022    EGFRNONAA 34 (A) 06/28/2022     CMP  Sodium   Date Value Ref Range Status   07/22/2025 137 136 - 145 mmol/L Final   01/17/2025 140 136 - 145 mmol/L Final     Potassium   Date Value Ref Range Status   07/22/2025 4.5 3.5 - 5.1 mmol/L Final   01/17/2025 4.5 3.5 - 5.1 mmol/L Final     Chloride   Date Value Ref Range Status   07/22/2025 105 95 - 110 mmol/L Final   01/17/2025 105 95 - 110 mmol/L Final     CO2   Date Value Ref Range Status   07/22/2025 25 23 - 29 mmol/L Final   01/17/2025 27 23 - 29 mmol/L Final     Glucose   Date Value Ref Range Status   07/22/2025 116 (H) 70 - 110 mg/dL Final   01/17/2025 100 70 - 110 mg/dL Final     BUN   Date Value Ref Range Status   07/22/2025 10 6 - 20 mg/dL Final     Creatinine   Date Value Ref Range Status   07/22/2025 1.6 (H) 0.5 - 1.4 " mg/dL Final     Calcium   Date Value Ref Range Status   07/22/2025 9.0 8.7 - 10.5 mg/dL Final   01/17/2025 9.9 8.7 - 10.5 mg/dL Final     Protein Total   Date Value Ref Range Status   05/13/2025 7.5 6.0 - 8.4 gm/dL Final     Total Protein   Date Value Ref Range Status   11/04/2024 7.4 6.0 - 8.4 g/dL Final     Albumin   Date Value Ref Range Status   07/22/2025 4.0 3.5 - 5.2 g/dL Final   01/17/2025 3.7 3.5 - 5.2 g/dL Final     Total Bilirubin   Date Value Ref Range Status   11/04/2024 0.4 0.1 - 1.0 mg/dL Final     Comment:     For infants and newborns, interpretation of results should be based  on gestational age, weight and in agreement with clinical  observations.    Premature Infant recommended reference ranges:  Up to 24 hours.............<8.0 mg/dL  Up to 48 hours............<12.0 mg/dL  3-5 days..................<15.0 mg/dL  6-29 days.................<15.0 mg/dL       Bilirubin Total   Date Value Ref Range Status   05/13/2025 0.3 0.1 - 1.0 mg/dL Final     Comment:     For infants and newborns, interpretation of results should be based   on gestational age, weight and in agreement with clinical   observations.    Premature Infant recommended reference ranges:   0-24 hours:  <8.0 mg/dL   24-48 hours: <12.0 mg/dL   3-5 days:    <15.0 mg/dL   6-29 days:   <15.0 mg/dL     Alkaline Phosphatase   Date Value Ref Range Status   11/04/2024 64 40 - 150 U/L Final     ALP   Date Value Ref Range Status   05/13/2025 80 40 - 150 unit/L Final     AST   Date Value Ref Range Status   05/13/2025 27 11 - 45 unit/L Final   11/04/2024 33 10 - 40 U/L Final     ALT   Date Value Ref Range Status   05/13/2025 29 10 - 44 unit/L Final   11/04/2024 66 (H) 10 - 44 U/L Final     Anion Gap   Date Value Ref Range Status   07/22/2025 7 (L) 8 - 16 mmol/L Final     eGFR if    Date Value Ref Range Status   06/28/2022 40 (A) >60 mL/min/1.73 m^2 Final     eGFR if non    Date Value Ref Range Status   06/28/2022 34 (A)  >60 mL/min/1.73 m^2 Final     Comment:     Calculation used to obtain the estimated glomerular filtration  rate (eGFR) is the CKD-EPI equation.        Medications Ordered Prior to Encounter[1]         Objective:            Physical Exam  Constitutional:       Appearance: Normal appearance.   HENT:      Head: Normocephalic and atraumatic.   Eyes:      General: No scleral icterus.     Extraocular Movements: Extraocular movements intact.      Pupils: Pupils are equal, round, and reactive to light.   Pulmonary:      Effort: Pulmonary effort is normal.      Breath sounds: No stridor.   Musculoskeletal:      Right lower leg: No edema.      Left lower leg: No edema.   Skin:     General: Skin is warm and dry.   Neurological:      General: No focal deficit present.      Mental Status: He is alert and oriented to person, place, and time.   Psychiatric:         Mood and Affect: Mood normal.         Behavior: Behavior normal.       Assessment:       1. Stage 3 chronic kidney disease, unspecified whether stage 3a or 3b CKD    2. Hypertension, unspecified type    3. Secondary hyperparathyroidism    4. Single kidney        Plan:       1. Creatinine has remained stable for the past 6 months ranging between 1.5 and 1.6.  Urinalysis is bland without evidence of proteinuria.  He is on a RAAS inhibitor.    Electrolytes, acid-base volume are stable.    2. Blood pressure is well controlled on current regimen.  Continue RAAS inhibition.    3. Intact PTH is now normal at 65.  Vitamin-D is normal at 50.  Phosphorus is slightly low at 2.4.  Will continue to monitor.  Calcium is stable at 9 with an albumin of 4.0.    He underwent unilateral nephrectomy in 2021 secondary to renal cell carcinoma.    Follow-up in 1 year with renal panel, PC ratio, intact PTH, vitamin-D.      Pedro Stacy MD         [1]   Current Outpatient Medications on File Prior to Visit   Medication Sig Dispense Refill    allopurinoL (ZYLOPRIM) 100 MG tablet TAKE ONE  TABLET BY MOUTH ONCE DAILY 90 tablet 1    amlodipine-benazepril 5-20 mg (LOTREL) 5-20 mg per capsule Take 1 capsule by mouth once daily. 90 capsule 3    ergocalciferol (ERGOCALCIFEROL) 50,000 unit Cap Take 1 capsule (50,000 Units total) by mouth every 7 days. 12 capsule 0    fluticasone propionate (FLONASE) 50 mcg/actuation nasal spray USE TWO SPRAYS IN EACH NOSTRIL ONCE DAILY 48 g 1    gabapentin (NEURONTIN) 300 MG capsule TAKE ONE CAPSULE BY MOUTH DURING THE DAY THEN TWO CAPSULES EVERY NIGHT AT BEDTIME 270 capsule 1    isosorbide mononitrate (IMDUR) 30 MG 24 hr tablet Take 1 tablet (30 mg total) by mouth once daily. 30 tablet 11    levocetirizine (XYZAL) 5 MG tablet TAKE ONE TABLET BY MOUTH EVERY EVENING 90 tablet 1    metoprolol succinate (TOPROL-XL) 25 MG 24 hr tablet Take 1 tablet (25 mg total) by mouth once daily. 90 tablet 2    nitroGLYCERIN (NITROSTAT) 0.4 MG SL tablet Place 1 tablet (0.4 mg total) under the tongue every 5 (five) minutes as needed. 20 tablet 12    potassium chloride (KLOR-CON) 10 MEQ TbSR Take 1 tablet (10 mEq total) by mouth every Mon, Wed, Fri, Sun. 48 tablet 3    pravastatin (PRAVACHOL) 40 MG tablet Take 1 tablet (40 mg total) by mouth once daily. 90 tablet 3    testosterone cypionate (DEPOTESTOTERONE CYPIONATE) 200 mg/mL injection INJECT ONE ML INTO MUSCLE EVERY 14 DAYS 2 mL 5    traZODone (DESYREL) 50 MG tablet Take 1 tablet (50 mg total) by mouth once daily. 90 tablet 2     No current facility-administered medications on file prior to visit.

## 2025-08-28 ENCOUNTER — HOSPITAL ENCOUNTER (OUTPATIENT)
Dept: RADIOLOGY | Facility: HOSPITAL | Age: 60
Discharge: HOME | End: 2025-08-28
Attending: UROLOGY
Payer: COMMERCIAL

## 2025-08-28 DIAGNOSIS — C64.2 RENAL CELL CARCINOMA OF LEFT KIDNEY: ICD-10-CM

## 2025-08-28 PROCEDURE — 76770 US EXAM ABDO BACK WALL COMP: CPT | Mod: 26,,, | Performed by: RADIOLOGY

## 2025-08-28 PROCEDURE — 71045 X-RAY EXAM CHEST 1 VIEW: CPT | Mod: TC

## 2025-08-28 PROCEDURE — 71045 X-RAY EXAM CHEST 1 VIEW: CPT | Mod: 26,,, | Performed by: RADIOLOGY

## 2025-08-28 PROCEDURE — 76770 US EXAM ABDO BACK WALL COMP: CPT | Mod: TC

## 2025-08-29 ENCOUNTER — PATIENT MESSAGE (OUTPATIENT)
Dept: UROLOGY | Facility: CLINIC | Age: 60
End: 2025-08-29
Payer: COMMERCIAL

## 2025-09-02 ENCOUNTER — TELEPHONE (OUTPATIENT)
Dept: PAIN MEDICINE | Facility: CLINIC | Age: 60
End: 2025-09-02

## 2025-09-02 ENCOUNTER — OFFICE VISIT (OUTPATIENT)
Dept: PAIN MEDICINE | Facility: CLINIC | Age: 60
End: 2025-09-02
Payer: COMMERCIAL

## 2025-09-02 DIAGNOSIS — M47.816 LUMBAR SPONDYLOSIS: ICD-10-CM

## 2025-09-02 DIAGNOSIS — M51.362 DEGENERATION OF INTERVERTEBRAL DISC OF LUMBAR REGION WITH DISCOGENIC BACK PAIN AND LOWER EXTREMITY PAIN: Primary | ICD-10-CM

## 2025-09-02 DIAGNOSIS — M54.16 LUMBAR RADICULOPATHY: ICD-10-CM

## 2025-09-02 DIAGNOSIS — M43.06 PARS DEFECT OF LUMBAR SPINE: ICD-10-CM

## 2025-09-02 DIAGNOSIS — M43.17 SPONDYLOLISTHESIS OF LUMBOSACRAL REGION: ICD-10-CM

## 2025-09-02 DIAGNOSIS — M54.16 LUMBAR RADICULOPATHY, CHRONIC: ICD-10-CM

## 2025-09-02 PROCEDURE — 98006 SYNCH AUDIO-VIDEO EST MOD 30: CPT | Mod: 95,,, | Performed by: PHYSICIAN ASSISTANT

## 2025-09-02 PROCEDURE — 4010F ACE/ARB THERAPY RXD/TAKEN: CPT | Mod: CPTII,95,, | Performed by: PHYSICIAN ASSISTANT

## 2025-09-02 PROCEDURE — 3061F NEG MICROALBUMINURIA REV: CPT | Mod: CPTII,95,, | Performed by: PHYSICIAN ASSISTANT

## 2025-09-02 PROCEDURE — 3044F HG A1C LEVEL LT 7.0%: CPT | Mod: CPTII,95,, | Performed by: PHYSICIAN ASSISTANT

## 2025-09-02 PROCEDURE — 3066F NEPHROPATHY DOC TX: CPT | Mod: CPTII,95,, | Performed by: PHYSICIAN ASSISTANT

## 2025-09-02 RX ORDER — GABAPENTIN 300 MG/1
CAPSULE ORAL
Qty: 270 CAPSULE | Refills: 1 | Status: SHIPPED | OUTPATIENT
Start: 2025-09-02

## 2025-09-03 ENCOUNTER — OFFICE VISIT (OUTPATIENT)
Dept: UROLOGY | Facility: CLINIC | Age: 60
End: 2025-09-03
Payer: COMMERCIAL

## 2025-09-03 ENCOUNTER — TELEPHONE (OUTPATIENT)
Dept: UROLOGY | Facility: CLINIC | Age: 60
End: 2025-09-03
Payer: COMMERCIAL

## 2025-09-03 VITALS
BODY MASS INDEX: 38.19 KG/M2 | SYSTOLIC BLOOD PRESSURE: 119 MMHG | HEIGHT: 68 IN | HEART RATE: 68 BPM | WEIGHT: 252 LBS | DIASTOLIC BLOOD PRESSURE: 78 MMHG

## 2025-09-03 DIAGNOSIS — C64.2 RENAL CELL CARCINOMA OF LEFT KIDNEY: ICD-10-CM

## 2025-09-03 DIAGNOSIS — R79.89 LOW TESTOSTERONE: ICD-10-CM

## 2025-09-03 DIAGNOSIS — N39.0 RECURRENT UTI: Primary | ICD-10-CM

## 2025-09-03 DIAGNOSIS — N18.32 STAGE 3B CHRONIC KIDNEY DISEASE: ICD-10-CM

## 2025-09-03 PROCEDURE — 4010F ACE/ARB THERAPY RXD/TAKEN: CPT | Mod: CPTII,S$GLB,, | Performed by: UROLOGY

## 2025-09-03 PROCEDURE — 99213 OFFICE O/P EST LOW 20 MIN: CPT | Mod: S$GLB,,, | Performed by: UROLOGY

## 2025-09-03 PROCEDURE — 3066F NEPHROPATHY DOC TX: CPT | Mod: CPTII,S$GLB,, | Performed by: UROLOGY

## 2025-09-03 PROCEDURE — 99999 PR PBB SHADOW E&M-EST. PATIENT-LVL IV: CPT | Mod: PBBFAC,,, | Performed by: UROLOGY

## 2025-09-03 PROCEDURE — 3044F HG A1C LEVEL LT 7.0%: CPT | Mod: CPTII,S$GLB,, | Performed by: UROLOGY

## 2025-09-03 PROCEDURE — 1159F MED LIST DOCD IN RCRD: CPT | Mod: CPTII,S$GLB,, | Performed by: UROLOGY

## 2025-09-03 PROCEDURE — 3078F DIAST BP <80 MM HG: CPT | Mod: CPTII,S$GLB,, | Performed by: UROLOGY

## 2025-09-03 PROCEDURE — 3074F SYST BP LT 130 MM HG: CPT | Mod: CPTII,S$GLB,, | Performed by: UROLOGY

## 2025-09-03 PROCEDURE — 3061F NEG MICROALBUMINURIA REV: CPT | Mod: CPTII,S$GLB,, | Performed by: UROLOGY

## 2025-09-03 PROCEDURE — 3008F BODY MASS INDEX DOCD: CPT | Mod: CPTII,S$GLB,, | Performed by: UROLOGY

## 2025-09-04 ENCOUNTER — TELEPHONE (OUTPATIENT)
Dept: PAIN MEDICINE | Facility: CLINIC | Age: 60
End: 2025-09-04
Payer: COMMERCIAL

## (undated) DEVICE — SEAL SCOPE WARMER 20/BX

## (undated) DEVICE — LINER GLOVE POWDERFREE SZ 8.5

## (undated) DEVICE — TIP SUCTION YANKAUER

## (undated) DEVICE — COVER OVERHEAD SURG LT BLUE

## (undated) DEVICE — DRAPE ABDOMINAL TIBURON 14X11

## (undated) DEVICE — SEE MEDLINE ITEM 157117

## (undated) DEVICE — HEMOSTAT SURGICEL 4X8IN

## (undated) DEVICE — STAPLER INT LINEAR ARTC 3.5-45

## (undated) DEVICE — DRESSING MEPORE 3.6 X 10

## (undated) DEVICE — TROCAR ENDOPATH XCEL 11MM 10CM

## (undated) DEVICE — SEE MEDLINE ITEM 157027

## (undated) DEVICE — CHLORAPREP 3ML APPLICATOR TINT

## (undated) DEVICE — CANNULA ENDOPATH XCEL 5X100MM

## (undated) DEVICE — SYR 3CC LUER LOC

## (undated) DEVICE — APPLIER CLIP ENDO MED/LG 10MM

## (undated) DEVICE — TROCAR ENDOPATH XCEL 12X100MM

## (undated) DEVICE — ELECTRODE REM PLYHSV RETURN 9

## (undated) DEVICE — CART STAPLE RELD 45MM WHT

## (undated) DEVICE — SOL IRR NACL .9% 3000ML

## (undated) DEVICE — SEE MEDLINE ITEM 152622

## (undated) DEVICE — SUT MCRYL PLUS 4-0 PS2 27IN

## (undated) DEVICE — NDL PNEUMO INSUFFLATI 120MM

## (undated) DEVICE — KIT GELPORT LAPAROSCOPIC ABD

## (undated) DEVICE — NDL 18GA X1 1/2 REG BEVEL

## (undated) DEVICE — TROCAR KII BLLN 12MM 10CM

## (undated) DEVICE — SOL NS 1000CC

## (undated) DEVICE — NDL SAFETY 25G X 1.5 ECLIPSE

## (undated) DEVICE — TROCAR ENDOPATH XCEL 5X100MM

## (undated) DEVICE — Device

## (undated) DEVICE — MANIFOLD 4 PORT

## (undated) DEVICE — SYR 10CC LUER LOCK

## (undated) DEVICE — UNDERGLOVES BIOGEL PI SIZE 8.5

## (undated) DEVICE — SEALER LIGASURE MARYLAND 37CM

## (undated) DEVICE — SEE MEDLINE ITEM 152739

## (undated) DEVICE — KIT ANTIFOG

## (undated) DEVICE — IRRIGATOR ENDOSCOPY DISP.